# Patient Record
Sex: FEMALE | Race: WHITE | NOT HISPANIC OR LATINO | Employment: OTHER | URBAN - METROPOLITAN AREA
[De-identification: names, ages, dates, MRNs, and addresses within clinical notes are randomized per-mention and may not be internally consistent; named-entity substitution may affect disease eponyms.]

---

## 2020-06-03 ENCOUNTER — HOSPITAL ENCOUNTER (OUTPATIENT)
Dept: LAB | Facility: MEDICAL CENTER | Age: 70
End: 2020-06-03
Attending: PHYSICIAN ASSISTANT
Payer: MEDICARE

## 2020-06-03 LAB
BASOPHILS # BLD AUTO: 0.6 % (ref 0–1.8)
BASOPHILS # BLD: 0.04 K/UL (ref 0–0.12)
CRP SERPL HS-MCNC: 1.2 MG/DL (ref 0–0.75)
EOSINOPHIL # BLD AUTO: 0.12 K/UL (ref 0–0.51)
EOSINOPHIL NFR BLD: 1.9 % (ref 0–6.9)
ERYTHROCYTE [DISTWIDTH] IN BLOOD BY AUTOMATED COUNT: 51.1 FL (ref 35.9–50)
ERYTHROCYTE [SEDIMENTATION RATE] IN BLOOD BY WESTERGREN METHOD: 8 MM/HOUR (ref 0–30)
HCT VFR BLD AUTO: 41.6 % (ref 37–47)
HGB BLD-MCNC: 13.5 G/DL (ref 12–16)
IMM GRANULOCYTES # BLD AUTO: 0.01 K/UL (ref 0–0.11)
IMM GRANULOCYTES NFR BLD AUTO: 0.2 % (ref 0–0.9)
LYMPHOCYTES # BLD AUTO: 2.32 K/UL (ref 1–4.8)
LYMPHOCYTES NFR BLD: 36.4 % (ref 22–41)
MCH RBC QN AUTO: 30.3 PG (ref 27–33)
MCHC RBC AUTO-ENTMCNC: 32.5 G/DL (ref 33.6–35)
MCV RBC AUTO: 93.3 FL (ref 81.4–97.8)
MONOCYTES # BLD AUTO: 0.44 K/UL (ref 0–0.85)
MONOCYTES NFR BLD AUTO: 6.9 % (ref 0–13.4)
NEUTROPHILS # BLD AUTO: 3.45 K/UL (ref 2–7.15)
NEUTROPHILS NFR BLD: 54 % (ref 44–72)
NRBC # BLD AUTO: 0 K/UL
NRBC BLD-RTO: 0 /100 WBC
PLATELET # BLD AUTO: 251 K/UL (ref 164–446)
PMV BLD AUTO: 10.6 FL (ref 9–12.9)
RBC # BLD AUTO: 4.46 M/UL (ref 4.2–5.4)
WBC # BLD AUTO: 6.4 K/UL (ref 4.8–10.8)

## 2020-06-03 PROCEDURE — 86140 C-REACTIVE PROTEIN: CPT

## 2020-06-03 PROCEDURE — 36415 COLL VENOUS BLD VENIPUNCTURE: CPT

## 2020-06-03 PROCEDURE — 85652 RBC SED RATE AUTOMATED: CPT

## 2020-06-03 PROCEDURE — 85025 COMPLETE CBC W/AUTO DIFF WBC: CPT

## 2022-05-04 PROBLEM — I10 ESSENTIAL HYPERTENSION: Status: ACTIVE | Noted: 2022-05-04

## 2022-05-04 PROBLEM — I25.10 CORONARY ATHEROSCLEROSIS: Status: ACTIVE | Noted: 2020-10-14

## 2022-05-04 PROBLEM — G47.39 SLEEP APNEA-LIKE BEHAVIOR: Status: ACTIVE | Noted: 2022-05-04

## 2022-05-04 PROBLEM — G47.14 HYPERSOMNIA DUE TO MEDICAL CONDITION: Status: ACTIVE | Noted: 2022-05-04

## 2022-05-04 PROBLEM — I70.1 RENAL ARTERY STENOSIS (HCC): Status: ACTIVE | Noted: 2022-05-04

## 2022-05-04 PROBLEM — I47.29 PAROXYSMAL VENTRICULAR TACHYCARDIA (HCC): Status: ACTIVE | Noted: 2020-10-14

## 2022-05-04 PROBLEM — E66.9 OBESITY: Status: ACTIVE | Noted: 2020-10-14

## 2022-05-04 PROBLEM — R53.82 CHRONIC FATIGUE: Status: ACTIVE | Noted: 2022-05-04

## 2022-05-04 PROBLEM — I27.20 PULMONARY HYPERTENSION (HCC): Status: ACTIVE | Noted: 2020-10-14

## 2022-05-04 PROBLEM — Z12.31 ENCOUNTER FOR SCREENING MAMMOGRAM FOR MALIGNANT NEOPLASM OF BREAST: Status: ACTIVE | Noted: 2022-05-04

## 2022-05-04 PROBLEM — Z86.73 HISTORY OF STROKE: Status: ACTIVE | Noted: 2020-10-14

## 2022-05-04 PROBLEM — R60.0 BILATERAL LOWER EXTREMITY EDEMA: Status: ACTIVE | Noted: 2022-05-04

## 2022-05-04 PROBLEM — I48.0 PAROXYSMAL ATRIAL FIBRILLATION WITH RAPID VENTRICULAR RESPONSE (HCC): Status: ACTIVE | Noted: 2020-10-14

## 2022-10-06 PROBLEM — M1A.09X0 IDIOPATHIC CHRONIC GOUT OF MULTIPLE SITES WITHOUT TOPHUS: Status: ACTIVE | Noted: 2022-10-06

## 2022-10-30 ENCOUNTER — DOCUMENTATION (OUTPATIENT)
Dept: HOSPITALIST | Facility: MEDICAL CENTER | Age: 72
End: 2022-10-30
Payer: MEDICARE

## 2022-10-30 ENCOUNTER — HOSPITAL ENCOUNTER (INPATIENT)
Facility: MEDICAL CENTER | Age: 72
LOS: 4 days | DRG: 327 | End: 2022-11-03
Attending: STUDENT IN AN ORGANIZED HEALTH CARE EDUCATION/TRAINING PROGRAM | Admitting: INTERNAL MEDICINE
Payer: MEDICARE
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
## 2022-10-30 DIAGNOSIS — G89.18 POSTOPERATIVE PAIN: ICD-10-CM

## 2022-10-30 DIAGNOSIS — T18.128S FOOD IMPACTION OF ESOPHAGUS, SEQUELA: ICD-10-CM

## 2022-10-30 DIAGNOSIS — K31.89 STENOSIS OF STOMACH: ICD-10-CM

## 2022-10-30 PROBLEM — E86.0 DEHYDRATION: Status: ACTIVE | Noted: 2022-10-30

## 2022-10-30 PROBLEM — F32.A DEPRESSION: Status: ACTIVE | Noted: 2022-10-30

## 2022-10-30 PROBLEM — R11.10 VOMITING: Status: ACTIVE | Noted: 2022-10-30

## 2022-10-30 PROBLEM — E03.9 HYPOTHYROIDISM: Status: ACTIVE | Noted: 2022-10-30

## 2022-10-30 PROCEDURE — 99223 1ST HOSP IP/OBS HIGH 75: CPT | Mod: AI | Performed by: INTERNAL MEDICINE

## 2022-10-30 PROCEDURE — 700105 HCHG RX REV CODE 258: Performed by: INTERNAL MEDICINE

## 2022-10-30 PROCEDURE — A9270 NON-COVERED ITEM OR SERVICE: HCPCS | Performed by: INTERNAL MEDICINE

## 2022-10-30 PROCEDURE — 700102 HCHG RX REV CODE 250 W/ 637 OVERRIDE(OP): Performed by: INTERNAL MEDICINE

## 2022-10-30 PROCEDURE — 770001 HCHG ROOM/CARE - MED/SURG/GYN PRIV*

## 2022-10-30 RX ORDER — AMOXICILLIN 875 MG/1
875 TABLET, COATED ORAL 2 TIMES DAILY
Status: ON HOLD | COMMUNITY
Start: 2022-10-29 | End: 2022-11-03

## 2022-10-30 RX ORDER — AMOXICILLIN 250 MG
2 CAPSULE ORAL 2 TIMES DAILY
Status: DISCONTINUED | OUTPATIENT
Start: 2022-10-30 | End: 2022-11-03 | Stop reason: HOSPADM

## 2022-10-30 RX ORDER — ONDANSETRON 2 MG/ML
4 INJECTION INTRAMUSCULAR; INTRAVENOUS EVERY 4 HOURS PRN
Status: DISCONTINUED | OUTPATIENT
Start: 2022-10-30 | End: 2022-11-03 | Stop reason: HOSPADM

## 2022-10-30 RX ORDER — BISACODYL 10 MG
10 SUPPOSITORY, RECTAL RECTAL
Status: DISCONTINUED | OUTPATIENT
Start: 2022-10-30 | End: 2022-11-03 | Stop reason: HOSPADM

## 2022-10-30 RX ORDER — FLUOXETINE HYDROCHLORIDE 20 MG/1
80 CAPSULE ORAL DAILY
Status: DISCONTINUED | OUTPATIENT
Start: 2022-10-31 | End: 2022-11-03 | Stop reason: HOSPADM

## 2022-10-30 RX ORDER — OXYCODONE HYDROCHLORIDE 5 MG/1
5 TABLET ORAL EVERY 4 HOURS PRN
Status: DISCONTINUED | OUTPATIENT
Start: 2022-10-30 | End: 2022-11-03 | Stop reason: HOSPADM

## 2022-10-30 RX ORDER — ONDANSETRON 4 MG/1
4 TABLET, ORALLY DISINTEGRATING ORAL EVERY 4 HOURS PRN
Status: DISCONTINUED | OUTPATIENT
Start: 2022-10-30 | End: 2022-11-03 | Stop reason: HOSPADM

## 2022-10-30 RX ORDER — TRAZODONE HYDROCHLORIDE 50 MG/1
100 TABLET ORAL NIGHTLY
Status: DISCONTINUED | OUTPATIENT
Start: 2022-10-30 | End: 2022-11-03 | Stop reason: HOSPADM

## 2022-10-30 RX ORDER — ALLOPURINOL 100 MG/1
100 TABLET ORAL DAILY
Status: DISCONTINUED | OUTPATIENT
Start: 2022-10-31 | End: 2022-11-03 | Stop reason: HOSPADM

## 2022-10-30 RX ORDER — HYDRALAZINE HYDROCHLORIDE 20 MG/ML
10 INJECTION INTRAMUSCULAR; INTRAVENOUS EVERY 4 HOURS PRN
Status: DISCONTINUED | OUTPATIENT
Start: 2022-10-30 | End: 2022-11-03 | Stop reason: HOSPADM

## 2022-10-30 RX ORDER — ACETAMINOPHEN 325 MG/1
650 TABLET ORAL EVERY 6 HOURS PRN
Status: DISCONTINUED | OUTPATIENT
Start: 2022-10-30 | End: 2022-11-03 | Stop reason: HOSPADM

## 2022-10-30 RX ORDER — SODIUM CHLORIDE 9 MG/ML
INJECTION, SOLUTION INTRAVENOUS CONTINUOUS
Status: DISCONTINUED | OUTPATIENT
Start: 2022-10-30 | End: 2022-10-31

## 2022-10-30 RX ORDER — AMOXICILLIN 875 MG/1
875 TABLET, COATED ORAL 2 TIMES DAILY
Status: DISCONTINUED | OUTPATIENT
Start: 2022-10-30 | End: 2022-10-30

## 2022-10-30 RX ORDER — LANOLIN ALCOHOL/MO/W.PET/CERES
400 CREAM (GRAM) TOPICAL DAILY
Status: DISCONTINUED | OUTPATIENT
Start: 2022-10-31 | End: 2022-10-31

## 2022-10-30 RX ORDER — POLYETHYLENE GLYCOL 3350 17 G/17G
1 POWDER, FOR SOLUTION ORAL
Status: DISCONTINUED | OUTPATIENT
Start: 2022-10-30 | End: 2022-11-03 | Stop reason: HOSPADM

## 2022-10-30 RX ADMIN — SODIUM CHLORIDE: 9 INJECTION, SOLUTION INTRAVENOUS at 21:53

## 2022-10-30 RX ADMIN — OXYCODONE 5 MG: 5 TABLET ORAL at 21:33

## 2022-10-30 RX ADMIN — SENNOSIDES AND DOCUSATE SODIUM 2 TABLET: 50; 8.6 TABLET ORAL at 21:33

## 2022-10-30 RX ADMIN — TRAZODONE HYDROCHLORIDE 100 MG: 50 TABLET ORAL at 21:33

## 2022-10-30 ASSESSMENT — COGNITIVE AND FUNCTIONAL STATUS - GENERAL
SUGGESTED CMS G CODE MODIFIER DAILY ACTIVITY: CH
DAILY ACTIVITIY SCORE: 24
SUGGESTED CMS G CODE MODIFIER MOBILITY: CH
MOBILITY SCORE: 24

## 2022-10-30 ASSESSMENT — ENCOUNTER SYMPTOMS
SHORTNESS OF BREATH: 0
COUGH: 0
HEADACHES: 0
DEPRESSION: 0
LOSS OF CONSCIOUSNESS: 0
FEVER: 0
FALLS: 0
MYALGIAS: 0
CHILLS: 0
ABDOMINAL PAIN: 1
DIZZINESS: 0
CONSTIPATION: 0
SPUTUM PRODUCTION: 0
VOMITING: 1
STRIDOR: 0
TINGLING: 0
PALPITATIONS: 0
NAUSEA: 0
DIARRHEA: 0
WEAKNESS: 0

## 2022-10-30 ASSESSMENT — LIFESTYLE VARIABLES
EVER HAD A DRINK FIRST THING IN THE MORNING TO STEADY YOUR NERVES TO GET RID OF A HANGOVER: NO
TOTAL SCORE: 0
ALCOHOL_USE: NO
HOW MANY TIMES IN THE PAST YEAR HAVE YOU HAD 5 OR MORE DRINKS IN A DAY: 0
EVER FELT BAD OR GUILTY ABOUT YOUR DRINKING: NO
TOTAL SCORE: 0
HAVE PEOPLE ANNOYED YOU BY CRITICIZING YOUR DRINKING: NO
TOTAL SCORE: 0
AVERAGE NUMBER OF DAYS PER WEEK YOU HAVE A DRINK CONTAINING ALCOHOL: 0
HAVE YOU EVER FELT YOU SHOULD CUT DOWN ON YOUR DRINKING: NO
DOES PATIENT WANT TO STOP DRINKING: NO
CONSUMPTION TOTAL: NEGATIVE
ON A TYPICAL DAY WHEN YOU DRINK ALCOHOL HOW MANY DRINKS DO YOU HAVE: 0

## 2022-10-30 ASSESSMENT — PATIENT HEALTH QUESTIONNAIRE - PHQ9
SUM OF ALL RESPONSES TO PHQ9 QUESTIONS 1 AND 2: 0
2. FEELING DOWN, DEPRESSED, IRRITABLE, OR HOPELESS: NOT AT ALL
1. LITTLE INTEREST OR PLEASURE IN DOING THINGS: NOT AT ALL

## 2022-10-30 ASSESSMENT — PAIN DESCRIPTION - PAIN TYPE: TYPE: ACUTE PAIN

## 2022-10-31 ENCOUNTER — APPOINTMENT (OUTPATIENT)
Dept: RADIOLOGY | Facility: MEDICAL CENTER | Age: 72
DRG: 327 | End: 2022-10-31
Attending: SURGERY
Payer: MEDICARE

## 2022-10-31 PROBLEM — R00.1 BRADYCARDIA: Status: ACTIVE | Noted: 2022-10-31

## 2022-10-31 PROBLEM — K04.7 DENTAL INFECTION: Status: ACTIVE | Noted: 2022-10-31

## 2022-10-31 PROBLEM — N17.9 AKI (ACUTE KIDNEY INJURY) (HCC): Status: ACTIVE | Noted: 2022-10-30

## 2022-10-31 PROBLEM — Z98.84 HX OF LAPAROSCOPIC GASTRIC BANDING: Status: ACTIVE | Noted: 2022-10-31

## 2022-10-31 PROBLEM — E83.42 HYPOMAGNESEMIA: Status: ACTIVE | Noted: 2022-10-31

## 2022-10-31 PROBLEM — J96.11 CHRONIC RESPIRATORY FAILURE WITH HYPOXIA (HCC): Status: ACTIVE | Noted: 2022-10-31

## 2022-10-31 LAB
ALBUMIN SERPL BCP-MCNC: 3.8 G/DL (ref 3.2–4.9)
ALBUMIN/GLOB SERPL: 1.4 G/DL
ALP SERPL-CCNC: 62 U/L (ref 30–99)
ALT SERPL-CCNC: 15 U/L (ref 2–50)
ANION GAP SERPL CALC-SCNC: 10 MMOL/L (ref 7–16)
ANION GAP SERPL CALC-SCNC: 10 MMOL/L (ref 7–16)
AST SERPL-CCNC: 23 U/L (ref 12–45)
BILIRUB SERPL-MCNC: 0.3 MG/DL (ref 0.1–1.5)
BUN SERPL-MCNC: 22 MG/DL (ref 8–22)
BUN SERPL-MCNC: 22 MG/DL (ref 8–22)
CALCIUM SERPL-MCNC: 9.1 MG/DL (ref 8.5–10.5)
CALCIUM SERPL-MCNC: 9.1 MG/DL (ref 8.5–10.5)
CHLORIDE SERPL-SCNC: 110 MMOL/L (ref 96–112)
CHLORIDE SERPL-SCNC: 110 MMOL/L (ref 96–112)
CO2 SERPL-SCNC: 23 MMOL/L (ref 20–33)
CO2 SERPL-SCNC: 23 MMOL/L (ref 20–33)
CREAT SERPL-MCNC: 1.03 MG/DL (ref 0.5–1.4)
CREAT SERPL-MCNC: 1.03 MG/DL (ref 0.5–1.4)
EKG IMPRESSION: NORMAL
ERYTHROCYTE [DISTWIDTH] IN BLOOD BY AUTOMATED COUNT: 56.1 FL (ref 35.9–50)
GFR SERPLBLD CREATININE-BSD FMLA CKD-EPI: 57 ML/MIN/1.73 M 2
GLOBULIN SER CALC-MCNC: 2.8 G/DL (ref 1.9–3.5)
GLUCOSE SERPL-MCNC: 81 MG/DL (ref 65–99)
GLUCOSE SERPL-MCNC: 81 MG/DL (ref 65–99)
HCT VFR BLD AUTO: 38.7 % (ref 37–47)
HGB BLD-MCNC: 12.2 G/DL (ref 12–16)
MAGNESIUM SERPL-MCNC: 1.9 MG/DL (ref 1.5–2.5)
MCH RBC QN AUTO: 29.5 PG (ref 27–33)
MCHC RBC AUTO-ENTMCNC: 31.5 G/DL (ref 33.6–35)
MCV RBC AUTO: 93.7 FL (ref 81.4–97.8)
PHOSPHATE SERPL-MCNC: 4 MG/DL (ref 2.5–4.5)
PLATELET # BLD AUTO: 180 K/UL (ref 164–446)
PMV BLD AUTO: 10.1 FL (ref 9–12.9)
POTASSIUM SERPL-SCNC: 4.3 MMOL/L (ref 3.6–5.5)
POTASSIUM SERPL-SCNC: 4.3 MMOL/L (ref 3.6–5.5)
PROT SERPL-MCNC: 6.6 G/DL (ref 6–8.2)
RBC # BLD AUTO: 4.13 M/UL (ref 4.2–5.4)
SODIUM SERPL-SCNC: 143 MMOL/L (ref 135–145)
SODIUM SERPL-SCNC: 143 MMOL/L (ref 135–145)
WBC # BLD AUTO: 4.9 K/UL (ref 4.8–10.8)

## 2022-10-31 PROCEDURE — 700102 HCHG RX REV CODE 250 W/ 637 OVERRIDE(OP): Performed by: INTERNAL MEDICINE

## 2022-10-31 PROCEDURE — 700111 HCHG RX REV CODE 636 W/ 250 OVERRIDE (IP): Performed by: SURGERY

## 2022-10-31 PROCEDURE — 80053 COMPREHEN METABOLIC PANEL: CPT

## 2022-10-31 PROCEDURE — 83735 ASSAY OF MAGNESIUM: CPT

## 2022-10-31 PROCEDURE — A9270 NON-COVERED ITEM OR SERVICE: HCPCS | Performed by: INTERNAL MEDICINE

## 2022-10-31 PROCEDURE — 93005 ELECTROCARDIOGRAM TRACING: CPT | Performed by: FAMILY MEDICINE

## 2022-10-31 PROCEDURE — 93010 ELECTROCARDIOGRAM REPORT: CPT | Performed by: INTERNAL MEDICINE

## 2022-10-31 PROCEDURE — A9270 NON-COVERED ITEM OR SERVICE: HCPCS | Performed by: SURGERY

## 2022-10-31 PROCEDURE — 700111 HCHG RX REV CODE 636 W/ 250 OVERRIDE (IP): Performed by: INTERNAL MEDICINE

## 2022-10-31 PROCEDURE — 700111 HCHG RX REV CODE 636 W/ 250 OVERRIDE (IP): Performed by: FAMILY MEDICINE

## 2022-10-31 PROCEDURE — 85027 COMPLETE CBC AUTOMATED: CPT

## 2022-10-31 PROCEDURE — 700105 HCHG RX REV CODE 258: Performed by: INTERNAL MEDICINE

## 2022-10-31 PROCEDURE — 74240 X-RAY XM UPR GI TRC 1CNTRST: CPT

## 2022-10-31 PROCEDURE — 700117 HCHG RX CONTRAST REV CODE 255: Performed by: SURGERY

## 2022-10-31 PROCEDURE — 84100 ASSAY OF PHOSPHORUS: CPT

## 2022-10-31 PROCEDURE — 36415 COLL VENOUS BLD VENIPUNCTURE: CPT

## 2022-10-31 PROCEDURE — 770001 HCHG ROOM/CARE - MED/SURG/GYN PRIV*

## 2022-10-31 PROCEDURE — 700102 HCHG RX REV CODE 250 W/ 637 OVERRIDE(OP): Performed by: SURGERY

## 2022-10-31 RX ORDER — DEXTROSE AND SODIUM CHLORIDE 5; .9 G/100ML; G/100ML
INJECTION, SOLUTION INTRAVENOUS CONTINUOUS
Status: DISCONTINUED | OUTPATIENT
Start: 2022-10-31 | End: 2022-11-03 | Stop reason: HOSPADM

## 2022-10-31 RX ORDER — MAGNESIUM SULFATE HEPTAHYDRATE 40 MG/ML
2 INJECTION, SOLUTION INTRAVENOUS ONCE
Status: COMPLETED | OUTPATIENT
Start: 2022-10-31 | End: 2022-10-31

## 2022-10-31 RX ORDER — FLUOXETINE HYDROCHLORIDE 40 MG/1
80 CAPSULE ORAL DAILY
COMMUNITY

## 2022-10-31 RX ORDER — CYANOCOBALAMIN 1000 UG/ML
1000 INJECTION, SOLUTION INTRAMUSCULAR; SUBCUTANEOUS ONCE
Status: COMPLETED | OUTPATIENT
Start: 2022-10-31 | End: 2022-10-31

## 2022-10-31 RX ORDER — ASPIRIN 81 MG/1
81 TABLET ORAL DAILY
COMMUNITY

## 2022-10-31 RX ORDER — M-VIT,TX,IRON,MINS/CALC/FOLIC 27MG-0.4MG
1 TABLET ORAL DAILY
Status: DISCONTINUED | OUTPATIENT
Start: 2022-10-31 | End: 2022-11-03 | Stop reason: HOSPADM

## 2022-10-31 RX ADMIN — CYANOCOBALAMIN 1000 MCG: 1000 INJECTION INTRAMUSCULAR; SUBCUTANEOUS at 11:01

## 2022-10-31 RX ADMIN — ONDANSETRON 4 MG: 2 INJECTION INTRAMUSCULAR; INTRAVENOUS at 00:17

## 2022-10-31 RX ADMIN — OXYCODONE 5 MG: 5 TABLET ORAL at 17:49

## 2022-10-31 RX ADMIN — AMPICILLIN AND SULBACTAM 3 G: 1; 2 INJECTION, POWDER, FOR SOLUTION INTRAMUSCULAR; INTRAVENOUS at 05:08

## 2022-10-31 RX ADMIN — OXYCODONE 5 MG: 5 TABLET ORAL at 08:56

## 2022-10-31 RX ADMIN — MAGNESIUM OXIDE TAB 400 MG (241.3 MG ELEMENTAL MG) 400 MG: 400 (241.3 MG) TAB at 05:09

## 2022-10-31 RX ADMIN — AMPICILLIN AND SULBACTAM 3 G: 1; 2 INJECTION, POWDER, FOR SOLUTION INTRAMUSCULAR; INTRAVENOUS at 17:50

## 2022-10-31 RX ADMIN — LEVOTHYROXINE SODIUM 200 MCG: 0.11 TABLET ORAL at 05:08

## 2022-10-31 RX ADMIN — THIAMINE HYDROCHLORIDE 100 MG: 100 INJECTION, SOLUTION INTRAMUSCULAR; INTRAVENOUS at 11:00

## 2022-10-31 RX ADMIN — MULTIPLE VITAMINS W/ MINERALS TAB 1 TABLET: TAB at 11:01

## 2022-10-31 RX ADMIN — SENNOSIDES AND DOCUSATE SODIUM 2 TABLET: 50; 8.6 TABLET ORAL at 05:12

## 2022-10-31 RX ADMIN — AMPICILLIN AND SULBACTAM 3 G: 1; 2 INJECTION, POWDER, FOR SOLUTION INTRAMUSCULAR; INTRAVENOUS at 00:03

## 2022-10-31 RX ADMIN — FLUOXETINE 80 MG: 20 CAPSULE ORAL at 05:09

## 2022-10-31 RX ADMIN — TRAZODONE HYDROCHLORIDE 100 MG: 50 TABLET ORAL at 21:42

## 2022-10-31 RX ADMIN — ALLOPURINOL 100 MG: 100 TABLET ORAL at 05:08

## 2022-10-31 RX ADMIN — BARIUM SULFATE 1 TABLET: 700 TABLET ORAL at 14:30

## 2022-10-31 RX ADMIN — AMPICILLIN AND SULBACTAM 3 G: 1; 2 INJECTION, POWDER, FOR SOLUTION INTRAMUSCULAR; INTRAVENOUS at 11:57

## 2022-10-31 RX ADMIN — MAGNESIUM SULFATE HEPTAHYDRATE 2 G: 40 INJECTION, SOLUTION INTRAVENOUS at 09:42

## 2022-10-31 ASSESSMENT — PAIN DESCRIPTION - PAIN TYPE
TYPE: ACUTE PAIN

## 2022-10-31 NOTE — PROGRESS NOTES
Med Rec Complete per patient  Allergies Reviewed with patient  Patient's Preferred Pharmacy: [facility] in [place], [place]      Patient started a 10 day course of Augmentin 875mg tablets on 10/29/22 and stopped this therapy on 10/30/22 upon admission to hospital and has continued IV antibiotics.

## 2022-10-31 NOTE — ASSESSMENT & PLAN NOTE
- Without nausea  -In patient with history of lap band surgery by Dr. Vega  -She does feel like the food makes it all the way to her stomach however then she begins having some pain and continues to vomit until the food comes up, mild distal esophageal wall thickening on CT scan, will likely need intervention  -May need to discuss the case with Dr. Vega and/or gastroenterology  -She has been tolerating fluids however she is dehydrated I will start IV fluids  -Trial of clear liquid diet, otherwise we will make her n.p.o. at midnight  -No pain with swallowing  -Change on CT scan could be secondary to her vomiting however there could be esophageal narrowing

## 2022-10-31 NOTE — DIETARY
NUTRITION SERVICES: BMI - Pt with BMI >40 (=Body mass index is 40.83 kg/m².), Class III obesity. Weight loss counseling not appropriate in acute care setting. RECOMMEND - If appropriate at DC please refer to outpatient nutrition services for weight management.

## 2022-10-31 NOTE — PROGRESS NOTES
[facility]PITAL[facility] TRIAGE OFFICER DIRECT ADMISSION REPORT    Transferring facility: [facility]    Transferring physician: Dr Eid    Transferring facility/physician contact number: [phone]    Chief complaint: Nausea and Vomiting    Pertinent history & patient course: 73 yo Woman with Afib, HTN, CAD, CKD3 presented to [place] with 3 days of post-prandial pain and N/V. She has history of bariatric surgery with Dr. Vega. Dr. Stanton Pretson. She recently completed a course of augmentin.    Pertinent imaging & lab results: Hgb normal. CT demonstrates GE junction edema without obstruction. BMP without hyponatremia, hypokalemia. Ekg and troponins not indicative of cardiac ischemia.    Code Status: Full Code Full Intubation per transferring provider, I personally verified with the transferring provider patient's code status and the transferring provider has confirmed this with the patient.    Further work up or recommendations per triage officer prior to transfer: Trial of PO liquids after antiemetic administration.    Consultants called prior to transfer and pertinent input from consultants:   Bariatric Surgery - transfer for endoscopy    Patient accepted for transfer: Yes    Consultants to be called upon arrival: Gastroenterology    Admission status: Inpatient.     Floor requested: Medical    If ICU transfer, name of intensivist case discussed with and pertinent input from critical care: N/A    Please inform the triage officer upon arrival of the patient to [facility] for assignment of a hospitalist to perform admission.     For any question or concerns regarding the care of this patient, please reach out to the assigned hospitalist.

## 2022-10-31 NOTE — CARE PLAN
The patient is Stable - Low risk of patient condition declining or worsening    Shift Goals  Clinical Goals: Admit, safety, pain control, rest  Patient Goals: Rest    Progress made toward(s) clinical / shift goals:  Pt resting. Admission profile complete. Pt educated on safety, call light within reach, pain controlled w/ medication per MD order.     Patient is not progressing towards the following goals:

## 2022-10-31 NOTE — DISCHARGE PLANNING
"Case Management Discharge Planning    Admission Date: 10/30/2022  GMLOS: 3  ALOS: 1    6-Clicks ADL Score: 24  6-Clicks Mobility Score: 24      Anticipated Discharge Dispo: Likely Home with Close OP follow Up    DME Needed: No    Action(s) Taken: Updated Provider/Nurse on Discharge Plan    Pt was discussed in IDT rounds today and per Dr Celaya pt might be scheduled for Surgery tomorrow.    Per Surgery Notes today\"  Will proceed with additional workup via Fluoro UGI w/ Barium today and will schedule patient for a laparoscopic gastric band/port removal and simultaneous EGD at the earliest convenience (pending OR availability - likely Tues vs Wed of this week).\"    Escalations Completed: None    Medically Clear: No    Next Steps:   This RN CM to continue to assist Pt with discharge as needed    Barriers to Discharge: Medical clearance    Is the patient up for discharge tomorrow: No        "

## 2022-10-31 NOTE — PROGRESS NOTES
Pt arrived to [facility] w/ belongings via [facility].   Pt A&Ox4  Denies tolerating clear liquid diet, intermittant n/v. Hypoactive bowel sounds, passing flatus, LBM PTA. IV access through RAC that is SL.  Saturating >90% on 1L NC.  Pt ambulates SBA.  Pain is controlled through medication orders. Updated on plan of care. Safety education provided. Bed locked in low. Call light within reach. Rounding in place.

## 2022-10-31 NOTE — CONSULTS
BARIATRIC AND GASTROESOPHAGEAL SURGERY  [facility]  CONSULT NOTE    Date  10/31/2022    Primary Care Physician  Drew T. Blumberg, D.O.    CC  Dysphagia, regurgitation, nausea & vomiting, poor PO tolerance    HPI  This is a 72 y.o. female, with prior history of open Chris-en-Y gastric bypass in 2006 (performed in [place], [place]) for morbid obesity, and s/p laparoscopic adjustable gastric band & port placement in 8/2018 for weight regain. She [facility] evaluated at [facility] on 10/30/2022 for ~ 3 day history of progressive dysphagia (solids > liquids), associated with regurgitation, some nausea with vomiting, and almost complete intolerance to PO intake. O/w, no recent history of fevers/chills, hematemesis, odynophagia, CP/SOB, bloating/belching, worsening abdominal pain, dysuria/hematuria, BRBPR/melena, or constipation/diarrhea. No other symptoms or complaints at this time. No recent history of unintentional weight loss.    Past Medical History:   Diagnosis Date    Hypertension     Pulmonary HTN (HCC)     moderate     Sleep apnea     mild JOSH    not on CPAP or O2    Stroke (HCC)     Thyroid disease     Tricuspid valve regurgitation        Past Surgical History:   Procedure Laterality Date    COLONOSCOPY - ENDO  12/8/2016    Procedure: COLONOSCOPY - ENDO with Anesthesia;  Surgeon: Raffaele Ritter M.D.;  Location: SURGERY [facility];  Service:     KNEE REPLACEMENT, TOTAL Right 3/15/2016    Dr Horowitz    CATARACT EXTRACTION WITH IOL Bilateral 2016    CARDIAC CATH, RIGHT/LEFT HEART  12/2015    nonobstructive CAD EF 60%    THYROIDECTOMY  3/2012    COLONOSCOPY  2010    GASTRIC BYPASS LAPAROSCOPIC  2006    Chris-en-Y    HYSTERECTOMY, TOTAL ABDOMINAL         Current Facility-Administered Medications   Medication Dose Route Frequency Provider Last Rate Last Admin    allopurinol (ZYLOPRIM) tablet 100 mg  100 mg Oral DAILY Sidney Stone D.O.   100 mg at 10/31/22 0507    FLUoxetine (PROZAC) capsule 80 mg  80 mg Oral  DAILY Sidney Stone D.O.   80 mg at 10/31/22 0509    levothyroxine (SYNTHROID) tablet 200 mcg  200 mcg Oral AM ES Sidney Stone D.O.   200 mcg at 10/31/22 0508    magnesium oxide tablet 400 mg  400 mg Oral DAILY Sidney Stone D.O.   400 mg at 10/31/22 0509    traZODone (DESYREL) tablet 100 mg  100 mg Oral Nightly Sidney Stone D.O.   100 mg at 10/30/22 2133    senna-docusate (PERICOLACE or SENOKOT S) 8.6-50 MG per tablet 2 Tablet  2 Tablet Oral BID Sidney Stone D.O.   2 Tablet at 10/31/22 0512    And    polyethylene glycol/lytes (MIRALAX) PACKET 1 Packet  1 Packet Oral QDAY PRN Sidney Stone D.O.        And    magnesium hydroxide (MILK OF MAGNESIA) suspension 30 mL  30 mL Oral QDAY PRN Sidney Stone D.O.        And    bisacodyl (DULCOLAX) suppository 10 mg  10 mg Rectal QDAY PRN Sidney Stone D.O.        NS infusion   Intravenous Continuous Sidney Stone D.O. 83 mL/hr at 10/30/22 2153 New Bag at 10/30/22 2153    acetaminophen (Tylenol) tablet 650 mg  650 mg Oral Q6HRS PRN Sidney Stone D.O.        hydrALAZINE (APRESOLINE) injection 10 mg  10 mg Intravenous Q4HRS PRN Sidney Stone D.O.        ondansetron (ZOFRAN) syringe/vial injection 4 mg  4 mg Intravenous Q4HRS PRN Sidney Stone D.O.   4 mg at 10/31/22 0017    ondansetron (ZOFRAN ODT) dispertab 4 mg  4 mg Oral Q4HRS PRN Sidney Stone D.O.        oxyCODONE immediate-release (ROXICODONE) tablet 5 mg  5 mg Oral Q4HRS PRN Sidney Stone D.O.   5 mg at 10/30/22 2133    ampicillin/sulbactam (UNASYN) 3 g in  mL IVPB  3 g Intravenous Q6HRS Sidney Stone D.O.   Stopped at 10/31/22 0538       Social History     Socioeconomic History    Marital status:      Spouse name: Not on file    Number of children: Not on file    Years of education: Not on file    Highest education level: Not on file   Occupational History    Not on file   Tobacco Use    Smoking status: Never    Smokeless tobacco: Never   Vaping Use    Vaping  Use: Never used   Substance and Sexual Activity    Alcohol use: No    Drug use: No    Sexual activity: Not on file   Other Topics Concern    Not on file   Social History Narrative    Not on file     Social Determinants of Health     Financial Resource Strain: Not on file   Food Insecurity: Not on file   Transportation Needs: Not on file   Physical Activity: Not on file   Stress: Not on file   Social Connections: Not on file   Intimate Partner Violence: Not on file   Housing Stability: Not on file       Family History   Problem Relation Age of Onset    Heart Attack Mother        Allergies  Iodine    Review of Systems  Negative except for what's noted in the HPI/PMH.    Vital Signs  Blood Pressure : 100/52   Temperature: 35.9 °C (96.7 °F)   Pulse: (!) 51   Respiration: 16   Pulse Oximetry: 96 %     Gen - comfortable, NAD, A&O x 3  HEENT - anicteric, PERRLA, no tracheal deviation  CV - regular rate and rhythm  Pulm - normal chest excursion, CTA - B  Abd - soft, no TTP, no rebound/guarding, no distention, no palp masses or bulges; gastric port is palp in LUQ - no evidence of overlying skin changes or fluctuance  Ext - full range of motion, no clubbing, cyanosis or edema bilaterally  Neuro - no focal deficits, CN II-XII are grossly intact  Skin - no rashes/lesions, no open wounds, no jaundice    Labs:  Recent Labs     10/31/22  0643   WBC 4.9   RBC 4.13*   HEMOGLOBIN 12.2   HEMATOCRIT 38.7   MCV 93.7   MCH 29.5   MCHC 31.5*   RDW 56.1*   PLATELETCT 180   MPV 10.1     Recent Labs     10/31/22  0643   SODIUM 143  143   POTASSIUM 4.3  4.3   CHLORIDE 110  110   CO2 23  23   GLUCOSE 81  81   BUN 22  22   CREATININE 1.03  1.03   CALCIUM 9.1  9.1         Recent Labs     10/31/22  0643   ASTSGOT 23   ALTSGPT 15   TBILIRUBIN 0.3   ALKPHOSPHAT 62   GLOBULIN 2.8       Assessment/Plan:  Possible gastric band slippage causing gastric outlet obstruction, s/p open Chris-en-Y gastric bypass in 2006 for morbid obesity, followed by  laparoscopic adjustable gastric band & port placement in 8/2018 for weight regain. Other Ddx includes: EGJOO (either due to benign or malignant causes), GJ anastomosis stricture, internal hernia, SBO due to adhesions, etc.    Will proceed with additional workup via Fluoro UGI w/ Barium today and will schedule patient for a laparoscopic gastric band/port removal and simultaneous EGD at the earliest convenience (pending OR availability - likely Tues vs Wed of this week). If additional findings are noted on Fluoro UGI, which require adjustment to the plan, both patient and her primary team will be notified ASAP. Will also write for IV Vitamin supplementation given history of RYGB and poor PO intake.    Thank you for giving us this opportunity to care for your patient.    Eitan Preston MD MPH FACS [facility]  Bariatric and Gastroesophageal Surgery  [facility]  Clinical Assistant Professor of Surgery and Physiology & Cell Biology  [facility]

## 2022-10-31 NOTE — PROGRESS NOTES
Assumed care, bedside report received from Bernie CADET. Initial assessment completed, orders reviewed, call light within reach, and hourly rounding in place. POC addressed with patient, no additional questions at this time.

## 2022-10-31 NOTE — H&P
Hospital Medicine History & Physical Note    Date of Service  10/30/2022    Primary Care Physician  Drew T. Blumberg, D.O.    Consultants  None    Specialist Names: None    Code Status  Full Code    Chief Complaint  No chief complaint on file.      History of Presenting Illness  Azalea Gan is a 72 y.o. female who presented 10/30/2022 with vomiting.  Patient states her symptoms started 3 days ago.  She tried to have a small amount of food, within a couple of minutes she had vomited it back up.  She states this has happened for the last 3 days, today she felt she was becoming more dehydrated and since her symptoms persisted she presented to an outside facility.  She was transferred here for higher level of care.  She does have a history of a Lap-Band surgery with Dr. Vega, states the only issue she has had in the past is if she eats too rapidly then she vomits everything up.  She states she can keep her pills down as well as fluids but any solid food she vomits.  She denies any nausea.  She states what happens that she will eat and then she will feel it get to her stomach and then she began to have some mild upper quadrant cramping and then she will began vomiting.  She states at first she will vomit a lot of fluid and then eventually she will get the food out but will continue to vomit until she gets it all out.  After she gets the food out, she has no symptoms.  However, next time she tries to eat she will have the same occurrence.  Patient denies any nausea, fever, chills.    I discussed the plan of care with patient.    Review of Systems  Review of Systems   Constitutional:  Negative for chills, fever and malaise/fatigue.   HENT:  Negative for congestion.    Respiratory:  Negative for cough, sputum production, shortness of breath and stridor.    Cardiovascular:  Negative for chest pain, palpitations and leg swelling.   Gastrointestinal:  Positive for abdominal pain and vomiting. Negative for constipation,  diarrhea and nausea.   Genitourinary:  Negative for dysuria and urgency.   Musculoskeletal:  Negative for falls and myalgias.   Neurological:  Negative for dizziness, tingling, loss of consciousness, weakness and headaches.   Psychiatric/Behavioral:  Negative for depression and suicidal ideas.    All other systems reviewed and are negative.    Past Medical History   has a past medical history of Hypertension, Pulmonary HTN (HCC), Sleep apnea, Stroke (HCC), Thyroid disease, and Tricuspid valve regurgitation.    Surgical History   has a past surgical history that includes gastric bypass laparoscopic (2006); hysterectomy, total abdominal; thyroidectomy (3/2012); knee replacement, total (Right, 3/15/2016); cardiac cath, right/left heart (12/2015); cataract extraction with iol (Bilateral, 2016); colonoscopy (2010); and colonoscopy - endo (12/8/2016).     Family History  family history includes Heart Attack in her mother.   Family history reviewed with patient. There is no family history that is pertinent to the chief complaint.     Social History   reports that she has never smoked. She has never used smokeless tobacco. She reports that she does not drink alcohol and does not use drugs.    Allergies  Allergies   Allergen Reactions    Iodine Anaphylaxis       Medications  Prior to Admission Medications   Prescriptions Last Dose Informant Patient Reported? Taking?   allopurinol (ZYLOPRIM) 100 MG Tab   No No   Sig: Take 1 Tablet by mouth every day.   amLODIPine (NORVASC) 10 MG Tab   No No   Sig: Take 1 Tablet by mouth every day.   chlorthalidone (HYGROTON) 25 MG Tab   No No   Sig: Take 1 Tablet by mouth every day.   cloNIDine (CATAPRES) 0.1 MG Tab   Yes No   Sig: Take 0.1 mg by mouth as needed.   fluoxetine (PROZAC) 40 MG capsule   No No   Sig: Take 2 Capsules by mouth every day for 180 days.   furosemide (LASIX) 20 MG Tab   No No   Sig: Take 1 Tablet by mouth every day for 180 days.   levothyroxine (SYNTHROID) 200 MCG Tab    No No   Sig: Take 1 Tablet by mouth every morning on an empty stomach.   lisinopril (PRINIVIL) 40 MG tablet   No No   Sig: Take 1 Tablet by mouth every day for 180 days.   magnesium oxide (MAG-OX) 400 MG Tab tablet   Yes No   Sig: Take 400 mg by mouth every day.   metoprolol tartrate (LOPRESSOR) 25 MG Tab   No No   Sig: Take 1 Tablet by mouth 2 times a day for 360 days.   potassium chloride SA (K-DUR) 10 MEQ Tab CR   No No   Sig: Take 1 Tablet by mouth every day.   traZODone (DESYREL) 100 MG Tab   No No   Sig: Take 1 Tablet by mouth every evening for 360 days.      Facility-Administered Medications: None       Physical Exam  Temp:  [36.4 °C (97.5 °F)] 36.4 °C (97.5 °F)  Pulse:  [48] 48  Resp:  [16] 16  BP: (102)/(55) 102/55  SpO2:  [88 %] 88 %  Blood Pressure : 102/55   Temperature: 36.4 °C (97.5 °F)   Pulse: (!) 48   Respiration: 16   Pulse Oximetry: 88 %       Physical Exam  Vitals and nursing note reviewed.   Constitutional:       General: She is not in acute distress.     Appearance: She is well-developed. She is obese. She is not diaphoretic.   HENT:      Head: Normocephalic and atraumatic.      Right Ear: External ear normal.      Left Ear: External ear normal.      Nose: Nose normal. No congestion or rhinorrhea.      Mouth/Throat:      Mouth: Mucous membranes are dry.      Pharynx: No oropharyngeal exudate.   Eyes:      General:         Right eye: No discharge.         Left eye: No discharge.   Neck:      Trachea: No tracheal deviation.   Cardiovascular:      Rate and Rhythm: Normal rate and regular rhythm.      Heart sounds: No murmur heard.    No friction rub. No gallop.   Pulmonary:      Effort: Pulmonary effort is normal. No respiratory distress.      Breath sounds: Normal breath sounds. No stridor. No wheezing or rales.   Chest:      Chest wall: No tenderness.   Abdominal:      General: Bowel sounds are normal. There is no distension.      Palpations: Abdomen is soft.      Tenderness: There is no  abdominal tenderness.   Musculoskeletal:         General: No tenderness. Normal range of motion.      Cervical back: Neck supple.      Right lower leg: No edema.      Left lower leg: No edema.   Lymphadenopathy:      Cervical: No cervical adenopathy.   Skin:     General: Skin is warm and dry.      Findings: No erythema or rash.   Neurological:      General: No focal deficit present.      Mental Status: She is alert and oriented to person, place, and time.      Cranial Nerves: No cranial nerve deficit.   Psychiatric:         Mood and Affect: Mood normal.         Behavior: Behavior normal.         Thought Content: Thought content normal.         Judgment: Judgment normal.       Laboratory:  Labs from outside facility: Sodium 141 potassium 3.7 chloride 107 CO2 26 BUN 29 creatinine 1.17 glucose is 95 alk phos 68 ALT 19 AST 21 total bilirubin 0.4 White blood cell count 4.72 hemoglobin 12.9 hematocrit 40.7 platelets 224      No results for input(s): ALTSGPT, ASTSGOT, ALKPHOSPHAT, TBILIRUBIN, DBILIRUBIN, GAMMAGT, AMYLASE, LIPASE, ALB, PREALBUMIN, GLUCOSE in the last 72 hours.      No results for input(s): NTPROBNP in the last 72 hours.      No results for input(s): TROPONINT in the last 72 hours.    Imaging:  OUTSIDE IMAGES-DX CHEST   Final Result      OUTSIDE IMAGES-CT ABDOMEN /PELVIS   Final Result      Imaging from the outside facility: CT abdomen/pelvis-no bowel wall thickening or evidence obstruction, mild distal esophageal wall thickening  Chest x-ray-mild cardiomegaly and mild central pulmonary vascular congestion without significant interstitial edema    no X-Ray or EKG requiring interpretation    Assessment/Plan:  Justification for Admission Status  I anticipate this patient will require at least two midnights for appropriate medical management, necessitating inpatient admission because of vomiting without nausea and patient with a history of Lap-Band surgery    Patient will need a Med/Surg bed on MEDICAL service  .  The need is secondary to ongoing vomiting without nausea, history of lap band surgery.    * Vomiting- (present on admission)  Assessment & Plan  - Without nausea  -In patient with history of lap band surgery by Dr. Vega  -She does feel like the food makes it all the way to her stomach however then she begins having some pain and continues to vomit until the food comes up, mild distal esophageal wall thickening on CT scan, will likely need intervention  -May need to discuss the case with Dr. Vega and/or gastroenterology  -She has been tolerating fluids however she is dehydrated I will start IV fluids  -Trial of clear liquid diet, otherwise we will make her n.p.o. at midnight  -No pain with swallowing  -Change on CT scan could be secondary to her vomiting however there could be esophageal narrowing    Dehydration- (present on admission)  Assessment & Plan  - Due to decreased oral intake from vomiting  -Start IV fluids  -Patient is on chlorthalidone and Lasix at home, will hold these but I would anticipate when she is eating more she will need these to be resumed    Hypothyroidism- (present on admission)  Assessment & Plan  - Continue home Synthroid at 200 mcg daily  -No recent TSH    Depression- (present on admission)  Assessment & Plan  - Continue home meds    Idiopathic chronic gout of multiple sites without tophus- (present on admission)  Assessment & Plan  - No acute flare, continue home allopurinol    Paroxysmal atrial fibrillation (HCC)- (present on admission)  Assessment & Plan  - Currently appears sinus  -She is not on full dose anticoagulation and I will not start any at this time, I do anticipate intervention so will not start anticoagulation at this time  -Hold home metoprolol until blood pressure improves    Obesity- (present on admission)  Assessment & Plan  - Chronic, would benefit from diet and exercise adjustment    Essential hypertension- (present on admission)  Assessment & Plan  - Blood  pressure is borderline low currently, I do feel this is due to dehydration  -Fluid bolus as needed, but for now we will hold her home meds including amlodipine, lisinopril, metoprolol as well as her Lasix and chlorthalidone      VTE prophylaxis: SCDs/TEDs

## 2022-11-01 ENCOUNTER — ANESTHESIA EVENT (OUTPATIENT)
Dept: SURGERY | Facility: MEDICAL CENTER | Age: 72
DRG: 327 | End: 2022-11-01
Payer: MEDICARE
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
## 2022-11-01 PROBLEM — T18.128A FOOD IMPACTION OF ESOPHAGUS: Status: ACTIVE | Noted: 2022-11-01

## 2022-11-01 PROBLEM — K31.89: Status: ACTIVE | Noted: 2022-11-01

## 2022-11-01 PROCEDURE — 94760 N-INVAS EAR/PLS OXIMETRY 1: CPT

## 2022-11-01 PROCEDURE — A9270 NON-COVERED ITEM OR SERVICE: HCPCS | Performed by: INTERNAL MEDICINE

## 2022-11-01 PROCEDURE — 700105 HCHG RX REV CODE 258: Performed by: FAMILY MEDICINE

## 2022-11-01 PROCEDURE — 99233 SBSQ HOSP IP/OBS HIGH 50: CPT | Performed by: STUDENT IN AN ORGANIZED HEALTH CARE EDUCATION/TRAINING PROGRAM

## 2022-11-01 PROCEDURE — 700102 HCHG RX REV CODE 250 W/ 637 OVERRIDE(OP): Performed by: INTERNAL MEDICINE

## 2022-11-01 PROCEDURE — 700111 HCHG RX REV CODE 636 W/ 250 OVERRIDE (IP): Performed by: SURGERY

## 2022-11-01 PROCEDURE — 700111 HCHG RX REV CODE 636 W/ 250 OVERRIDE (IP): Performed by: INTERNAL MEDICINE

## 2022-11-01 PROCEDURE — A9270 NON-COVERED ITEM OR SERVICE: HCPCS | Performed by: SURGERY

## 2022-11-01 PROCEDURE — 700105 HCHG RX REV CODE 258: Performed by: SURGERY

## 2022-11-01 PROCEDURE — 700105 HCHG RX REV CODE 258: Performed by: INTERNAL MEDICINE

## 2022-11-01 PROCEDURE — 700102 HCHG RX REV CODE 250 W/ 637 OVERRIDE(OP): Performed by: SURGERY

## 2022-11-01 PROCEDURE — 770001 HCHG ROOM/CARE - MED/SURG/GYN PRIV*

## 2022-11-01 RX ORDER — HYDROMORPHONE HYDROCHLORIDE 1 MG/ML
0.5 INJECTION, SOLUTION INTRAMUSCULAR; INTRAVENOUS; SUBCUTANEOUS EVERY 4 HOURS PRN
Status: DISCONTINUED | OUTPATIENT
Start: 2022-11-01 | End: 2022-11-03

## 2022-11-01 RX ADMIN — HYDROMORPHONE HYDROCHLORIDE 0.5 MG: 1 INJECTION, SOLUTION INTRAMUSCULAR; INTRAVENOUS; SUBCUTANEOUS at 17:23

## 2022-11-01 RX ADMIN — AMPICILLIN AND SULBACTAM 3 G: 1; 2 INJECTION, POWDER, FOR SOLUTION INTRAMUSCULAR; INTRAVENOUS at 11:23

## 2022-11-01 RX ADMIN — DEXTROSE AND SODIUM CHLORIDE: 5; 900 INJECTION, SOLUTION INTRAVENOUS at 05:41

## 2022-11-01 RX ADMIN — AMPICILLIN AND SULBACTAM 3 G: 1; 2 INJECTION, POWDER, FOR SOLUTION INTRAMUSCULAR; INTRAVENOUS at 00:09

## 2022-11-01 RX ADMIN — ACETAMINOPHEN 650 MG: 325 TABLET, FILM COATED ORAL at 04:26

## 2022-11-01 RX ADMIN — MULTIPLE VITAMINS W/ MINERALS TAB 1 TABLET: TAB at 04:26

## 2022-11-01 RX ADMIN — OXYCODONE 5 MG: 5 TABLET ORAL at 20:58

## 2022-11-01 RX ADMIN — AMPICILLIN AND SULBACTAM 3 G: 1; 2 INJECTION, POWDER, FOR SOLUTION INTRAMUSCULAR; INTRAVENOUS at 17:23

## 2022-11-01 RX ADMIN — ALLOPURINOL 100 MG: 100 TABLET ORAL at 04:26

## 2022-11-01 RX ADMIN — THIAMINE HYDROCHLORIDE 100 MG: 100 INJECTION, SOLUTION INTRAMUSCULAR; INTRAVENOUS at 05:42

## 2022-11-01 RX ADMIN — HYDROMORPHONE HYDROCHLORIDE 0.5 MG: 1 INJECTION, SOLUTION INTRAMUSCULAR; INTRAVENOUS; SUBCUTANEOUS at 21:52

## 2022-11-01 RX ADMIN — AMPICILLIN AND SULBACTAM 3 G: 1; 2 INJECTION, POWDER, FOR SOLUTION INTRAMUSCULAR; INTRAVENOUS at 04:26

## 2022-11-01 RX ADMIN — TRAZODONE HYDROCHLORIDE 100 MG: 50 TABLET ORAL at 20:46

## 2022-11-01 RX ADMIN — DEXTROSE AND SODIUM CHLORIDE: 5; 900 INJECTION, SOLUTION INTRAVENOUS at 20:58

## 2022-11-01 RX ADMIN — OXYCODONE 5 MG: 5 TABLET ORAL at 01:20

## 2022-11-01 RX ADMIN — HYDROMORPHONE HYDROCHLORIDE 0.5 MG: 1 INJECTION, SOLUTION INTRAMUSCULAR; INTRAVENOUS; SUBCUTANEOUS at 11:26

## 2022-11-01 RX ADMIN — FLUOXETINE 80 MG: 20 CAPSULE ORAL at 04:26

## 2022-11-01 RX ADMIN — LEVOTHYROXINE SODIUM 200 MCG: 0.11 TABLET ORAL at 04:26

## 2022-11-01 ASSESSMENT — PAIN DESCRIPTION - PAIN TYPE
TYPE: ACUTE PAIN

## 2022-11-01 ASSESSMENT — ENCOUNTER SYMPTOMS
EYES NEGATIVE: 1
CARDIOVASCULAR NEGATIVE: 1
CONSTITUTIONAL NEGATIVE: 1
BACK PAIN: 1
NEUROLOGICAL NEGATIVE: 1
GASTROINTESTINAL NEGATIVE: 1
RESPIRATORY NEGATIVE: 1
PSYCHIATRIC NEGATIVE: 1

## 2022-11-01 NOTE — CARE PLAN
The patient is Stable - Low risk of patient condition declining or worsening    Shift Goals  Clinical Goals: IV, rest  Patient Goals: Rest    Progress made toward(s) clinical / shift goals:  Pt resting. New IV placed. Fluids running.     Patient is not progressing towards the following goals:

## 2022-11-01 NOTE — CARE PLAN
The patient is Stable - Low risk of patient condition declining or worsening    Shift Goals  Clinical Goals: pain control, ambulate  Patient Goals: rest, pain control    Progress made toward(s) clinical / shift goals:  pain controlled, ambulating to bathroom    Patient is not progressing towards the following goals:        Problem: Knowledge Deficit - Standard  Goal: Patient and family/care givers will demonstrate understanding of plan of care, disease process/condition, diagnostic tests and medications  Outcome: Progressing     Problem: Pain - Standard  Goal: Alleviation of pain or a reduction in pain to the patient’s comfort goal  Outcome: Progressing

## 2022-11-01 NOTE — PROGRESS NOTES
"[facility]  Progress Note    HD#2, admitted with progressive dysphagia (solids > liquids), associated with regurgitation, some nausea with vomiting, and almost complete intolerance to PO intake, in the setting of open Chris-en-Y gastric bypass in 2006 (performed in [place], [place]) for morbid obesity, and s/p laparoscopic adjustable gastric band & port placement in 8/2018 for weight regain.    Subjective:     Able to swallow her saliva and some liquids. However, pills/tabs and solid foods get stuck at the level of xiphoid. Notes low back pain that is not controlled with oral pain tabs (due to gastric outlet obstruction) and is requesting IV pain meds. Otherwise, no recent history of fevers/chills, hematemesis, CP/SOB, bloating/belching, worsening abdominal pain, dysuria/hematuria, BRBPR/melena, or constipation/diarrhea. No other symptoms or complaints at this time.    Underwent a Fluoro UGI study yesterday (see below for results) - noted to have significant obstruction at the level of gastric band. In addition, likely has some retained food in distal esophagus / proximal stomach (based on a filling defect on that same fluoro study).    Objective:    /60   Pulse (!) 54   Temp 36.2 °C (97.1 °F) (Temporal)   Resp 17   Ht 1.626 m (5' 4\")   Wt 108 kg (237 lb 14 oz)   SpO2 94%     I/O last 3 completed shifts:  In: 200   Out: 0     Current Facility-Administered Medications   Medication Dose    HYDROmorphone (Dilaudid) injection 0.5 mg  0.5 mg    D5 NS infusion      thiamine (B-1) IVPB 100 mg in 100 mL D5W (premix)  100 mg    therapeutic multivitamin-minerals (THERAGRAN-M) tablet 1 Tablet  1 Tablet    allopurinol (ZYLOPRIM) tablet 100 mg  100 mg    FLUoxetine (PROZAC) capsule 80 mg  80 mg    levothyroxine (SYNTHROID) tablet 200 mcg  200 mcg    traZODone (DESYREL) tablet 100 mg  100 mg    senna-docusate (PERICOLACE or SENOKOT S) 8.6-50 MG per tablet 2 Tablet  2 Tablet    And    polyethylene glycol/lytes " (MIRALAX) PACKET 1 Packet  1 Packet    And    magnesium hydroxide (MILK OF MAGNESIA) suspension 30 mL  30 mL    And    bisacodyl (DULCOLAX) suppository 10 mg  10 mg    acetaminophen (Tylenol) tablet 650 mg  650 mg    hydrALAZINE (APRESOLINE) injection 10 mg  10 mg    ondansetron (ZOFRAN) syringe/vial injection 4 mg  4 mg    ondansetron (ZOFRAN ODT) dispertab 4 mg  4 mg    oxyCODONE immediate-release (ROXICODONE) tablet 5 mg  5 mg    ampicillin/sulbactam (UNASYN) 3 g in  mL IVPB  3 g     Physical Exam:     Gen - comfortable, NAD, A&O x 3  HEENT - anicteric, PERRLA  CV - regular rate and rhythm  Abd - soft, no TTP, no rebound/guarding, no distention, no palp masses or bulges; gastric port is palp in LUQ - no evidence of overlying skin changes or fluctuance  Ext - no clubbing, cyanosis or edema bilaterally  Skin - no rashes/lesions, no open wounds, no jaundice    Labs:    Recent Labs     10/31/22  0643   WBC 4.9   RBC 4.13*   HEMOGLOBIN 12.2   HEMATOCRIT 38.7   MCV 93.7   MCH 29.5   MCHC 31.5*   RDW 56.1*   PLATELETCT 180   MPV 10.1     Recent Labs     10/31/22  0643   SODIUM 143  143   POTASSIUM 4.3  4.3   CHLORIDE 110  110   CO2 23  23   GLUCOSE 81  81   BUN 22  22     Imaging:    DX-UPPER GI-SERIES WITH KUB    Result Date: 10/31/2022  10/31/2022 1:53 PM HISTORY/REASON FOR EXAM:  Gastrointestinal Complaint; S/p adjustable gastric band placement 4 years ago and old gastric bypass. Now, with dysphagia and regurgitation.. TECHNIQUE/EXAM DESCRIPTION AND NUMBER OF VIEWS: Single contrast barium upper GI series was performed. A 13 mm pill was attempted to be solid. 33 fluoroscopic images obtained. Fluoroscopy time: 0.8 minutes COMPARISON: None available. FINDINGS: Esophagus: Motility:  Tertiary contractions Filling defect at the distal esophagus. Gastroesophageal junction:  No evidence of hiatal hernia. Gastroesophageal reflux:  None observed. Stomach:  Marked stenosis of the proximal stomach associated with the  lap band. Barium pill did not pass through the stenosis. Duodenum:  Bulb and sweep are normal.  Duodenal-jejunal junction is in the normal expected position.     1.  Focal, marked stenosis of the proximal stomach at the lap band. Barium pill could not pass the stenosis. 2.  Esophageal dysmotility. 3.  Filling defect at the distal esophagus likely representing food impaction.    Assessment/Plan:    Gastric outlet obstruction due to implanted gastric band (possibly slipped), s/p open Chris-en-Y gastric bypass in 2006 for morbid obesity, followed by laparoscopic adjustable gastric band & port placement in 8/2018 for weight regain. In addition, possibly with a retained food bolus at/near GE junction.    Will proceed with laparoscopic gastric band/port removal and simultaneous EGD with possible food bolus retrieval tomorrow (Wed) in AM (earliest available time in the OR). In the meanwhile, will write patient for a full liquid diet with protein supplements and for some IV Dilaudid for management of her chronic back pain (unable to take pills at this point).     Thank you for giving us this opportunity to care for your patient.     Eitan Preston MD MPH [facility]  Bariatric and Gastroesophageal Surgery  [facility]  Clinical Assistant Professor of Surgery and Physiology & Cell Biology  [facility]

## 2022-11-01 NOTE — PROGRESS NOTES
Pt A&Ox4  Denies tolerating clear liquid diet, intermittant n/v. Hypoactive bowel sounds, passing flatus, LBM PTA. IV access through 20g RAC that is SL.  Saturating >90% on 1L NC.  Pt ambulates SBA.  Pain is controlled through medication orders. Updated on plan of care. Safety education provided. Bed locked in low. Call light within reach. Rounding in place.

## 2022-11-01 NOTE — ASSESSMENT & PLAN NOTE
Stenosis of the proximal stomach, dysmotility and food impaction on Barium  General surgery following, aim for laparoscopic gastric band/port removal and simultaneous EGD on today

## 2022-11-01 NOTE — PROGRESS NOTES
[facility] Medicine Daily Progress Note    Date of Service  11/1/2022    Chief Complaint  Azalea Gan is a 72 y.o. female admitted 10/30/2022 with ABD pain    Hospital Course  72 y.o. female who presented 10/30/2022 with vomiting.  Patient states her symptoms started 3 days ago.  She tried to have a small amount of food, within a couple of minutes she had vomited it back up.  She states this has happened for the last 3 days, today she felt she was becoming more dehydrated and since her symptoms persisted she presented to an outside facility.  She was transferred here for higher level of care.  She does have a history of a Lap-Band surgery with Dr. Vega, states the only issue she has had in the past is if she eats too rapidly then she vomits everything up.  She states she can keep her pills down as well as fluids but any solid food she vomits.  She denies any nausea.  She states what happens that she will eat and then she will feel it get to her stomach and then she began to have some mild upper quadrant cramping and then she will began vomiting.  She states at first she will vomit a lot of fluid and then eventually she will get the food out but will continue to vomit until she gets it all out.  After she gets the food out, she has no symptoms.  However, next time she tries to eat she will have the same occurrence.  Patient denies any nausea, fever, chills.    Interval Problem Update  Patient was evaluated at bedside  Reporting some back pain  Stable vitals  On 1 L nasal cannula  Stenosis of the proximal stomach, dysmotility and food impaction on Barium  General surgery following, aim for laparoscopic gastric band/port removal and simultaneous EGD on AM    I have discussed this patient's plan of care and discharge plan at IDT rounds today with Case Management, Nursing, Nursing leadership, and other members of the IDT team.    Consultants/Specialty  Surg    Code Status  Full Code    Disposition  Patient is not  medically cleared for discharge.   Anticipate discharge to to home with close outpatient follow-up.  I have placed the appropriate orders for post-discharge needs.    Review of Systems  Review of Systems   Constitutional: Negative.    HENT: Negative.     Eyes: Negative.    Respiratory: Negative.     Cardiovascular: Negative.    Gastrointestinal: Negative.    Genitourinary: Negative.    Musculoskeletal:  Positive for back pain.   Skin: Negative.    Neurological: Negative.    Endo/Heme/Allergies: Negative.    Psychiatric/Behavioral: Negative.        Physical Exam  Temp:  [36.2 °C (97.1 °F)-37.3 °C (99.1 °F)] 36.2 °C (97.1 °F)  Pulse:  [54] 54  Resp:  [17-20] 17  BP: (110-140)/(50-64) 110/60  SpO2:  [91 %-94 %] 94 %    Physical Exam  Constitutional:       Appearance: Normal appearance.   HENT:      Head: Normocephalic and atraumatic.      Mouth/Throat:      Mouth: Mucous membranes are moist.   Eyes:      Extraocular Movements: Extraocular movements intact.      Pupils: Pupils are equal, round, and reactive to light.   Cardiovascular:      Rate and Rhythm: Normal rate and regular rhythm.      Pulses: Normal pulses.      Heart sounds: Normal heart sounds.   Pulmonary:      Effort: Pulmonary effort is normal.      Breath sounds: Normal breath sounds.   Abdominal:      General: Bowel sounds are normal. There is distension.      Palpations: Abdomen is soft.      Tenderness: There is no abdominal tenderness. There is no guarding or rebound.   Musculoskeletal:         General: No swelling. Normal range of motion.      Cervical back: Normal range of motion and neck supple.   Skin:     General: Skin is warm.      Coloration: Skin is not jaundiced.   Neurological:      General: No focal deficit present.      Mental Status: She is alert and oriented to person, place, and time. Mental status is at baseline.      Cranial Nerves: No cranial nerve deficit.   Psychiatric:         Mood and Affect: Mood normal.         Behavior:  Behavior normal.         Thought Content: Thought content normal.         Judgment: Judgment normal.       Fluids    Intake/Output Summary (Last 24 hours) at 11/1/2022 0944  Last data filed at 11/1/2022 0800  Gross per 24 hour   Intake 300 ml   Output --   Net 300 ml       Laboratory  Recent Labs     10/31/22  0643   WBC 4.9   RBC 4.13*   HEMOGLOBIN 12.2   HEMATOCRIT 38.7   MCV 93.7   MCH 29.5   MCHC 31.5*   RDW 56.1*   PLATELETCT 180   MPV 10.1     Recent Labs     10/31/22  0643   SODIUM 143  143   POTASSIUM 4.3  4.3   CHLORIDE 110  110   CO2 23  23   GLUCOSE 81  81   BUN 22  22   CREATININE 1.03  1.03   CALCIUM 9.1  9.1                   Imaging  DX-UPPER GI-SERIES WITH KUB   Final Result      1.  Focal, marked stenosis of the proximal stomach at the lap band. Barium pill could not pass the stenosis.   2.  Esophageal dysmotility.   3.  Filling defect at the distal esophagus likely representing food impaction.      OUTSIDE IMAGES-DX CHEST   Final Result      OUTSIDE IMAGES-CT ABDOMEN /PELVIS   Final Result           Assessment/Plan  * Stenosis of stomach  Assessment & Plan  Stenosis of the proximal stomach on barium   Surg following, OR on AM    Food impaction of esophagus  Assessment & Plan  Stenosis of the proximal stomach, dysmotility and food impaction on Barium  General surgery following, aim for laparoscopic gastric band/port removal and simultaneous EGD on AM    Hx of laparoscopic gastric banding- (present on admission)  Assessment & Plan  Diet per Surgery    Dental infection- (present on admission)  Assessment & Plan  IV Unasyn  Follow up with Dentist as outpatient    Bradycardia- (present on admission)  Assessment & Plan  Hold Metoprolol  Check EKG    Hypomagnesemia- (present on admission)  Assessment & Plan  IV Mg 2 g  Follow level    Chronic respiratory failure with hypoxia (HCC)- (present on admission)  Assessment & Plan  RT protocol    ALCIRA (acute kidney injury) (HCC)- (present on  admission)  Assessment & Plan  Careful IVF hydration  Follow bmp, check PTH    Hypothyroidism- (present on admission)  Assessment & Plan  Synthroid   Check TSH    Depression- (present on admission)  Assessment & Plan  Prozac, Trazodone    Vomiting- (present on admission)  Assessment & Plan  For UGIS  Diet per Surgery  OR on AM    Idiopathic chronic gout of multiple sites without tophus- (present on admission)  Assessment & Plan  Allopurinol    Pulmonary hypertension (HCC)- (present on admission)  Assessment & Plan  Hold Lasix  Monitor volume status  Needs sleep study    Paroxysmal atrial fibrillation (HCC)- (present on admission)  Assessment & Plan  Hold Metoprolol  Was not on anticoagulation    Obesity- (present on admission)  Assessment & Plan  Body mass index is 40.83 kg/m².    Bilateral lower extremity edema- (present on admission)  Assessment & Plan  Hold Lasix and HCTZ    Essential hypertension- (present on admission)  Assessment & Plan  Hold Metoprolol, Lisinopril, HCTZ, Amlodipine       VTE prophylaxis: SCDs/TEDs    I have performed a physical exam and reviewed and updated ROS and Plan today (11/1/2022). In review of yesterday's note (10/31/2022), there are no changes except as documented above.

## 2022-11-01 NOTE — PROGRESS NOTES
Assumed care of patient at 0700. Patient is alert and oriented, respirations are unlabored and regular on 1L02. Lung sounds clear throughout, diminished in bases. Abdomen soft, non tender, +bowel sounds, +flatus. Pain to mouth and lower back, patient states that pain pills do not work because they are not reaching the stomach to dissolve, she can taste them in her mouth. Heat packs applied to lower back. Patient NPO since midnight for possible surgery today. Bed in lowest position, call light within reach, patient states no needs at this time.

## 2022-11-02 ENCOUNTER — ANESTHESIA (OUTPATIENT)
Dept: SURGERY | Facility: MEDICAL CENTER | Age: 72
DRG: 327 | End: 2022-11-02
Payer: MEDICARE

## 2022-11-02 PROBLEM — N18.9 CKD (CHRONIC KIDNEY DISEASE): Status: ACTIVE | Noted: 2022-11-02

## 2022-11-02 PROBLEM — R13.10 DYSPHAGIA: Status: ACTIVE | Noted: 2022-11-02

## 2022-11-02 LAB — PATHOLOGY CONSULT NOTE: NORMAL

## 2022-11-02 PROCEDURE — 160029 HCHG SURGERY MINUTES - 1ST 30 MINS LEVEL 4: Performed by: SURGERY

## 2022-11-02 PROCEDURE — 700102 HCHG RX REV CODE 250 W/ 637 OVERRIDE(OP): Performed by: STUDENT IN AN ORGANIZED HEALTH CARE EDUCATION/TRAINING PROGRAM

## 2022-11-02 PROCEDURE — 160041 HCHG SURGERY MINUTES - EA ADDL 1 MIN LEVEL 4: Performed by: SURGERY

## 2022-11-02 PROCEDURE — 00797 ANES IPER UPR ABD GSTR PX MO: CPT | Performed by: STUDENT IN AN ORGANIZED HEALTH CARE EDUCATION/TRAINING PROGRAM

## 2022-11-02 PROCEDURE — 700111 HCHG RX REV CODE 636 W/ 250 OVERRIDE (IP): Performed by: STUDENT IN AN ORGANIZED HEALTH CARE EDUCATION/TRAINING PROGRAM

## 2022-11-02 PROCEDURE — 160036 HCHG PACU - EA ADDL 30 MINS PHASE I: Performed by: SURGERY

## 2022-11-02 PROCEDURE — 160048 HCHG OR STATISTICAL LEVEL 1-5: Performed by: SURGERY

## 2022-11-02 PROCEDURE — 700102 HCHG RX REV CODE 250 W/ 637 OVERRIDE(OP): Performed by: INTERNAL MEDICINE

## 2022-11-02 PROCEDURE — 700111 HCHG RX REV CODE 636 W/ 250 OVERRIDE (IP): Performed by: SURGERY

## 2022-11-02 PROCEDURE — 160002 HCHG RECOVERY MINUTES (STAT): Performed by: SURGERY

## 2022-11-02 PROCEDURE — 700101 HCHG RX REV CODE 250: Performed by: STUDENT IN AN ORGANIZED HEALTH CARE EDUCATION/TRAINING PROGRAM

## 2022-11-02 PROCEDURE — 700105 HCHG RX REV CODE 258: Performed by: FAMILY MEDICINE

## 2022-11-02 PROCEDURE — 88300 SURGICAL PATH GROSS: CPT

## 2022-11-02 PROCEDURE — 0DJ08ZZ INSPECTION OF UPPER INTESTINAL TRACT, VIA NATURAL OR ARTIFICIAL OPENING ENDOSCOPIC: ICD-10-PCS | Performed by: SURGERY

## 2022-11-02 PROCEDURE — 99100 ANES PT EXTEME AGE<1 YR&>70: CPT | Performed by: STUDENT IN AN ORGANIZED HEALTH CARE EDUCATION/TRAINING PROGRAM

## 2022-11-02 PROCEDURE — 700105 HCHG RX REV CODE 258: Performed by: INTERNAL MEDICINE

## 2022-11-02 PROCEDURE — 700105 HCHG RX REV CODE 258: Performed by: STUDENT IN AN ORGANIZED HEALTH CARE EDUCATION/TRAINING PROGRAM

## 2022-11-02 PROCEDURE — 0DN64ZZ RELEASE STOMACH, PERCUTANEOUS ENDOSCOPIC APPROACH: ICD-10-PCS | Performed by: SURGERY

## 2022-11-02 PROCEDURE — 0DP64CZ REMOVAL OF EXTRALUMINAL DEVICE FROM STOMACH, PERCUTANEOUS ENDOSCOPIC APPROACH: ICD-10-PCS | Performed by: SURGERY

## 2022-11-02 PROCEDURE — 700105 HCHG RX REV CODE 258: Performed by: SURGERY

## 2022-11-02 PROCEDURE — 0DNU4ZZ RELEASE OMENTUM, PERCUTANEOUS ENDOSCOPIC APPROACH: ICD-10-PCS | Performed by: SURGERY

## 2022-11-02 PROCEDURE — 700101 HCHG RX REV CODE 250: Performed by: SURGERY

## 2022-11-02 PROCEDURE — 0DN34ZZ RELEASE LOWER ESOPHAGUS, PERCUTANEOUS ENDOSCOPIC APPROACH: ICD-10-PCS | Performed by: SURGERY

## 2022-11-02 PROCEDURE — 770001 HCHG ROOM/CARE - MED/SURG/GYN PRIV*

## 2022-11-02 PROCEDURE — 160009 HCHG ANES TIME/MIN: Performed by: SURGERY

## 2022-11-02 PROCEDURE — A9270 NON-COVERED ITEM OR SERVICE: HCPCS | Performed by: INTERNAL MEDICINE

## 2022-11-02 PROCEDURE — 160035 HCHG PACU - 1ST 60 MINS PHASE I: Performed by: SURGERY

## 2022-11-02 PROCEDURE — A9270 NON-COVERED ITEM OR SERVICE: HCPCS | Performed by: STUDENT IN AN ORGANIZED HEALTH CARE EDUCATION/TRAINING PROGRAM

## 2022-11-02 PROCEDURE — 700111 HCHG RX REV CODE 636 W/ 250 OVERRIDE (IP): Performed by: INTERNAL MEDICINE

## 2022-11-02 PROCEDURE — 99233 SBSQ HOSP IP/OBS HIGH 50: CPT | Performed by: STUDENT IN AN ORGANIZED HEALTH CARE EDUCATION/TRAINING PROGRAM

## 2022-11-02 RX ORDER — ROCURONIUM BROMIDE 10 MG/ML
INJECTION, SOLUTION INTRAVENOUS PRN
Status: DISCONTINUED | OUTPATIENT
Start: 2022-11-02 | End: 2022-11-02 | Stop reason: SURG

## 2022-11-02 RX ORDER — HYDRALAZINE HYDROCHLORIDE 20 MG/ML
5 INJECTION INTRAMUSCULAR; INTRAVENOUS
Status: DISCONTINUED | OUTPATIENT
Start: 2022-11-02 | End: 2022-11-02 | Stop reason: HOSPADM

## 2022-11-02 RX ORDER — DEXAMETHASONE SODIUM PHOSPHATE 4 MG/ML
INJECTION, SOLUTION INTRA-ARTICULAR; INTRALESIONAL; INTRAMUSCULAR; INTRAVENOUS; SOFT TISSUE PRN
Status: DISCONTINUED | OUTPATIENT
Start: 2022-11-02 | End: 2022-11-02 | Stop reason: SURG

## 2022-11-02 RX ORDER — ONDANSETRON 2 MG/ML
INJECTION INTRAMUSCULAR; INTRAVENOUS PRN
Status: DISCONTINUED | OUTPATIENT
Start: 2022-11-02 | End: 2022-11-02 | Stop reason: SURG

## 2022-11-02 RX ORDER — SCOLOPAMINE TRANSDERMAL SYSTEM 1 MG/1
PATCH, EXTENDED RELEASE TRANSDERMAL
Status: COMPLETED
Start: 2022-11-02 | End: 2022-11-02

## 2022-11-02 RX ORDER — NEOSTIGMINE METHYLSULFATE 1 MG/ML
INJECTION, SOLUTION INTRAVENOUS PRN
Status: DISCONTINUED | OUTPATIENT
Start: 2022-11-02 | End: 2022-11-02 | Stop reason: SURG

## 2022-11-02 RX ORDER — HYDROMORPHONE HYDROCHLORIDE 1 MG/ML
0.1 INJECTION, SOLUTION INTRAMUSCULAR; INTRAVENOUS; SUBCUTANEOUS
Status: DISCONTINUED | OUTPATIENT
Start: 2022-11-02 | End: 2022-11-02 | Stop reason: HOSPADM

## 2022-11-02 RX ORDER — PRAMIPEXOLE DIHYDROCHLORIDE 0.5 MG/1
1.5 TABLET ORAL
Status: DISCONTINUED | OUTPATIENT
Start: 2022-11-02 | End: 2022-11-03 | Stop reason: HOSPADM

## 2022-11-02 RX ORDER — HALOPERIDOL 5 MG/ML
1 INJECTION INTRAMUSCULAR
Status: DISCONTINUED | OUTPATIENT
Start: 2022-11-02 | End: 2022-11-02 | Stop reason: HOSPADM

## 2022-11-02 RX ORDER — BUPIVACAINE HYDROCHLORIDE AND EPINEPHRINE 5; 5 MG/ML; UG/ML
INJECTION, SOLUTION PERINEURAL
Status: DISCONTINUED | OUTPATIENT
Start: 2022-11-02 | End: 2022-11-02 | Stop reason: HOSPADM

## 2022-11-02 RX ORDER — LIDOCAINE HYDROCHLORIDE 20 MG/ML
INJECTION, SOLUTION EPIDURAL; INFILTRATION; INTRACAUDAL; PERINEURAL PRN
Status: DISCONTINUED | OUTPATIENT
Start: 2022-11-02 | End: 2022-11-02 | Stop reason: SURG

## 2022-11-02 RX ORDER — SODIUM CHLORIDE, SODIUM LACTATE, POTASSIUM CHLORIDE, CALCIUM CHLORIDE 600; 310; 30; 20 MG/100ML; MG/100ML; MG/100ML; MG/100ML
INJECTION, SOLUTION INTRAVENOUS
Status: DISCONTINUED | OUTPATIENT
Start: 2022-11-02 | End: 2022-11-02 | Stop reason: SURG

## 2022-11-02 RX ORDER — SODIUM CHLORIDE, SODIUM LACTATE, POTASSIUM CHLORIDE, CALCIUM CHLORIDE 600; 310; 30; 20 MG/100ML; MG/100ML; MG/100ML; MG/100ML
INJECTION, SOLUTION INTRAVENOUS CONTINUOUS
Status: DISCONTINUED | OUTPATIENT
Start: 2022-11-02 | End: 2022-11-02 | Stop reason: HOSPADM

## 2022-11-02 RX ORDER — ONDANSETRON 2 MG/ML
4 INJECTION INTRAMUSCULAR; INTRAVENOUS
Status: COMPLETED | OUTPATIENT
Start: 2022-11-02 | End: 2022-11-02

## 2022-11-02 RX ORDER — SCOLOPAMINE TRANSDERMAL SYSTEM 1 MG/1
PATCH, EXTENDED RELEASE TRANSDERMAL PRN
Status: DISCONTINUED | OUTPATIENT
Start: 2022-11-02 | End: 2022-11-02 | Stop reason: SURG

## 2022-11-02 RX ORDER — GLYCOPYRROLATE 0.2 MG/ML
INJECTION INTRAMUSCULAR; INTRAVENOUS PRN
Status: DISCONTINUED | OUTPATIENT
Start: 2022-11-02 | End: 2022-11-02 | Stop reason: SURG

## 2022-11-02 RX ORDER — HYDROMORPHONE HYDROCHLORIDE 1 MG/ML
0.4 INJECTION, SOLUTION INTRAMUSCULAR; INTRAVENOUS; SUBCUTANEOUS
Status: DISCONTINUED | OUTPATIENT
Start: 2022-11-02 | End: 2022-11-02 | Stop reason: HOSPADM

## 2022-11-02 RX ORDER — LABETALOL HYDROCHLORIDE 5 MG/ML
5 INJECTION, SOLUTION INTRAVENOUS
Status: DISCONTINUED | OUTPATIENT
Start: 2022-11-02 | End: 2022-11-02 | Stop reason: HOSPADM

## 2022-11-02 RX ORDER — DIPHENHYDRAMINE HYDROCHLORIDE 50 MG/ML
12.5 INJECTION INTRAMUSCULAR; INTRAVENOUS
Status: DISCONTINUED | OUTPATIENT
Start: 2022-11-02 | End: 2022-11-02 | Stop reason: HOSPADM

## 2022-11-02 RX ORDER — HYDROMORPHONE HYDROCHLORIDE 1 MG/ML
0.2 INJECTION, SOLUTION INTRAMUSCULAR; INTRAVENOUS; SUBCUTANEOUS
Status: DISCONTINUED | OUTPATIENT
Start: 2022-11-02 | End: 2022-11-02 | Stop reason: HOSPADM

## 2022-11-02 RX ORDER — SUCCINYLCHOLINE CHLORIDE 20 MG/ML
INJECTION INTRAMUSCULAR; INTRAVENOUS PRN
Status: DISCONTINUED | OUTPATIENT
Start: 2022-11-02 | End: 2022-11-02 | Stop reason: SURG

## 2022-11-02 RX ORDER — HEPARIN SODIUM 5000 [USP'U]/ML
5000 INJECTION, SOLUTION INTRAVENOUS; SUBCUTANEOUS
Status: COMPLETED | OUTPATIENT
Start: 2022-11-02 | End: 2022-11-02

## 2022-11-02 RX ADMIN — ONDANSETRON HYDROCHLORIDE 4 MG: 2 SOLUTION INTRAMUSCULAR; INTRAVENOUS at 11:23

## 2022-11-02 RX ADMIN — SODIUM CHLORIDE, POTASSIUM CHLORIDE, SODIUM LACTATE AND CALCIUM CHLORIDE: 600; 310; 30; 20 INJECTION, SOLUTION INTRAVENOUS at 11:25

## 2022-11-02 RX ADMIN — HYDROMORPHONE HYDROCHLORIDE 0.4 MG: 1 INJECTION, SOLUTION INTRAMUSCULAR; INTRAVENOUS; SUBCUTANEOUS at 11:42

## 2022-11-02 RX ADMIN — SUCCINYLCHOLINE CHLORIDE 200 MG: 20 INJECTION, SOLUTION INTRAMUSCULAR; INTRAVENOUS; PARENTERAL at 09:45

## 2022-11-02 RX ADMIN — ONDANSETRON 4 MG: 2 INJECTION INTRAMUSCULAR; INTRAVENOUS at 10:54

## 2022-11-02 RX ADMIN — HEPARIN SODIUM 5000 UNITS: 5000 INJECTION, SOLUTION INTRAVENOUS; SUBCUTANEOUS at 09:14

## 2022-11-02 RX ADMIN — AMPICILLIN AND SULBACTAM 3 G: 1; 2 INJECTION, POWDER, FOR SOLUTION INTRAMUSCULAR; INTRAVENOUS at 00:05

## 2022-11-02 RX ADMIN — FLUOXETINE 80 MG: 20 CAPSULE ORAL at 04:19

## 2022-11-02 RX ADMIN — FENTANYL CITRATE 50 MCG: 50 INJECTION, SOLUTION INTRAMUSCULAR; INTRAVENOUS at 11:23

## 2022-11-02 RX ADMIN — LEVOTHYROXINE SODIUM 200 MCG: 0.11 TABLET ORAL at 04:19

## 2022-11-02 RX ADMIN — ROCURONIUM BROMIDE 10 MG: 10 INJECTION, SOLUTION INTRAVENOUS at 09:45

## 2022-11-02 RX ADMIN — NEOSTIGMINE METHYLSULFATE 3 MG: 1 INJECTION INTRAVENOUS at 10:55

## 2022-11-02 RX ADMIN — ROCURONIUM BROMIDE 10 MG: 10 INJECTION, SOLUTION INTRAVENOUS at 10:00

## 2022-11-02 RX ADMIN — OXYCODONE 5 MG: 5 TABLET ORAL at 15:26

## 2022-11-02 RX ADMIN — LIDOCAINE HYDROCHLORIDE 60 MG: 20 INJECTION, SOLUTION EPIDURAL; INFILTRATION; INTRACAUDAL at 09:45

## 2022-11-02 RX ADMIN — OXYCODONE 5 MG: 5 TABLET ORAL at 21:12

## 2022-11-02 RX ADMIN — ROCURONIUM BROMIDE 10 MG: 10 INJECTION, SOLUTION INTRAVENOUS at 10:23

## 2022-11-02 RX ADMIN — PROPOFOL 130 MG: 10 INJECTION, EMULSION INTRAVENOUS at 09:45

## 2022-11-02 RX ADMIN — GLYCOPYRROLATE 0.6 MG: 0.2 INJECTION INTRAMUSCULAR; INTRAVENOUS at 10:55

## 2022-11-02 RX ADMIN — AMPICILLIN AND SULBACTAM 3 G: 1; 2 INJECTION, POWDER, FOR SOLUTION INTRAMUSCULAR; INTRAVENOUS at 23:11

## 2022-11-02 RX ADMIN — SODIUM CHLORIDE, POTASSIUM CHLORIDE, SODIUM LACTATE AND CALCIUM CHLORIDE: 600; 310; 30; 20 INJECTION, SOLUTION INTRAVENOUS at 09:40

## 2022-11-02 RX ADMIN — AMPICILLIN AND SULBACTAM 3 G: 1; 2 INJECTION, POWDER, FOR SOLUTION INTRAMUSCULAR; INTRAVENOUS at 17:38

## 2022-11-02 RX ADMIN — DEXAMETHASONE SODIUM PHOSPHATE 4 MG: 4 INJECTION, SOLUTION INTRA-ARTICULAR; INTRALESIONAL; INTRAMUSCULAR; INTRAVENOUS; SOFT TISSUE at 09:55

## 2022-11-02 RX ADMIN — ALLOPURINOL 100 MG: 100 TABLET ORAL at 04:19

## 2022-11-02 RX ADMIN — AMPICILLIN AND SULBACTAM 3 G: 1; 2 INJECTION, POWDER, FOR SOLUTION INTRAMUSCULAR; INTRAVENOUS at 04:19

## 2022-11-02 RX ADMIN — HYDROMORPHONE HYDROCHLORIDE 0.5 MG: 1 INJECTION, SOLUTION INTRAMUSCULAR; INTRAVENOUS; SUBCUTANEOUS at 03:15

## 2022-11-02 RX ADMIN — HYDROMORPHONE HYDROCHLORIDE 0.2 MG: 1 INJECTION, SOLUTION INTRAMUSCULAR; INTRAVENOUS; SUBCUTANEOUS at 11:50

## 2022-11-02 RX ADMIN — HYDROMORPHONE HYDROCHLORIDE 0.4 MG: 1 INJECTION, SOLUTION INTRAMUSCULAR; INTRAVENOUS; SUBCUTANEOUS at 11:33

## 2022-11-02 RX ADMIN — FENTANYL CITRATE 50 MCG: 50 INJECTION, SOLUTION INTRAMUSCULAR; INTRAVENOUS at 09:45

## 2022-11-02 RX ADMIN — THIAMINE HYDROCHLORIDE 100 MG: 100 INJECTION, SOLUTION INTRAMUSCULAR; INTRAVENOUS at 05:38

## 2022-11-02 RX ADMIN — SCOPALAMINE 1 PATCH: 1 PATCH, EXTENDED RELEASE TRANSDERMAL at 09:20

## 2022-11-02 RX ADMIN — DEXTROSE AND SODIUM CHLORIDE: 5; 900 INJECTION, SOLUTION INTRAVENOUS at 21:23

## 2022-11-02 ASSESSMENT — PAIN DESCRIPTION - PAIN TYPE
TYPE: SURGICAL PAIN
TYPE: ACUTE PAIN
TYPE: SURGICAL PAIN
TYPE: ACUTE PAIN;SURGICAL PAIN
TYPE: SURGICAL PAIN
TYPE: ACUTE PAIN;SURGICAL PAIN
TYPE: SURGICAL PAIN
TYPE: ACUTE PAIN
TYPE: ACUTE PAIN;SURGICAL PAIN
TYPE: SURGICAL PAIN
TYPE: ACUTE PAIN;SURGICAL PAIN
TYPE: SURGICAL PAIN

## 2022-11-02 ASSESSMENT — ENCOUNTER SYMPTOMS
CARDIOVASCULAR NEGATIVE: 1
BACK PAIN: 1
NEUROLOGICAL NEGATIVE: 1
EYES NEGATIVE: 1
CONSTITUTIONAL NEGATIVE: 1
RESPIRATORY NEGATIVE: 1
NERVOUS/ANXIOUS: 1
HEARTBURN: 1

## 2022-11-02 NOTE — ANESTHESIA PREPROCEDURE EVALUATION
Case: [id number] Date/Time: 11/02/22 0845    Procedures:       LAPAROSCOPIC ADJUSTABLE GASTRIC BANDING, PORT REMOVAL,ESOPHAGOGASTRODUODENOSCOPY (Abdomen)      GASTROSCOPY (Esophagus)    Anesthesia type: General    Pre-op diagnosis: dysphagia    Location: [facility] OR 10 / SURGERY [facility]    Surgeons: Eitan Preston M.D.        73 yo w/ dysphagia s/t gastric band.  CKDIII, pAfib, trace aortic regurg, h/o PONV. No h/o pHTN (see ECHO 1/25/22 in care everywhere)    Relevant Problems   NEURO   (positive) History of stroke      CARDIAC   (positive) Coronary atherosclerosis   (positive) Essential hypertension   (positive) Paroxysmal atrial fibrillation (HCC)   (positive) Paroxysmal ventricular tachycardia   (positive) Renal artery stenosis (HCC)         (positive) CKD (chronic kidney disease)      ENDO   (positive) Hypothyroidism      Other   (positive) Dysphagia   (positive) Food impaction of esophagus   (positive) Hx of laparoscopic gastric banding   (positive) Obesity   (positive) Vomiting       Physical Exam    Airway   Mallampati: III  TM distance: >3 FB  Neck ROM: full       Cardiovascular - normal exam  Rhythm: irregular  Rate: normal     Dental - normal exam           Pulmonary - normal exam  Breath sounds clear to auscultation     Abdominal    Neurological - normal exam                 Anesthesia Plan    ASA 3   ASA physical status 3 criteria: morbid obesity - BMI greater than or equal to 40    Plan - general       Airway plan will be ETT          Induction: intravenous    Postoperative Plan: Postoperative administration of opioids is intended.    Pertinent diagnostic labs and testing reviewed    Informed Consent:    Anesthetic plan and risks discussed with patient.    Use of blood products discussed with: patient whom consented to blood products.

## 2022-11-02 NOTE — OR NURSING
1104 Pt arrived from OR via bed. Report given by anesthesia and RN. 99.1f. sleeping, perrla, opa, 4L mask even non labored breathing, lungs clear airway patent. SR. Skin pink warm dry. PIV patent. Abd soft, x4 lap sites, dermabond, well approximated attached edges, no drainage, no hematoma, cold pack.     1110 arousable. Gag reflex intact, opa removed spontaneous even non labored breathing.     1116 given pur wick. Awake. Clear speech, follows commands. DENIES pain and nausea.     1123, 1133, 1142  c/o pain and nausea treated per mar    1145 abd soft, x4 lap sites, cdi, no drainage, no hematoma. Cold pack.     1150 continue to treat for pain per mar    1210 resting comfortably with eyes closed, even non labored breathing. Face relaxed, skin pink warm dry.     1239 report given to Nimisha CADET, T401. O2 tank full for transfer. Personal belongings given to pt.     1243 pain resolved. Awake alert. Declines update given to daughter. Ready for transfer to room.     1345 awake alert, even non labored breathing,DENIES pain and nausea. Abd no changes

## 2022-11-02 NOTE — CARE PLAN
The patient is Stable - Low risk of patient condition declining or worsening    Shift Goals  Clinical Goals: Pain control, sleep  Patient Goals: Sleep    Progress made toward(s) clinical / shift goals:  Pt resting. Pain managed w/ medication per MD order.     Patient is not progressing towards the following goals:

## 2022-11-02 NOTE — CARE PLAN
The patient is Stable - Low risk of patient condition declining or worsening    Shift Goals  Clinical Goals: Surgery  Patient Goals: heart burn resolution    Progress made toward(s) clinical / shift goals:  Patient recovering from surgery.     Patient is not progressing towards the following goals:

## 2022-11-02 NOTE — ANESTHESIA PROCEDURE NOTES
Airway    Date/Time: 11/2/2022 9:47 AM  Performed by: Blas Garcia M.D.  Authorized by: Gus Burr M.D.     Location:  OR  Urgency:  Elective  Indications for Airway Management:  Anesthesia      Spontaneous Ventilation: absent    Sedation Level:  Deep  Preoxygenated: Yes    Patient Position:  Sniffing  Mask Difficulty Assessment:  1 - vent by mask  Final Airway Type:  Endotracheal airway  Final Endotracheal Airway:  ETT  Cuffed: Yes    Technique Used for Successful ETT Placement:  Direct laryngoscopy  Devices/Methods Used in Placement:  Cricoid pressure    Insertion Site:  Oral  Blade Type:  Tara  Laryngoscope Blade/Videolaryngoscope Blade Size:  3  ETT Size (mm):  7.0  Measured from:  Teeth  ETT to Teeth (cm):  21  Placement Verified by: auscultation and capnometry    Cormack-Lehane Classification:  Grade IIb - view of arytenoids or posterior of glottis only  Number of Attempts at Approach:  1  Number of Other Approaches Attempted:  0

## 2022-11-02 NOTE — PROGRESS NOTES
"[facility]  Progress Note    HD#3, admitted with progressive dysphagia (solids > liquids), associated with regurgitation, some nausea with vomiting, and almost complete intolerance to PO intake, in the setting of open Chris-en-Y gastric bypass in 2006 (performed in [place], [place]) for morbid obesity, and s/p laparoscopic adjustable gastric band & port placement in 8/2018 for weight regain.    Subjective:     Tolerated some full liquids, still has mild nausea and epigastric abdominal pain. Otherwise, no recent history of fevers/chills, hematemesis, CP/SOB, bloating/belching, worsening abdominal pain, dysuria/hematuria, BRBPR/melena, or constipation/diarrhea. No other symptoms or complaints at this time.    Objective:    BP (!) 192/88   Pulse 73   Temp 36.8 °C (98.2 °F) (Temporal)   Resp 20   Ht 1.626 m (5' 4\")   Wt 108 kg (237 lb 14 oz)   SpO2 92%     I/O last 3 completed shifts:  In: 200   Out: 0     Current Facility-Administered Medications   Medication Dose    SCOPOLAMINE 1 MG/3DAYS TD PT72      [MAR Hold] HYDROmorphone (Dilaudid) injection 0.5 mg  0.5 mg    D5 NS infusion      [MAR Hold] thiamine (B-1) IVPB 100 mg in 100 mL D5W (premix)  100 mg    [MAR Hold] therapeutic multivitamin-minerals (THERAGRAN-M) tablet 1 Tablet  1 Tablet    [MAR Hold] allopurinol (ZYLOPRIM) tablet 100 mg  100 mg    [MAR Hold] FLUoxetine (PROZAC) capsule 80 mg  80 mg    [MAR Hold] levothyroxine (SYNTHROID) tablet 200 mcg  200 mcg    [MAR Hold] traZODone (DESYREL) tablet 100 mg  100 mg    [MAR Hold] senna-docusate (PERICOLACE or SENOKOT S) 8.6-50 MG per tablet 2 Tablet  2 Tablet    And    [MAR Hold] polyethylene glycol/lytes (MIRALAX) PACKET 1 Packet  1 Packet    And    [MAR Hold] magnesium hydroxide (MILK OF MAGNESIA) suspension 30 mL  30 mL    And    [MAR Hold] bisacodyl (DULCOLAX) suppository 10 mg  10 mg    [MAR Hold] acetaminophen (Tylenol) tablet 650 mg  650 mg    [MAR Hold] hydrALAZINE (APRESOLINE) injection 10 mg  " 10 mg    [MAR Hold] ondansetron (ZOFRAN) syringe/vial injection 4 mg  4 mg    [MAR Hold] ondansetron (ZOFRAN ODT) dispertab 4 mg  4 mg    [MAR Hold] oxyCODONE immediate-release (ROXICODONE) tablet 5 mg  5 mg    [MAR Hold] ampicillin/sulbactam (UNASYN) 3 g in  mL IVPB  3 g     Physical Exam:     Gen - comfortable, NAD, A&O x 3  HEENT - anicteric, PERRLA  CV - regular rate and rhythm  Abd - soft, no TTP, no rebound/guarding, no distention, no palp masses or bulges; gastric port is palp in LUQ - no evidence of overlying skin changes or fluctuance  Ext - no clubbing, cyanosis or edema bilaterally  Skin - no rashes/lesions, no open wounds, no jaundice    Labs:    Recent Labs     10/31/22  0643   WBC 4.9   RBC 4.13*   HEMOGLOBIN 12.2   HEMATOCRIT 38.7   MCV 93.7   MCH 29.5   MCHC 31.5*   RDW 56.1*   PLATELETCT 180   MPV 10.1       Recent Labs     10/31/22  0643   SODIUM 143  143   POTASSIUM 4.3  4.3   CHLORIDE 110  110   CO2 23  23   GLUCOSE 81  81   BUN 22  22       Imaging:    DX-UPPER GI-SERIES WITH KUB    Result Date: 10/31/2022  10/31/2022 1:53 PM HISTORY/REASON FOR EXAM:  Gastrointestinal Complaint; S/p adjustable gastric band placement 4 years ago and old gastric bypass. Now, with dysphagia and regurgitation.. TECHNIQUE/EXAM DESCRIPTION AND NUMBER OF VIEWS: Single contrast barium upper GI series was performed. A 13 mm pill was attempted to be solid. 33 fluoroscopic images obtained. Fluoroscopy time: 0.8 minutes COMPARISON: None available. FINDINGS: Esophagus: Motility:  Tertiary contractions Filling defect at the distal esophagus. Gastroesophageal junction:  No evidence of hiatal hernia. Gastroesophageal reflux:  None observed. Stomach:  Marked stenosis of the proximal stomach associated with the lap band. Barium pill did not pass through the stenosis. Duodenum:  Bulb and sweep are normal.  Duodenal-jejunal junction is in the normal expected position.     1.  Focal, marked stenosis of the proximal  stomach at the lap band. Barium pill could not pass the stenosis. 2.  Esophageal dysmotility. 3.  Filling defect at the distal esophagus likely representing food impaction.    Assessment/Plan:    Gastric outlet obstruction due to implanted gastric band (possibly slipped), s/p open Chris-en-Y gastric bypass in 2006 for morbid obesity, followed by laparoscopic adjustable gastric band & port placement in 8/2018 for weight regain. In addition, possibly with a retained food bolus at/near GE junction.    Will proceed with laparoscopic gastric band/port removal and simultaneous EGD with possible food bolus retrieval today. Possible discharge home tomorrow AM if condition improves.     Thank you for giving us this opportunity to care for your patient.     Eitan Preston MD MPH FACS [facility]  Bariatric and Gastroesophageal Surgery  [facility]  Clinical Assistant Professor of Surgery and Physiology & Cell Biology  [facility]

## 2022-11-02 NOTE — ANESTHESIA POSTPROCEDURE EVALUATION
Patient: Azalea Gna    Procedure Summary     Date: 11/02/22 Room / Location: [place] [address] / SURGERY [facility]    Anesthesia Start: 0939 Anesthesia Stop: 1106    Procedures:       LAPAROSCOPIC ADJUSTABLE GASTRIC BANDING, PORT REMOVAL,ESOPHAGOGASTRODUODENOSCOPY (Abdomen)      GASTROSCOPY (Esophagus) Diagnosis: (dysphagia)    Surgeons: Eitan Preston M.D. Responsible Provider: Gus Burr M.D.    Anesthesia Type: general ASA Status: 3          Final Anesthesia Type: general  Last vitals  BP   Blood Pressure : (!) 192/88 (surgeon notified)    Temp   36.8 °C (98.2 °F)    Pulse   73   Resp   20    SpO2   92 %      Anesthesia Post Evaluation    Patient location during evaluation: PACU  Patient participation: complete - patient participated  Level of consciousness: awake and alert    Airway patency: patent  Anesthetic complications: no  Cardiovascular status: hemodynamically stable  Respiratory status: acceptable  Hydration status: euvolemic    PONV: none          No notable events documented.     Nurse Pain Score: 2 (NPRS)

## 2022-11-02 NOTE — OP REPORT
[facility]    OPERATIVE REPORT         DATE OF OPERATION: 11/2/2022    SURGEON:  Eitan Preston MD MPH FACS [facility]    ASSISTANT(S):  Annette Varma PA-C    ANESTHESIOLOGIST(S):  Blas Garcia MD    ANESTHESIA:  General endotracheal, with local anesthetic    PREOPERATIVE DIAGNOSES:    Dysphagia, regurgitation, and nausea with vomiting  Esophagogastric junction outlet obstruction  S/p laparoscopic adjustable gastric band placement for weight regain in 2018  S/p open Chris-en-Y gastric bypass in 2006    POSTOPERATIVE DIAGNOSES:    Dysphagia, regurgitation, and nausea with vomiting  Esophagogastric junction outlet obstruction  S/p laparoscopic adjustable gastric band placement for weight regain in 2018  S/p open undivided Chris-en-Y gastric bypass in 2006  Reflux esophagitis  Esophageal dysmotility    OPERATION(S) PERFORMED:     1.  Laparoscopic removal of adjustable gastric band and port  2.  Laparoscopic extensive lysis of adhesions  3.  Esophagogastroduodenoscopy (EGD) with removal of impacted food bolus    ESTIMATED BLOOD LOSS:  10 mL    SPECIMEN(S):  Adjustable gastric band and port, in 4 pieces    COMPLICATIONS: None    BACKGROUND:  Ms. Azalea Gan is a 72 y.o. female, who was admitted a few days ago with progressive dysphagia, regurgitation, and nausea with vomiting. On her diagnostic workup (including a CT Scan of Abdomen/Pelvis and Fluoro UGI w/ Barium), she was found to have evidence of esophagogastric junction outlet obstruction due to her implanted adjustable gastric band (that has possibly slipped). Of note, her prior surgical history includes an open Chris-en-Y gastric bypass in 2006 for morbid obesity, followed by laparoscopic adjustable gastric band & port placement in 8/2018 for weight regain. Thus, she was recommended to undergo a laparoscopic removal of the adjustable gastric band and port, and possible EGD with retrieval of retained food bolus.    We discussed the risks of  surgery including infection, bleeding, need for transfusion, blood clot formation, pulmonary embolus, pneumonia, leak or perforation, recurrence of symptoms, and death. In addition, the risks of general anesthetic were discussed, including MI, CVA, reaction to anesthetic medications, or sudden death. The patient understands all of the above risks and all questions were answered to her satisfaction.    OPERATIVE FINDINGS:  Severe intra-abdominal adhesions between the greater omentum, Chris limb and anterior abdominal wall - located mostly throughout bilateral upper quadrants. Evidence of previously placed adjustable gastric band around the cardia of stomach, with dense adhesions to the surrounding omentum, hiatal crura, and diaphragm. Gastric band was noted to be surrounded by a thick fibrous capsule that was adhering to the left liver lobe and proximal stomach. No visual evidence of band erosion, slippage/herniation, or infection on laparoscopy or endoscopy. No other abnormalities were noted on diagnostic laparoscopy.     Our upper endoscopy demonstrated a retained large food bolus in the distal esophagus. There was evidence of moderate distal esophagitis (consistent with LA Grade B esophagitis) and some retained bile in the fundus of stomach. Further diagnostic endoscopy did not demonstrate any evidence of band erosion or a large hiatal hernia. The food bolus was pushed into the lumen of stomach by using our endoscope.    OPERATIVE PROCEDURE:  Following obtaining informed consent, the patient was brought to the Operating Room and placed in the supine position. A preoperative dose of antibiotics was not necessary in this case, since patient was already maintained on the broad-spectrum antibiotics while on the floor. General anesthesia was smoothly induced. The abdomen was prepped and draped in the usual sterile fashion. Following a formal time-out and site verification, the procedure was undertaken.     The patient's  port from her laparoscopic gastric band was palpated about 5-cm superior to the umbilicus and slightly to the left of midline. Incision was made above and to the left of upper umbilicus. The abdomen was then entered using a FIOS 5-mm trocar. Insufflation was obtained successfully to 15 mmHg. The patient tolerated this insufflation well. Additional two 5-mm trocars were placed under direct vision throughout her left upper quadrant.     Diagnostic laparoscopy was performed and demonstrated no injuries to the viscera or blood vessels from entry. The patient was noted to have severe intra-abdominal adhesions between the greater omentum, Chris limb and anterior abdominal wall - those were located mostly throughout bilateral upper quadrants. In addition, there was evidence of a previously placed adjustable gastric band around the cardia of stomach, with dense adhesions to the surrounding omentum, hiatal crura, and diaphragm. Gastric band was noted to be surrounded by a thick fibrous capsule that was adhering to the left liver lobe and proximal stomach. No visual evidence of band erosion, slippage/herniation, or infection on laparoscopy or endoscopy. No other abnormalities were noted on diagnostic laparoscopy.     Next, extensive adhesiolysis for greater than 30 mins was performed with our blunt graspers and the Harmonic scalpel. This maneuver allowed us to determine the exact location of the gastric band and to start taking down numerous adhesions in its vicinity. The gastric band was then dissected free from the surrounding tissues. Additional lysis of adhesions was done to remove the band and to split the capsule along the anterior aspect of  proximal stomach. The band was unclasped and the tubing was ligated. The band was then removed from the abdomen via the midline trocar incision.    Diagnostic esophagogastroscopy was performed next. Esophageal and gastric mucosa were evaluated carefully and the following findings  were noted: there was a retained large food bolus in the distal esophagus. There was evidence of moderate distal esophagitis (consistent with LA Grade B esophagitis) and some retained bile in the fundus of stomach. The remainder of esophageal and gastric mucosa appeared normal. Further diagnostic endoscopy did not demonstrate any evidence of band erosion or a large hiatal hernia (on the retroflex view). The food bolus was pushed into the lumen of stomach by using our endoscope. Endoscopy was concluded successfully, stomach was suctioned out, and laparoscopic portion of the operation was concluded.    Attention then turned to the subcutaneous port of the gastric band. The incision overlying the port was opened with a scalpel and the device itself was freed from its surrounding tissues by using electrocautery. All anchoring sutures were cut and the port was removed in one piece. Hemostasis was achieved. The deep tissues were reapproximated with interrupted 3-0 Vicryl sutures. The abdomen was then desufflated after confirming hemostasis. The skin at all trocar sites was then reapproximated by using 4-0 Monocryl sutures. Local anesthetic was injected into all trocar sites and the abdomen was then cleaned and dried. Dermabond skin glue was then applied to the incisions.    The patient was then awakened from anesthesia and transferred to the recovery room in stable condition.  At the conclusion of the case, the sponge, needle, and instrument counts were correct x 2.    Dr. Eitan Preston was present and scrubbed for the entirety of this case.

## 2022-11-02 NOTE — PROGRESS NOTES
Report received from RN, assumed care at 0645  Pt is A0X4, and responds appropriately   Pt declines any SOB, chest pain, new onset of numbness/ tingling  Pt rates pain at 8/10, on a scale of 1-10, pt medicated per MAR postoperatively   Pt is voiding adequatly and without hesitancy  Pt has + flatus, + bowel sounds. Last BM prior to procedure 11/2.   Pt ambulates with a x1 assist   Pt is tolerating a diet, pt denies any nausea/vomiting  Plan of care discussed, all questions answered. Explained importance of calling before getting OOB and pt verbalizes understanding. Explained importance of oral care. Call light is within reach, treaded slipper socks on, bed in lowest/ locked position, hourly rounding in place, all needs met at this time

## 2022-11-02 NOTE — PROGRESS NOTES
4 Eyes Skin Assessment Completed by CHICHI Bansal and CHICHI Pleitez.    Head WDL  Ears WDL  Nose WDL  Mouth WDL  Neck WDL  Breast/Chest WDL  Shoulder Blades WDL  Spine WDL  (R) Arm/Elbow/Hand WDL  (L) Arm/Elbow/Hand WDL  Abdomen Incision x4  Groin WDL  Scrotum/Coccyx/Buttocks WDL  (R) Leg WDL  (L) Leg WDL  (R) Heel/Foot/Toe WDL  (L) Heel/Foot/Toe WDL          Devices In Places Blood Pressure Cuff, Pulse Ox, SCD's, and Oxy Mask      Interventions In Place Pillows, Heels Loaded W/Pillows, and Pressure Redistribution Mattress    Possible Skin Injury No    Pictures Uploaded Into Epic N/A  Wound Consult Placed N/A  RN Wound Prevention Protocol Ordered No

## 2022-11-02 NOTE — PROGRESS NOTES
[facility] Medicine Daily Progress Note    Date of Service  11/2/2022    Chief Complaint  Azalea Gan is a 72 y.o. female admitted 10/30/2022 with ABD pain    Hospital Course  72 y.o. female who presented 10/30/2022 with vomiting.  Patient states her symptoms started 3 days ago.  She tried to have a small amount of food, within a couple of minutes she had vomited it back up.  She states this has happened for the last 3 days, today she felt she was becoming more dehydrated and since her symptoms persisted she presented to an outside facility.  She was transferred here for higher level of care.  She does have a history of a Lap-Band surgery with Dr. Vega, states the only issue she has had in the past is if she eats too rapidly then she vomits everything up.  She states she can keep her pills down as well as fluids but any solid food she vomits.  She denies any nausea.  She states what happens that she will eat and then she will feel it get to her stomach and then she began to have some mild upper quadrant cramping and then she will began vomiting.  She states at first she will vomit a lot of fluid and then eventually she will get the food out but will continue to vomit until she gets it all out.  After she gets the food out, she has no symptoms.  However, next time she tries to eat she will have the same occurrence.  Patient denies any nausea, fever, chills.    Interval Problem Update  Patient was evaluated at bedside  Reporting epigastric pain and anxiety 2/2 surgery today  SBP in the 190, likely 2/2 anxiety  Stenosis of the proximal stomach, dysmotility and food impaction on Barium  General surgery following, aim for laparoscopic gastric band/port removal and simultaneous EGD today    I have discussed this patient's plan of care and discharge plan at IDT rounds today with Case Management, Nursing, Nursing leadership, and other members of the IDT team.    Consultants/Specialty  Surg    Code Status  Full  Code    Disposition  Patient is not medically cleared for discharge.   Anticipate discharge to to home with close outpatient follow-up.  I have placed the appropriate orders for post-discharge needs.    Review of Systems  Review of Systems   Constitutional: Negative.    HENT: Negative.     Eyes: Negative.    Respiratory: Negative.     Cardiovascular: Negative.    Gastrointestinal:  Positive for heartburn.   Genitourinary: Negative.    Musculoskeletal:  Positive for back pain.   Skin: Negative.    Neurological: Negative.    Endo/Heme/Allergies: Negative.    Psychiatric/Behavioral:  The patient is nervous/anxious.       Physical Exam  Temp:  [35.9 °C (96.6 °F)-37.7 °C (99.8 °F)] 36.8 °C (98.2 °F)  Pulse:  [53-73] 73  Resp:  [17-20] 20  BP: (117-192)/(51-88) 192/88  SpO2:  [90 %-92 %] 92 %    Physical Exam  Constitutional:       Appearance: Normal appearance.   HENT:      Head: Normocephalic and atraumatic.      Mouth/Throat:      Mouth: Mucous membranes are moist.   Eyes:      Extraocular Movements: Extraocular movements intact.      Pupils: Pupils are equal, round, and reactive to light.   Cardiovascular:      Rate and Rhythm: Normal rate and regular rhythm.      Pulses: Normal pulses.      Heart sounds: Normal heart sounds.   Pulmonary:      Effort: Pulmonary effort is normal.      Breath sounds: Normal breath sounds.   Abdominal:      General: Bowel sounds are normal. There is distension.      Palpations: Abdomen is soft.      Tenderness: There is no abdominal tenderness. There is no guarding or rebound.   Musculoskeletal:         General: No swelling. Normal range of motion.      Cervical back: Normal range of motion and neck supple.   Skin:     General: Skin is warm.      Coloration: Skin is not jaundiced.   Neurological:      General: No focal deficit present.      Mental Status: She is alert and oriented to person, place, and time. Mental status is at baseline.      Cranial Nerves: No cranial nerve deficit.    Psychiatric:         Mood and Affect: Mood normal.         Behavior: Behavior normal.         Thought Content: Thought content normal.         Judgment: Judgment normal.       Fluids    Intake/Output Summary (Last 24 hours) at 11/2/2022 0933  Last data filed at 11/2/2022 0800  Gross per 24 hour   Intake 560 ml   Output 50 ml   Net 510 ml       Laboratory  Recent Labs     10/31/22  0643   WBC 4.9   RBC 4.13*   HEMOGLOBIN 12.2   HEMATOCRIT 38.7   MCV 93.7   MCH 29.5   MCHC 31.5*   RDW 56.1*   PLATELETCT 180   MPV 10.1     Recent Labs     10/31/22  0643   SODIUM 143  143   POTASSIUM 4.3  4.3   CHLORIDE 110  110   CO2 23  23   GLUCOSE 81  81   BUN 22  22   CREATININE 1.03  1.03   CALCIUM 9.1  9.1                   Imaging  DX-UPPER GI-SERIES WITH KUB   Final Result      1.  Focal, marked stenosis of the proximal stomach at the lap band. Barium pill could not pass the stenosis.   2.  Esophageal dysmotility.   3.  Filling defect at the distal esophagus likely representing food impaction.      OUTSIDE IMAGES-DX CHEST   Final Result      OUTSIDE IMAGES-CT ABDOMEN /PELVIS   Final Result           Assessment/Plan  * Stenosis of stomach  Assessment & Plan  Stenosis of the proximal stomach on barium   Surg following, OR on today    Food impaction of esophagus  Assessment & Plan  Stenosis of the proximal stomach, dysmotility and food impaction on Barium  General surgery following, aim for laparoscopic gastric band/port removal and simultaneous EGD on today    Hx of laparoscopic gastric banding- (present on admission)  Assessment & Plan  Diet per Surgery    Dental infection- (present on admission)  Assessment & Plan  IV Unasyn  Follow up with Dentist as outpatient    Bradycardia- (present on admission)  Assessment & Plan  Hold Metoprolol  Check EKG    Hypomagnesemia- (present on admission)  Assessment & Plan  IV Mg 2 g  Follow level    Chronic respiratory failure with hypoxia (HCC)- (present on admission)  Assessment &  Plan  RT protocol    ALCIRA (acute kidney injury) (HCC)- (present on admission)  Assessment & Plan  Careful IVF hydration  Follow bmp, check PTH    Hypothyroidism- (present on admission)  Assessment & Plan  Synthroid   Check TSH    Depression- (present on admission)  Assessment & Plan  Prozac, Trazodone    Vomiting- (present on admission)  Assessment & Plan  For UGIS  Diet per Surgery    Idiopathic chronic gout of multiple sites without tophus- (present on admission)  Assessment & Plan  Allopurinol    Pulmonary hypertension (HCC)- (present on admission)  Assessment & Plan  Hold Lasix  Monitor volume status  Needs sleep study    Paroxysmal atrial fibrillation (HCC)- (present on admission)  Assessment & Plan  Hold Metoprolol  Was not on anticoagulation    Obesity- (present on admission)  Assessment & Plan  Body mass index is 40.83 kg/m².    Bilateral lower extremity edema- (present on admission)  Assessment & Plan  Hold Lasix and HCTZ    Essential hypertension- (present on admission)  Assessment & Plan  Hold Metoprolol, Lisinopril, HCTZ, Amlodipine       VTE prophylaxis: SCDs/TEDs    I have performed a physical exam and reviewed and updated ROS and Plan today (11/2/2022). In review of yesterday's note (11/1/2022), there are no changes except as documented above.

## 2022-11-03 ENCOUNTER — PHARMACY VISIT (OUTPATIENT)
Dept: PHARMACY | Facility: MEDICAL CENTER | Age: 72
End: 2022-11-03
Payer: MEDICARE

## 2022-11-03 VITALS
HEART RATE: 67 BPM | BODY MASS INDEX: 40.61 KG/M2 | HEIGHT: 64 IN | RESPIRATION RATE: 16 BRPM | OXYGEN SATURATION: 90 % | WEIGHT: 237.88 LBS | DIASTOLIC BLOOD PRESSURE: 50 MMHG | TEMPERATURE: 97.6 F | SYSTOLIC BLOOD PRESSURE: 123 MMHG

## 2022-11-03 LAB
ALBUMIN SERPL BCP-MCNC: 3.3 G/DL (ref 3.2–4.9)
ALBUMIN/GLOB SERPL: 1.2 G/DL
ALP SERPL-CCNC: 56 U/L (ref 30–99)
ALT SERPL-CCNC: 16 U/L (ref 2–50)
ANION GAP SERPL CALC-SCNC: 8 MMOL/L (ref 7–16)
AST SERPL-CCNC: 19 U/L (ref 12–45)
BASOPHILS # BLD AUTO: 0.3 % (ref 0–1.8)
BASOPHILS # BLD: 0.02 K/UL (ref 0–0.12)
BILIRUB SERPL-MCNC: 0.3 MG/DL (ref 0.1–1.5)
BUN SERPL-MCNC: 11 MG/DL (ref 8–22)
CALCIUM SERPL-MCNC: 8.7 MG/DL (ref 8.5–10.5)
CHLORIDE SERPL-SCNC: 103 MMOL/L (ref 96–112)
CO2 SERPL-SCNC: 27 MMOL/L (ref 20–33)
CREAT SERPL-MCNC: 0.74 MG/DL (ref 0.5–1.4)
EOSINOPHIL # BLD AUTO: 0.04 K/UL (ref 0–0.51)
EOSINOPHIL NFR BLD: 0.6 % (ref 0–6.9)
ERYTHROCYTE [DISTWIDTH] IN BLOOD BY AUTOMATED COUNT: 53.2 FL (ref 35.9–50)
GFR SERPLBLD CREATININE-BSD FMLA CKD-EPI: 85 ML/MIN/1.73 M 2
GLOBULIN SER CALC-MCNC: 2.7 G/DL (ref 1.9–3.5)
GLUCOSE SERPL-MCNC: 143 MG/DL (ref 65–99)
HCT VFR BLD AUTO: 35.5 % (ref 37–47)
HGB BLD-MCNC: 11.3 G/DL (ref 12–16)
IMM GRANULOCYTES # BLD AUTO: 0.02 K/UL (ref 0–0.11)
IMM GRANULOCYTES NFR BLD AUTO: 0.3 % (ref 0–0.9)
LYMPHOCYTES # BLD AUTO: 1.08 K/UL (ref 1–4.8)
LYMPHOCYTES NFR BLD: 15.3 % (ref 22–41)
MCH RBC QN AUTO: 29.4 PG (ref 27–33)
MCHC RBC AUTO-ENTMCNC: 31.8 G/DL (ref 33.6–35)
MCV RBC AUTO: 92.2 FL (ref 81.4–97.8)
MONOCYTES # BLD AUTO: 0.6 K/UL (ref 0–0.85)
MONOCYTES NFR BLD AUTO: 8.5 % (ref 0–13.4)
NEUTROPHILS # BLD AUTO: 5.31 K/UL (ref 2–7.15)
NEUTROPHILS NFR BLD: 75 % (ref 44–72)
NRBC # BLD AUTO: 0 K/UL
NRBC BLD-RTO: 0 /100 WBC
PLATELET # BLD AUTO: 183 K/UL (ref 164–446)
PMV BLD AUTO: 9.7 FL (ref 9–12.9)
POTASSIUM SERPL-SCNC: 4 MMOL/L (ref 3.6–5.5)
PROT SERPL-MCNC: 6 G/DL (ref 6–8.2)
RBC # BLD AUTO: 3.85 M/UL (ref 4.2–5.4)
SODIUM SERPL-SCNC: 138 MMOL/L (ref 135–145)
WBC # BLD AUTO: 7.1 K/UL (ref 4.8–10.8)

## 2022-11-03 PROCEDURE — A9270 NON-COVERED ITEM OR SERVICE: HCPCS | Performed by: INTERNAL MEDICINE

## 2022-11-03 PROCEDURE — 700105 HCHG RX REV CODE 258: Performed by: INTERNAL MEDICINE

## 2022-11-03 PROCEDURE — A9270 NON-COVERED ITEM OR SERVICE: HCPCS

## 2022-11-03 PROCEDURE — 700111 HCHG RX REV CODE 636 W/ 250 OVERRIDE (IP): Performed by: SURGERY

## 2022-11-03 PROCEDURE — RXMED WILLOW AMBULATORY MEDICATION CHARGE: Performed by: SURGERY

## 2022-11-03 PROCEDURE — 700102 HCHG RX REV CODE 250 W/ 637 OVERRIDE(OP): Performed by: INTERNAL MEDICINE

## 2022-11-03 PROCEDURE — 85025 COMPLETE CBC W/AUTO DIFF WBC: CPT

## 2022-11-03 PROCEDURE — 700102 HCHG RX REV CODE 250 W/ 637 OVERRIDE(OP)

## 2022-11-03 PROCEDURE — 80053 COMPREHEN METABOLIC PANEL: CPT

## 2022-11-03 PROCEDURE — 700105 HCHG RX REV CODE 258: Performed by: SURGERY

## 2022-11-03 PROCEDURE — 700111 HCHG RX REV CODE 636 W/ 250 OVERRIDE (IP): Performed by: INTERNAL MEDICINE

## 2022-11-03 PROCEDURE — RXMED WILLOW AMBULATORY MEDICATION CHARGE: Performed by: PHYSICIAN ASSISTANT

## 2022-11-03 PROCEDURE — 700102 HCHG RX REV CODE 250 W/ 637 OVERRIDE(OP): Performed by: SURGERY

## 2022-11-03 PROCEDURE — 99239 HOSP IP/OBS DSCHRG MGMT >30: CPT | Performed by: STUDENT IN AN ORGANIZED HEALTH CARE EDUCATION/TRAINING PROGRAM

## 2022-11-03 PROCEDURE — A9270 NON-COVERED ITEM OR SERVICE: HCPCS | Performed by: SURGERY

## 2022-11-03 RX ORDER — OMEPRAZOLE 20 MG/1
20 CAPSULE, DELAYED RELEASE ORAL DAILY
Qty: 90 CAPSULE | Refills: 3 | Status: SHIPPED | OUTPATIENT
Start: 2022-11-03

## 2022-11-03 RX ORDER — HYDROCODONE BITARTRATE AND ACETAMINOPHEN 5; 325 MG/1; MG/1
1 TABLET ORAL EVERY 4 HOURS PRN
Qty: 18 TABLET | Refills: 0 | Status: SHIPPED | OUTPATIENT
Start: 2022-11-03 | End: 2022-11-06

## 2022-11-03 RX ADMIN — AMPICILLIN AND SULBACTAM 3 G: 1; 2 INJECTION, POWDER, FOR SOLUTION INTRAMUSCULAR; INTRAVENOUS at 06:10

## 2022-11-03 RX ADMIN — TRAZODONE HYDROCHLORIDE 100 MG: 50 TABLET ORAL at 00:50

## 2022-11-03 RX ADMIN — PRAMIPEXOLE DIHYDROCHLORIDE 1.5 MG: 0.5 TABLET ORAL at 00:50

## 2022-11-03 RX ADMIN — AMPICILLIN AND SULBACTAM 3 G: 1; 2 INJECTION, POWDER, FOR SOLUTION INTRAMUSCULAR; INTRAVENOUS at 12:02

## 2022-11-03 RX ADMIN — OXYCODONE 5 MG: 5 TABLET ORAL at 12:01

## 2022-11-03 RX ADMIN — THIAMINE HYDROCHLORIDE 100 MG: 100 INJECTION, SOLUTION INTRAMUSCULAR; INTRAVENOUS at 06:54

## 2022-11-03 RX ADMIN — OXYCODONE 5 MG: 5 TABLET ORAL at 06:16

## 2022-11-03 RX ADMIN — OXYCODONE 5 MG: 5 TABLET ORAL at 02:06

## 2022-11-03 RX ADMIN — LEVOTHYROXINE SODIUM 200 MCG: 0.11 TABLET ORAL at 06:09

## 2022-11-03 RX ADMIN — MULTIPLE VITAMINS W/ MINERALS TAB 1 TABLET: TAB at 06:10

## 2022-11-03 RX ADMIN — FLUOXETINE 80 MG: 20 CAPSULE ORAL at 06:10

## 2022-11-03 RX ADMIN — ALLOPURINOL 100 MG: 100 TABLET ORAL at 06:10

## 2022-11-03 ASSESSMENT — PAIN DESCRIPTION - PAIN TYPE
TYPE: ACUTE PAIN;SURGICAL PAIN
TYPE: ACUTE PAIN

## 2022-11-03 ASSESSMENT — ENCOUNTER SYMPTOMS
DIARRHEA: 0
CHILLS: 0
VOMITING: 0
HEARTBURN: 0
COUGH: 0
SHORTNESS OF BREATH: 0
ABDOMINAL PAIN: 1
PALPITATIONS: 0
FEVER: 0
NAUSEA: 0

## 2022-11-03 NOTE — DISCHARGE SUMMARY
Discharge Summary    CHIEF COMPLAINT ON ADMISSION  No chief complaint on file.      Reason for Admission  Intractable vomiting     Admission Date  10/30/2022    CODE STATUS  Full Code    HPI & HOSPITAL COURSE  72-year-old female with a past medical history of Band surgery was admitted on 10/30/2022 for intractable vomiting and dysphagia to solids.  Patient underwent barium swallow which showed Stenosis of the proximal stomach, dysmotility and food impaction.  General surgery following for which patient underwent Laparoscopic removal of adjustable gastric band and port,  laparoscopic extensive lysis of adhesions and Esophagogastroduodenoscopy (EGD) with removal of impacted food bolus on 11/2/2022 which patient tolerated well.  Ultimately, patient able to tolerate regular diet and abdominal pain significantly improved.  Stable patient with in chronic condition was discharged home and instructed to follow-up with her primary care provider within 2 weeks.  All results and plan of action discussed with the patient for she was understanding and agreement with the primary care team.  Patient was instructed to return to emergency department if symptoms were to worsen.    Therefore, she is discharged in good and stable condition to home with close outpatient follow-up.    The patient met 2-midnight criteria for an inpatient stay at the time of discharge.    Discharge Date  11/3/2022    FOLLOW UP ITEMS POST DISCHARGE  Primary care provider follow-up with hospital discharge care    DISCHARGE DIAGNOSES  Principal Problem:    Stenosis of stomach POA: No  Active Problems:    Food impaction of esophagus POA: No    Essential hypertension POA: Yes    Bilateral lower extremity edema POA: Yes    Obesity POA: Yes    Paroxysmal atrial fibrillation (HCC) POA: Yes    Pulmonary hypertension (HCC) POA: Yes    Idiopathic chronic gout of multiple sites without tophus POA: Yes    Vomiting POA: Yes    Depression POA: Yes    Hypothyroidism POA:  Yes    ALCIRA (acute kidney injury) (HCC) POA: Yes    Chronic respiratory failure with hypoxia (HCC) POA: Yes    Hypomagnesemia POA: Yes    Bradycardia POA: Yes    Dental infection POA: Yes    Hx of laparoscopic gastric banding POA: Yes    Dysphagia POA: Unknown    CKD (chronic kidney disease) POA: Unknown  Resolved Problems:    * No resolved hospital problems. *      FOLLOW UP  No future appointments.  No follow-up provider specified.    MEDICATIONS ON DISCHARGE     Medication List        START taking these medications        Instructions   HYDROcodone-acetaminophen 5-325 MG Tabs per tablet  Commonly known as: Norco   Take 1 Tablet by mouth every four hours as needed (for pain) for up to 3 days.  Dose: 1 Tablet            ASK your doctor about these medications        Instructions   allopurinol 100 MG Tabs  Commonly known as: ZYLOPRIM   Take 1 Tablet by mouth every day.  Dose: 100 mg     amLODIPine 10 MG Tabs  Commonly known as: NORVASC   Take 1 Tablet by mouth every day.  Dose: 10 mg     amoxicillin 875 MG tablet  Commonly known as: AMOXIL   Take 875 mg by mouth 2 times a day. 10 day course  Indications: Dental infection  Dose: 875 mg     aspirin 81 MG EC tablet   Take 81 mg by mouth every day.  Dose: 81 mg     chlorthalidone 25 MG Tabs  Commonly known as: HYGROTON   Take 1 Tablet by mouth every day.  Dose: 25 mg     cloNIDine 0.1 MG Tabs  Commonly known as: CATAPRES   Take 0.1 mg by mouth as needed.  Dose: 0.1 mg     fluoxetine 40 MG capsule  Commonly known as: PROZAC  Ask about: Which instructions should I use?   Take 80 mg by mouth every day. 80 mg = 2 capsules  Dose: 80 mg     furosemide 20 MG Tabs  Commonly known as: LASIX   Take 1 Tablet by mouth every day for 180 days.  Dose: 20 mg     levothyroxine 200 MCG Tabs  Commonly known as: SYNTHROID   Take 1 Tablet by mouth every morning on an empty stomach.  Dose: 200 mcg     lisinopril 40 MG tablet  Commonly known as: PRINIVIL   Take 1 Tablet by mouth every day for  180 days.  Dose: 40 mg     magnesium oxide 400 MG Tabs tablet  Commonly known as: MAG-OX   Take 400 mg by mouth every day.  Dose: 400 mg     metoprolol tartrate 25 MG Tabs  Commonly known as: LOPRESSOR   Take 1 Tablet by mouth 2 times a day for 360 days.  Dose: 25 mg     potassium chloride SA 10 MEQ Tbcr  Commonly known as: K-DUR   Take 1 Tablet by mouth every day.  Dose: 10 mEq     traZODone 100 MG Tabs  Commonly known as: DESYREL   Take 1 Tablet by mouth every evening for 360 days.  Dose: 100 mg              Allergies  Allergies   Allergen Reactions    Iodine Anaphylaxis       DIET  Orders Placed This Encounter   Procedures    Diet Order Diet: Regular     Standing Status:   Standing     Number of Occurrences:   1     Order Specific Question:   Diet:     Answer:   Regular [1]       ACTIVITY  As tolerated.  Weight bearing as tolerated    CONSULTATIONS  General surgery    PROCEDURES  1.  Laparoscopic removal of adjustable gastric band and port  2.  Laparoscopic extensive lysis of adhesions  3.  Esophagogastroduodenoscopy (EGD) with removal of impacted food bolus    LABORATORY  Lab Results   Component Value Date    SODIUM 138 11/03/2022    POTASSIUM 4.0 11/03/2022    CHLORIDE 103 11/03/2022    CO2 27 11/03/2022    GLUCOSE 143 (H) 11/03/2022    BUN 11 11/03/2022    CREATININE 0.74 11/03/2022        Lab Results   Component Value Date    WBC 7.1 11/03/2022    HEMOGLOBIN 11.3 (L) 11/03/2022    HEMATOCRIT 35.5 (L) 11/03/2022    PLATELETCT 183 11/03/2022        Total time of the discharge process exceeds 33 minutes.

## 2022-11-03 NOTE — CARE PLAN
The patient is Stable - Low risk of patient condition declining or worsening    Shift Goals  Clinical Goals: Pain control, mobility, rest  Patient Goals: Pain control, rest, discharge discussion    Progress made toward(s) clinical / shift goals: Medicated for pain; see MAR. Patient up to/from bathroom and chair x 2 during shift. Patient slept intermittently.     Patient is not progressing towards the following goals: Patient has discharge concerns regarding ability to take care of self. SW consult placed.

## 2022-11-03 NOTE — PROGRESS NOTES
Report received from AM RN; assumed care. Assumed care. A&O x 4. VSS. 96% on 3L NC. Mild SOB reported d/t pain. Patient denied numbness, tingling, nausea, vomiting, dizziness. Medicated for pain; see MAR. Abdomen round, distended. X 1 transverse incision and x 3 lap stabs, all approximated with dermabond. + void, utilized fracture pan/toilet. - eructation. -flatus. LBM 11/02. Patient tolerating clear liquid diet. Patient up SBA with stead, wide based gait. Patient tolerating MIVF/IV ABX. Discussed plan of care/potential discharge with patient. All questions answered.  Low fall risk.. Bed in locked/lowest position.  Call light/personal belongings within reach.  All needs met, patient sleeping at present time.     Patient expressed concern with inclement weather and discharge difficulties. IP SW consult placed.
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
## 2022-11-03 NOTE — DISCHARGE INSTRUCTIONS
Discharge instructions:    1. DIET: Upon discharge from the hospital you may resume your normal preoperative diet. Depending on how you are feeling and whether you have nausea or not, you may wish to stay with a bland diet for the first few days. However, you can advance this as quickly as you feel ready.    2. ACTIVITIES: After discharge from the hospital, you may resume full routine activities. However, there should be no heavy lifting (greater than 15 pounds) and no strenuous activities until after your follow-up visit. Otherwise, routine activities of daily living are acceptable.    3. DRIVING: You may drive whenever you are off pain medications and are able to perform the activities needed to drive, i.e. turning, bending, twisting, etc.    4. BATHING: You may get the wound wet 2 days after surgery. You may shower, but do not submerge in a bath for at least a week.      5. BOWEL FUNCTION: Constipation is common after an operation, especially with pain medications. The combination of pain medication and decreased activity level can cause constipation in otherwise normal patients. If you feel this is occurring, take a laxative (Miralax, Milk of Magnesia, Ex-Lax, Senokot, etc.) until the problem has resolved.    6. PAIN MEDICATION: You will be given a prescription for pain medication at discharge. Please take these as directed. It is important to remember not to take medications on an empty stomach as this may cause nausea.    7.CALL IF YOU HAVE: (1) Fevers to more than 101.0 F, (2) Unusual chest or leg pain, (3) Drainage or fluid from incision that may be foul smelling, increased tenderness or soreness at the wound or the wound edges are no longer together, redness or swelling at the incision site. Please do not hesitate to call with any other questions.     8. APPOINTMENT: Contact our office at [phone] for a follow-up appointment in 1-2 weeks following your procedure.    If you have any additional questions,  please do not hesitate to call the office and speak to either myself or the physician on call.    Office address:  [facility] [address]  [address]  [place] [address]

## 2022-11-03 NOTE — PROGRESS NOTES
Assumed care of patient at 0645. Bedside report received. Assessment complete.  AA&Ox4. Denies CP/SOB.  Reporting mild pain. Declined intervention at this time. Educated patient regarding pharmacologic and non pharmacologic modalities for pain management.  Skin per flow sheets. One transverse incision dermabond and EMILIANO, 2x lap sites  Tolerating diet. Denies N/V.  + void. + BM. Last BM 11/2  Pt ambulates standby.  Fall prevention measures in place per flowsheets. SCD's in use/Educated on SCD use, patient declined at this time, ambulating frequently, Pharmacologic VTE prophylaxis in use.  Plan of care discussed, all questions answered.  Educated regarding importance of oral care. Oral care kit at bedside. Call light is within reach, treaded slipper socks on, bed in lowest/ locked position, hourly rounding in place, all needs met at this time.

## 2023-05-30 ENCOUNTER — HOSPITAL ENCOUNTER (OUTPATIENT)
Dept: RADIOLOGY | Facility: MEDICAL CENTER | Age: 73
End: 2023-05-30

## 2023-05-30 ENCOUNTER — APPOINTMENT (OUTPATIENT)
Dept: RADIOLOGY | Facility: MEDICAL CENTER | Age: 73
DRG: 292 | End: 2023-05-30
Attending: STUDENT IN AN ORGANIZED HEALTH CARE EDUCATION/TRAINING PROGRAM
Payer: MEDICARE

## 2023-05-30 ENCOUNTER — HOSPITAL ENCOUNTER (INPATIENT)
Facility: MEDICAL CENTER | Age: 73
LOS: 3 days | DRG: 292 | End: 2023-06-02
Attending: STUDENT IN AN ORGANIZED HEALTH CARE EDUCATION/TRAINING PROGRAM | Admitting: STUDENT IN AN ORGANIZED HEALTH CARE EDUCATION/TRAINING PROGRAM
Payer: MEDICARE

## 2023-05-30 DIAGNOSIS — I48.0 PAROXYSMAL ATRIAL FIBRILLATION (HCC): ICD-10-CM

## 2023-05-30 DIAGNOSIS — G25.81 RESTLESS LEG: ICD-10-CM

## 2023-05-30 DIAGNOSIS — G47.39 SLEEP APNEA-LIKE BEHAVIOR: ICD-10-CM

## 2023-05-30 DIAGNOSIS — I47.29 PAROXYSMAL VENTRICULAR TACHYCARDIA (HCC): ICD-10-CM

## 2023-05-30 DIAGNOSIS — R60.0 BILATERAL LOWER EXTREMITY EDEMA: ICD-10-CM

## 2023-05-30 DIAGNOSIS — M25.552 LEFT HIP PAIN: ICD-10-CM

## 2023-05-30 PROBLEM — Z71.89 ADVANCE CARE PLANNING: Status: ACTIVE | Noted: 2022-05-04

## 2023-05-30 PROBLEM — R06.02 SOB (SHORTNESS OF BREATH): Status: ACTIVE | Noted: 2023-05-30

## 2023-05-30 LAB
ALBUMIN SERPL BCP-MCNC: 3.9 G/DL (ref 3.2–4.9)
ALBUMIN/GLOB SERPL: 1.4 G/DL
ALP SERPL-CCNC: 60 U/L (ref 30–99)
ALT SERPL-CCNC: 16 U/L (ref 2–50)
ANION GAP SERPL CALC-SCNC: 12 MMOL/L (ref 7–16)
AST SERPL-CCNC: 20 U/L (ref 12–45)
BASOPHILS # BLD AUTO: 0.5 % (ref 0–1.8)
BASOPHILS # BLD: 0.03 K/UL (ref 0–0.12)
BILIRUB SERPL-MCNC: 0.3 MG/DL (ref 0.1–1.5)
BUN SERPL-MCNC: 20 MG/DL (ref 8–22)
CALCIUM ALBUM COR SERPL-MCNC: 9.5 MG/DL (ref 8.5–10.5)
CALCIUM SERPL-MCNC: 9.4 MG/DL (ref 8.5–10.5)
CHLORIDE SERPL-SCNC: 105 MMOL/L (ref 96–112)
CO2 SERPL-SCNC: 27 MMOL/L (ref 20–33)
CREAT SERPL-MCNC: 0.83 MG/DL (ref 0.5–1.4)
D DIMER PPP IA.FEU-MCNC: 0.73 UG/ML (FEU) (ref 0–0.5)
EKG IMPRESSION: NORMAL
EOSINOPHIL # BLD AUTO: 0.07 K/UL (ref 0–0.51)
EOSINOPHIL NFR BLD: 1.3 % (ref 0–6.9)
ERYTHROCYTE [DISTWIDTH] IN BLOOD BY AUTOMATED COUNT: 47.4 FL (ref 35.9–50)
GFR SERPLBLD CREATININE-BSD FMLA CKD-EPI: 74 ML/MIN/1.73 M 2
GLOBULIN SER CALC-MCNC: 2.7 G/DL (ref 1.9–3.5)
GLUCOSE SERPL-MCNC: 82 MG/DL (ref 65–99)
HCT VFR BLD AUTO: 36.7 % (ref 37–47)
HGB BLD-MCNC: 12.1 G/DL (ref 12–16)
IMM GRANULOCYTES # BLD AUTO: 0.01 K/UL (ref 0–0.11)
IMM GRANULOCYTES NFR BLD AUTO: 0.2 % (ref 0–0.9)
LYMPHOCYTES # BLD AUTO: 1.86 K/UL (ref 1–4.8)
LYMPHOCYTES NFR BLD: 33.5 % (ref 22–41)
MCH RBC QN AUTO: 30 PG (ref 27–33)
MCHC RBC AUTO-ENTMCNC: 33 G/DL (ref 32.2–35.5)
MCV RBC AUTO: 91.1 FL (ref 81.4–97.8)
MONOCYTES # BLD AUTO: 0.46 K/UL (ref 0–0.85)
MONOCYTES NFR BLD AUTO: 8.3 % (ref 0–13.4)
NEUTROPHILS # BLD AUTO: 3.13 K/UL (ref 1.82–7.42)
NEUTROPHILS NFR BLD: 56.2 % (ref 44–72)
NRBC # BLD AUTO: 0 K/UL
NRBC BLD-RTO: 0 /100 WBC (ref 0–0.2)
NT-PROBNP SERPL IA-MCNC: 898 PG/ML (ref 0–125)
PLATELET # BLD AUTO: 239 K/UL (ref 164–446)
PMV BLD AUTO: 9.3 FL (ref 9–12.9)
POTASSIUM SERPL-SCNC: 3.6 MMOL/L (ref 3.6–5.5)
PROCALCITONIN SERPL-MCNC: <0.05 NG/ML
PROT SERPL-MCNC: 6.6 G/DL (ref 6–8.2)
RBC # BLD AUTO: 4.03 M/UL (ref 4.2–5.4)
SODIUM SERPL-SCNC: 144 MMOL/L (ref 135–145)
TROPONIN T SERPL-MCNC: 13 NG/L (ref 6–19)
TROPONIN T SERPL-MCNC: 15 NG/L (ref 6–19)
WBC # BLD AUTO: 5.6 K/UL (ref 4.8–10.8)

## 2023-05-30 PROCEDURE — 93005 ELECTROCARDIOGRAM TRACING: CPT | Performed by: STUDENT IN AN ORGANIZED HEALTH CARE EDUCATION/TRAINING PROGRAM

## 2023-05-30 PROCEDURE — 80053 COMPREHEN METABOLIC PANEL: CPT

## 2023-05-30 PROCEDURE — 700102 HCHG RX REV CODE 250 W/ 637 OVERRIDE(OP): Performed by: STUDENT IN AN ORGANIZED HEALTH CARE EDUCATION/TRAINING PROGRAM

## 2023-05-30 PROCEDURE — 99223 1ST HOSP IP/OBS HIGH 75: CPT | Mod: 25,AI | Performed by: STUDENT IN AN ORGANIZED HEALTH CARE EDUCATION/TRAINING PROGRAM

## 2023-05-30 PROCEDURE — 770020 HCHG ROOM/CARE - TELE (206)

## 2023-05-30 PROCEDURE — 84484 ASSAY OF TROPONIN QUANT: CPT | Mod: 91

## 2023-05-30 PROCEDURE — 85379 FIBRIN DEGRADATION QUANT: CPT

## 2023-05-30 PROCEDURE — 84145 PROCALCITONIN (PCT): CPT

## 2023-05-30 PROCEDURE — 36415 COLL VENOUS BLD VENIPUNCTURE: CPT

## 2023-05-30 PROCEDURE — 83880 ASSAY OF NATRIURETIC PEPTIDE: CPT

## 2023-05-30 PROCEDURE — 99497 ADVNCD CARE PLAN 30 MIN: CPT | Performed by: STUDENT IN AN ORGANIZED HEALTH CARE EDUCATION/TRAINING PROGRAM

## 2023-05-30 PROCEDURE — A9270 NON-COVERED ITEM OR SERVICE: HCPCS | Performed by: STUDENT IN AN ORGANIZED HEALTH CARE EDUCATION/TRAINING PROGRAM

## 2023-05-30 PROCEDURE — 71045 X-RAY EXAM CHEST 1 VIEW: CPT

## 2023-05-30 PROCEDURE — 85025 COMPLETE CBC W/AUTO DIFF WBC: CPT

## 2023-05-30 PROCEDURE — 93010 ELECTROCARDIOGRAM REPORT: CPT | Performed by: INTERNAL MEDICINE

## 2023-05-30 RX ORDER — CHLORTHALIDONE 25 MG/1
25 TABLET ORAL DAILY
Status: DISCONTINUED | OUTPATIENT
Start: 2023-05-31 | End: 2023-05-31

## 2023-05-30 RX ORDER — ONDANSETRON 4 MG/1
4 TABLET, ORALLY DISINTEGRATING ORAL EVERY 4 HOURS PRN
Status: DISCONTINUED | OUTPATIENT
Start: 2023-05-30 | End: 2023-06-02 | Stop reason: HOSPADM

## 2023-05-30 RX ORDER — LEVOTHYROXINE SODIUM 0.1 MG/1
200 TABLET ORAL
Status: DISCONTINUED | OUTPATIENT
Start: 2023-05-31 | End: 2023-06-02 | Stop reason: HOSPADM

## 2023-05-30 RX ORDER — ONDANSETRON 2 MG/ML
4 INJECTION INTRAMUSCULAR; INTRAVENOUS EVERY 4 HOURS PRN
Status: DISCONTINUED | OUTPATIENT
Start: 2023-05-30 | End: 2023-06-02 | Stop reason: HOSPADM

## 2023-05-30 RX ORDER — METOPROLOL SUCCINATE 25 MG/1
25 TABLET, EXTENDED RELEASE ORAL DAILY
Status: ON HOLD | COMMUNITY
Start: 2023-03-23 | End: 2023-06-02

## 2023-05-30 RX ORDER — PRAMIPEXOLE DIHYDROCHLORIDE 0.12 MG/1
0.25 TABLET ORAL NIGHTLY
Status: ON HOLD | COMMUNITY
End: 2023-06-02 | Stop reason: SDUPTHER

## 2023-05-30 RX ORDER — TRAMADOL HYDROCHLORIDE 50 MG/1
50 TABLET ORAL EVERY 6 HOURS PRN
Status: DISCONTINUED | OUTPATIENT
Start: 2023-05-30 | End: 2023-06-01

## 2023-05-30 RX ORDER — HYDRALAZINE HYDROCHLORIDE 20 MG/ML
10 INJECTION INTRAMUSCULAR; INTRAVENOUS EVERY 4 HOURS PRN
Status: DISCONTINUED | OUTPATIENT
Start: 2023-05-30 | End: 2023-06-02 | Stop reason: HOSPADM

## 2023-05-30 RX ORDER — AMLODIPINE BESYLATE 10 MG/1
10 TABLET ORAL DAILY
Status: DISCONTINUED | OUTPATIENT
Start: 2023-05-31 | End: 2023-06-02 | Stop reason: HOSPADM

## 2023-05-30 RX ORDER — AMOXICILLIN 250 MG
2 CAPSULE ORAL 2 TIMES DAILY
Status: DISCONTINUED | OUTPATIENT
Start: 2023-05-30 | End: 2023-06-02 | Stop reason: HOSPADM

## 2023-05-30 RX ORDER — FLUOXETINE HYDROCHLORIDE 20 MG/1
80 CAPSULE ORAL DAILY
Status: DISCONTINUED | OUTPATIENT
Start: 2023-05-31 | End: 2023-06-02 | Stop reason: HOSPADM

## 2023-05-30 RX ORDER — BISACODYL 10 MG
10 SUPPOSITORY, RECTAL RECTAL
Status: DISCONTINUED | OUTPATIENT
Start: 2023-05-30 | End: 2023-06-02 | Stop reason: HOSPADM

## 2023-05-30 RX ORDER — OMEPRAZOLE 20 MG/1
20 CAPSULE, DELAYED RELEASE ORAL DAILY
Status: DISCONTINUED | OUTPATIENT
Start: 2023-05-31 | End: 2023-06-02 | Stop reason: HOSPADM

## 2023-05-30 RX ORDER — POLYETHYLENE GLYCOL 3350 17 G/17G
1 POWDER, FOR SOLUTION ORAL
Status: DISCONTINUED | OUTPATIENT
Start: 2023-05-30 | End: 2023-06-02 | Stop reason: HOSPADM

## 2023-05-30 RX ORDER — ACETAMINOPHEN 325 MG/1
650 TABLET ORAL EVERY 6 HOURS PRN
Status: DISCONTINUED | OUTPATIENT
Start: 2023-05-30 | End: 2023-06-02 | Stop reason: HOSPADM

## 2023-05-30 RX ORDER — FUROSEMIDE 10 MG/ML
40 INJECTION INTRAMUSCULAR; INTRAVENOUS
Status: DISCONTINUED | OUTPATIENT
Start: 2023-05-31 | End: 2023-06-02 | Stop reason: HOSPADM

## 2023-05-30 RX ORDER — TRAZODONE HYDROCHLORIDE 100 MG/1
100 TABLET ORAL NIGHTLY
Status: DISCONTINUED | OUTPATIENT
Start: 2023-05-30 | End: 2023-06-02 | Stop reason: HOSPADM

## 2023-05-30 RX ADMIN — TRAZODONE HYDROCHLORIDE 100 MG: 100 TABLET ORAL at 21:47

## 2023-05-30 RX ADMIN — METOPROLOL TARTRATE 25 MG: 25 TABLET, FILM COATED ORAL at 21:47

## 2023-05-30 RX ADMIN — APIXABAN 5 MG: 5 TABLET, FILM COATED ORAL at 21:47

## 2023-05-30 ASSESSMENT — PATIENT HEALTH QUESTIONNAIRE - PHQ9
SUM OF ALL RESPONSES TO PHQ QUESTIONS 1-9: 18
8. MOVING OR SPEAKING SO SLOWLY THAT OTHER PEOPLE COULD HAVE NOTICED. OR THE OPPOSITE, BEING SO FIGETY OR RESTLESS THAT YOU HAVE BEEN MOVING AROUND A LOT MORE THAN USUAL: NOT AT ALL
3. TROUBLE FALLING OR STAYING ASLEEP OR SLEEPING TOO MUCH: NEARLY EVERY DAY
9. THOUGHTS THAT YOU WOULD BE BETTER OFF DEAD, OR OF HURTING YOURSELF: SEVERAL DAYS
4. FEELING TIRED OR HAVING LITTLE ENERGY: NEARLY EVERY DAY
7. TROUBLE CONCENTRATING ON THINGS, SUCH AS READING THE NEWSPAPER OR WATCHING TELEVISION: NOT AT ALL
5. POOR APPETITE OR OVEREATING: MORE THAN HALF THE DAYS
SUM OF ALL RESPONSES TO PHQ9 QUESTIONS 1 AND 2: 6
1. LITTLE INTEREST OR PLEASURE IN DOING THINGS: NEARLY EVERY DAY
2. FEELING DOWN, DEPRESSED, IRRITABLE, OR HOPELESS: NEARLY EVERY DAY
6. FEELING BAD ABOUT YOURSELF - OR THAT YOU ARE A FAILURE OR HAVE LET YOURSELF OR YOUR FAMILY DOWN: NEARLY EVERY DAY

## 2023-05-30 ASSESSMENT — COGNITIVE AND FUNCTIONAL STATUS - GENERAL
CLIMB 3 TO 5 STEPS WITH RAILING: A LITTLE
STANDING UP FROM CHAIR USING ARMS: A LITTLE
SUGGESTED CMS G CODE MODIFIER DAILY ACTIVITY: CH
DAILY ACTIVITIY SCORE: 24
MOBILITY SCORE: 21
SUGGESTED CMS G CODE MODIFIER MOBILITY: CJ
WALKING IN HOSPITAL ROOM: A LITTLE

## 2023-05-30 ASSESSMENT — LIFESTYLE VARIABLES
ON A TYPICAL DAY WHEN YOU DRINK ALCOHOL HOW MANY DRINKS DO YOU HAVE: 0
HAVE YOU EVER FELT YOU SHOULD CUT DOWN ON YOUR DRINKING: NO
ALCOHOL_USE: NO
HOW MANY TIMES IN THE PAST YEAR HAVE YOU HAD 5 OR MORE DRINKS IN A DAY: 0
TOTAL SCORE: 0
DOES PATIENT WANT TO STOP DRINKING: NO
EVER HAD A DRINK FIRST THING IN THE MORNING TO STEADY YOUR NERVES TO GET RID OF A HANGOVER: NO
TOTAL SCORE: 0
TOTAL SCORE: 0
HAVE PEOPLE ANNOYED YOU BY CRITICIZING YOUR DRINKING: NO
CONSUMPTION TOTAL: NEGATIVE
EVER FELT BAD OR GUILTY ABOUT YOUR DRINKING: NO
AVERAGE NUMBER OF DAYS PER WEEK YOU HAVE A DRINK CONTAINING ALCOHOL: 0

## 2023-05-30 ASSESSMENT — ENCOUNTER SYMPTOMS
SPUTUM PRODUCTION: 1
WEAKNESS: 1
COUGH: 1
SENSORY CHANGE: 1
TINGLING: 1
SHORTNESS OF BREATH: 1
BACK PAIN: 1

## 2023-05-30 ASSESSMENT — FIBROSIS 4 INDEX
FIB4 SCORE: 1.89
FIB4 SCORE: 1.89

## 2023-05-30 NOTE — PROGRESS NOTES
"[facility]PITAL[facility] TRIAGE OFFICER DIRECT ADMISSION REPORT  Transferring facility: [facility]  Transferring physician: Dr Harrison  Chief complaint: sob  Pertinent history & patient course: 71 yo female with hx of CHF, Afib, HTN, who presented to [facility] for sob. Noted EKG new LBBB and ddimer 860, trop 15, . CXR notes pulmonary edema vs pneumonia. Concerning for PE or CHF exacerbation. Patient has hx of anaphylaxis reaction to iodine, unable to perform CTA.   Pertinent imaging & lab results: See above  Code Status: Full code per transferring provider, I personally verified with the transferring provider patient's code status and the transferring provider has confirmed this with the patient.  Further work up or recommendations per triage officer prior to transfer: None  Consultants called prior to transfer and pertinent input from consultants: Tele  Patient accepted for transfer: Yes  Consultants to be called upon arrival: na  Admission status: Inpatient.   Floor requested: Tele   If ICU transfer, name of intensivist case discussed with and pertinent input from critical care: na    Please inform the \"Triage Coord.-RTOC\" through Voalte upon arrival of the patient to [facility] for assignment of a hospitalist to perform admission. Reach out to the assigned hospitalist (assigned by RTOC) for any questions or concerns regarding the care of this patient.           "

## 2023-05-31 ENCOUNTER — APPOINTMENT (OUTPATIENT)
Dept: CARDIOLOGY | Facility: MEDICAL CENTER | Age: 73
DRG: 292 | End: 2023-05-31
Attending: STUDENT IN AN ORGANIZED HEALTH CARE EDUCATION/TRAINING PROGRAM
Payer: MEDICARE

## 2023-05-31 ENCOUNTER — APPOINTMENT (OUTPATIENT)
Dept: RADIOLOGY | Facility: MEDICAL CENTER | Age: 73
DRG: 292 | End: 2023-05-31
Attending: STUDENT IN AN ORGANIZED HEALTH CARE EDUCATION/TRAINING PROGRAM
Payer: MEDICARE

## 2023-05-31 LAB
ANION GAP SERPL CALC-SCNC: 12 MMOL/L (ref 7–16)
BUN SERPL-MCNC: 28 MG/DL (ref 8–22)
CALCIUM SERPL-MCNC: 9 MG/DL (ref 8.5–10.5)
CHLORIDE SERPL-SCNC: 107 MMOL/L (ref 96–112)
CO2 SERPL-SCNC: 26 MMOL/L (ref 20–33)
CREAT SERPL-MCNC: 1.15 MG/DL (ref 0.5–1.4)
GFR SERPLBLD CREATININE-BSD FMLA CKD-EPI: 50 ML/MIN/1.73 M 2
GLUCOSE SERPL-MCNC: 97 MG/DL (ref 65–99)
LV EJECT FRACT  99904: 65
LV EJECT FRACT MOD 2C 99903: 61.45
LV EJECT FRACT MOD 4C 99902: 66.13
LV EJECT FRACT MOD BP 99901: 64.25
POTASSIUM SERPL-SCNC: 3.6 MMOL/L (ref 3.6–5.5)
SODIUM SERPL-SCNC: 145 MMOL/L (ref 135–145)
TSH SERPL DL<=0.005 MIU/L-ACNC: 12.4 UIU/ML (ref 0.38–5.33)

## 2023-05-31 PROCEDURE — 700111 HCHG RX REV CODE 636 W/ 250 OVERRIDE (IP): Performed by: STUDENT IN AN ORGANIZED HEALTH CARE EDUCATION/TRAINING PROGRAM

## 2023-05-31 PROCEDURE — 93306 TTE W/DOPPLER COMPLETE: CPT

## 2023-05-31 PROCEDURE — A9270 NON-COVERED ITEM OR SERVICE: HCPCS | Performed by: STUDENT IN AN ORGANIZED HEALTH CARE EDUCATION/TRAINING PROGRAM

## 2023-05-31 PROCEDURE — 770020 HCHG ROOM/CARE - TELE (206)

## 2023-05-31 PROCEDURE — 700102 HCHG RX REV CODE 250 W/ 637 OVERRIDE(OP): Performed by: STUDENT IN AN ORGANIZED HEALTH CARE EDUCATION/TRAINING PROGRAM

## 2023-05-31 PROCEDURE — 99232 SBSQ HOSP IP/OBS MODERATE 35: CPT | Performed by: INTERNAL MEDICINE

## 2023-05-31 PROCEDURE — 93970 EXTREMITY STUDY: CPT

## 2023-05-31 PROCEDURE — 84443 ASSAY THYROID STIM HORMONE: CPT

## 2023-05-31 PROCEDURE — 80048 BASIC METABOLIC PNL TOTAL CA: CPT

## 2023-05-31 PROCEDURE — 700102 HCHG RX REV CODE 250 W/ 637 OVERRIDE(OP): Performed by: INTERNAL MEDICINE

## 2023-05-31 PROCEDURE — A9270 NON-COVERED ITEM OR SERVICE: HCPCS | Performed by: INTERNAL MEDICINE

## 2023-05-31 PROCEDURE — 93306 TTE W/DOPPLER COMPLETE: CPT | Mod: 26 | Performed by: INTERNAL MEDICINE

## 2023-05-31 PROCEDURE — 36415 COLL VENOUS BLD VENIPUNCTURE: CPT

## 2023-05-31 PROCEDURE — 93970 EXTREMITY STUDY: CPT | Mod: 26 | Performed by: INTERNAL MEDICINE

## 2023-05-31 RX ORDER — CYCLOBENZAPRINE HCL 10 MG
5 TABLET ORAL 3 TIMES DAILY PRN
Status: DISCONTINUED | OUTPATIENT
Start: 2023-05-31 | End: 2023-06-02 | Stop reason: HOSPADM

## 2023-05-31 RX ORDER — PRAMIPEXOLE DIHYDROCHLORIDE 0.25 MG/1
0.25 TABLET ORAL NIGHTLY
Status: DISCONTINUED | OUTPATIENT
Start: 2023-05-31 | End: 2023-06-02 | Stop reason: HOSPADM

## 2023-05-31 RX ADMIN — TRAZODONE HYDROCHLORIDE 100 MG: 100 TABLET ORAL at 20:08

## 2023-05-31 RX ADMIN — LEVOTHYROXINE SODIUM 200 MCG: 0.1 TABLET ORAL at 05:55

## 2023-05-31 RX ADMIN — APIXABAN 5 MG: 5 TABLET, FILM COATED ORAL at 17:30

## 2023-05-31 RX ADMIN — ACETAMINOPHEN 650 MG: 325 TABLET, FILM COATED ORAL at 17:30

## 2023-05-31 RX ADMIN — TRAMADOL HYDROCHLORIDE 50 MG: 50 TABLET, COATED ORAL at 10:52

## 2023-05-31 RX ADMIN — PRAMIPEXOLE DIHYDROCHLORIDE 0.25 MG: 0.25 TABLET ORAL at 20:08

## 2023-05-31 RX ADMIN — CHLORTHALIDONE 25 MG: 25 TABLET ORAL at 05:55

## 2023-05-31 RX ADMIN — OMEPRAZOLE 20 MG: 20 CAPSULE, DELAYED RELEASE ORAL at 05:55

## 2023-05-31 RX ADMIN — APIXABAN 5 MG: 5 TABLET, FILM COATED ORAL at 05:55

## 2023-05-31 RX ADMIN — FUROSEMIDE 40 MG: 10 INJECTION INTRAMUSCULAR; INTRAVENOUS at 05:55

## 2023-05-31 RX ADMIN — TRAMADOL HYDROCHLORIDE 50 MG: 50 TABLET, COATED ORAL at 20:07

## 2023-05-31 RX ADMIN — AMLODIPINE BESYLATE 10 MG: 10 TABLET ORAL at 05:55

## 2023-05-31 RX ADMIN — FLUOXETINE 80 MG: 20 CAPSULE ORAL at 05:55

## 2023-05-31 RX ADMIN — METOPROLOL TARTRATE 25 MG: 25 TABLET, FILM COATED ORAL at 17:30

## 2023-05-31 ASSESSMENT — FIBROSIS 4 INDEX
FIB4 SCORE: 1.53
FIB4 SCORE: 1.53

## 2023-05-31 ASSESSMENT — PAIN DESCRIPTION - PAIN TYPE
TYPE: ACUTE PAIN
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
## 2023-05-31 NOTE — ASSESSMENT & PLAN NOTE
possibles due to  pulmonary edema  CXR personally reviewed showing cardiomegaly and edema   BNP elevated, echo pending   Cont on lasix  40mg iv bid, monitoring I&O

## 2023-05-31 NOTE — PROGRESS NOTES
4 Eyes Skin Assessment Completed by CHICHI Hansen and CHICHI Vail.    Head WDL  Ears WDL  Nose WDL  Mouth WDL  Neck WDL  Breast/Chest Redness under breasts  Shoulder Blades WDL  Spine WDL  (R) Arm/Elbow/Hand WDL  (L) Arm/Elbow/Hand WDL  Abdomen WDL  Groin Redness mild redness/moisture in groin  Scrotum/Coccyx/Buttocks WDL  (R) Leg WDL  (L) Leg WDL  (R) Heel/Foot/Toe WDL  (L) Heel/Foot/Toe WDL          Devices In Places Tele Box and Nasal Cannula      Interventions In Place N/A    Possible Skin Injury No    Pictures Uploaded Into Epic Yes  Wound Consult Placed N/A  RN Wound Prevention Protocol Ordered No

## 2023-05-31 NOTE — H&P
Hospital Medicine History & Physical Note    Date of Service  5/30/2023    Primary Care Physician  Drew T. Blumberg, D.O.    Consultants  na    Code Status  DNAR/DNI    Chief Complaint  sob      History of Presenting Illness  Azalea Gan is a 72 y.o. female with history of chronic respiratory failure on 2L O2, HTN, Afib on Eliquis, hypothyroidism, who presented 5/30/2023 as a transfer from [facility] for sob. Patient reports worsening sob and leg swelling in the past few weeks. She has gained 25 lbs in the past couple of months. The sob has been gradually worsening which prompted ER visit to [facility]. She endorses chills and coughing with phlegm and BLE swelling, but denies fever, chest pain, nausea or abdominal pain.   Reviewed [facility] record, CBC unremarkable, CMP normal sodium 138, potassium 3.6, Cr 0.93, normal LFT, , Ddimer 858, Trop 14.9 (normal range), CPK 69. CXR notes pulmonary edema, query cardiogenic, asymmetric right basilar edema vs pneumonia. Patient has anaphylactic reaction to contrast, so CTA was not performed there.   Patient is transferred here for further evaluation and managements.  Of note, she said she has significant LLE pain and numbness, likely sciatic pain. She has not been walking for a while. She lives alone.     I discussed the plan of care with patient, bedside RN, and Dr. Harrison at [facility] .    Review of Systems  Review of Systems   Constitutional:  Positive for malaise/fatigue.   Respiratory:  Positive for cough, sputum production and shortness of breath.    Cardiovascular:  Positive for leg swelling.   Musculoskeletal:  Positive for back pain and joint pain.   Neurological:  Positive for tingling, sensory change and weakness.   All other systems reviewed and are negative.      Past Medical History   has a past medical history of Hypertension, Pulmonary HTN (HCC), Sleep apnea, Stroke (HCC), Thyroid disease, and Tricuspid valve  regurgitation.    Surgical History   has a past surgical history that includes gastric bypass laparoscopic (2006); hysterectomy, total abdominal; thyroidectomy (3/2012); knee replacement, total (Right, 3/15/2016); cardiac cath, right/left heart (12/2015); cataract extraction with iol (Bilateral, 2016); colonoscopy (2010); colonoscopy - endo (12/8/2016); lap, place adjust alfreda restrict de* (N/A, 11/2/2022); and pr upper gi endoscopy,diagnosis (N/A, 11/2/2022).     Family History  family history includes Heart Attack in her mother.   Family history reviewed with patient. There is no family history that is pertinent to the chief complaint.     Social History   reports that she has never smoked. She has never used smokeless tobacco. She reports that she does not drink alcohol and does not use drugs.    Allergies  Allergies   Allergen Reactions    Iodine Anaphylaxis       Medications  Prior to Admission Medications   Prescriptions Last Dose Informant Patient Reported? Taking?   allopurinol (ZYLOPRIM) 100 MG Tab  Patient No No   Sig: Take 1 Tablet by mouth every day.   amLODIPine (NORVASC) 10 MG Tab  Patient No No   Sig: Take 1 Tablet by mouth every day.   aspirin 81 MG EC tablet  Patient Yes No   Sig: Take 81 mg by mouth every day.   chlorthalidone (HYGROTON) 25 MG Tab  Patient No No   Sig: Take 1 Tablet by mouth every day.   cloNIDine (CATAPRES) 0.1 MG Tab  Patient Yes No   Sig: Take 0.1 mg by mouth as needed.   fluoxetine (PROZAC) 40 MG capsule  Patient Yes No   Sig: Take 80 mg by mouth every day. 80 mg = 2 capsules   levothyroxine (SYNTHROID) 200 MCG Tab  Patient No No   Sig: Take 1 Tablet by mouth every morning on an empty stomach.   magnesium oxide (MAG-OX) 400 MG Tab tablet  Patient Yes No   Sig: Take 400 mg by mouth every day.   metoprolol tartrate (LOPRESSOR) 25 MG Tab  Patient No No   Sig: Take 1 Tablet by mouth 2 times a day for 360 days.   omeprazole (PRILOSEC) 20 MG delayed-release capsule   No No   Sig: Take  1 Capsule by mouth every day.   potassium chloride SA (K-DUR) 10 MEQ Tab CR  Patient No No   Sig: Take 1 Tablet by mouth every day.   traZODone (DESYREL) 100 MG Tab  Patient No No   Sig: Take 1 Tablet by mouth every evening for 360 days.      Facility-Administered Medications: None       Physical Exam  BP: (147)/(81) 147/81  Blood Pressure : (!) 147/81                       Physical Exam  Vitals and nursing note reviewed.   Constitutional:       Appearance: Normal appearance. She is obese.   HENT:      Head: Normocephalic and atraumatic.      Nose: Nose normal.      Mouth/Throat:      Pharynx: Oropharynx is clear.   Eyes:      Extraocular Movements: Extraocular movements intact.      Conjunctiva/sclera: Conjunctivae normal.      Pupils: Pupils are equal, round, and reactive to light.   Cardiovascular:      Rate and Rhythm: Normal rate and regular rhythm.      Pulses: Normal pulses.      Heart sounds: Normal heart sounds.   Pulmonary:      Effort: Pulmonary effort is normal.      Breath sounds: Normal breath sounds.      Comments: On 2L O2  Abdominal:      General: Abdomen is flat. Bowel sounds are normal.      Palpations: Abdomen is soft.   Musculoskeletal:         General: No swelling or tenderness. Normal range of motion.      Cervical back: Normal range of motion and neck supple.      Right lower leg: Edema present.      Left lower leg: Edema present.   Skin:     General: Skin is warm and dry.   Neurological:      General: No focal deficit present.      Mental Status: She is alert and oriented to person, place, and time. Mental status is at baseline.   Psychiatric:         Mood and Affect: Mood normal.         Behavior: Behavior normal.         Laboratory:          No results for input(s): ALTSGPT, ASTSGOT, ALKPHOSPHAT, TBILIRUBIN, DBILIRUBIN, GAMMAGT, AMYLASE, LIPASE, ALB, PREALBUMIN, GLUCOSE in the last 72 hours.      No results for input(s): NTPROBNP in the last 72 hours.      No results for input(s):  TROPONINT in the last 72 hours.    Imaging:  DX-CHEST-PORTABLE (1 VIEW)    (Results Pending)   EC-ECHOCARDIOGRAM COMPLETE W/O CONT    (Results Pending)       X-Ray:  I have personally reviewed the images and compared with prior images.  EKG:  I have personally reviewed the images and compared with prior images.    Assessment/Plan:  Justification for Admission Status  I anticipate this patient will require at least two midnights for appropriate medical management, necessitating inpatient admission because sob and leg swelling requiring iv lasix and echo and weaning O2 as tolerated    Patient will need a Telemetry bed on MEDICAL service .  The need is secondary to sob.    * SOB (shortness of breath)- (present on admission)  Assessment & Plan  Likely secondary to pulmonary edema vs. Pneumonia vs. PE (less likely as patient reports she is compliant with Eliquis)  CXR per outside record, reviewed, per my impression noted infiltrates in Right lower lobe.   I have ordered BNP, procalcitonin, Ddimer  Echo ordered   Lasix 40mg iv bid, monitoring I&O     Chronic respiratory failure with hypoxia (HCC)- (present on admission)  Assessment & Plan  On 2L at home nightly. Currently O2 at baseline    Hypothyroidism- (present on admission)  Assessment & Plan  Check TSH  Cont levothyroxine     Paroxysmal atrial fibrillation (HCC)- (present on admission)  Assessment & Plan  Hx of Afib, on toprol and Eliquis, which were resumed  Monitoring cbc and sign of bleeding    Obesity- (present on admission)  Assessment & Plan  BMI 43  Life style modifications including healthy plant based diet and regular exercise recommended       Bilateral lower extremity edema- (present on admission)  Assessment & Plan  BLE swelling, weight gain in past few months  Doppler BLE ordered     Advance care planning  Assessment & Plan  DNAR: I discussed code status with patient  who at time of discussion had medical decision making capacity. The patient wishes to  be DNAR and under no circumstances would want life sustaining treatments in the event that he has a cardiac arrest. I also discussed intubation including temporary courses and he does not wish to be placed on a ventilator under any circumstances, including for temporary and reversible causes.   I discussed advance care planning with the patient for16 minutes, including diagnosis, prognosis, plan of care, risks and benefits of any therapies that could be offered, as well as alternatives including palliation and hospice, as appropriate.  POLST filled out.        Essential hypertension- (present on admission)  Assessment & Plan  Cont home med amlodipine, cholothalidone and Toprol with prn hydralazine prn        VTE prophylaxis: therapeutic anticoagulation with Eliquis

## 2023-05-31 NOTE — ASSESSMENT & PLAN NOTE
BLE swelling, weight gain in past few months  Doppler BLE reviewed and is negative for DVT  Echo reviewed and showing EF of 65%  Cont on IV lasix

## 2023-05-31 NOTE — CARE PLAN
The patient is Stable - Low risk of patient condition declining or worsening    Shift Goals  Clinical Goals: mobility, monitor pain and VS  Patient Goals: rest, go home  Family Goals: DANE    Progress made toward(s) clinical / shift goals:    Problem: Fall Risk  Goal: Patient will remain free from falls  Outcome: Progressing  Note: Pt did not fall during shift     Problem: Psychosocial  Goal: Patient's ability to identify and utilize available support systems will improve  Outcome: Progressing  Note: Pt is going to move in with daughter to help with loneliness.        Patient is not progressing towards the following goals:

## 2023-05-31 NOTE — ASSESSMENT & PLAN NOTE
DNAR: I discussed code status with patient  who at time of discussion had medical decision making capacity. The patient wishes to be DNAR and under no circumstances would want life sustaining treatments in the event that he has a cardiac arrest. I also discussed intubation including temporary courses and he does not wish to be placed on a ventilator under any circumstances, including for temporary and reversible causes.   I discussed advance care planning with the patient for16 minutes, including diagnosis, prognosis, plan of care, risks and benefits of any therapies that could be offered, as well as alternatives including palliation and hospice, as appropriate.  POLST filled out.

## 2023-05-31 NOTE — PROGRESS NOTES
Hospital Medicine Daily Progress Note    Date of Service  5/31/2023    Chief Complaint  Azalea Gan is a 72 y.o. female admitted 5/30/2023 with SOB     Hospital Course  This is a  72 y.o. female with history of chronic respiratory failure on 2L O2, HTN, Afib on Eliquis, hypothyroidism, who presented 5/30/2023 as a transfer from [facility] for sob. Patient reports worsening sob and leg swelling in the past few weeks. She has gained 25 lbs in the past couple of months. The sob has been gradually worsening which prompted ER visit to [facility]. Patient was then transferred here for higher level of care     Interval Problem Update  Patient seen and examined, restin in bed, still feeling SOB, cont onIV lasix , echo pending, d-dimer 0.73. BNP at 800.  CXR personally reviewed showing cardiomegaly idania pulmonary congestion/edema      I have discussed this patient's plan of care and discharge plan at IDT rounds today with Case Management, Nursing, Nursing leadership, and other members of the IDT team.    Consultants/Specialty  None     Code Status  DNAR/DNI    Disposition  The patient is not medically cleared for discharge to home or a post-acute facility.      I have placed the appropriate orders for post-discharge needs.    Review of Systems  Review of Systems   Constitutional:  Positive for malaise/fatigue.   Eyes:  Negative for double vision and photophobia.   Respiratory:  Negative for cough, sputum production and shortness of breath.    Cardiovascular:  Positive for leg swelling.   Musculoskeletal:  Positive for back pain and joint pain.   Neurological:  Negative for tingling, sensory change and weakness.   All other systems reviewed and are negative.       Physical Exam  Temp:  [36.4 °C (97.5 °F)-37 °C (98.6 °F)] 36.8 °C (98.2 °F)  Pulse:  [53-66] 53  Resp:  [16-18] 16  BP: (117-147)/(65-81) 135/77  SpO2:  [91 %-94 %] 91 %    Physical Exam  Vitals and nursing note reviewed.   Constitutional:        Appearance: Normal appearance. She is obese.   HENT:      Head: Normocephalic and atraumatic.      Nose: Nose normal.      Mouth/Throat:      Pharynx: Oropharynx is clear.   Eyes:      Extraocular Movements: Extraocular movements intact.      Conjunctiva/sclera: Conjunctivae normal.      Pupils: Pupils are equal, round, and reactive to light.   Cardiovascular:      Rate and Rhythm: Normal rate and regular rhythm.      Pulses: Normal pulses.      Heart sounds: Normal heart sounds.   Pulmonary:      Effort: Pulmonary effort is normal.      Breath sounds: Normal breath sounds.      Comments: On 2L O2  Abdominal:      General: Abdomen is flat. Bowel sounds are normal. There is no distension.      Palpations: Abdomen is soft.      Tenderness: There is no abdominal tenderness. There is no guarding or rebound.   Musculoskeletal:         General: No swelling or tenderness. Normal range of motion.      Cervical back: Normal range of motion and neck supple.      Right lower leg: Edema present.      Left lower leg: Edema present.   Skin:     General: Skin is warm and dry.   Neurological:      General: No focal deficit present.      Mental Status: She is alert and oriented to person, place, and time. Mental status is at baseline.   Psychiatric:         Mood and Affect: Mood normal.         Behavior: Behavior normal.         Fluids    Intake/Output Summary (Last 24 hours) at 5/31/2023 1258  Last data filed at 5/31/2023 0830  Gross per 24 hour   Intake 480 ml   Output --   Net 480 ml       Laboratory  Recent Labs     05/30/23  1811   WBC 5.6   RBC 4.03*   HEMOGLOBIN 12.1   HEMATOCRIT 36.7*   MCV 91.1   MCH 30.0   MCHC 33.0   RDW 47.4   PLATELETCT 239   MPV 9.3     Recent Labs     05/30/23  1811 05/31/23  0528   SODIUM 144 145   POTASSIUM 3.6 3.6   CHLORIDE 105 107   CO2 27 26   GLUCOSE 82 97   BUN 20 28*   CREATININE 0.83 1.15   CALCIUM 9.4 9.0                   Imaging  EC-ECHOCARDIOGRAM COMPLETE W/O CONT          US-EXTREMITY VENOUS LOWER BILAT   Final Result      DX-CHEST-PORTABLE (1 VIEW)   Final Result      Cardiomegaly and mild pulmonary edema.           Assessment/Plan  * SOB (shortness of breath)- (present on admission)  Assessment & Plan  possibles due to  pulmonary edema  CXR personally reviewed showing cardiomegaly and edema   BNP elevated, echo pending   Cont on lasix  40mg iv bid, monitoring I&O     Chronic respiratory failure with hypoxia (HCC)- (present on admission)  Assessment & Plan  On 2L at home nightly. Currently O2 at baseline    Hypothyroidism- (present on admission)  Assessment & Plan  TSH elevated   Cont levothyroxine     Paroxysmal atrial fibrillation (HCC)- (present on admission)  Assessment & Plan  Hx of Afib, on toprol and Eliquis, which were resumed  Monitoring cbc and sign of bleeding    Obesity- (present on admission)  Assessment & Plan  BMI 43  Life style modifications including healthy plant based diet and regular exercise recommended       Bilateral lower extremity edema- (present on admission)  Assessment & Plan  BLE swelling, weight gain in past few months  Doppler BLE ordered   Cont on IV lasix     Advance care planning  Assessment & Plan  DNAR: I discussed code status with patient  who at time of discussion had medical decision making capacity. The patient wishes to be DNAR and under no circumstances would want life sustaining treatments in the event that he has a cardiac arrest. I also discussed intubation including temporary courses and he does not wish to be placed on a ventilator under any circumstances, including for temporary and reversible causes.   I discussed advance care planning with the patient for16 minutes, including diagnosis, prognosis, plan of care, risks and benefits of any therapies that could be offered, as well as alternatives including palliation and hospice, as appropriate.  POLST filled out.        Essential hypertension- (present on admission)  Assessment &  Plan  Cont home regimen of  Amlodipine and Toprol with prn hydralazine prn  Also on lasix IV now          VTE prophylaxis: therapeutic anticoagulation with eliquis     I have performed a physical exam and reviewed and updated ROS and Plan today (5/31/2023). In review of yesterday's note (5/30/2023), there are no changes except as documented above.

## 2023-05-31 NOTE — ASSESSMENT & PLAN NOTE
BMI 43  Life style modifications including healthy plant based diet and regular exercise recommended

## 2023-06-01 ENCOUNTER — APPOINTMENT (OUTPATIENT)
Dept: RADIOLOGY | Facility: MEDICAL CENTER | Age: 73
DRG: 292 | End: 2023-06-01
Attending: INTERNAL MEDICINE
Payer: MEDICARE

## 2023-06-01 PROBLEM — M25.552 LEFT HIP PAIN: Status: ACTIVE | Noted: 2023-06-01

## 2023-06-01 PROBLEM — E87.6 HYPOKALEMIA: Status: ACTIVE | Noted: 2023-06-01

## 2023-06-01 LAB
ANION GAP SERPL CALC-SCNC: 11 MMOL/L (ref 7–16)
BUN SERPL-MCNC: 27 MG/DL (ref 8–22)
CALCIUM SERPL-MCNC: 9 MG/DL (ref 8.5–10.5)
CHLORIDE SERPL-SCNC: 102 MMOL/L (ref 96–112)
CO2 SERPL-SCNC: 27 MMOL/L (ref 20–33)
CREAT SERPL-MCNC: 1.16 MG/DL (ref 0.5–1.4)
ERYTHROCYTE [DISTWIDTH] IN BLOOD BY AUTOMATED COUNT: 49.7 FL (ref 35.9–50)
GFR SERPLBLD CREATININE-BSD FMLA CKD-EPI: 50 ML/MIN/1.73 M 2
GLUCOSE SERPL-MCNC: 84 MG/DL (ref 65–99)
HCT VFR BLD AUTO: 36.8 % (ref 37–47)
HGB BLD-MCNC: 11.7 G/DL (ref 12–16)
MCH RBC QN AUTO: 30.3 PG (ref 27–33)
MCHC RBC AUTO-ENTMCNC: 31.8 G/DL (ref 32.2–35.5)
MCV RBC AUTO: 95.3 FL (ref 81.4–97.8)
PLATELET # BLD AUTO: 215 K/UL (ref 164–446)
PMV BLD AUTO: 10 FL (ref 9–12.9)
POTASSIUM SERPL-SCNC: 3.5 MMOL/L (ref 3.6–5.5)
RBC # BLD AUTO: 3.86 M/UL (ref 4.2–5.4)
SODIUM SERPL-SCNC: 140 MMOL/L (ref 135–145)
WBC # BLD AUTO: 6.2 K/UL (ref 4.8–10.8)

## 2023-06-01 PROCEDURE — A9270 NON-COVERED ITEM OR SERVICE: HCPCS | Performed by: INTERNAL MEDICINE

## 2023-06-01 PROCEDURE — 99233 SBSQ HOSP IP/OBS HIGH 50: CPT | Performed by: INTERNAL MEDICINE

## 2023-06-01 PROCEDURE — 770020 HCHG ROOM/CARE - TELE (206)

## 2023-06-01 PROCEDURE — 700111 HCHG RX REV CODE 636 W/ 250 OVERRIDE (IP): Performed by: STUDENT IN AN ORGANIZED HEALTH CARE EDUCATION/TRAINING PROGRAM

## 2023-06-01 PROCEDURE — 73502 X-RAY EXAM HIP UNI 2-3 VIEWS: CPT | Mod: LT

## 2023-06-01 PROCEDURE — 97161 PT EVAL LOW COMPLEX 20 MIN: CPT

## 2023-06-01 PROCEDURE — 700102 HCHG RX REV CODE 250 W/ 637 OVERRIDE(OP): Performed by: STUDENT IN AN ORGANIZED HEALTH CARE EDUCATION/TRAINING PROGRAM

## 2023-06-01 PROCEDURE — 97535 SELF CARE MNGMENT TRAINING: CPT

## 2023-06-01 PROCEDURE — 36415 COLL VENOUS BLD VENIPUNCTURE: CPT

## 2023-06-01 PROCEDURE — 80048 BASIC METABOLIC PNL TOTAL CA: CPT

## 2023-06-01 PROCEDURE — 85027 COMPLETE CBC AUTOMATED: CPT

## 2023-06-01 PROCEDURE — 97165 OT EVAL LOW COMPLEX 30 MIN: CPT

## 2023-06-01 PROCEDURE — A9270 NON-COVERED ITEM OR SERVICE: HCPCS | Performed by: STUDENT IN AN ORGANIZED HEALTH CARE EDUCATION/TRAINING PROGRAM

## 2023-06-01 PROCEDURE — 700102 HCHG RX REV CODE 250 W/ 637 OVERRIDE(OP): Performed by: INTERNAL MEDICINE

## 2023-06-01 RX ORDER — NYSTATIN 100000 [USP'U]/G
POWDER TOPICAL 2 TIMES DAILY
Status: DISCONTINUED | OUTPATIENT
Start: 2023-06-01 | End: 2023-06-02 | Stop reason: HOSPADM

## 2023-06-01 RX ORDER — OXYCODONE HYDROCHLORIDE 5 MG/1
5 TABLET ORAL EVERY 4 HOURS PRN
Status: DISCONTINUED | OUTPATIENT
Start: 2023-06-01 | End: 2023-06-02 | Stop reason: HOSPADM

## 2023-06-01 RX ADMIN — APIXABAN 5 MG: 5 TABLET, FILM COATED ORAL at 05:00

## 2023-06-01 RX ADMIN — METOPROLOL TARTRATE 25 MG: 25 TABLET, FILM COATED ORAL at 05:01

## 2023-06-01 RX ADMIN — DOCUSATE SODIUM 50 MG AND SENNOSIDES 8.6 MG 2 TABLET: 8.6; 5 TABLET, FILM COATED ORAL at 17:26

## 2023-06-01 RX ADMIN — METOPROLOL TARTRATE 25 MG: 25 TABLET, FILM COATED ORAL at 17:25

## 2023-06-01 RX ADMIN — APIXABAN 5 MG: 5 TABLET, FILM COATED ORAL at 17:26

## 2023-06-01 RX ADMIN — NYSTATIN: 100000 POWDER TOPICAL at 21:19

## 2023-06-01 RX ADMIN — LEVOTHYROXINE SODIUM 200 MCG: 0.1 TABLET ORAL at 05:01

## 2023-06-01 RX ADMIN — FUROSEMIDE 40 MG: 10 INJECTION INTRAMUSCULAR; INTRAVENOUS at 05:00

## 2023-06-01 RX ADMIN — OXYCODONE 5 MG: 5 TABLET ORAL at 19:42

## 2023-06-01 RX ADMIN — FLUOXETINE 80 MG: 20 CAPSULE ORAL at 05:00

## 2023-06-01 RX ADMIN — OMEPRAZOLE 20 MG: 20 CAPSULE, DELAYED RELEASE ORAL at 05:00

## 2023-06-01 RX ADMIN — OXYCODONE 5 MG: 5 TABLET ORAL at 12:12

## 2023-06-01 RX ADMIN — PRAMIPEXOLE DIHYDROCHLORIDE 0.25 MG: 0.25 TABLET ORAL at 20:44

## 2023-06-01 RX ADMIN — TRAMADOL HYDROCHLORIDE 50 MG: 50 TABLET, COATED ORAL at 02:12

## 2023-06-01 RX ADMIN — TRAZODONE HYDROCHLORIDE 100 MG: 100 TABLET ORAL at 20:44

## 2023-06-01 RX ADMIN — AMLODIPINE BESYLATE 10 MG: 10 TABLET ORAL at 05:01

## 2023-06-01 ASSESSMENT — ENCOUNTER SYMPTOMS
SENSORY CHANGE: 0
PHOTOPHOBIA: 0
TINGLING: 0
DOUBLE VISION: 0
SPUTUM PRODUCTION: 0
SHORTNESS OF BREATH: 0
BACK PAIN: 1
COUGH: 0
WEAKNESS: 0

## 2023-06-01 ASSESSMENT — PAIN DESCRIPTION - PAIN TYPE
TYPE: ACUTE PAIN

## 2023-06-01 ASSESSMENT — COGNITIVE AND FUNCTIONAL STATUS - GENERAL
CLIMB 3 TO 5 STEPS WITH RAILING: A LITTLE
STANDING UP FROM CHAIR USING ARMS: A LITTLE
MOBILITY SCORE: 21
SUGGESTED CMS G CODE MODIFIER DAILY ACTIVITY: CH
WALKING IN HOSPITAL ROOM: A LITTLE
DAILY ACTIVITIY SCORE: 24
SUGGESTED CMS G CODE MODIFIER MOBILITY: CJ

## 2023-06-01 ASSESSMENT — GAIT ASSESSMENTS
DISTANCE (FEET): 60
DEVIATION: ANTALGIC
GAIT LEVEL OF ASSIST: SUPERVISED
ASSISTIVE DEVICE: FRONT WHEEL WALKER

## 2023-06-01 ASSESSMENT — ACTIVITIES OF DAILY LIVING (ADL): TOILETING: INDEPENDENT

## 2023-06-01 NOTE — ASSESSMENT & PLAN NOTE
Pain management with narcotics close monitoring for side effects   XRay hip reviewed and shows osteoarthritis and no fracture  PT/OT eval

## 2023-06-01 NOTE — PROGRESS NOTES
Hospital Medicine Daily Progress Note    Date of Service  6/1/2023    Chief Complaint  Azalea Gan is a 72 y.o. female admitted 5/30/2023 with SOB     Hospital Course  This is a  72 y.o. female with history of chronic respiratory failure on 2L O2, HTN, Afib on Eliquis, hypothyroidism, who presented 5/30/2023 as a transfer from [facility] for sob. Patient reports worsening sob and leg swelling in the past few weeks. She has gained 25 lbs in the past couple of months. The sob has been gradually worsening which prompted ER visit to [facility]. Patient was then transferred here for higher level of care     Interval Problem Update  Patient s restin in bed, still feeling SOB, cont onIV lasix , echo reviewed showing EF of 65%.  Complaining of hip pain. Xray done personally reviewed showing osteoarthritis no fracture   Cont on pain management  with narcotics close monitoring for side effect   Hypokalemia: potassium of 3.5 repleting     I have discussed this patient's plan of care and discharge plan at IDT rounds today with Case Management, Nursing, Nursing leadership, and other members of the IDT team.    Consultants/Specialty  None     Code Status  DNAR/DNI    Disposition  The patient is not medically cleared for discharge to home or a post-acute facility.      I have placed the appropriate orders for post-discharge needs.    Review of Systems  Review of Systems   Constitutional:  Positive for malaise/fatigue.   Respiratory:  Negative for cough, sputum production and shortness of breath.    Cardiovascular:  Positive for leg swelling.   Musculoskeletal:  Positive for back pain and joint pain.   Neurological:  Negative for tingling, sensory change and weakness.   All other systems reviewed and are negative.       Physical Exam  Temp:  [36.6 °C (97.9 °F)-37.3 °C (99.1 °F)] 36.8 °C (98.2 °F)  Pulse:  [51-63] 58  Resp:  [15-20] 17  BP: (109-146)/(60-79) 145/79  SpO2:  [91 %-95 %] 95 %    Physical  Exam  Vitals and nursing note reviewed.   Constitutional:       Appearance: Normal appearance. She is obese.   HENT:      Head: Normocephalic and atraumatic.      Nose: Nose normal.      Mouth/Throat:      Pharynx: Oropharynx is clear.   Eyes:      Extraocular Movements: Extraocular movements intact.      Conjunctiva/sclera: Conjunctivae normal.      Pupils: Pupils are equal, round, and reactive to light.   Cardiovascular:      Rate and Rhythm: Normal rate and regular rhythm.      Pulses: Normal pulses.      Heart sounds: Normal heart sounds.   Pulmonary:      Effort: Pulmonary effort is normal.      Breath sounds: Normal breath sounds.      Comments: On 2L O2  Abdominal:      General: Abdomen is flat. Bowel sounds are normal. There is no distension.      Palpations: Abdomen is soft.      Tenderness: There is no abdominal tenderness. There is no guarding or rebound.   Musculoskeletal:         General: No swelling or tenderness. Normal range of motion.      Cervical back: Normal range of motion and neck supple.      Right lower leg: Edema present.      Left lower leg: Edema present.   Skin:     General: Skin is warm and dry.   Neurological:      General: No focal deficit present.      Mental Status: She is alert and oriented to person, place, and time. Mental status is at baseline.   Psychiatric:         Mood and Affect: Mood normal.         Behavior: Behavior normal.         Fluids  No intake or output data in the 24 hours ending 06/01/23 1348      Laboratory  Recent Labs     05/30/23  1811 06/01/23  0230   WBC 5.6 6.2   RBC 4.03* 3.86*   HEMOGLOBIN 12.1 11.7*   HEMATOCRIT 36.7* 36.8*   MCV 91.1 95.3   MCH 30.0 30.3   MCHC 33.0 31.8*   RDW 47.4 49.7   PLATELETCT 239 215   MPV 9.3 10.0     Recent Labs     05/30/23  1811 05/31/23  0528 06/01/23  0230   SODIUM 144 145 140   POTASSIUM 3.6 3.6 3.5*   CHLORIDE 105 107 102   CO2 27 26 27   GLUCOSE 82 97 84   BUN 20 28* 27*   CREATININE 0.83 1.15 1.16   CALCIUM 9.4 9.0 9.0                    Imaging  DX-HIP-COMPLETE - UNILATERAL 2+ LEFT   Final Result      1.  No radiographic evidence of acute traumatic injury.      2.  Moderate osteoarthritis of the hips particular attention to the left hip.      3.  Osteitis pubis.      EC-ECHOCARDIOGRAM COMPLETE W/O CONT   Final Result      US-EXTREMITY VENOUS LOWER BILAT   Final Result      DX-CHEST-PORTABLE (1 VIEW)   Final Result      Cardiomegaly and mild pulmonary edema.           Assessment/Plan  * SOB (shortness of breath)- (present on admission)  Assessment & Plan  possibles due to  pulmonary edema  CXR personally reviewed showing cardiomegaly and edema   BNP elevated, echo pending   Cont on lasix  40mg iv bid, monitoring I&O     Left hip pain  Assessment & Plan  Pain management with narcotics close monitoring for side effects   XRay hip reviewed and shows osteoarthritis and no fracture  PT/OT eval     Hypokalemia  Assessment & Plan  Potassium of 3.5 replete     Chronic respiratory failure with hypoxia (HCC)- (present on admission)  Assessment & Plan  On 2L at home nightly. Currently O2 at baseline    Hypothyroidism- (present on admission)  Assessment & Plan  TSH elevated   Cont levothyroxine     Paroxysmal atrial fibrillation (HCC)- (present on admission)  Assessment & Plan  Hx of Afib, on toprol and Eliquis, which were resumed  Monitoring cbc and sign of bleeding    Obesity- (present on admission)  Assessment & Plan  BMI 43  Life style modifications including healthy plant based diet and regular exercise recommended       Bilateral lower extremity edema- (present on admission)  Assessment & Plan  BLE swelling, weight gain in past few months  Doppler BLE reviewed and is negative for DVT  Echo reviewed and showing EF of 65%  Cont on IV lasix     Advance care planning  Assessment & Plan  DNAR: I discussed code status with patient  who at time of discussion had medical decision making capacity. The patient wishes to be DNAR and under no  circumstances would want life sustaining treatments in the event that he has a cardiac arrest. I also discussed intubation including temporary courses and he does not wish to be placed on a ventilator under any circumstances, including for temporary and reversible causes.   I discussed advance care planning with the patient for16 minutes, including diagnosis, prognosis, plan of care, risks and benefits of any therapies that could be offered, as well as alternatives including palliation and hospice, as appropriate.  POLST filled out.        Essential hypertension- (present on admission)  Assessment & Plan  Cont home regimen of  Amlodipine and Toprol with prn hydralazine prn  Also on lasix IV now          VTE prophylaxis: therapeutic anticoagulation with eliquis     I have performed a physical exam and reviewed and updated ROS and Plan today (6/1/2023). In review of yesterday's note (5/31/2023), there are no changes except as documented above.

## 2023-06-01 NOTE — DOCUMENTATION QUERY
[facility]                                                                       Query Response Note      PATIENT:               JOSE LANE  ACCT #:                  [id number]  MRN:                     [id number]  :                      1950  ADMIT DATE:       2023 5:41 PM  DISCH DATE:          RESPONDING  PROVIDER #:        [id number]           QUERY TEXT:    Congestive Heart Failure is documented in the Medical Record. Please document the type and acuity (includes probable or suspected).    The patient's Clinical Indicators include:  Findings:  --Patient admitted for SOB and leg swelling requiring IV Lasix and Echo  --Echocardiogram from :  The left ventricular ejection fraction is visually estimated to be 65%. Normal      regional wall motion. Normal left ventricular systolic function. Grade 1 diastolic dysfunction  --CXR from  admission:  Cardiomegaly and mild pulmonary edema  --BNP from  admission:  898    Treatment:  --Echocardiogram  --CXR  --Labs including BNP  --Lasix 40mg IV daily    Risk Factors:  --Cardiomegaly  --Mild pulmonary edema  --Chronic hypoxic respiratory failure  --Paroxysmal AFib    Thank you,  Sally MASTERSON, RN  Clinical   Connect via [facility]  Options provided:   -- Acute Systolic heart failure   -- Chronic Systolic heart failure   -- Acute on Chronic Systolic heart failure   -- Acute Diastolic heart failure   -- Chronic Diastolic heart failure   -- Acute on Chronic Diastolic heart failure   -- Acute Systolic and Diastolic heart failure   -- Chronic Systolic and Diastolic heart failure   -- Acute on Chronic Systolic and diastolic heart failure   -- Other explanation, please specify   -- Unable to determine      Query created by: Sally Leon on 2023 9:26 AM    RESPONSE TEXT:    Unable to determine          Electronically signed by:  ANA ELLSWORTH MD  6/1/2023 9:41 AM

## 2023-06-01 NOTE — THERAPY
Physical Therapy   Initial Evaluation     Patient Name: Azalea Gan  Age:  72 y.o., Sex:  female  Medical Record #: [id number]  Today's Date: 6/1/2023    Assessment  Patient is 72 y.o. female admitted with SOB.  PMH includes chronic hypoxic respiratory failure on 2L at night, hypothyroidism, A fib, and essential HTN.  Patient endorsed back and hip pain, has appt to follow up with neurosx, hip X ray indicates moderate OA.  Patient was seen for PT evaluation, presented at/near functional baseline.  She demonstrated functional mobility including ambulation in room and hallway with FWW and SPV.  Educated patient on role of acute PT and outpatient PT for strengthening & pain management.  No further acute PT needs.      Plan    Physical Therapy Initial Treatment Plan   Duration: Evaluation only    DC Equipment Recommendations: None  Discharge Recommendations: Anticipate that the patient will have no further physical therapy needs after discharge from the hospital     Objective     06/01/23 1004   Pain 0 - 10 Group   Therapist Pain Assessment Post Activity Pain Same as Prior to Activity;Nurse Notified;0   Prior Living Situation   Prior Services Home-Independent   Housing / Facility 2 Story House   Steps Into Home (ramp)   Steps In Home (stays on 1st floor)   Equipment Owned Front-Wheel Walker   Lives with - Patient's Self Care Capacity Alone and Able to Care For Self   Comments Pt is planning to move to [place] to live near family   Prior Level of Functional Mobility   Bed Mobility Independent   Transfer Status Independent   Ambulation Independent   Ambulation Distance community ambulator   Assistive Devices Used Front-Wheel Walker   Cognition    Cognition / Consciousness WDL   Comments Very pleasant & cooperative   Active ROM Lower Body    Active ROM Lower Body  WDL   Strength Lower Body   Lower Body Strength  WDL   Sensation Lower Body   Lower Extremity Sensation   WDL   Balance Assessment   Sitting Balance (Static) Good    Sitting Balance (Dynamic) Good   Standing Balance (Static) Fair +   Standing Balance (Dynamic) Fair +   Weight Shift Sitting Good   Weight Shift Standing Good   Bed Mobility    Supine to Sit Supervised   Sit to Supine Supervised   Scooting Supervised   Gait Analysis   Gait Level Of Assist Supervised   Assistive Device Front Wheel Walker   Distance (Feet) 60   # of Times Distance was Traveled 2   Deviation Antalgic   Weight Bearing Status No restrictions   Functional Mobility   Sit to Stand Supervised   Bed, Chair, Wheelchair Transfer Supervised   Toilet Transfers Supervised   Transfer Method Stand Step   Mobility bed mobility, ambulation   Physical Therapy Initial Treatment Plan    Duration Evaluation only

## 2023-06-01 NOTE — THERAPY
Occupational Therapy   Initial Evaluation     Patient Name: Azalea Gan  Age:  72 y.o., Sex:  female  Medical Record #: [id number]  Today's Date: 6/1/2023          Assessment  Patient is 72 y.o. female with a diagnosis of SOB.   Pt is at or near his/her functional baseline. Pt with no further skilled OT needs in the acute care setting at this time.       Plan    Occupational Therapy Initial Treatment Plan   Duration: (P) Discharge Needs Only       Discharge Recommendations: (P) Anticipate that the patient will have no further occupational therapy needs after discharge from the hospital        06/01/23 0949   Prior Living Situation   Prior Services Home-Independent   Housing / Facility 2 Story House   Steps Into Home   (ramp)   Equipment Owned Front-Wheel Walker   Lives with - Patient's Self Care Capacity Alone and Able to Care For Self   Comments planning to move to [place] with DTR   Prior Level of ADL Function   Self Feeding Independent   Grooming / Hygiene Independent   Bathing Independent   Dressing Independent   Toileting Independent   ADL Assessment   Grooming Supervision   Upper Body Dressing Supervision   Lower Body Dressing Supervision   Toileting Supervision   Functional Mobility   Sit to Stand Supervised   Bed, Chair, Wheelchair Transfer Supervised   Toilet Transfers Supervised   Tub / Shower Transfers Supervised   Occupational Therapy Initial Treatment Plan    Duration Discharge Needs Only   Anticipated Discharge Equipment and Recommendations   Discharge Recommendations Anticipate that the patient will have no further occupational therapy needs after discharge from the hospital

## 2023-06-01 NOTE — CARE PLAN
Problem: Pain - Standard  Goal: Alleviation of pain or a reduction in pain to the patient’s comfort goal  Outcome: Progressing  Patient receiving PRN medication for pain      The patient is Stable - Low risk of patient condition declining or worsening    Shift Goals  Clinical Goals: hemodynamic stability, diuresis  Patient Goals: Sleep, pain control  Family Goals: DANE    Progress made toward(s) clinical / shift goals:  Progressing    Patient is not progressing towards the following goals: n/a

## 2023-06-02 ENCOUNTER — PHARMACY VISIT (OUTPATIENT)
Dept: PHARMACY | Facility: MEDICAL CENTER | Age: 73
End: 2023-06-02
Payer: MEDICARE

## 2023-06-02 ENCOUNTER — PATIENT OUTREACH (OUTPATIENT)
Dept: SCHEDULING | Facility: IMAGING CENTER | Age: 73
End: 2023-06-02
Payer: MEDICARE

## 2023-06-02 VITALS
RESPIRATION RATE: 14 BRPM | TEMPERATURE: 98 F | SYSTOLIC BLOOD PRESSURE: 116 MMHG | DIASTOLIC BLOOD PRESSURE: 63 MMHG | HEART RATE: 59 BPM | OXYGEN SATURATION: 92 % | BODY MASS INDEX: 43.76 KG/M2 | WEIGHT: 256.3 LBS | HEIGHT: 64 IN

## 2023-06-02 LAB
ANION GAP SERPL CALC-SCNC: 12 MMOL/L (ref 7–16)
BUN SERPL-MCNC: 28 MG/DL (ref 8–22)
CALCIUM SERPL-MCNC: 8.9 MG/DL (ref 8.5–10.5)
CHLORIDE SERPL-SCNC: 99 MMOL/L (ref 96–112)
CO2 SERPL-SCNC: 29 MMOL/L (ref 20–33)
CREAT SERPL-MCNC: 0.89 MG/DL (ref 0.5–1.4)
ERYTHROCYTE [DISTWIDTH] IN BLOOD BY AUTOMATED COUNT: 49 FL (ref 35.9–50)
GFR SERPLBLD CREATININE-BSD FMLA CKD-EPI: 68 ML/MIN/1.73 M 2
GLUCOSE SERPL-MCNC: 103 MG/DL (ref 65–99)
HCT VFR BLD AUTO: 37.3 % (ref 37–47)
HGB BLD-MCNC: 11.8 G/DL (ref 12–16)
MCH RBC QN AUTO: 30 PG (ref 27–33)
MCHC RBC AUTO-ENTMCNC: 31.6 G/DL (ref 32.2–35.5)
MCV RBC AUTO: 94.9 FL (ref 81.4–97.8)
PLATELET # BLD AUTO: 222 K/UL (ref 164–446)
PMV BLD AUTO: 9.8 FL (ref 9–12.9)
POTASSIUM SERPL-SCNC: 3.4 MMOL/L (ref 3.6–5.5)
RBC # BLD AUTO: 3.93 M/UL (ref 4.2–5.4)
SODIUM SERPL-SCNC: 140 MMOL/L (ref 135–145)
WBC # BLD AUTO: 7.3 K/UL (ref 4.8–10.8)

## 2023-06-02 PROCEDURE — A9270 NON-COVERED ITEM OR SERVICE: HCPCS | Performed by: STUDENT IN AN ORGANIZED HEALTH CARE EDUCATION/TRAINING PROGRAM

## 2023-06-02 PROCEDURE — 700102 HCHG RX REV CODE 250 W/ 637 OVERRIDE(OP): Performed by: STUDENT IN AN ORGANIZED HEALTH CARE EDUCATION/TRAINING PROGRAM

## 2023-06-02 PROCEDURE — A9270 NON-COVERED ITEM OR SERVICE: HCPCS | Performed by: INTERNAL MEDICINE

## 2023-06-02 PROCEDURE — RXMED WILLOW AMBULATORY MEDICATION CHARGE: Performed by: INTERNAL MEDICINE

## 2023-06-02 PROCEDURE — 80048 BASIC METABOLIC PNL TOTAL CA: CPT

## 2023-06-02 PROCEDURE — 85027 COMPLETE CBC AUTOMATED: CPT

## 2023-06-02 PROCEDURE — 700102 HCHG RX REV CODE 250 W/ 637 OVERRIDE(OP): Performed by: INTERNAL MEDICINE

## 2023-06-02 PROCEDURE — 99239 HOSP IP/OBS DSCHRG MGMT >30: CPT | Performed by: INTERNAL MEDICINE

## 2023-06-02 PROCEDURE — 36415 COLL VENOUS BLD VENIPUNCTURE: CPT

## 2023-06-02 PROCEDURE — 700111 HCHG RX REV CODE 636 W/ 250 OVERRIDE (IP): Performed by: STUDENT IN AN ORGANIZED HEALTH CARE EDUCATION/TRAINING PROGRAM

## 2023-06-02 RX ORDER — FUROSEMIDE 40 MG/1
40 TABLET ORAL DAILY
Qty: 30 TABLET | Refills: 0 | Status: ON HOLD | OUTPATIENT
Start: 2023-06-02 | End: 2023-06-06 | Stop reason: SDUPTHER

## 2023-06-02 RX ORDER — OXYCODONE HYDROCHLORIDE 5 MG/1
5 TABLET ORAL EVERY 4 HOURS PRN
Qty: 12 TABLET | Refills: 0 | Status: ON HOLD | OUTPATIENT
Start: 2023-06-02 | End: 2023-06-06

## 2023-06-02 RX ORDER — PRAMIPEXOLE DIHYDROCHLORIDE 0.25 MG/1
0.25 TABLET ORAL NIGHTLY
Qty: 180 TABLET | Refills: 0 | Status: SHIPPED | OUTPATIENT
Start: 2023-06-02

## 2023-06-02 RX ADMIN — OMEPRAZOLE 20 MG: 20 CAPSULE, DELAYED RELEASE ORAL at 06:30

## 2023-06-02 RX ADMIN — AMLODIPINE BESYLATE 10 MG: 10 TABLET ORAL at 06:29

## 2023-06-02 RX ADMIN — OXYCODONE 5 MG: 5 TABLET ORAL at 07:53

## 2023-06-02 RX ADMIN — NYSTATIN: 100000 POWDER TOPICAL at 06:30

## 2023-06-02 RX ADMIN — APIXABAN 5 MG: 5 TABLET, FILM COATED ORAL at 06:29

## 2023-06-02 RX ADMIN — METOPROLOL TARTRATE 25 MG: 25 TABLET, FILM COATED ORAL at 06:30

## 2023-06-02 RX ADMIN — LEVOTHYROXINE SODIUM 200 MCG: 0.1 TABLET ORAL at 06:31

## 2023-06-02 RX ADMIN — FUROSEMIDE 40 MG: 10 INJECTION INTRAMUSCULAR; INTRAVENOUS at 06:29

## 2023-06-02 RX ADMIN — DOCUSATE SODIUM 50 MG AND SENNOSIDES 8.6 MG 2 TABLET: 8.6; 5 TABLET, FILM COATED ORAL at 06:30

## 2023-06-02 RX ADMIN — FLUOXETINE 80 MG: 20 CAPSULE ORAL at 06:29

## 2023-06-02 ASSESSMENT — FIBROSIS 4 INDEX: FIB4 SCORE: 1.7

## 2023-06-02 ASSESSMENT — PAIN DESCRIPTION - PAIN TYPE: TYPE: ACUTE PAIN

## 2023-06-02 NOTE — CARE PLAN
Pt had no acute events today. Very pleasant/cooperative. A&Ox4. VSS on 1LNC. C/o R hip pain radiating down leg, PRN oxy added to help manage. XR completed. Nystatin powder ordered for mild skin breakdown in folds. Tolerating cardiac diet. OOB independently with FWW. Will continue to monitor improvements in resp status and diuresis.      Problem: Knowledge Deficit - Standard  Goal: Patient and family/care givers will demonstrate understanding of plan of care, disease process/condition, diagnostic tests and medications  Outcome: Progressing     Problem: Fall Risk  Goal: Patient will remain free from falls  Outcome: Progressing     Problem: Depression  Goal: Patient and family/caregiver will verbalize accurate information about at least two of the possible causes of depression, three-four of the signs and symptoms of depression  Outcome: Progressing     Problem: Provide Safe Environment  Goal: Suicide environmental safety, protocols, policies, and practices will be implemented  Outcome: Progressing     Problem: Psychosocial  Goal: Patient's ability to identify and develop effective coping behaviors will improve  Outcome: Progressing  Goal: Patient's ability to identify and utilize available support systems will improve  Outcome: Progressing     Problem: Pain - Standard  Goal: Alleviation of pain or a reduction in pain to the patient’s comfort goal  Outcome: Progressing

## 2023-06-02 NOTE — FACE TO FACE
"Face to Face Note  -  Durable Medical Equipment    Vern Vidal M.D. - NPI: [id number]  I certify that this patient is under my care and that they had a durable medical equipment(DME)face to face encounter by myself that meets the physician DME face-to-face encounter requirements with this patient on:    Date of encounter:   Patient:                    MRN:                       YOB: 2024  Azalea Gan  [id number]  1950     The encounter with the patient was in whole, or in part, for the following medical condition, which is the primary reason for durable medical equipment:  Cardiac Disease    I certify that, based on my findings, the following durable medical equipment is medically necessary:    Oxygen   HOME O2 Saturation Measurements:(Values must be present for Home Oxygen orders)  Room air sat at rest: 93  Room air sat with amb: 87  With liters of O2: 0, O2 sat at rest with O2: 93  With Liters of O2: 1, O2 sat with amb with O2 : 90  Is the patient mobile?: Yes  If patient feels more short of breath, they can go up to 6 liters per minute and contact healthcare provider.    Supporting Symptoms: The patient requires supplemental oxygen, as the following interventions have been tried with limited or no improvement: \"Ambulation with oximetry and \"Incentive spirometry.    My Clinical findings support the need for the above equipment due to:  Hypoxia  "

## 2023-06-02 NOTE — DISCHARGE SUMMARY
Discharge Summary    CHIEF COMPLAINT ON ADMISSION  No chief complaint on file.      Reason for Admission  CHF exacerbation     Admission Date  5/30/2023    CODE STATUS  DNAR/DNI    HPI & HOSPITAL COURSE  This is a 72 y.o. female with history of chronic respiratory failure on 2L O2, HTN, Afib on Eliquis, hypothyroidism, who presented 5/30/2023 as a transfer from [facility] for sob. Patient reports worsening sob and leg swelling in the past few weeks. She has gained 25 lbs in the past couple of months. The sob has been gradually worsening which prompted ER visit to [facility]. Patient was then transferred here for higher level of care . She was started on IV lasix and her oxygen requirement improved and is now requiring 1L with activity. An echo was done showing normal LV function with EF of 65%.  She was complaining of left hip pain so xray was done showing osteoarthritis   Seen by PT/OT and cleared for discharge.  Will switch her chlorthalidone to lasix, will arrange meds to bed and also home O2 and will discharge patient home today      The patient met 2-midnight criteria for an inpatient stay at the time of discharge.    Discharge Date  6/2/23    FOLLOW UP ITEMS POST DISCHARGE  PCP     DISCHARGE DIAGNOSES  Principal Problem:    SOB (shortness of breath) (POA: Yes)  Active Problems:    Essential hypertension (POA: Yes)    Advance care planning (POA: Unknown)    Bilateral lower extremity edema (POA: Yes)    Obesity (POA: Yes)    Paroxysmal atrial fibrillation (HCC) (POA: Yes)    Hypothyroidism (POA: Yes)    Chronic respiratory failure with hypoxia (HCC) (POA: Yes)    Hypokalemia (POA: Unknown)    Left hip pain (POA: Unknown)  Resolved Problems:    * No resolved hospital problems. *      FOLLOW UP  No future appointments.  WHITNEY JoyN.P.  [address] [address]  [phone]    Call  The office of JOSEFA Joy will be reaching out to you to  schedule.    Drew T. Blumberg, D.O.  [address] [facility] [address]  [facility] [id number] [phone]    Follow up        MEDICATIONS ON DISCHARGE     Medication List        START taking these medications        Instructions   furosemide 40 MG Tabs  Commonly known as: LASIX   Take 1 Tablet by mouth every day.  Dose: 40 mg     oxyCODONE immediate-release 5 MG Tabs  Commonly known as: ROXICODONE   Take 1 Tablet by mouth every four hours as needed for Severe Pain for up to 3 days.  Dose: 5 mg            CONTINUE taking these medications        Instructions   allopurinol 100 MG Tabs  Commonly known as: ZYLOPRIM   Take 1 Tablet by mouth every day.  Dose: 100 mg     amLODIPine 10 MG Tabs  Commonly known as: NORVASC   Take 1 Tablet by mouth every day.  Dose: 10 mg     apixaban 5mg Tabs  Commonly known as: ELIQUIS   Take 1 Tablet by mouth 2 times a day.  Dose: 1 Tablet     aspirin 81 MG EC tablet   Take 81 mg by mouth every day.  Dose: 81 mg     cloNIDine 0.1 MG Tabs  Commonly known as: CATAPRES   Take 0.1 mg by mouth as needed.  Dose: 0.1 mg     fluoxetine 40 MG capsule  Commonly known as: PROZAC   Take 80 mg by mouth every day. 80 mg = 2 capsules  Dose: 80 mg     levothyroxine 200 MCG Tabs  Commonly known as: SYNTHROID   Take 1 Tablet by mouth every morning on an empty stomach.  Dose: 200 mcg     magnesium oxide 400 MG Tabs tablet  Commonly known as: MAG-OX   Take 400 mg by mouth every day.  Dose: 400 mg     metoprolol tartrate 25 MG Tabs  Commonly known as: LOPRESSOR   Take 1 Tablet by mouth 2 times a day for 360 days.  Dose: 25 mg     omeprazole 20 MG delayed-release capsule  Commonly known as: PRILOSEC   Take 1 Capsule by mouth every day.  Dose: 20 mg     potassium chloride SA 10 MEQ Tbcr  Commonly known as: K-DUR   Take 1 Tablet by mouth every day.  Dose: 10 mEq     pramipexole 0.125 MG Tabs  Commonly known as: MIRAPEX   Take 0.25 mg by mouth every evening. Indications: Restless Leg Syndrome  Dose: 0.25 mg     traZODone 100  MG Tabs  Commonly known as: DESYREL   Take 1 Tablet by mouth every evening for 360 days.  Dose: 100 mg            STOP taking these medications      chlorthalidone 25 MG Tabs  Commonly known as: HYGROTON     metoprolol SR 25 MG Tb24  Commonly known as: TOPROL XL              Allergies  Allergies   Allergen Reactions    Iodine Anaphylaxis       DIET  Orders Placed This Encounter   Procedures    Diet Order Diet: Cardiac     Standing Status:   Standing     Number of Occurrences:   1     Order Specific Question:   Diet:     Answer:   Cardiac [6]       ACTIVITY  As tolerated.  Weight bearing as tolerated    CONSULTATIONS  None     PROCEDURES  None     LABORATORY  Lab Results   Component Value Date    SODIUM 140 06/02/2023    POTASSIUM 3.4 (L) 06/02/2023    CHLORIDE 99 06/02/2023    CO2 29 06/02/2023    GLUCOSE 103 (H) 06/02/2023    BUN 28 (H) 06/02/2023    CREATININE 0.89 06/02/2023        Lab Results   Component Value Date    WBC 7.3 06/02/2023    HEMOGLOBIN 11.8 (L) 06/02/2023    HEMATOCRIT 37.3 06/02/2023    PLATELETCT 222 06/02/2023        Total time of the discharge process exceeds 42 minutes.

## 2023-06-02 NOTE — DISCHARGE PLANNING
Patient needed assistance with a ride back home to:  [address] [facility] [address]    An Uber ride was arranged for the patient, trip ID [id number].

## 2023-06-02 NOTE — DISCHARGE PLANNING
0946  Agency/Facility Name: [facility] ([place])  Spoke To: Hayley   Outcome: Hayley transferred DPA to [place] office as Pt is service with [facility].     0948  Agency/Facility Name: [facility] ([place])  Spoke To: Roman   Outcome: DPA transferred and Roman requesting referral sent to [place] office to review and give [facility] the greenlight to deliver at bedside.     1130  Agency/Facility Name: [facility]   Spoke To: Roman  Outcome: DPA called to check on order, per Roman order received, and being reviewed. Roman will now get ready to send the order to [facility] in [place].      1223  Agency/Facility Name: [facility]   Spoke To: Precious   Outcome: Per Precious order received, and  will deliver within 1-2 hours.

## 2023-06-05 ENCOUNTER — HOSPITAL ENCOUNTER (OUTPATIENT)
Facility: MEDICAL CENTER | Age: 73
End: 2023-06-06
Attending: INTERNAL MEDICINE | Admitting: INTERNAL MEDICINE
Payer: MEDICARE

## 2023-06-05 DIAGNOSIS — M25.552 LEFT HIP PAIN: ICD-10-CM

## 2023-06-05 DIAGNOSIS — R60.0 BILATERAL LOWER EXTREMITY EDEMA: ICD-10-CM

## 2023-06-05 DIAGNOSIS — I47.29 PAROXYSMAL VENTRICULAR TACHYCARDIA (HCC): ICD-10-CM

## 2023-06-05 PROBLEM — G47.34 NOCTURNAL HYPOXEMIA: Status: ACTIVE | Noted: 2022-10-31

## 2023-06-05 PROCEDURE — 99223 1ST HOSP IP/OBS HIGH 75: CPT | Performed by: GENERAL PRACTICE

## 2023-06-05 PROCEDURE — G0378 HOSPITAL OBSERVATION PER HR: HCPCS

## 2023-06-05 PROCEDURE — 700102 HCHG RX REV CODE 250 W/ 637 OVERRIDE(OP): Performed by: GENERAL PRACTICE

## 2023-06-05 PROCEDURE — A9270 NON-COVERED ITEM OR SERVICE: HCPCS | Performed by: GENERAL PRACTICE

## 2023-06-05 RX ORDER — OMEPRAZOLE 20 MG/1
20 CAPSULE, DELAYED RELEASE ORAL DAILY
Status: DISCONTINUED | OUTPATIENT
Start: 2023-06-06 | End: 2023-06-06 | Stop reason: HOSPADM

## 2023-06-05 RX ORDER — OXYCODONE HYDROCHLORIDE 5 MG/1
5 TABLET ORAL EVERY 4 HOURS PRN
Status: DISCONTINUED | OUTPATIENT
Start: 2023-06-05 | End: 2023-06-05

## 2023-06-05 RX ORDER — TRAZODONE HYDROCHLORIDE 50 MG/1
100 TABLET ORAL NIGHTLY
Status: DISCONTINUED | OUTPATIENT
Start: 2023-06-05 | End: 2023-06-06 | Stop reason: HOSPADM

## 2023-06-05 RX ORDER — AMOXICILLIN 250 MG
2 CAPSULE ORAL 2 TIMES DAILY
Status: DISCONTINUED | OUTPATIENT
Start: 2023-06-05 | End: 2023-06-06 | Stop reason: HOSPADM

## 2023-06-05 RX ORDER — ONDANSETRON 2 MG/ML
4 INJECTION INTRAMUSCULAR; INTRAVENOUS EVERY 4 HOURS PRN
Status: DISCONTINUED | OUTPATIENT
Start: 2023-06-05 | End: 2023-06-05

## 2023-06-05 RX ORDER — PRAMIPEXOLE DIHYDROCHLORIDE 0.25 MG/1
0.25 TABLET ORAL NIGHTLY
Status: DISCONTINUED | OUTPATIENT
Start: 2023-06-05 | End: 2023-06-06 | Stop reason: HOSPADM

## 2023-06-05 RX ORDER — HYDROCODONE BITARTRATE AND ACETAMINOPHEN 10; 325 MG/1; MG/1
1 TABLET ORAL EVERY 6 HOURS PRN
Status: DISCONTINUED | OUTPATIENT
Start: 2023-06-05 | End: 2023-06-06 | Stop reason: HOSPADM

## 2023-06-05 RX ORDER — AMLODIPINE BESYLATE 10 MG/1
10 TABLET ORAL DAILY
Status: DISCONTINUED | OUTPATIENT
Start: 2023-06-06 | End: 2023-06-06 | Stop reason: HOSPADM

## 2023-06-05 RX ORDER — POLYETHYLENE GLYCOL 3350 17 G/17G
1 POWDER, FOR SOLUTION ORAL
Status: DISCONTINUED | OUTPATIENT
Start: 2023-06-05 | End: 2023-06-06 | Stop reason: HOSPADM

## 2023-06-05 RX ORDER — ONDANSETRON 4 MG/1
4 TABLET, ORALLY DISINTEGRATING ORAL EVERY 4 HOURS PRN
Status: DISCONTINUED | OUTPATIENT
Start: 2023-06-05 | End: 2023-06-06 | Stop reason: HOSPADM

## 2023-06-05 RX ORDER — ONDANSETRON 2 MG/ML
4 INJECTION INTRAMUSCULAR; INTRAVENOUS EVERY 4 HOURS PRN
Status: DISCONTINUED | OUTPATIENT
Start: 2023-06-05 | End: 2023-06-06 | Stop reason: HOSPADM

## 2023-06-05 RX ORDER — LEVOTHYROXINE SODIUM 0.2 MG/1
200 TABLET ORAL
Status: DISCONTINUED | OUTPATIENT
Start: 2023-06-06 | End: 2023-06-06 | Stop reason: HOSPADM

## 2023-06-05 RX ORDER — FLUOXETINE HYDROCHLORIDE 20 MG/1
80 CAPSULE ORAL DAILY
Status: DISCONTINUED | OUTPATIENT
Start: 2023-06-06 | End: 2023-06-06 | Stop reason: HOSPADM

## 2023-06-05 RX ORDER — HYDROCODONE BITARTRATE AND ACETAMINOPHEN 5; 325 MG/1; MG/1
1 TABLET ORAL EVERY 6 HOURS PRN
Status: DISCONTINUED | OUTPATIENT
Start: 2023-06-05 | End: 2023-06-06 | Stop reason: HOSPADM

## 2023-06-05 RX ORDER — LABETALOL HYDROCHLORIDE 5 MG/ML
10 INJECTION, SOLUTION INTRAVENOUS EVERY 4 HOURS PRN
Status: ACTIVE | OUTPATIENT
Start: 2023-06-05 | End: 2023-06-05

## 2023-06-05 RX ORDER — BISACODYL 10 MG
10 SUPPOSITORY, RECTAL RECTAL
Status: DISCONTINUED | OUTPATIENT
Start: 2023-06-05 | End: 2023-06-06 | Stop reason: HOSPADM

## 2023-06-05 RX ORDER — ACETAMINOPHEN 325 MG/1
650 TABLET ORAL EVERY 6 HOURS PRN
Status: DISCONTINUED | OUTPATIENT
Start: 2023-06-05 | End: 2023-06-05

## 2023-06-05 RX ORDER — ASPIRIN 81 MG/1
81 TABLET ORAL DAILY
Status: DISCONTINUED | OUTPATIENT
Start: 2023-06-06 | End: 2023-06-06 | Stop reason: HOSPADM

## 2023-06-05 RX ORDER — PREDNISONE 20 MG/1
40 TABLET ORAL DAILY
Status: DISCONTINUED | OUTPATIENT
Start: 2023-06-06 | End: 2023-06-06 | Stop reason: HOSPADM

## 2023-06-05 RX ORDER — LANOLIN ALCOHOL/MO/W.PET/CERES
400 CREAM (GRAM) TOPICAL DAILY
Status: DISCONTINUED | OUTPATIENT
Start: 2023-06-06 | End: 2023-06-06 | Stop reason: HOSPADM

## 2023-06-05 RX ORDER — ACETAMINOPHEN 325 MG/1
650 TABLET ORAL EVERY 6 HOURS PRN
Status: DISCONTINUED | OUTPATIENT
Start: 2023-06-05 | End: 2023-06-06 | Stop reason: HOSPADM

## 2023-06-05 RX ADMIN — HYDROCODONE BITARTRATE AND ACETAMINOPHEN 1 TABLET: 5; 325 TABLET ORAL at 23:11

## 2023-06-05 RX ADMIN — PRAMIPEXOLE DIHYDROCHLORIDE 0.25 MG: 0.25 TABLET ORAL at 22:38

## 2023-06-05 RX ADMIN — TRAZODONE HYDROCHLORIDE 100 MG: 50 TABLET ORAL at 22:37

## 2023-06-05 ASSESSMENT — PATIENT HEALTH QUESTIONNAIRE - PHQ9
5. POOR APPETITE OR OVEREATING: SEVERAL DAYS
2. FEELING DOWN, DEPRESSED, IRRITABLE, OR HOPELESS: NOT AT ALL
SUM OF ALL RESPONSES TO PHQ QUESTIONS 1-9: 9
7. TROUBLE CONCENTRATING ON THINGS, SUCH AS READING THE NEWSPAPER OR WATCHING TELEVISION: NOT AT ALL
4. FEELING TIRED OR HAVING LITTLE ENERGY: NEARLY EVERY DAY
1. LITTLE INTEREST OR PLEASURE IN DOING THINGS: SEVERAL DAYS
SUM OF ALL RESPONSES TO PHQ9 QUESTIONS 1 AND 2: 1
6. FEELING BAD ABOUT YOURSELF - OR THAT YOU ARE A FAILURE OR HAVE LET YOURSELF OR YOUR FAMILY DOWN: SEVERAL DAYS
8. MOVING OR SPEAKING SO SLOWLY THAT OTHER PEOPLE COULD HAVE NOTICED. OR THE OPPOSITE, BEING SO FIGETY OR RESTLESS THAT YOU HAVE BEEN MOVING AROUND A LOT MORE THAN USUAL: NOT AT ALL
3. TROUBLE FALLING OR STAYING ASLEEP OR SLEEPING TOO MUCH: NEARLY EVERY DAY
9. THOUGHTS THAT YOU WOULD BE BETTER OFF DEAD, OR OF HURTING YOURSELF: NOT AT ALL

## 2023-06-05 ASSESSMENT — COGNITIVE AND FUNCTIONAL STATUS - GENERAL
SUGGESTED CMS G CODE MODIFIER MOBILITY: CJ
CLIMB 3 TO 5 STEPS WITH RAILING: A LOT
STANDING UP FROM CHAIR USING ARMS: A LITTLE
TOILETING: A LITTLE
DAILY ACTIVITIY SCORE: 21
DRESSING REGULAR LOWER BODY CLOTHING: A LITTLE
WALKING IN HOSPITAL ROOM: A LITTLE
HELP NEEDED FOR BATHING: A LITTLE
MOBILITY SCORE: 20
SUGGESTED CMS G CODE MODIFIER DAILY ACTIVITY: CJ

## 2023-06-05 ASSESSMENT — LIFESTYLE VARIABLES
HAVE YOU EVER FELT YOU SHOULD CUT DOWN ON YOUR DRINKING: NO
ON A TYPICAL DAY WHEN YOU DRINK ALCOHOL HOW MANY DRINKS DO YOU HAVE: 0
CONSUMPTION TOTAL: NEGATIVE
HAVE PEOPLE ANNOYED YOU BY CRITICIZING YOUR DRINKING: NO
AVERAGE NUMBER OF DAYS PER WEEK YOU HAVE A DRINK CONTAINING ALCOHOL: 0
TOTAL SCORE: 0
TOTAL SCORE: 0
HOW MANY TIMES IN THE PAST YEAR HAVE YOU HAD 5 OR MORE DRINKS IN A DAY: 0
EVER HAD A DRINK FIRST THING IN THE MORNING TO STEADY YOUR NERVES TO GET RID OF A HANGOVER: NO
TOTAL SCORE: 0
ALCOHOL_USE: NO
EVER FELT BAD OR GUILTY ABOUT YOUR DRINKING: NO

## 2023-06-05 ASSESSMENT — CHA2DS2 SCORE
CHA2DS2 VASC SCORE: 6
DIABETES: NO
HYPERTENSION: YES
VASCULAR DISEASE: YES
SEX: FEMALE
PRIOR STROKE OR TIA OR THROMBOEMBOLISM: YES
AGE 65 TO 74: YES
CHF OR LEFT VENTRICULAR DYSFUNCTION: NO
AGE 75 OR GREATER: NO

## 2023-06-05 ASSESSMENT — FIBROSIS 4 INDEX: FIB4 SCORE: 1.64

## 2023-06-06 ENCOUNTER — HOSPITAL ENCOUNTER (OUTPATIENT)
Dept: RADIOLOGY | Facility: MEDICAL CENTER | Age: 73
End: 2023-06-06
Payer: MEDICARE

## 2023-06-06 ENCOUNTER — PHARMACY VISIT (OUTPATIENT)
Dept: PHARMACY | Facility: MEDICAL CENTER | Age: 73
End: 2023-06-06
Payer: COMMERCIAL

## 2023-06-06 VITALS
DIASTOLIC BLOOD PRESSURE: 67 MMHG | TEMPERATURE: 98.1 F | SYSTOLIC BLOOD PRESSURE: 117 MMHG | RESPIRATION RATE: 18 BRPM | WEIGHT: 240.74 LBS | BODY MASS INDEX: 41.32 KG/M2 | OXYGEN SATURATION: 94 % | HEART RATE: 72 BPM

## 2023-06-06 LAB
ANION GAP SERPL CALC-SCNC: 12 MMOL/L (ref 7–16)
BUN SERPL-MCNC: 29 MG/DL (ref 8–22)
CALCIUM SERPL-MCNC: 9.5 MG/DL (ref 8.5–10.5)
CHLORIDE SERPL-SCNC: 97 MMOL/L (ref 96–112)
CO2 SERPL-SCNC: 30 MMOL/L (ref 20–33)
CREAT SERPL-MCNC: 0.99 MG/DL (ref 0.5–1.4)
ERYTHROCYTE [DISTWIDTH] IN BLOOD BY AUTOMATED COUNT: 45.6 FL (ref 35.9–50)
GFR SERPLBLD CREATININE-BSD FMLA CKD-EPI: 60 ML/MIN/1.73 M 2
GLUCOSE SERPL-MCNC: 189 MG/DL (ref 65–99)
HCT VFR BLD AUTO: 39 % (ref 37–47)
HGB BLD-MCNC: 13 G/DL (ref 12–16)
MAGNESIUM SERPL-MCNC: 1.8 MG/DL (ref 1.5–2.5)
MCH RBC QN AUTO: 30.3 PG (ref 27–33)
MCHC RBC AUTO-ENTMCNC: 33.3 G/DL (ref 32.2–35.5)
MCV RBC AUTO: 90.9 FL (ref 81.4–97.8)
PHOSPHATE SERPL-MCNC: 3.3 MG/DL (ref 2.5–4.5)
PLATELET # BLD AUTO: 249 K/UL (ref 164–446)
PMV BLD AUTO: 9.8 FL (ref 9–12.9)
POTASSIUM SERPL-SCNC: 3.2 MMOL/L (ref 3.6–5.5)
RBC # BLD AUTO: 4.29 M/UL (ref 4.2–5.4)
SODIUM SERPL-SCNC: 139 MMOL/L (ref 135–145)
WBC # BLD AUTO: 7.9 K/UL (ref 4.8–10.8)

## 2023-06-06 PROCEDURE — A9270 NON-COVERED ITEM OR SERVICE: HCPCS | Performed by: HOSPITALIST

## 2023-06-06 PROCEDURE — RXMED WILLOW AMBULATORY MEDICATION CHARGE: Performed by: HOSPITALIST

## 2023-06-06 PROCEDURE — 700111 HCHG RX REV CODE 636 W/ 250 OVERRIDE (IP): Performed by: GENERAL PRACTICE

## 2023-06-06 PROCEDURE — 85027 COMPLETE CBC AUTOMATED: CPT

## 2023-06-06 PROCEDURE — G0378 HOSPITAL OBSERVATION PER HR: HCPCS

## 2023-06-06 PROCEDURE — 700102 HCHG RX REV CODE 250 W/ 637 OVERRIDE(OP): Performed by: HOSPITALIST

## 2023-06-06 PROCEDURE — 84100 ASSAY OF PHOSPHORUS: CPT

## 2023-06-06 PROCEDURE — A9270 NON-COVERED ITEM OR SERVICE: HCPCS | Performed by: GENERAL PRACTICE

## 2023-06-06 PROCEDURE — 99239 HOSP IP/OBS DSCHRG MGMT >30: CPT | Performed by: HOSPITALIST

## 2023-06-06 PROCEDURE — 700111 HCHG RX REV CODE 636 W/ 250 OVERRIDE (IP): Performed by: HOSPITALIST

## 2023-06-06 PROCEDURE — 80048 BASIC METABOLIC PNL TOTAL CA: CPT

## 2023-06-06 PROCEDURE — 700102 HCHG RX REV CODE 250 W/ 637 OVERRIDE(OP): Performed by: GENERAL PRACTICE

## 2023-06-06 PROCEDURE — 83735 ASSAY OF MAGNESIUM: CPT

## 2023-06-06 PROCEDURE — 36415 COLL VENOUS BLD VENIPUNCTURE: CPT

## 2023-06-06 RX ORDER — HYDROCODONE BITARTRATE AND ACETAMINOPHEN 5; 325 MG/1; MG/1
1 TABLET ORAL
Qty: 10 TABLET | Refills: 0 | Status: SHIPPED | OUTPATIENT
Start: 2023-06-06 | End: 2023-06-11

## 2023-06-06 RX ORDER — MAGNESIUM SULFATE HEPTAHYDRATE 40 MG/ML
2 INJECTION, SOLUTION INTRAVENOUS ONCE
Status: COMPLETED | OUTPATIENT
Start: 2023-06-06 | End: 2023-06-06

## 2023-06-06 RX ORDER — FUROSEMIDE 40 MG/1
20 TABLET ORAL DAILY
Qty: 30 TABLET | Refills: 0 | Status: SHIPPED | OUTPATIENT
Start: 2023-06-06

## 2023-06-06 RX ORDER — AMOXICILLIN 250 MG
2 CAPSULE ORAL 2 TIMES DAILY
Qty: 30 TABLET | Refills: 0 | Status: SHIPPED | OUTPATIENT
Start: 2023-06-06

## 2023-06-06 RX ORDER — POTASSIUM CHLORIDE 20 MEQ/1
40 TABLET, EXTENDED RELEASE ORAL ONCE
Status: COMPLETED | OUTPATIENT
Start: 2023-06-06 | End: 2023-06-06

## 2023-06-06 RX ORDER — POLYETHYLENE GLYCOL 3350 17 G/17G
17 POWDER, FOR SOLUTION ORAL
Qty: 510 G | Refills: 0 | Status: SHIPPED | OUTPATIENT
Start: 2023-06-06

## 2023-06-06 RX ORDER — PREDNISONE 20 MG/1
40 TABLET ORAL DAILY
Qty: 8 TABLET | Refills: 0 | Status: SHIPPED | OUTPATIENT
Start: 2023-06-06 | End: 2023-06-10

## 2023-06-06 RX ADMIN — Medication 400 MG: at 05:16

## 2023-06-06 RX ADMIN — APIXABAN 5 MG: 5 TABLET, FILM COATED ORAL at 05:16

## 2023-06-06 RX ADMIN — DOCUSATE SODIUM 50 MG AND SENNOSIDES 8.6 MG 2 TABLET: 8.6; 5 TABLET, FILM COATED ORAL at 17:00

## 2023-06-06 RX ADMIN — FLUOXETINE 80 MG: 20 CAPSULE ORAL at 05:16

## 2023-06-06 RX ADMIN — HYDROCODONE BITARTRATE AND ACETAMINOPHEN 1 TABLET: 10; 325 TABLET ORAL at 08:16

## 2023-06-06 RX ADMIN — OMEPRAZOLE 20 MG: 20 CAPSULE, DELAYED RELEASE ORAL at 05:16

## 2023-06-06 RX ADMIN — PREDNISONE 40 MG: 20 TABLET ORAL at 05:17

## 2023-06-06 RX ADMIN — HYDROCODONE BITARTRATE AND ACETAMINOPHEN 1 TABLET: 5; 325 TABLET ORAL at 17:01

## 2023-06-06 RX ADMIN — ASPIRIN 81 MG: 81 TABLET, COATED ORAL at 05:16

## 2023-06-06 RX ADMIN — APIXABAN 5 MG: 5 TABLET, FILM COATED ORAL at 17:01

## 2023-06-06 RX ADMIN — POTASSIUM CHLORIDE 40 MEQ: 1500 TABLET, EXTENDED RELEASE ORAL at 12:38

## 2023-06-06 RX ADMIN — LEVOTHYROXINE SODIUM 200 MCG: 0.2 TABLET ORAL at 05:16

## 2023-06-06 RX ADMIN — MAGNESIUM SULFATE HEPTAHYDRATE 2 G: 2 INJECTION, SOLUTION INTRAVENOUS at 12:40

## 2023-06-06 RX ADMIN — METOPROLOL TARTRATE 25 MG: 25 TABLET, FILM COATED ORAL at 17:00

## 2023-06-06 RX ADMIN — AMLODIPINE BESYLATE 10 MG: 10 TABLET ORAL at 05:16

## 2023-06-06 RX ADMIN — METOPROLOL TARTRATE 25 MG: 25 TABLET, FILM COATED ORAL at 05:16

## 2023-06-06 ASSESSMENT — PATIENT HEALTH QUESTIONNAIRE - PHQ9
SUM OF ALL RESPONSES TO PHQ QUESTIONS 1-9: 9
7. TROUBLE CONCENTRATING ON THINGS, SUCH AS READING THE NEWSPAPER OR WATCHING TELEVISION: NOT AT ALL
4. FEELING TIRED OR HAVING LITTLE ENERGY: NEARLY EVERY DAY
5. POOR APPETITE OR OVEREATING: SEVERAL DAYS
SUM OF ALL RESPONSES TO PHQ9 QUESTIONS 1 AND 2: 1
3. TROUBLE FALLING OR STAYING ASLEEP OR SLEEPING TOO MUCH: NEARLY EVERY DAY
1. LITTLE INTEREST OR PLEASURE IN DOING THINGS: SEVERAL DAYS
9. THOUGHTS THAT YOU WOULD BE BETTER OFF DEAD, OR OF HURTING YOURSELF: NOT AT ALL
2. FEELING DOWN, DEPRESSED, IRRITABLE, OR HOPELESS: NOT AT ALL
8. MOVING OR SPEAKING SO SLOWLY THAT OTHER PEOPLE COULD HAVE NOTICED. OR THE OPPOSITE, BEING SO FIGETY OR RESTLESS THAT YOU HAVE BEEN MOVING AROUND A LOT MORE THAN USUAL: NOT AT ALL
6. FEELING BAD ABOUT YOURSELF - OR THAT YOU ARE A FAILURE OR HAVE LET YOURSELF OR YOUR FAMILY DOWN: SEVERAL DAYS

## 2023-06-06 ASSESSMENT — PAIN DESCRIPTION - PAIN TYPE
TYPE: ACUTE PAIN

## 2023-06-06 NOTE — DISCHARGE INSTRUCTIONS
Discharge Instructions per Thania Parada M.D.  DIET: Resume previous diet  Healthy diet. Plenty of vegetables.  ACTIVITY: Avoid strenuous activity or heavy lifting.  DIAGNOSIS: Principal Problem:    ALCIRA (acute kidney injury) (HCC) (POA: Yes)  Active Problems:    Essential hypertension (POA: Yes)    Advance care planning (POA: Yes)    Paroxysmal atrial fibrillation (HCC) (POA: Yes)    Idiopathic chronic gout of multiple sites without tophus (POA: Yes)    Hypothyroidism (POA: Yes)    Nocturnal hypoxemia (POA: Yes)    Hypokalemia (POA: Yes)  Resolved Problems:    * No resolved hospital problems. *    Follow up instructions.  Please call [phone] to schedule and/or confirm appointments([phone] for medical group, [phone] for imaging, [phone] for specialty); we will do our part to try calling as well.  Follow up Drew T. Blumberg, D.O.    Return to ER in the event of new or worsening symptoms. Please note importance of compliance and the patient has agreed to proceed with all medical recommendations and follow up plan indicated above. All medications come with benefits and risks. Risks may include permanent injury or death and these risks can be minimized with close reassessment and monitoring. Please make it to your scheduled follow ups with Drew T. Blumberg, D.O., and/or specialists clinic.    Please do not take lasix and Potassium  until 6/8/2023 a

## 2023-06-06 NOTE — CARE PLAN
The patient is Stable - Low risk of patient condition declining or worsening    Shift Goals  Clinical Goals: maintain skin integrity with interventions in place    Progress made toward(s) clinical / shift goals:  Pt is A&Ox4, medicated foe BLE pain report pain level 7/10. Pt will report decrease in pain within 2 hours. Pillow uses for support and comfort. Hourly rounding in place. Reinforced the use of call light when assistance needed. Safety measures in place.           Problem: Pain - Standard  Goal: Alleviation of pain or a reduction in pain to the patient’s comfort goal  Description: Target End Date:  Prior to discharge or change in level of care    Document on Vitals flowsheet    1.  Document pain using the appropriate pain scale per order or unit policy  2.  Educate and implement non-pharmacologic comfort measures (i.e. relaxation, distraction, massage, cold/heat therapy, etc.)  3.  Pain management medications as ordered  4.  Reassess pain after pain med administration per policy  5.  If opiods administered assess patient's response to pain medication is appropriate per POSS sedation scale  6.  Follow pain management plan developed in collaboration with patient and interdisciplinary team (including palliative care or pain specialists if applicable)  Outcome: Not Progressing

## 2023-06-06 NOTE — ASSESSMENT & PLAN NOTE
Prior creatinine of 0.89, creatinine at outside facility 1.36  ERP was concerned with worsening ALCIRA requiring nephrology consult  She was discharged with Lasix 40 mg daily, her cardiologist increased it to Lasix 40 mg twice daily  At this time hold patient's diuretics  Serial BMP

## 2023-06-06 NOTE — HOSPITAL COURSE
"This is a 72-year-old female with past medical history of hypertension, paroxysmal atrial fibrillation on Eliquis, chronic hypoxemic respiratory failure on 2 L, hypothyroidism who was transferred from [facility] for ?  Gout flare.    \"Uric acid 9.4, CRP greater than 18.  Outside facility reports patient's BNP is now 160 is improved from the 800s on last admission.  Creatinine noted to be 1.36.  Patient was possibly over diuresed leading to gout flare.  Would recommend reducing dose of patient Lasix and possibly giving back a little fluid.  Outside facility stated they are incapable of caring for patient and therefore patient is transferred for further care.\"    Of note patient was recently admitted 5/30-6/2 for shortness of breath. TTE was performed and noted LVEF of 65%, no valvular abnormalities, no evidence of pulmonary hypertension, noted grade 1 diastolic dysfunction.  During that admission, D-dimer elevated at 0.73 however when age corrected this is normal.  Venous Doppler negative for DVT bilaterally TSH elevated at 12.4.  Patient discharged with Lasix, chlorthalidone discontinued.    This is a 72-year-old female with a past medical history significant for hypertension, paroxysmal atrial fibrillation, secondary hypercoagulable state on Eliquis, chronic hypoxic respiratory failure on 2 to 3 L of oxygen, hypothyroidism was [facility].  [facility] uric acid noted to be 9.4, it is noted from [facility] facility that patient was not been able to be taken care of thus was transferred to [facility].    Patient was recently discharged from the hospital on 6/2 for shortness of breath, TTE showing EF of 65%, grade 1 diastolic dysfunction: Patient said chlorthalidone was discontinued and was discharged on Lasix.    During the stay in the hospital, patient was started on prednisone 40 mg for total of 5 days.  Her pain/erythema has gotten completely better, patient is able to walk " in hallway without any problem.  At this time patient is medically stable to be discharged home

## 2023-06-06 NOTE — H&P
"Hospital Medicine History & Physical Note    Date of Service  6/5/2023    Primary Care Physician  Drew T. Blumberg, D.O.    Consultants  None    Specialist Names: None    Code Status  DNAR/DNI    Chief Complaint  Bilateral foot pain    History of Presenting Illness  This is a 72-year-old female with past medical history of hypertension, paroxysmal atrial fibrillation on Eliquis, nocturnal hypoxemia on 2 L, hypothyroidism and gout who was transferred from \[facility]\"" for gout flare and ALCIRA.    \"Uric acid 9.4, CRP greater than 18.  Outside facility reports patient's BNP is now 160 is improved from the 800s on last admission.  Creatinine noted to be 1.36.  Patient was possibly over diuresed leading to gout flare.\"      Outside facility requested transfer to 3 different facilities all declined due to no need for higher level of care.  Cardiology was called and they recommended to decrease patient's Lasix and discharged home.    Of note patient was recently admitted 5/30-6/2 for shortness of breath. TTE was performed and noted LVEF of 65%, no valvular abnormalities, no evidence of pulmonary hypertension, noted grade 1 diastolic dysfunction.  During that admission, D-dimer elevated at 0.73 however when age corrected this is normal.  Venous Doppler negative for DVT bilaterally TSH elevated at 12.4.  Patient discharged with Lasix, chlorthalidone discontinued.    I discussed the plan of care with patient and bedside RN.    Review of Systems  Review of Systems   Musculoskeletal:         Bilateral foot pain   All other systems reviewed and are negative.      Past Medical History   has a past medical history of Hypertension, Pulmonary HTN (HCC), Sleep apnea, Stroke (HCC), Thyroid disease, and Tricuspid valve regurgitation.    Surgical History   has a past surgical history that includes gastric bypass laparoscopic (2006); hysterectomy, total abdominal; thyroidectomy (3/2012); knee replacement, total (Right, 3/15/2016); cardiac " cath, right/left heart (12/2015); cataract extraction with iol (Bilateral, 2016); colonoscopy (2010); colonoscopy - endo (12/8/2016); lap, place adjust alfreda restrict de* (N/A, 11/2/2022); and pr upper gi endoscopy,diagnosis (N/A, 11/2/2022).     Family History  family history includes Heart Attack in her mother.   Family history reviewed with patient. There is no family history that is pertinent to the chief complaint.     Social History   reports that she has never smoked. She has never used smokeless tobacco. She reports that she does not drink alcohol and does not use drugs.    Allergies  Allergies   Allergen Reactions    Iodine Anaphylaxis       Medications  Prior to Admission Medications   Prescriptions Last Dose Informant Patient Reported? Taking?   allopurinol (ZYLOPRIM) 100 MG Tab  Patient No No   Sig: Take 1 Tablet by mouth every day.   amLODIPine (NORVASC) 10 MG Tab  Patient No No   Sig: Take 1 Tablet by mouth every day.   apixaban (ELIQUIS) 5mg Tab   Yes No   Sig: Take 1 Tablet by mouth 2 times a day.   aspirin 81 MG EC tablet  Patient Yes No   Sig: Take 81 mg by mouth every day.   cloNIDine (CATAPRES) 0.1 MG Tab  Patient Yes No   Sig: Take 0.1 mg by mouth as needed.   fluoxetine (PROZAC) 40 MG capsule  Patient Yes No   Sig: Take 80 mg by mouth every day. 80 mg = 2 capsules   furosemide (LASIX) 40 MG Tab   No No   Sig: Take 1 Tablet by mouth every day.   levothyroxine (SYNTHROID) 200 MCG Tab  Patient No No   Sig: Take 1 Tablet by mouth every morning on an empty stomach.   magnesium oxide (MAG-OX) 400 MG Tab tablet  Patient Yes No   Sig: Take 400 mg by mouth every day.   metoprolol tartrate (LOPRESSOR) 25 MG Tab  Patient No No   Sig: Take 1 Tablet by mouth 2 times a day for 360 days.   omeprazole (PRILOSEC) 20 MG delayed-release capsule   No No   Sig: Take 1 Capsule by mouth every day.   oxyCODONE immediate-release (ROXICODONE) 5 MG Tab   No No   Sig: Take 1 Tablet by mouth every four hours as needed for  Severe Pain for up to 3 days.   potassium chloride SA (K-DUR) 10 MEQ Tab CR  Patient No No   Sig: Take 1 Tablet by mouth every day.   pramipexole (MIRAPEX) 0.25 MG Tab   No No   Sig: Take 1 Tablet by mouth every evening. Indications: Restless Leg Syndrome   traZODone (DESYREL) 100 MG Tab  Patient No No   Sig: Take 1 Tablet by mouth every evening for 360 days.      Facility-Administered Medications: None       Physical Exam  Temp:  [36.7 °C (98 °F)] 36.7 °C (98 °F)  Pulse:  [69] 69  Resp:  [17] 17  BP: (130)/(81) 130/81  SpO2:  [87 %-93 %] 93 %  Blood Pressure : 130/81   Temperature: 36.7 °C (98 °F)   Pulse: 69   Respiration: 17   Pulse Oximetry: 93 %       Physical Exam  Vitals and nursing note reviewed.   Constitutional:       General: She is not in acute distress.     Appearance: She is obese.   HENT:      Head: Normocephalic and atraumatic.      Mouth/Throat:      Mouth: Mucous membranes are moist.      Pharynx: No oropharyngeal exudate.   Eyes:      Extraocular Movements: Extraocular movements intact.      Pupils: Pupils are equal, round, and reactive to light.   Cardiovascular:      Rate and Rhythm: Normal rate and regular rhythm.      Pulses: Normal pulses.      Heart sounds: No murmur heard.     No friction rub. No gallop.   Pulmonary:      Effort: Pulmonary effort is normal. No respiratory distress.      Breath sounds: No wheezing, rhonchi or rales.   Abdominal:      General: Bowel sounds are normal. There is no distension.      Palpations: Abdomen is soft. There is no mass.      Tenderness: There is no abdominal tenderness.   Musculoskeletal:         General: No swelling or tenderness. Normal range of motion.      Cervical back: Normal range of motion. No rigidity. No muscular tenderness.      Right lower leg: No edema.      Left lower leg: No edema.   Skin:     General: Skin is warm and dry.      Capillary Refill: Capillary refill takes less than 2 seconds.      Findings: No erythema or rash.    Neurological:      General: No focal deficit present.      Mental Status: She is alert and oriented to person, place, and time.      Motor: No weakness.      Gait: Gait normal.         Laboratory:          No results for input(s): ALTSGPT, ASTSGOT, ALKPHOSPHAT, TBILIRUBIN, DBILIRUBIN, GAMMAGT, AMYLASE, LIPASE, ALB, PREALBUMIN, GLUCOSE in the last 72 hours.      No results for input(s): NTPROBNP in the last 72 hours.      No results for input(s): TROPONINT in the last 72 hours.    Imaging:  No orders to display       no X-Ray or EKG requiring interpretation    Assessment/Plan:  Justification for Admission Status  I anticipate this patient is appropriate for observation status at this time because will require serial labs to monitor renal function and pain control for gout flare    Patient will need a Med/Surg bed on MEDICAL service .  The need is secondary to will require serial labs to monitor renal function and pain control for gout flare.    * ALCIRA (acute kidney injury) (HCC)- (present on admission)  Assessment & Plan  Prior creatinine of 0.89, creatinine at outside facility 1.36  ERP was concerned with worsening ALCIRA requiring nephrology consult  She was discharged with Lasix 40 mg daily, her cardiologist increased it to Lasix 40 mg twice daily  At this time hold patient's diuretics  Serial BMP    Idiopathic chronic gout of multiple sites without tophus- (present on admission)  Assessment & Plan  Uric acid 9.4   Elevated CRP  Bilateral foot pain -gout flare   Started patient on steroid therapy  In light of ALCIRA held patient's allopurinol, will refrain from colchicine      Nocturnal hypoxemia- (present on admission)  Assessment & Plan  Requiring 2 L    Hypothyroidism- (present on admission)  Assessment & Plan  Last TSH was 12  Resume patient's Synthroid    Paroxysmal atrial fibrillation (HCC)- (present on admission)  Assessment & Plan  Resume patient's metoprolol and Eliquis    Advance care planning- (present on  admission)  Assessment & Plan  Confirm with patient she is DNR/DNI    Essential hypertension- (present on admission)  Assessment & Plan  Resume patient's amlodipine and metoprolol with parameters        VTE prophylaxis: SCDs/TEDs and therapeutic anticoagulation with Eliquis

## 2023-06-06 NOTE — DISCHARGE PLANNING
DC Transport Scheduled    Received request at: 6/6/2023 at 1306    Transport Company Scheduled:  [employer]    Scheduled Date: 6/6/2023  Scheduled Time: 1630    Destination: Home at [address] [place] [address]     Notified care team of scheduled transport via [employer].     If there are any changes needed to the DC transportation scheduled, please contact [facility] at ext. [phone] between the hours of 7914-2400 Mon-Fri. If outside those hours, contact the ED Case Manager at ext. [phone].

## 2023-06-06 NOTE — ASSESSMENT & PLAN NOTE
Uric acid 9.4   Elevated CRP  Bilateral foot pain -gout flare   Started patient on steroid therapy  In light of ALCIRA held patient's allopurinol, will refrain from colchicine

## 2023-06-06 NOTE — DISCHARGE SUMMARY
Discharge Summary    CHIEF COMPLAINT ON ADMISSION  No chief complaint on file.      Reason for Admission  CHF, Gout     Admission Date  6/5/2023    CODE STATUS  DNAR/DNI    HPI & HOSPITAL COURSE  This is a 72-year-old female with a past medical history significant for hypertension, paroxysmal atrial fibrillation, secondary hypercoagulable state on Eliquis, chronic hypoxic respiratory failure on 2 to 3 L of oxygen, hypothyroidism was transferred from [facility] for gout flare.  At [facility], uric acid noted to be 9.4, it is noted from [facility] that patient was not been able to be taken care of thus was transferred to [facility].    Patient was recently discharged from the hospital on 6/2 for shortness of breath, TTE showing EF of 65%, grade 1 diastolic dysfunction: Patient said chlorthalidone was discontinued and was discharged on Lasix.    During the stay in the hospital, patient was started on prednisone 40 mg for total of 5 days.  Her pain/erythema has gotten completely better, patient is able to walk in hallway without any problem.  At this time patient is medically stable to be discharged home. She will continue home allopurinol.      Therefore, she is discharged in good and stable condition to home with close outpatient follow-up.    The patient met 2-midnight criteria for an inpatient stay at the time of discharge.    Discharge Date  6/6/2023    FOLLOW UP ITEMS POST DISCHARGE  Drew T. Blumberg, D.O.      DISCHARGE DIAGNOSES  Principal Problem:    ALCIRA (acute kidney injury) (HCC) (POA: Yes)  Active Problems:    Essential hypertension (POA: Yes)    Advance care planning (POA: Yes)    Paroxysmal atrial fibrillation (HCC) (POA: Yes)    Idiopathic chronic gout of multiple sites without tophus (POA: Yes)    Hypothyroidism (POA: Yes)    Nocturnal hypoxemia (POA: Yes)    Hypokalemia (POA: Yes)  Resolved Problems:    * No resolved hospital problems. *      FOLLOW UP  No future appointments.  No  follow-up provider specified.    MEDICATIONS ON DISCHARGE     Medication List        START taking these medications        Instructions   HYDROcodone-acetaminophen 5-325 MG Tabs per tablet  Commonly known as: NORCO   Take 1 Tablet by mouth 2 times daily with meals as needed (severe pain) for up to 5 days.  Dose: 1 Tablet     polyethylene glycol 3350 17 GM/SCOOP Powd  Commonly known as: MIRALAX   Take 17 g by mouth 1 time a day as needed (if sennosides and docusate ineffective after 24 hours).  Dose: 17 g     predniSONE 20 MG Tabs  Commonly known as: DELTASONE   Take 2 Tablets by mouth every day for 4 days.  Dose: 40 mg     senna-docusate 8.6-50 MG Tabs  Commonly known as: PERICOLACE or SENOKOT S   Take 2 Tablets by mouth 2 times a day.  Dose: 2 Tablet            CHANGE how you take these medications        Instructions   furosemide 40 MG Tabs  What changed: how much to take  Commonly known as: LASIX   Take 0.5 Tablets by mouth every day.  Dose: 20 mg            CONTINUE taking these medications        Instructions   allopurinol 100 MG Tabs  Commonly known as: ZYLOPRIM   Take 1 Tablet by mouth every day.  Dose: 100 mg     amLODIPine 10 MG Tabs  Commonly known as: NORVASC   Take 1 Tablet by mouth every day.  Dose: 10 mg     apixaban 5mg Tabs  Commonly known as: ELIQUIS   Take 1 Tablet by mouth 2 times a day.  Dose: 1 Tablet     aspirin 81 MG EC tablet   Take 81 mg by mouth every day.  Dose: 81 mg     cloNIDine 0.1 MG Tabs  Commonly known as: CATAPRES   Take 0.1 mg by mouth as needed.  Dose: 0.1 mg     fluoxetine 40 MG capsule  Commonly known as: PROZAC   Take 80 mg by mouth every day. 80 mg = 2 capsules  Dose: 80 mg     levothyroxine 200 MCG Tabs  Commonly known as: SYNTHROID   Take 1 Tablet by mouth every morning on an empty stomach.  Dose: 200 mcg     metoprolol tartrate 25 MG Tabs  Commonly known as: LOPRESSOR   Take 1 Tablet by mouth 2 times a day for 360 days.  Dose: 25 mg     omeprazole 20 MG delayed-release  capsule  Commonly known as: PRILOSEC   Take 1 Capsule by mouth every day.  Dose: 20 mg     potassium chloride SA 10 MEQ Tbcr  Commonly known as: K-DUR   Take 1 Tablet by mouth every day.  Dose: 10 mEq     pramipexole 0.25 MG Tabs  Commonly known as: MIRAPEX   Take 1 Tablet by mouth every evening. Indications: Restless Leg Syndrome  Dose: 0.25 mg     traZODone 100 MG Tabs  Commonly known as: DESYREL   Take 1 Tablet by mouth every evening for 360 days.  Dose: 100 mg            STOP taking these medications      magnesium oxide 400 MG Tabs tablet  Commonly known as: MAG-OX     oxyCODONE immediate-release 5 MG Tabs  Commonly known as: ROXICODONE              Allergies  Allergies   Allergen Reactions    Iodine Anaphylaxis       DIET  Orders Placed This Encounter   Procedures    Diet Order Diet: Cardiac     Standing Status:   Standing     Number of Occurrences:   1     Order Specific Question:   Diet:     Answer:   Cardiac [6]       ACTIVITY  As tolerated.  Weight bearing as tolerated    CONSULTATIONS  None     PROCEDURES  None     LABORATORY  Lab Results   Component Value Date    SODIUM 139 06/06/2023    POTASSIUM 3.2 (L) 06/06/2023    CHLORIDE 97 06/06/2023    CO2 30 06/06/2023    GLUCOSE 189 (H) 06/06/2023    BUN 29 (H) 06/06/2023    CREATININE 0.99 06/06/2023        Lab Results   Component Value Date    WBC 7.9 06/06/2023    HEMOGLOBIN 13.0 06/06/2023    HEMATOCRIT 39.0 06/06/2023    PLATELETCT 249 06/06/2023        Total time of the discharge process exceeds 37 minutes.

## 2023-06-06 NOTE — PROGRESS NOTES
4 Eyes Skin Assessment Completed by lian RN and uzair RN.    Head WDL  Ears Redness  Nose Redness  Mouth WDL  Neck WDL  Breast/Chest WDL  Shoulder Blades WDL  Spine WDL  (R) Arm/Elbow/Hand WDL  (L) Arm/Elbow/Hand Bruising  Abdomen Scar, redness to left panus area  Groin Redness  Scrotum/Coccyx/Buttocks Redness  (R) Leg WDL  (L) Leg WDL  (R) Heel/Foot/Toe Redness  (L) Heel/Foot/Toe Redness          Devices In Places     Interventions In Place Pillows    Possible Skin Injury No    Pictures Uploaded Into Epic N/A  Wound Consult Placed N/A  RN Wound Prevention Protocol Ordered No

## 2023-06-06 NOTE — PROGRESS NOTES
Direct admit via remsa.   Alert x4 and able to let her needs known, bed alarm placed due to painful walking due to her gout flare up  Await orders

## 2023-06-06 NOTE — PROGRESS NOTES
"[facility] TRIAGE OFFICER DIRECT ADMISSION REPORT  Transferring facility: [facility]  Transferring physician: Dr Song  Chief complaint: Diarrhea  Pertinent history & patient course:   72-year-old female recently discharged from [facility] after hospitalization from 5/30 - 6/2.  Patient was diuresed after experiencing increased shortness of breath and lower extremity swelling.  Patient improved clinically and was discharged home on 6/2.  Patient presented today to outside facility with nonspecific symptoms.  I was told patient had a gout flare but provider did not specify which joint.  Uric acid 9.4, CRP greater than 18.  Outside facility reports patient's BNP is now 160 is improved from the 800s on last admission.  Creatinine noted to be 1.36.  Patient was possibly over diuresed leading to gout flare.  Would recommend reducing dose of patient Lasix and possibly giving back a little fluid.  Outside facility stated they are incapable of caring for patient and therefore patient is transferred for further care.    Pertinent imaging & lab results:   Code Status:  per transferring provider, I personally verified with the transferring provider patient's code status and the transferring provider has confirmed this with the patient.  Further work up or recommendations per triage officer prior to transfer:   Consultants called prior to transfer and pertinent input from consultants:   Patient accepted for transfer: Yes  Consultants to be called upon arrival:   Admission status: Observation.   Floor requested: medical  If ICU transfer, name of intensivist case discussed with and pertinent input from critical care:     Please inform the \"Triage Coord.-RTOC\" through Voalte upon arrival of the patient to [facility] for assignment of a hospitalist to perform admission. Reach out to the assigned hospitalist (assigned by RTOC) for any questions or concerns regarding the care of this patient.           "

## 2024-05-08 ENCOUNTER — HOSPITAL ENCOUNTER (INPATIENT)
Facility: MEDICAL CENTER | Age: 74
LOS: 14 days | DRG: 273 | End: 2024-05-22
Attending: INTERNAL MEDICINE | Admitting: STUDENT IN AN ORGANIZED HEALTH CARE EDUCATION/TRAINING PROGRAM
Payer: MEDICARE

## 2024-05-08 DIAGNOSIS — R00.1 BRADYCARDIA: ICD-10-CM

## 2024-05-08 DIAGNOSIS — I27.20 PULMONARY HYPERTENSION (HCC): ICD-10-CM

## 2024-05-08 DIAGNOSIS — I48.91 ATRIAL FIBRILLATION WITH RAPID VENTRICULAR RESPONSE (HCC): ICD-10-CM

## 2024-05-08 DIAGNOSIS — I50.31 ACUTE HEART FAILURE WITH PRESERVED EJECTION FRACTION (HCC): Primary | ICD-10-CM

## 2024-05-08 DIAGNOSIS — J69.0 ASPIRATION PNEUMONIA OF RIGHT LUNG DUE TO GASTRIC SECRETIONS, UNSPECIFIED PART OF LUNG (HCC): ICD-10-CM

## 2024-05-08 DIAGNOSIS — I48.92 ATRIAL FIB/FLUTTER, TRANSIENT (HCC): ICD-10-CM

## 2024-05-08 DIAGNOSIS — I48.91 ATRIAL FIB/FLUTTER, TRANSIENT (HCC): ICD-10-CM

## 2024-05-08 PROBLEM — I95.9 HYPOTENSION: Status: ACTIVE | Noted: 2024-05-08

## 2024-05-08 PROCEDURE — 99222 1ST HOSP IP/OBS MODERATE 55: CPT | Mod: AI | Performed by: STUDENT IN AN ORGANIZED HEALTH CARE EDUCATION/TRAINING PROGRAM

## 2024-05-08 RX ORDER — ALLOPURINOL 100 MG/1
100 TABLET ORAL DAILY
Status: DISCONTINUED | OUTPATIENT
Start: 2024-05-09 | End: 2024-05-12

## 2024-05-08 RX ORDER — LEVOTHYROXINE SODIUM 175 UG/1
175 TABLET ORAL
Status: DISCONTINUED | OUTPATIENT
Start: 2024-05-09 | End: 2024-05-12

## 2024-05-08 RX ORDER — SODIUM CHLORIDE 9 MG/ML
INJECTION, SOLUTION INTRAVENOUS CONTINUOUS
Status: DISCONTINUED | OUTPATIENT
Start: 2024-05-09 | End: 2024-05-09

## 2024-05-08 RX ORDER — FLUOXETINE HYDROCHLORIDE 20 MG/1
120 CAPSULE ORAL DAILY
Status: DISCONTINUED | OUTPATIENT
Start: 2024-05-09 | End: 2024-05-11

## 2024-05-08 RX ORDER — PRAMIPEXOLE DIHYDROCHLORIDE 0.25 MG/1
0.25 TABLET ORAL NIGHTLY
Status: DISCONTINUED | OUTPATIENT
Start: 2024-05-08 | End: 2024-05-15

## 2024-05-08 RX ORDER — TRAMADOL HYDROCHLORIDE 100 MG/1
100 TABLET, COATED ORAL NIGHTLY
Status: ON HOLD | COMMUNITY
End: 2024-05-31

## 2024-05-08 RX ORDER — OMEPRAZOLE 20 MG/1
20 CAPSULE, DELAYED RELEASE ORAL DAILY
Status: DISCONTINUED | OUTPATIENT
Start: 2024-05-09 | End: 2024-05-12

## 2024-05-08 RX ADMIN — PRAMIPEXOLE DIHYDROCHLORIDE 0.25 MG: 0.25 TABLET ORAL at 23:31

## 2024-05-08 RX ADMIN — APIXABAN 5 MG: 5 TABLET, FILM COATED ORAL at 23:32

## 2024-05-08 ASSESSMENT — CHA2DS2 SCORE
VASCULAR DISEASE: YES
AGE 65 TO 74: YES
CHA2DS2 VASC SCORE: 5
PRIOR STROKE OR TIA OR THROMBOEMBOLISM: NO
AGE 75 OR GREATER: NO
SEX: FEMALE
CHF OR LEFT VENTRICULAR DYSFUNCTION: YES
DIABETES: NO
HYPERTENSION: YES

## 2024-05-08 ASSESSMENT — PATIENT HEALTH QUESTIONNAIRE - PHQ9
SUM OF ALL RESPONSES TO PHQ9 QUESTIONS 1 AND 2: 1
6. FEELING BAD ABOUT YOURSELF - OR THAT YOU ARE A FAILURE OR HAVE LET YOURSELF OR YOUR FAMILY DOWN: NOT AL ALL
3. TROUBLE FALLING OR STAYING ASLEEP OR SLEEPING TOO MUCH: NOT AT ALL
2. FEELING DOWN, DEPRESSED, IRRITABLE, OR HOPELESS: NOT AT ALL
8. MOVING OR SPEAKING SO SLOWLY THAT OTHER PEOPLE COULD HAVE NOTICED. OR THE OPPOSITE, BEING SO FIGETY OR RESTLESS THAT YOU HAVE BEEN MOVING AROUND A LOT MORE THAN USUAL: NOT AT ALL
9. THOUGHTS THAT YOU WOULD BE BETTER OFF DEAD, OR OF HURTING YOURSELF: NOT AT ALL
7. TROUBLE CONCENTRATING ON THINGS, SUCH AS READING THE NEWSPAPER OR WATCHING TELEVISION: NOT AT ALL
1. LITTLE INTEREST OR PLEASURE IN DOING THINGS: SEVERAL DAYS
5. POOR APPETITE OR OVEREATING: SEVERAL DAYS
SUM OF ALL RESPONSES TO PHQ QUESTIONS 1-9: 3
4. FEELING TIRED OR HAVING LITTLE ENERGY: SEVERAL DAYS

## 2024-05-08 ASSESSMENT — COGNITIVE AND FUNCTIONAL STATUS - GENERAL
STANDING UP FROM CHAIR USING ARMS: A LITTLE
TURNING FROM BACK TO SIDE WHILE IN FLAT BAD: A LITTLE
MOVING TO AND FROM BED TO CHAIR: A LITTLE
MOVING FROM LYING ON BACK TO SITTING ON SIDE OF FLAT BED: A LITTLE
DAILY ACTIVITIY SCORE: 20
TOILETING: A LITTLE
DRESSING REGULAR LOWER BODY CLOTHING: A LITTLE
SUGGESTED CMS G CODE MODIFIER DAILY ACTIVITY: CJ
HELP NEEDED FOR BATHING: A LITTLE
MOBILITY SCORE: 18
CLIMB 3 TO 5 STEPS WITH RAILING: A LITTLE
WALKING IN HOSPITAL ROOM: A LITTLE
SUGGESTED CMS G CODE MODIFIER MOBILITY: CK
DRESSING REGULAR UPPER BODY CLOTHING: A LITTLE

## 2024-05-08 ASSESSMENT — LIFESTYLE VARIABLES
DOES PATIENT WANT TO STOP DRINKING: NO
HOW MANY TIMES IN THE PAST YEAR HAVE YOU HAD 5 OR MORE DRINKS IN A DAY: 0
EVER FELT BAD OR GUILTY ABOUT YOUR DRINKING: NO
AVERAGE NUMBER OF DAYS PER WEEK YOU HAVE A DRINK CONTAINING ALCOHOL: 0
TOTAL SCORE: 0
HAVE PEOPLE ANNOYED YOU BY CRITICIZING YOUR DRINKING: NO
CONSUMPTION TOTAL: NEGATIVE
TOTAL SCORE: 0
HAVE YOU EVER FELT YOU SHOULD CUT DOWN ON YOUR DRINKING: NO
ALCOHOL_USE: NO
TOTAL SCORE: 0
ON A TYPICAL DAY WHEN YOU DRINK ALCOHOL HOW MANY DRINKS DO YOU HAVE: 0
EVER HAD A DRINK FIRST THING IN THE MORNING TO STEADY YOUR NERVES TO GET RID OF A HANGOVER: NO

## 2024-05-08 ASSESSMENT — ENCOUNTER SYMPTOMS
DEPRESSION: 0
NECK PAIN: 0
DIZZINESS: 0
CHILLS: 0
HEMOPTYSIS: 0
BLURRED VISION: 0
DOUBLE VISION: 0
HEARTBURN: 0
FEVER: 0
NAUSEA: 0
MYALGIAS: 0
PALPITATIONS: 0
COUGH: 0
BRUISES/BLEEDS EASILY: 0
HEADACHES: 0

## 2024-05-08 ASSESSMENT — PAIN DESCRIPTION - PAIN TYPE: TYPE: ACUTE PAIN

## 2024-05-08 ASSESSMENT — FIBROSIS 4 INDEX: FIB4 SCORE: 1.91

## 2024-05-09 ENCOUNTER — APPOINTMENT (OUTPATIENT)
Dept: RADIOLOGY | Facility: MEDICAL CENTER | Age: 74
DRG: 273 | End: 2024-05-09
Payer: MEDICARE

## 2024-05-09 LAB
ALBUMIN SERPL BCP-MCNC: 3.6 G/DL (ref 3.2–4.9)
ALBUMIN/GLOB SERPL: 1.8 G/DL
ALP SERPL-CCNC: 65 U/L (ref 30–99)
ALT SERPL-CCNC: 10 U/L (ref 2–50)
ANION GAP SERPL CALC-SCNC: 9 MMOL/L (ref 7–16)
AST SERPL-CCNC: 15 U/L (ref 12–45)
BASOPHILS # BLD AUTO: 0.2 % (ref 0–1.8)
BASOPHILS # BLD: 0.01 K/UL (ref 0–0.12)
BILIRUB SERPL-MCNC: <0.2 MG/DL (ref 0.1–1.5)
BUN SERPL-MCNC: 37 MG/DL (ref 8–22)
CALCIUM ALBUM COR SERPL-MCNC: 9.1 MG/DL (ref 8.5–10.5)
CALCIUM SERPL-MCNC: 8.8 MG/DL (ref 8.5–10.5)
CHLORIDE SERPL-SCNC: 116 MMOL/L (ref 96–112)
CO2 SERPL-SCNC: 21 MMOL/L (ref 20–33)
CREAT SERPL-MCNC: 1.16 MG/DL (ref 0.5–1.4)
EKG IMPRESSION: NORMAL
EOSINOPHIL # BLD AUTO: 0.27 K/UL (ref 0–0.51)
EOSINOPHIL NFR BLD: 5.9 % (ref 0–6.9)
ERYTHROCYTE [DISTWIDTH] IN BLOOD BY AUTOMATED COUNT: 60.9 FL (ref 35.9–50)
FERRITIN SERPL-MCNC: 313 NG/ML (ref 10–291)
GFR SERPLBLD CREATININE-BSD FMLA CKD-EPI: 49 ML/MIN/1.73 M 2
GLOBULIN SER CALC-MCNC: 2 G/DL (ref 1.9–3.5)
GLUCOSE SERPL-MCNC: 113 MG/DL (ref 65–99)
HCT VFR BLD AUTO: 31.8 % (ref 37–47)
HGB BLD-MCNC: 9.8 G/DL (ref 12–16)
HGB RETIC QN AUTO: 30.8 PG/CELL (ref 29–35)
IMM GRANULOCYTES # BLD AUTO: 0 K/UL (ref 0–0.11)
IMM GRANULOCYTES NFR BLD AUTO: 0 % (ref 0–0.9)
IMM RETICS NFR: 13.3 % (ref 2.6–16.1)
IRON SATN MFR SERPL: 21 % (ref 15–55)
IRON SERPL-MCNC: 52 UG/DL (ref 40–170)
LYMPHOCYTES # BLD AUTO: 1.92 K/UL (ref 1–4.8)
LYMPHOCYTES NFR BLD: 42.2 % (ref 22–41)
MCH RBC QN AUTO: 29.8 PG (ref 27–33)
MCHC RBC AUTO-ENTMCNC: 30.8 G/DL (ref 32.2–35.5)
MCV RBC AUTO: 96.7 FL (ref 81.4–97.8)
MONOCYTES # BLD AUTO: 0.47 K/UL (ref 0–0.85)
MONOCYTES NFR BLD AUTO: 10.3 % (ref 0–13.4)
NEUTROPHILS # BLD AUTO: 1.88 K/UL (ref 1.82–7.42)
NEUTROPHILS NFR BLD: 41.4 % (ref 44–72)
NRBC # BLD AUTO: 0 K/UL
NRBC BLD-RTO: 0 /100 WBC (ref 0–0.2)
PLATELET # BLD AUTO: 221 K/UL (ref 164–446)
PMV BLD AUTO: 10.6 FL (ref 9–12.9)
POTASSIUM SERPL-SCNC: 4.3 MMOL/L (ref 3.6–5.5)
PROT SERPL-MCNC: 5.6 G/DL (ref 6–8.2)
RBC # BLD AUTO: 3.29 M/UL (ref 4.2–5.4)
RETICS # AUTO: 0.07 M/UL (ref 0.04–0.12)
RETICS/RBC NFR: 1.8 % (ref 0.8–2.6)
SODIUM SERPL-SCNC: 146 MMOL/L (ref 135–145)
TIBC SERPL-MCNC: 253 UG/DL (ref 250–450)
TROPONIN T SERPL-MCNC: 14 NG/L (ref 6–19)
TROPONIN T SERPL-MCNC: 17 NG/L (ref 6–19)
UIBC SERPL-MCNC: 201 UG/DL (ref 110–370)
VIT B12 SERPL-MCNC: 605 PG/ML (ref 211–911)
WBC # BLD AUTO: 4.6 K/UL (ref 4.8–10.8)

## 2024-05-09 PROCEDURE — 93010 ELECTROCARDIOGRAM REPORT: CPT | Performed by: INTERNAL MEDICINE

## 2024-05-09 PROCEDURE — 99232 SBSQ HOSP IP/OBS MODERATE 35: CPT | Mod: GC | Performed by: INTERNAL MEDICINE

## 2024-05-09 RX ORDER — NYSTATIN 100000 [USP'U]/G
1 POWDER TOPICAL 3 TIMES DAILY PRN
Status: ON HOLD | COMMUNITY
End: 2024-05-31

## 2024-05-09 RX ORDER — DIGOXIN 125 MCG
125 TABLET ORAL DAILY
Status: DISCONTINUED | OUTPATIENT
Start: 2024-05-09 | End: 2024-05-12

## 2024-05-09 RX ORDER — FLUTICASONE PROPIONATE 50 MCG
1 SPRAY, SUSPENSION (ML) NASAL
Status: ON HOLD | COMMUNITY
End: 2024-05-31

## 2024-05-09 RX ORDER — REGADENOSON 0.08 MG/ML
0.4 INJECTION, SOLUTION INTRAVENOUS ONCE
Status: ACTIVE | OUTPATIENT
Start: 2024-05-09 | End: 2024-05-10

## 2024-05-09 RX ORDER — AMINOPHYLLINE 25 MG/ML
100 INJECTION, SOLUTION INTRAVENOUS
Status: DISCONTINUED | OUTPATIENT
Start: 2024-05-09 | End: 2024-05-12

## 2024-05-09 RX ORDER — FUROSEMIDE 40 MG/1
40 TABLET ORAL DAILY
Status: ON HOLD | COMMUNITY
End: 2024-05-22

## 2024-05-09 RX ORDER — ACETAMINOPHEN 325 MG/1
650 TABLET ORAL EVERY 4 HOURS PRN
Status: DISCONTINUED | OUTPATIENT
Start: 2024-05-09 | End: 2024-05-12

## 2024-05-09 RX ORDER — LEVOTHYROXINE SODIUM 175 UG/1
175 TABLET ORAL
Status: ON HOLD | COMMUNITY
End: 2024-05-31

## 2024-05-09 RX ORDER — ALLOPURINOL 300 MG/1
300 TABLET ORAL DAILY
Status: ON HOLD | COMMUNITY
End: 2024-05-31

## 2024-05-09 RX ADMIN — ACETAMINOPHEN 650 MG: 325 TABLET, FILM COATED ORAL at 19:35

## 2024-05-09 RX ADMIN — LEVOTHYROXINE SODIUM 175 MCG: 0.17 TABLET ORAL at 05:23

## 2024-05-09 RX ADMIN — FLUOXETINE 120 MG: 20 CAPSULE ORAL at 05:22

## 2024-05-09 RX ADMIN — APIXABAN 5 MG: 5 TABLET, FILM COATED ORAL at 05:23

## 2024-05-09 RX ADMIN — APIXABAN 5 MG: 5 TABLET, FILM COATED ORAL at 17:02

## 2024-05-09 RX ADMIN — OMEPRAZOLE 20 MG: 20 CAPSULE, DELAYED RELEASE ORAL at 05:23

## 2024-05-09 RX ADMIN — ALLOPURINOL 100 MG: 100 TABLET ORAL at 05:23

## 2024-05-09 RX ADMIN — DIGOXIN 125 MCG: 0.12 TABLET ORAL at 17:02

## 2024-05-09 RX ADMIN — PRAMIPEXOLE DIHYDROCHLORIDE 0.25 MG: 0.25 TABLET ORAL at 20:41

## 2024-05-09 ASSESSMENT — ENCOUNTER SYMPTOMS
NAUSEA: 0
DOUBLE VISION: 0
CHILLS: 0
BACK PAIN: 1
PND: 0
DIAPHORESIS: 0
LOSS OF CONSCIOUSNESS: 0
PALPITATIONS: 0
SPUTUM PRODUCTION: 0
ABDOMINAL PAIN: 0
WEAKNESS: 1
VOMITING: 0
CLAUDICATION: 0
ORTHOPNEA: 0
BLURRED VISION: 0
DIZZINESS: 0
FEVER: 0
COUGH: 1

## 2024-05-09 ASSESSMENT — PAIN DESCRIPTION - PAIN TYPE
TYPE: ACUTE PAIN
TYPE: ACUTE PAIN

## 2024-05-09 ASSESSMENT — FIBROSIS 4 INDEX: FIB4 SCORE: 1.59

## 2024-05-09 NOTE — PROGRESS NOTES
Medication history reviewed with PT at [facility] rec is complete per PT reporting    Allergies reviewed.     Patient denies any outpatient antibiotics in the last 30 days.     Patient is taking Eliquis 5mg. Last dose is 05/08/2024 at 0800.    Preferred pharmacy for this visit - [facility] in [place] ([phone])

## 2024-05-09 NOTE — CARE PLAN
The patient is Stable - Low risk of patient condition declining or worsening    Shift Goals  Clinical Goals: cardio consultation, Monitor v/S  Patient Goals: to get better, to avoid recurrent hospitaliztion  Family Goals: DANE    Progress made toward(s) clinical / shift goals:        Problem: Knowledge Deficit - Standard  Goal: Patient and family/care givers will demonstrate understanding of plan of care, disease process/condition, diagnostic tests and medications  5/9/2024 0433 by Sylvia Sweet RARMANDO  Outcome: Progressing  Note: Patient updated regarding plan of care and current treatment. All questions answered. Pt voiced understanding.     5/9/2024 0432 by Sylvia Sweet RBarbieN.  Outcome: Progressing  Note: Patient updated regarding plan of care and current treatment. All questions answered. Pt voiced understanding.        Problem: Fall Risk  Goal: Patient will remain free from falls  Outcome: Progressing  Note: Patient remained free from falls. All fall precautions in place. Patient educated the need to call when needed assistance, patient verbalized understanding. Call light and personal belongings within reach.         Patient is not progressing towards the following goals:

## 2024-05-09 NOTE — DIETARY
NUTRITION SERVICES: BMI - Pt with BMI >40 (=Body mass index is 42.19 kg/m².), Class III obesity. Weight loss counseling not appropriate in acute care setting.     RECOMMEND - If appropriate at DC please refer to outpatient nutrition services for weight management.

## 2024-05-09 NOTE — ASSESSMENT & PLAN NOTE
Blood pressure borderline, holding home BP meds   Monitoring.   Blood pressure is trending up, will restart amlodipine

## 2024-05-09 NOTE — H&P
Hospital Medicine History & Physical Note    Date of Service  5/8/2024    Primary Care Physician  No primary care provider on file.    Consultants  None     Code Status  Full Code    Chief Complaint  Atrial fibrillation with RVR     History of Presenting Illness  Azalea Gan is a 73 y.o. female with past medical history of atrial fibrillation status post recent ablation in March 2024, hypertension, who presented 5/8/2024 with atrial fibrillation with rapid ventricular response.  Patient reports she checks her blood pressure every morning and this morning her heart rate was elevated.    Patient was given IV metoprolol 5 mg x 1, metoprolol 25 mg p.o. x 1, digoxin IV.  Labs from outside facility showed hemoglobin of 11, creatinine 1.7, BUN 47, BMP normal.   [facility] did not have any beds so patient was transferred here.   Patient would like second opinion from cardiology here.     I discussed the plan of care with patient and bedside RN .    Review of Systems  Review of Systems   Constitutional:  Negative for chills and fever.   HENT:  Negative for hearing loss and tinnitus.    Eyes:  Negative for blurred vision and double vision.   Respiratory:  Negative for cough and hemoptysis.    Cardiovascular:  Negative for chest pain and palpitations.   Gastrointestinal:  Negative for heartburn and nausea.   Genitourinary:  Negative for dysuria and urgency.   Musculoskeletal:  Negative for myalgias and neck pain.   Neurological:  Negative for dizziness and headaches.   Endo/Heme/Allergies:  Does not bruise/bleed easily.   Psychiatric/Behavioral:  Negative for depression and suicidal ideas.        Past Medical History   has a past medical history of Hypertension, Pulmonary HTN (HCC), Sleep apnea, Stroke (HCC), Thyroid disease, and Tricuspid valve regurgitation.    Surgical History   has a past surgical history that includes gastric bypass laparoscopic (2006); hysterectomy, total abdominal; thyroidectomy (3/2012); knee  replacement, total (Right, 3/15/2016); cardiac cath, right/left heart (12/2015); cataract extraction with iol (Bilateral, 2016); colonoscopy (2010); colonoscopy - endo (12/8/2016); lap, place adjust alfreda restrict de* (N/A, 11/2/2022); and pr upper gi endoscopy,diagnosis (N/A, 11/2/2022).     Family History  family history includes Heart Attack in her mother.   Family history reviewed with patient. There is no family history that is pertinent to the chief complaint.     Social History   reports that she has never smoked. She has never used smokeless tobacco. She reports that she does not drink alcohol and does not use drugs.    Allergies  Allergies   Allergen Reactions    Iodine Anaphylaxis    Iodine Contrast [[employer]] Anaphylaxis    Povidone Iodine Anaphylaxis       Medications  Prior to Admission Medications   Prescriptions Last Dose Informant Patient Reported? Taking?   allopurinol (ZYLOPRIM) 100 MG Tab 5/8/2024 at 0800 Patient No Yes   Sig: Take 1 Tablet by mouth every day.   Patient taking differently: Take 300 mg by mouth every day.   amLODIPine (NORVASC) 10 MG Tab 5/8/2024 at 0800  No Yes   Sig: TAKE 1 TABLET DAILY   apixaban (ELIQUIS) 5mg Tab 5/8/2024 at 0800  Yes Yes   Sig: Take 1 Tablet by mouth 2 times a day.   aspirin 81 MG EC tablet  Patient Yes No   Sig: Take 81 mg by mouth every day.   cloNIDine (CATAPRES) 0.1 MG Tab  Patient Yes No   Sig: Take 0.1 mg by mouth as needed.   fluoxetine (PROZAC) 40 MG capsule 5/8/2024 at 0800 Patient Yes Yes   Sig: Take 120 mg by mouth every day. 120 mg = 3capsules   furosemide (LASIX) 40 MG Tab 5/8/2024 at 0800  No Yes   Sig: Take 0.5 Tablets by mouth every day.   Patient taking differently: Take 40 mg by mouth every day.   levothyroxine (SYNTHROID) 200 MCG Tab 5/8/2024 at 0600 Patient No Yes   Sig: Take 1 Tablet by mouth every morning on an empty stomach.   Patient taking differently: Take 175 mcg by mouth every morning on an empty stomach.    metoprolol tartrate (LOPRESSOR) 25 MG Tab   No No   Sig: Take 1 Tablet by mouth 2 times a day.   omeprazole (PRILOSEC) 20 MG delayed-release capsule   No No   Sig: Take 1 Capsule by mouth every day.   polyethylene glycol 3350 (MIRALAX) 17 GM/SCOOP Powder   No No   Sig: Take 17 g by mouth 1 time a day as needed (if sennosides and docusate ineffective after 24 hours).   potassium chloride SA (K-DUR) 10 MEQ Tab CR  Patient No No   Sig: Take 1 Tablet by mouth every day.   pramipexole (MIRAPEX) 0.25 MG Tab 5/7/2024 at 2100  No Yes   Sig: Take 1 Tablet by mouth every evening. Indications: Restless Leg Syndrome   senna-docusate (PERICOLACE OR SENOKOT S) 8.6-50 MG Tab   No No   Sig: Take 2 Tablets by mouth 2 times a day.   traMADol (ULTRAM) 50 MG Tab 5/7/2024 at 2100  Yes Yes   Sig: Take 100 mg by mouth every evening.      Facility-Administered Medications: None       Physical Exam  Temp:  [36.8 °C (98.2 °F)] 36.8 °C (98.2 °F)  Pulse:  [68] 68  Resp:  [17] 17  BP: (91)/(52) 91/52  SpO2:  [92 %] 92 %  Blood Pressure : 91/52   Temperature: 36.8 °C (98.2 °F)   Pulse: 68   Respiration: 17   Pulse Oximetry: 92 %       Physical Exam  Vitals and nursing note reviewed.   Constitutional:       Appearance: Normal appearance.   HENT:      Head: Normocephalic and atraumatic.      Right Ear: Tympanic membrane normal.      Left Ear: Tympanic membrane normal.      Nose: Nose normal.      Mouth/Throat:      Mouth: Mucous membranes are moist.      Pharynx: Oropharynx is clear.   Eyes:      Extraocular Movements: Extraocular movements intact.      Pupils: Pupils are equal, round, and reactive to light.   Cardiovascular:      Rate and Rhythm: Normal rate. Rhythm irregular.      Pulses: Normal pulses.      Heart sounds: Normal heart sounds.   Pulmonary:      Effort: Pulmonary effort is normal.      Breath sounds: Normal breath sounds.   Abdominal:      General: Bowel sounds are normal. There is no distension.      Palpations: Abdomen is  soft. There is no mass.   Musculoskeletal:         General: Normal range of motion.      Cervical back: Neck supple.   Skin:     General: Skin is warm.      Capillary Refill: Capillary refill takes less than 2 seconds.   Neurological:      General: No focal deficit present.      Mental Status: She is alert and oriented to person, place, and time. Mental status is at baseline.   Psychiatric:         Mood and Affect: Mood normal.         Behavior: Behavior normal.         Laboratory:      WBC 4.65  Hemoglobin 11.8  Hematocrit 39.1  Platelets 255  Sodium 146  Potassium 4.4  Chloride 112  BUN 46  Creatinine 1.47  AST 17  ALT 16  Anion gap 14.4  CPK 68  Troponin 15.3  TSH 1.06  Phosphorus 3.8  Magnesium 2.2      Imaging:  No orders to display       EKG:  I have personally reviewed the images and compared with prior images.    Assessment/Plan:  Justification for Admission Status  I anticipate this patient will require at least two midnights for appropriate medical management, necessitating inpatient admission because atrial fibrillation with RVR    Patient will need a Telemetry bed on MEDICAL service .  The need is secondary to see above.    * Atrial fibrillation with rapid ventricular response (HCC)- (present on admission)  Assessment & Plan  Currently rate controlled.    Patient given IV metoprolol 5 mg x 1, metoprolol 25 mg x 1, digoxin IV at outside facility.    Currently blood pressure is borderline we will hold off on further rate control.  Continue with eliquis 5 mg BID   Magnesium > 2   CBC, CMP       Hypothyroidism- (present on admission)  Assessment & Plan  Continue with levothyroxine     Depression- (present on admission)  Assessment & Plan  Continue with prozac     Essential hypertension- (present on admission)  Assessment & Plan  Blood pressure borderline hold blood pressure medications         VTE prophylaxis: SCDs/TEDs and therapeutic anticoagulation with eliquis

## 2024-05-09 NOTE — CARE PLAN
The patient is Stable - Low risk of patient condition declining or worsening    Shift Goals  Clinical Goals: monitor labs/vs  Patient Goals: rest  Family Goals: DANE    Progress made toward(s) clinical / shift goals:          Problem: Hemodynamics  Goal: Patient's hemodynamics, fluid balance and neurologic status will be stable or improve  Description: Target End Date:  Prior to discharge or change in level of care    Document on Assessment and I/O flowsheet templates    1.  Monitor vital signs, pulse oximetry and cardiac monitor per provider order and/or policy  2.  Maintain blood pressure per provider order  3.  Hemodynamic monitoring per provider order  4.  Manage IV fluids and IV infusions  5.  Monitor intake and output  6.  Daily weights per unit policy or provider order  7.  Assess peripheral pulses and capillary refill  8.  Assess color and body temperature  9.  Position patient for maximum circulation/cardiac output  10. Monitor for signs/symptoms of excessive bleeding  11. Assess mental status, restlessness and changes in level of consciousness  12. Monitor temperature and report fever or hypothermia to provider immediately. Consideration of targeted temperature management.  Outcome: Progressing     Problem: Mobility  Goal: Patient's capacity to carry out activities will improve  Description: Target End Date:  Prior to discharge or change in level of care    1.  Assess for barriers to mobility/activity  2.  Implement activity per interdisciplinary team recommendations  3.  Target activity level identified and patient/family/caregiver aware of goal  4.  Provide assistive devices  5.  Instruct patient/caregiver on proper use of assistive/adaptive devices  6.  Schedule activities and rest periods to decrease effects of fatigue  7.  Encourage mobilization to extent of ability  8.  Maintain proper body alignment  9.  Provide adequate pain management to allow progressive mobilization  10. Implement pace maker  precautions as needed  Outcome: Progressing     Problem: Self Care  Goal: Patient will have the ability to perform ADLs independently or with assistance (bathe, groom, dress, toilet and feed)  Description: Target End Date:  Prior to discharge or change in level of care    Document on ADL flowsheet    1.  Assess the capability and level of deficiency to perform ADLs  2.  Encourage family/care giver involvement  3.  Provide assistive devices  4.  Consider PT/OT evaluations  5.  Maintain support, give positive feedback, encourage self-care allowing extra time and verbal cuing as needed  6.  Avoid doing something for patients they can do themselves, but provide assistance as needed  7.  Assist in anticipating/planning individual needs  8.  Collaborate with Case Management and  to meet discharge needs  Outcome: Progressing       Patient is not progressing towards the following goals:

## 2024-05-09 NOTE — PROGRESS NOTES
Telemetry Report:      Rhythm:     SR- SB   Heart Rate: 59-68  Ectopy:      Frequent PAC       IN:  0.19          QRS:       0.09  QT:   0.42        Per Telestrip from Monitor Room

## 2024-05-09 NOTE — ASSESSMENT & PLAN NOTE
She is s/p DINESH+DCCV at [facility] in March.   Her last echo in 3/2024 with moderate MR, moderate dilatation of LA, EF of 45-50% but in the setting of arrhythmia.   Anticoagulation with apixaban: Currently on hold with heparin drip pending left heart cath  Rate control with digoxin  Soft pressures have limited the ability to use beta-blockade or calcium channel blockers  Cardiology following  Continue telemetry    EP eval today, recommending outpatient EP studies/ablation    EP recommending ablation before dc, probably next week Tuesday

## 2024-05-09 NOTE — PROGRESS NOTES
[facility] DIRECT ADMISSION REPORT  Transferring facility: [facility]  Transferring physician: Sadei Harrison    Chief complaint: Palpitation, dizziness  Pertinent history & patient course: 73-year-old female with history of paroxysmal A-fib ablation recently, anticoagulation with Eliquis, heart failure with preserved EF, hypothyroidism, hypertension, dyslipidemia, presented with acute onset palpitation and dizziness.  She found to have A-fib with RVR heart rate 120.  She was treated with IV fluids, IV and p.o. metoprolol and IV digoxin without improvement of heart rate, still at 120.  Blood pressure is soft  systolic.  Troponin is not elevated.  Hemoglobin 11, creatinine 1.7, BUN 47, BNP normal.  Chest x-ray: Vascular congestion, cardiomegaly..  Patient initially requested to be transferred to [facility] where she had ablation in March 2024, however they do not have beds.  She agreed to go to [facility].  Pertinent imaging & lab results:   Consultants called prior to transfer and pertinent input from consultants:   Code Status: DNR/DNI per POLST form  Reason for Transfer: Cardiology consult for paroxysmal A-fib  Further work up or recommendations requested prior to transfer:     Patient accepted for transfer: Yes  Accepting [facility] Facility: [facility] to notify the Triage Coordinator in the RTOC via Voalte or Phone ext. [phone] when patient arrives to the unit. The Triage Coordinator will assign the admitting provider.    Consultants to be called upon arrival: Consider cardiology consult for cardioversion  Admission status: Inpatient.   Floor requested: Telemetry  If ICU transfer, name of intensivist case discussed with and pertinent input from critical care: N/A    The admitting provider is the point of contact for questions or concerns regarding patient's care.

## 2024-05-09 NOTE — PROGRESS NOTES
"[facility] Internal Medicine Daily Progress Note    Date of Service  5/9/2024    R Team: R IM Blue Team   Attending: Walker Handley M.d.  Senior Resident: Dr. Davis  Intern:  Dr. Diana  Contact Number: [phone]    Chief Complaint  Atrial fibrillation with RVR    Hospital Course  Azalea Gan is a 73 y.o. female with past medical history hypertension, CVA (unclear hx), hyperlipidemia, L sided sciatica, renal artery stenosis, and paroxysmal atrial fibrillation status post DINESH+Cardioversion in March 2024 at [facility], who presented 5/8/2024 with atrial fibrillation with rapid ventricular response, transferred from [facility], HR was in the 130s  and they did not have beds. Patient was given IV metoprolol 5 mg x 1, metoprolol 25 mg p.o. x 1, digoxin IV.  Labs from outside facility showed hemoglobin of 11, creatinine 1.7, BUN 47, BMP normal, Troponin 15.3, TSH 1.06, and Magnesium 2.2.     Interval Problem Update  VS wnl, HR in the 60s, EKG shows back to sinus at 1 am but telemetry shows aFib at 5 AM - paroxysmal.   Creatinine trending down, no electrolyte derangements. Troponin remains negative.    Patient reports history of chronic fatigue over the last 1-2 years, feels energy for the first few hours in the morning but the rest of the day she feels severely tired and desiring to take naps frequently. Unclear if she snores as she lives alone with her cats.   She is frustrated because she attributes these symptoms to the afib that has not been \"taken care of\". Reports no history of CAD or CHF, denies hx of ischemic work up that she knows of.   Reports there are few days when she feels short of breath with exertion on top of her baseline tiredness, sometimes associated with chest tightness. She has stopped doing her her usual shores because of this tiredness with exertional SOB - has to stop to catch her breath frequently and then she takes naps because feels \"drained\".    I have discussed this patient's plan of care " and discharge plan at IDT rounds today with Case Management, Nursing, Nursing leadership, and other members of the IDT team.    Consultants/Specialty  None     Code Status  Full Code    Disposition  The patient is not medically cleared for discharge to home or a post-acute facility.      I have placed the appropriate orders for post-discharge needs.    Review of Systems  Review of Systems   Constitutional:  Negative for chills, diaphoresis and fever.   HENT:  Negative for congestion.    Eyes:  Negative for blurred vision and double vision.   Respiratory:  Positive for cough (chronic). Negative for sputum production.    Cardiovascular:  Negative for chest pain, palpitations, orthopnea, claudication, leg swelling and PND.   Gastrointestinal:  Negative for abdominal pain, melena, nausea and vomiting.   Genitourinary:  Negative for dysuria.   Musculoskeletal:  Positive for back pain.   Neurological:  Positive for weakness. Negative for dizziness and loss of consciousness.        Physical Exam  Temp:  [36.6 °C (97.8 °F)-36.8 °C (98.2 °F)] 36.6 °C (97.9 °F)  Pulse:  [63-71] 69  Resp:  [17-18] 17  BP: ()/(51-66) 100/62  SpO2:  [90 %-96 %] 92 %    Physical Exam  Vitals and nursing note reviewed.   HENT:      Head: Normocephalic and atraumatic.      Nose: Nose normal.      Mouth/Throat:      Mouth: Mucous membranes are moist.   Eyes:      Extraocular Movements: Extraocular movements intact.      Conjunctiva/sclera: Conjunctivae normal.      Pupils: Pupils are equal, round, and reactive to light.   Cardiovascular:      Rate and Rhythm: Normal rate. Rhythm irregular.      Pulses: Normal pulses.      Heart sounds: Normal heart sounds. No murmur heard.  Pulmonary:      Effort: Pulmonary effort is normal.      Breath sounds: Normal breath sounds.   Abdominal:      General: Abdomen is flat. Bowel sounds are normal.      Palpations: Abdomen is soft.      Tenderness: There is no abdominal tenderness.   Musculoskeletal:          General: No swelling. Normal range of motion.      Cervical back: Normal range of motion.   Skin:     General: Skin is warm.      Capillary Refill: Capillary refill takes less than 2 seconds.   Neurological:      General: No focal deficit present.      Mental Status: She is alert. Mental status is at baseline.         Fluids    Intake/Output Summary (Last 24 hours) at 5/9/2024 1728  Last data filed at 5/9/2024 1200  Gross per 24 hour   Intake 550 ml   Output 0 ml   Net 550 ml       Laboratory  Recent Labs     05/09/24  0134   WBC 4.6*   RBC 3.29*   HEMOGLOBIN 9.8*   HEMATOCRIT 31.8*   MCV 96.7   MCH 29.8   MCHC 30.8*   RDW 60.9*   PLATELETCT 221   MPV 10.6     Recent Labs     05/09/24  0134   SODIUM 146*   POTASSIUM 4.3   CHLORIDE 116*   CO2 21   GLUCOSE 113*   BUN 37*   CREATININE 1.16   CALCIUM 8.8                   Imaging  NM-CARDIAC STRESS TEST    (Results Pending)        Assessment/Plan:    * Atrial fibrillation with rapid ventricular response (HCC)- (present on admission)  Assessment & Plan  Transferred due to RVR (around 130s). Responded to IV metoprolol 5 mg x 1, metoprolol 25 mg x 1, digoxin IV.   She is s/p DINESH+DCCV at [facility] in March.   Her last echo in 3/2024 with moderate MR, moderate dilatation of LA, EF of 45-50% but in the setting of arrhythmia.   Currently rate controlled. Blood pressure remains soft, so not a good candidate for beta or CC blockers. Avoiding amidoarone in the setting of thyroid disease.   Feels poorly at baseline, tired all day with exertional symptoms on top.   - It is reasonable to obtain a stress test given her risk factors for CAD   - Start digoxin 0.125 mcg daily   - Continue with eliquis 5 mg BID   - Continue optimizing electrolytes  - Suspect underlying JOSH - pending sleep medicine appt outpatient   - No consideration for cardioversion at this point, focusing on rate control     Hypothyroidism- (present on admission)  Assessment & Plan  TSH wnl this admission, likely  not contributing to afib.   - Continue with home levothyroxine     Depression- (present on admission)  Assessment & Plan  Continue with prozac     Essential hypertension- (present on admission)  Assessment & Plan  Blood pressure borderline, holding home BP meds   Monitor         VTE prophylaxis: therapeutic anticoagulation with eliquis    I have performed a physical exam and reviewed and updated ROS and Plan today (5/9/2024). In review of yesterday's note (5/8/2024), there are no changes except as documented above.

## 2024-05-09 NOTE — PROGRESS NOTES
Received patient via gur[facility] from [facility] . Patient alert and oriented x 4, on 2 LPM via nasal cannula. Admission profile , assessment and 4 eyes completed with CHICHI Johnson. VS stable, tele monitor on. Seen and checked by MD.All fall precautions in place. Bed locked and on lowest position. Call light and personal belongings within reach. Patient updated regarding plan of care. All needs attended.

## 2024-05-09 NOTE — PROGRESS NOTES
4 Eyes Skin Assessment Completed by CHICHI Ward and CHICHI Johnson.    Head WDL  Ears WDL  Nose WDL  Mouth WDL  Neck WDL  Breast/Chest WDL  Shoulder Blades WDL  Spine WDL  (R) Arm/Elbow/Hand Redness and Blanching  (L) Arm/Elbow/Hand Redness and Blanching  Abdomen Redness and Non-Blanching,   Groin WDL  Scrotum/Coccyx/Buttocks Redness and Blanching  (R) Leg WDL  (L) Leg WDL  (R) Heel/Foot/Toe Boggy  (L) Heel/Foot/Toe Boggy          Devices In Places Tele Box and Pulse Ox      Interventions In Place Gray Ear Foams    Possible Skin Injury No    Pictures Uploaded Into Epic Yes  Wound Consult Placed N/A  RN Wound Prevention Protocol Ordered Yes

## 2024-05-10 ENCOUNTER — APPOINTMENT (OUTPATIENT)
Dept: RADIOLOGY | Facility: MEDICAL CENTER | Age: 74
DRG: 273 | End: 2024-05-10
Payer: MEDICARE

## 2024-05-10 ENCOUNTER — APPOINTMENT (OUTPATIENT)
Dept: CARDIOLOGY | Facility: MEDICAL CENTER | Age: 74
DRG: 273 | End: 2024-05-10
Attending: INTERNAL MEDICINE
Payer: MEDICARE

## 2024-05-10 PROBLEM — D64.9 NORMOCYTIC ANEMIA: Status: ACTIVE | Noted: 2024-05-10

## 2024-05-10 PROBLEM — R94.39 POSITIVE CARDIAC STRESS TEST: Status: ACTIVE | Noted: 2024-05-10

## 2024-05-10 LAB
ANION GAP SERPL CALC-SCNC: 7 MMOL/L (ref 7–16)
BASOPHILS # BLD AUTO: 0.6 % (ref 0–1.8)
BASOPHILS # BLD: 0.03 K/UL (ref 0–0.12)
BUN SERPL-MCNC: 31 MG/DL (ref 8–22)
CALCIUM SERPL-MCNC: 8.8 MG/DL (ref 8.5–10.5)
CHLORIDE SERPL-SCNC: 110 MMOL/L (ref 96–112)
CO2 SERPL-SCNC: 23 MMOL/L (ref 20–33)
CREAT SERPL-MCNC: 1.3 MG/DL (ref 0.5–1.4)
EKG IMPRESSION: NORMAL
EOSINOPHIL # BLD AUTO: 0.25 K/UL (ref 0–0.51)
EOSINOPHIL NFR BLD: 5.3 % (ref 0–6.9)
ERYTHROCYTE [DISTWIDTH] IN BLOOD BY AUTOMATED COUNT: 61.6 FL (ref 35.9–50)
GFR SERPLBLD CREATININE-BSD FMLA CKD-EPI: 43 ML/MIN/1.73 M 2
GLUCOSE SERPL-MCNC: 120 MG/DL (ref 65–99)
HCT VFR BLD AUTO: 31.5 % (ref 37–47)
HGB BLD-MCNC: 9.6 G/DL (ref 12–16)
IMM GRANULOCYTES # BLD AUTO: 0 K/UL (ref 0–0.11)
IMM GRANULOCYTES NFR BLD AUTO: 0 % (ref 0–0.9)
LV EJECT FRACT  99904: 55
LV EJECT FRACT MOD 2C 99903: 45.76
LV EJECT FRACT MOD 4C 99902: 51.58
LV EJECT FRACT MOD BP 99901: 50.09
LYMPHOCYTES # BLD AUTO: 1.47 K/UL (ref 1–4.8)
LYMPHOCYTES NFR BLD: 31.2 % (ref 22–41)
MAGNESIUM SERPL-MCNC: 2 MG/DL (ref 1.5–2.5)
MAGNESIUM SERPL-MCNC: 2 MG/DL (ref 1.5–2.5)
MCH RBC QN AUTO: 29.9 PG (ref 27–33)
MCHC RBC AUTO-ENTMCNC: 30.5 G/DL (ref 32.2–35.5)
MCV RBC AUTO: 98.1 FL (ref 81.4–97.8)
MONOCYTES # BLD AUTO: 0.5 K/UL (ref 0–0.85)
MONOCYTES NFR BLD AUTO: 10.6 % (ref 0–13.4)
NEUTROPHILS # BLD AUTO: 2.46 K/UL (ref 1.82–7.42)
NEUTROPHILS NFR BLD: 52.3 % (ref 44–72)
NRBC # BLD AUTO: 0 K/UL
NRBC BLD-RTO: 0 /100 WBC (ref 0–0.2)
NT-PROBNP SERPL IA-MCNC: 1989 PG/ML (ref 0–125)
PLATELET # BLD AUTO: 216 K/UL (ref 164–446)
PMV BLD AUTO: 10.5 FL (ref 9–12.9)
POTASSIUM SERPL-SCNC: 4.9 MMOL/L (ref 3.6–5.5)
RBC # BLD AUTO: 3.21 M/UL (ref 4.2–5.4)
SODIUM SERPL-SCNC: 140 MMOL/L (ref 135–145)
WBC # BLD AUTO: 4.7 K/UL (ref 4.8–10.8)

## 2024-05-10 PROCEDURE — 99232 SBSQ HOSP IP/OBS MODERATE 35: CPT | Mod: GC | Performed by: INTERNAL MEDICINE

## 2024-05-10 PROCEDURE — 93010 ELECTROCARDIOGRAM REPORT: CPT | Performed by: INTERNAL MEDICINE

## 2024-05-10 PROCEDURE — 99223 1ST HOSP IP/OBS HIGH 75: CPT | Mod: GC | Performed by: INTERNAL MEDICINE

## 2024-05-10 RX ORDER — IPRATROPIUM BROMIDE AND ALBUTEROL SULFATE 2.5; .5 MG/3ML; MG/3ML
3 SOLUTION RESPIRATORY (INHALATION)
Status: DISCONTINUED | OUTPATIENT
Start: 2024-05-10 | End: 2024-05-10

## 2024-05-10 RX ORDER — SODIUM CHLORIDE, SODIUM LACTATE, POTASSIUM CHLORIDE, AND CALCIUM CHLORIDE .6; .31; .03; .02 G/100ML; G/100ML; G/100ML; G/100ML
1000 INJECTION, SOLUTION INTRAVENOUS ONCE
Status: COMPLETED | OUTPATIENT
Start: 2024-05-10 | End: 2024-05-10

## 2024-05-10 RX ORDER — ALBUTEROL SULFATE 90 UG/1
2 AEROSOL, METERED RESPIRATORY (INHALATION)
Status: DISCONTINUED | OUTPATIENT
Start: 2024-05-10 | End: 2024-05-15

## 2024-05-10 RX ORDER — REGADENOSON 0.08 MG/ML
INJECTION, SOLUTION INTRAVENOUS
Status: COMPLETED
Start: 2024-05-10 | End: 2024-05-10

## 2024-05-10 RX ORDER — IPRATROPIUM BROMIDE AND ALBUTEROL SULFATE 2.5; .5 MG/3ML; MG/3ML
3 SOLUTION RESPIRATORY (INHALATION)
Status: DISCONTINUED | OUTPATIENT
Start: 2024-05-10 | End: 2024-05-15

## 2024-05-10 RX ORDER — ATORVASTATIN CALCIUM 40 MG/1
40 TABLET, FILM COATED ORAL EVERY EVENING
Status: DISCONTINUED | OUTPATIENT
Start: 2024-05-10 | End: 2024-05-12

## 2024-05-10 RX ORDER — SOTALOL HYDROCHLORIDE 80 MG/1
80 TABLET ORAL TWICE DAILY
Status: DISCONTINUED | OUTPATIENT
Start: 2024-05-10 | End: 2024-05-11

## 2024-05-10 RX ADMIN — ALBUTEROL SULFATE 2 PUFF: 90 AEROSOL, METERED RESPIRATORY (INHALATION) at 22:02

## 2024-05-10 RX ADMIN — ATORVASTATIN CALCIUM 40 MG: 40 TABLET, FILM COATED ORAL at 16:51

## 2024-05-10 RX ADMIN — PRAMIPEXOLE DIHYDROCHLORIDE 0.25 MG: 0.25 TABLET ORAL at 21:52

## 2024-05-10 RX ADMIN — SODIUM CHLORIDE, POTASSIUM CHLORIDE, SODIUM LACTATE AND CALCIUM CHLORIDE 1000 ML: 600; 310; 30; 20 INJECTION, SOLUTION INTRAVENOUS at 00:50

## 2024-05-10 RX ADMIN — REGADENOSON 0.4 MG: 0.08 INJECTION, SOLUTION INTRAVENOUS at 10:27

## 2024-05-10 RX ADMIN — SOTALOL HYDROCHLORIDE 80 MG: 80 TABLET ORAL at 21:51

## 2024-05-10 RX ADMIN — OMEPRAZOLE 20 MG: 20 CAPSULE, DELAYED RELEASE ORAL at 04:30

## 2024-05-10 RX ADMIN — FLUOXETINE 120 MG: 20 CAPSULE ORAL at 04:31

## 2024-05-10 RX ADMIN — LEVOTHYROXINE SODIUM 175 MCG: 0.17 TABLET ORAL at 04:31

## 2024-05-10 RX ADMIN — APIXABAN 5 MG: 5 TABLET, FILM COATED ORAL at 04:31

## 2024-05-10 RX ADMIN — APIXABAN 5 MG: 5 TABLET, FILM COATED ORAL at 16:51

## 2024-05-10 RX ADMIN — DIGOXIN 125 MCG: 0.12 TABLET ORAL at 16:51

## 2024-05-10 RX ADMIN — ALLOPURINOL 100 MG: 100 TABLET ORAL at 04:31

## 2024-05-10 RX ADMIN — IPRATROPIUM BROMIDE AND ALBUTEROL SULFATE 3 ML: 2.5; .5 SOLUTION RESPIRATORY (INHALATION) at 14:45

## 2024-05-10 ASSESSMENT — ENCOUNTER SYMPTOMS
FEVER: 0
ABDOMINAL PAIN: 0
ORTHOPNEA: 0
SPUTUM PRODUCTION: 0
WEAKNESS: 0
NAUSEA: 0
BLURRED VISION: 0
COUGH: 1
DIZZINESS: 0
DIARRHEA: 1
PND: 0
PALPITATIONS: 0
CLAUDICATION: 0
CHILLS: 0
VOMITING: 0
DOUBLE VISION: 0

## 2024-05-10 ASSESSMENT — FIBROSIS 4 INDEX: FIB4 SCORE: 1.63

## 2024-05-10 ASSESSMENT — PAIN DESCRIPTION - PAIN TYPE: TYPE: ACUTE PAIN

## 2024-05-10 NOTE — PROGRESS NOTES
This RN received a call from telemetry monitor tech concerning patient having 7 beats of PSVT elevated up to 161.   MD notified.   Patient asymptomatic. Currently SR.

## 2024-05-10 NOTE — ASSESSMENT & PLAN NOTE
Patient reported exertional dyspnea and some chest pain on exertion over the past year. Stress test revealed small reversible perfusion defect.  Likely an artifact per cardiology.

## 2024-05-10 NOTE — ASSESSMENT & PLAN NOTE
RVR has resolved  Transferred due to RVR (around 130s). Responded to IV metoprolol 5 mg x 1, metoprolol 25 mg x 1, digoxin IV.   She is s/p DINESH+DCCV at [facility] in March.   Her last echo in 3/2024 with moderate MR, moderate dilatation of LA, EF of 45-50% but in the setting of arrhythmia.   Currently rate controlled. Blood pressure remains soft, so not a good candidate for beta or CC blockers. Avoiding amidoarone in the setting of thyroid disease.   Feels poorly at baseline, tired all day with exertional symptoms on top.   - Due to concern for symptomatic atrial fibrillation, cardiology started patient on sotalol.  She has been in sinus rhythm since.  QTc has been stable.  She will need continued inpatient monitoring for QTc and arrhythmias.  - Continue digoxin 0.125 mcg daily   - Continue with eliquis 5 mg BID   - Continue optimizing electrolytes  - Suspect underlying JOSH - pending sleep medicine appt outpatient   - No consideration for cardioversion at this point, focusing on rate control

## 2024-05-10 NOTE — DOCUMENTATION QUERY
"                                                                         [facility]                                                                       Query Response Note      PATIENT:               JOSE LANE  ACCT #:                  [id number]  MRN:                     [id number]  :                      1950  ADMIT DATE:       2024 8:59 PM  DISCH DATE:          RESPONDING  PROVIDER #:        [id number]           QUERY TEXT:    Documentation in the medical record indicates that this patient as having a diagnosis of BMI 42.19 without a corresponding diagnosis for this finding.     Can you provide a corresponding diagnosis for the above clinical findings?    The patient's Clinical Indicators include:  72yo with dx of paroxysmal atrial fibrillation, chronic diastolic heart failure, HTN     RD note \"BMI >40 (=Body mass index is 42.19 kg/m2) Class III obesity.)    Risk factors: advanced age, HTN, chronic diastolic heart failure, paroxysmal atrial fibrillation, depression  Treatments: RD consultation, referral to outpatient nutrition services weight management    Contact me with questions.    Thank you,  MECHE Mckeon, CDI  [email or web address]  Options provided:   -- Morbid Obesity with BMI 42.19 kg/m2   -- Other explanation:other explanation-, please specify   -- Unable to determine      Query created by: Paige Ag on 5/10/2024 8:34 AM    RESPONSE TEXT:    Other explanation:other explanation- Class 3 obesity. We don't use the term morbid any longer          Electronically signed by:  DEANNA MAJOR MD 5/10/2024 8:40 AM              "

## 2024-05-10 NOTE — CARE PLAN
The patient is Stable - Low risk of patient condition declining or worsening    Shift Goals  Clinical Goals: Stress test today  Patient Goals: rest  Family Goals: DANE    Progress made toward(s) clinical / shift goals:        Problem: Knowledge Deficit - Standard  Goal: Patient and family/care givers will demonstrate understanding of plan of care, disease process/condition, diagnostic tests and medications  Description: Target End Date:  1-3 days or as soon as patient condition allows    Document in Patient Education    1.  Patient and family/caregiver oriented to unit, equipment, visitation policy and means for communicating concern  2.  Complete/review Learning Assessment  3.  Assess knowledge level of disease process/condition, treatment plan, diagnostic tests and medications  4.  Explain disease process/condition, treatment plan, diagnostic tests and medications  Outcome: Progressing     Problem: Fall Risk  Goal: Patient will remain free from falls  Description: Target End Date:  Prior to discharge or change in level of care    Document interventions on the [facility] Fall Risk Assessment    1.  Assess for fall risk factors  2.  Implement fall precautions  Outcome: Progressing     Problem: Communication  Goal: The ability to communicate needs accurately and effectively will improve  Description: Target End Date:  End of day 1    1.  Assess ability to communicate and understand  2.  Provide augmentative or alternative methods of communication devices  3.  Use /language line as appropriate  4.  Collaborate with Speech Therapy as needed  Outcome: Progressing     Problem: Respiratory  Goal: Patient will achieve/maintain optimum respiratory ventilation and gas exchange  Description: Target End Date:  Prior to discharge or change in level of care    Document on Assessment flowsheet    1.  Assess and monitor rate, rhythm, depth and effort of respiration  2.  Breath sounds assessed qshift and/or as  needed  3.  Assess O2 saturation, administer/titrate oxygen as ordered  4.  Position patient for maximum ventilatory efficiency  5.  Turn, cough, and deep breath with splinting to improve effectiveness  6.  Collaborate with RT to administer medication/treatments per order  7.  Encourage use of incentive spirometer and encourage patient to cough after use and utilize splinting techniques if applicable  8.  Airway suctioning  9.  Monitor sputum production for changes in color, consistency and frequency  10. Perform frequent oral hygiene  11. Alternate physical activity with rest periods  Outcome: Progressing     Problem: Mobility  Goal: Patient's capacity to carry out activities will improve  Description: Target End Date:  Prior to discharge or change in level of care    1.  Assess for barriers to mobility/activity  2.  Implement activity per interdisciplinary team recommendations  3.  Target activity level identified and patient/family/caregiver aware of goal  4.  Provide assistive devices  5.  Instruct patient/caregiver on proper use of assistive/adaptive devices  6.  Schedule activities and rest periods to decrease effects of fatigue  7.  Encourage mobilization to extent of ability  8.  Maintain proper body alignment  9.  Provide adequate pain management to allow progressive mobilization  10. Implement pace maker precautions as needed  Outcome: Progressing     Problem: Self Care  Goal: Patient will have the ability to perform ADLs independently or with assistance (bathe, groom, dress, toilet and feed)  Description: Target End Date:  Prior to discharge or change in level of care    Document on ADL flowsheet    1.  Assess the capability and level of deficiency to perform ADLs  2.  Encourage family/care giver involvement  3.  Provide assistive devices  4.  Consider PT/OT evaluations  5.  Maintain support, give positive feedback, encourage self-care allowing extra time and verbal cuing as needed  6.  Avoid doing  something for patients they can do themselves, but provide assistance as needed  7.  Assist in anticipating/planning individual needs  8.  Collaborate with Case Management and  to meet discharge needs  Outcome: Progressing       Patient is not progressing towards the following goals:

## 2024-05-10 NOTE — PROGRESS NOTES
Telemetry Monitor Reports    SR  PVC, POC  HR Range: 66 - 70    .18/10/.41    *per monitor tech

## 2024-05-10 NOTE — CARE PLAN
The patient is Stable - Low risk of patient condition declining or worsening    Shift Goals  Clinical Goals: monitor telemetry, NPO for planned stress test, rest, pain management  Patient Goals: pain management, stress test tomorrow and go home  Family Goals: DANE    Progress made toward(s) clinical / shift goals:      Patient has remained NPO since midnight.   Patient was hypotensive this shift, resolving post 1 L bolus    Problem: Knowledge Deficit - Standard  Goal: Patient and family/care givers will demonstrate understanding of plan of care, disease process/condition, diagnostic tests and medications  Description: Target End Date:  1-3 days or as soon as patient condition allows    Document in Patient Education    1.  Patient and family/caregiver oriented to unit, equipment, visitation policy and means for communicating concern  2.  Complete/review Learning Assessment  3.  Assess knowledge level of disease process/condition, treatment plan, diagnostic tests and medications  4.  Explain disease process/condition, treatment plan, diagnostic tests and medications  Outcome: Progressing  Note: Plan of care discussed with patient during bedside assessment, including medication regimen, diet restrictions, NPO status except sips with meds upon midnight for planned stress test, falls prevention, and continued telemetry monitoring. Patient stated understanding and compliance with goals of care.      Problem: Fall Risk  Goal: Patient will remain free from falls  Description: Target End Date:  Prior to discharge or change in level of care    Document interventions on the [place] Fall Risk Assessment    1.  Assess for fall risk factors  2.  Implement fall precautions  Outcome: Progressing  Note: Patient using call light appropriately throughout the shift for all bathroom use and assistance.   Bed in lowest position. Call light within reach, along with all personal belongings.      Problem: Pain - Standard  Goal:  Alleviation of pain or a reduction in pain to the patient’s comfort goal  Description: Target End Date:  Prior to discharge or change in level of care    Document on Vitals flowsheet    1.  Document pain using the appropriate pain scale per order or unit policy  2.  Educate and implement non-pharmacologic comfort measures (i.e. relaxation, distraction, massage, cold/heat therapy, etc.)  3.  Pain management medications as ordered  4.  Reassess pain after pain med administration per policy  5.  If opiods administered assess patient's response to pain medication is appropriate per POSS sedation scale  6.  Follow pain management plan developed in collaboration with patient and interdisciplinary team (including palliative care or pain specialists if applicable)  Outcome: Progressing  Note: Patient having complaints of bilateral shoulder pain upon bedside report. MD notified and prn tylenol ordered and given with optimal results. No more complaints of pain this shift.        Patient is not progressing towards the following goals:      Problem: Respiratory  Goal: Patient will achieve/maintain optimum respiratory ventilation and gas exchange  Description: Target End Date:  Prior to discharge or change in level of care    Document on Assessment flowsheet    1.  Assess and monitor rate, rhythm, depth and effort of respiration  2.  Breath sounds assessed qshift and/or as needed  3.  Assess O2 saturation, administer/titrate oxygen as ordered  4.  Position patient for maximum ventilatory efficiency  5.  Turn, cough, and deep breath with splinting to improve effectiveness  6.  Collaborate with RT to administer medication/treatments per order  7.  Encourage use of incentive spirometer and encourage patient to cough after use and utilize splinting techniques if applicable  8.  Airway suctioning  9.  Monitor sputum production for changes in color, consistency and frequency  10. Perform frequent oral hygiene  11. Alternate physical  activity with rest periods  Outcome: Not Progressing  Note: Patient having increased dyspnea with ambulation to and from bathroom, resolving with rest after several minutes.   Patient on room air with oxygen saturation of 93% at rest.   2 L NC applied during sleeping hours due to increased work of breathing, and noted desaturation of 88% - 90% at rest during sleep.   Currently patient oxygen saturation 94% on 2 L NC at rest.   Plan to discontinue supplemental oxygen and return to room air during waking hours

## 2024-05-10 NOTE — PROGRESS NOTES
Patient hypotensive upon 0000 VS assessment.   84/53, 86/50, and 89/51  No complaints of dizziness. No complaints of pain. Asymptomatic.   MD notified.  1 L LR bolus currently infusing per orders.   Will continue to monitor BP

## 2024-05-10 NOTE — CONSULTS
Cardiology Consult Note:    Jil Bentley M.D.  Date & Time note created:    5/10/2024   12:24 PM     Referring MD:  Dr. Diana and Dr. Handley     Patient ID:   Name:             Azalea Gan   YOB: 1950  Age:                 73 y.o.  female   MRN:               [id number]                                                             Reason for Consult:      Abnormal MPI    History of Present Illness:     is very pleasant 73-year-old female with past medical history significant for A-fib status post cardioversion x 2, on AC, hypothyroidism,Nonsustained V. tach, heart failure with preserved ejection fraction, history of mild mitral regurgitation, hypertension, pulmonary hypertension, JOSH on nocturnal O2, history of stroke and history of renal artery stenosis who presented 5/8 to an outside hospital for dizziness and lightheadedness, found to be in A-fib RVR status post metoprolol pushes which she responded to, though went in and out of A-fib during her hospital stay, given IV digoxin.  Patient states that she is felt overall very fatigued low energy with some PINTO for the last year predating this atrial fibrillation.  Though cannot give history on 04 exactly she could walk before becoming short of breath.  Patient denies any orthopnea or PND, denies any chest pain, no palpitations 14 point review of systems except for as above below.    Review of Systems:      Constitutional: Denies fevers, Denies weight changes  Eyes: Denies changes in vision, no eye pain  Ears/Nose/Throat/Mouth: Denies nasal congestion or sore throat   Cardiovascular: Denies chest pain, endorses palpitations   Respiratory: Endorses shortness of breath , Denies cough  Gastrointestinal/Hepatic: Denies abdominal pain, nausea, vomiting, diarrhea, constipation or GI bleeding   Genitourinary: Denies dysuria or frequency  Musculoskeletal/Rheum: Denies  joint pain and swelling, no edema  Skin: Denies rash  Neurological: Denies  headache, confusion, memory loss or focal weakness/parasthesias  Psychiatric: denies mood disorder   Endocrine: Cyn thyroid problems  Heme/Oncology/Lymph Nodes: Denies enlarged lymph nodes, denies brusing or known bleeding disorder  All other systems were reviewed and are negative (AMA/CMS criteria)                Past Medical History:   Past Medical History:   Diagnosis Date    Hypertension     Pulmonary HTN (HCC)     moderate     Sleep apnea     mild JOSH    not on CPAP or O2    Stroke (HCC)     Thyroid disease     Tricuspid valve regurgitation      Active Hospital Problems    Diagnosis     Atrial fibrillation with rapid ventricular response (HCC) [I48.91]     Hypotension [I95.9]     Depression [F32.A]     Hypothyroidism [E03.9]     Essential hypertension [I10]        Past Surgical History:  Past Surgical History:   Procedure Laterality Date    LAP, PLACE ADJUST NISREEN RESTRICT DE* N/A 11/2/2022    Procedure: LAPAROSCOPIC ADJUSTABLE GASTRIC BANDING, PORT REMOVAL,ESOPHAGOGASTRODUODENOSCOPY;  Surgeon: Eitan Preston M.D.;  Location: [facility];  Service: Gastroenterology    NH UPPER GI ENDOSCOPY,DIAGNOSIS N/A 11/2/2022    Procedure: GASTROSCOPY;  Surgeon: Eitan Preston M.D.;  Location: [facility];  Service: Gastroenterology    COLONOSCOPY - ENDO  12/8/2016    Procedure: COLONOSCOPY - ENDO with Anesthesia;  Surgeon: Raffaele Ritter M.D.;  Location: [facility];  Service:     KNEE REPLACEMENT, TOTAL Right 3/15/2016    Dr Horowitz    CATARACT EXTRACTION WITH IOL Bilateral 2016    CARDIAC CATH, RIGHT/LEFT HEART  12/2015    nonobstructive CAD EF 60%    THYROIDECTOMY  3/2012    COLONOSCOPY  2010    GASTRIC BYPASS LAPAROSCOPIC  2006    [facility]-en-[email or web address]    HYSTERECTOMY, TOTAL ABDOMINAL         Hospital Medications:    Current Facility-Administered Medications:     digoxin (Lanoxin) tablet 125 mcg, 125 mcg, Oral, DAILY AT 1800, Makayla Davis M.D., 125 mcg at 05/09/24 1702    regadenoson  (Lexiscan) injection SOLN 0.4 mg, 0.4 mg, Intravenous, Once, Makayla Davis M.D.    aminophylline injection 100 mg, 100 mg, Intravenous, Once PRN, Makayla Davis M.D.    acetaminophen (Tylenol) tablet 650 mg, 650 mg, Oral, Q4HRS PRN, Iveth Griffith D.O., 650 mg at 05/09/24 1935    allopurinol (Zyloprim) tablet 100 mg, 100 mg, Oral, DAILY, Wali Flores M.D., 100 mg at 05/10/24 0431    apixaban (Eliquis) tablet 5 mg, 5 mg, Oral, BID, Wali Flores M.D., 5 mg at 05/10/24 0431    FLUoxetine (PROzac) capsule 120 mg, 120 mg, Oral, DAILY, Wali Flores M.D., 120 mg at 05/10/24 0431    levothyroxine (Synthroid) tablet 175 mcg, 175 mcg, Oral, AM ES, Wali Flores M.D., 175 mcg at 05/10/24 0431    omeprazole (PriLOSEC) capsule 20 mg, 20 mg, Oral, DAILY, Wali Flores M.D., 20 mg at 05/10/24 0430    pramipexole (Mirapex) tablet 0.25 mg, 0.25 mg, Oral, Nightly, Wali Flores M.D., 0.25 mg at 05/09/24 2041    Current Outpatient Medications:  Medications Prior to Admission   Medication Sig Dispense Refill Last Dose    allopurinol (ZYLOPRIM) 300 MG Tab Take 300 mg by mouth every day.   5/8/2024 at 0800    furosemide (LASIX) 40 MG Tab Take 40 mg by mouth every day.   5/8/2024 at 0800    levothyroxine (SYNTHROID) 175 MCG Tab Take 175 mcg by mouth every morning on an empty stomach.   5/8/2024 at 0600    nystatin (MYCOSTATIN) powder Apply 1 Application topically 3 times a day as needed (itchy).   1 WEEK AGO at PRN    fluticasone (FLONASE) 50 MCG/ACT nasal spray Administer 1 Spray into affected nostril(S) 1 time a day as needed (allergies).   LAST WEEK at PRN    traMADol (ULTRAM) 50 MG Tab Take 100 mg by mouth every evening as needed for Severe Pain. 2 tablets = 100mg   5/7/2024 at 2100    amLODIPine (NORVASC) 10 MG Tab TAKE 1 TABLET DAILY 90 Tablet 1 5/8/2024 at 0800    pramipexole (MIRAPEX) 0.25 MG Tab Take 1 Tablet by mouth every evening. Indications: Restless Leg Syndrome 180 Tablet 0 5/7/2024 at 2100    apixaban  "(ELIQUIS) 5mg Tab Take 1 Tablet by mouth 2 times a day.   5/8/2024 at 0800    fluoxetine (PROZAC) 40 MG capsule Take 120 mg by mouth every day. 120 mg = 3capsules   5/8/2024 at 0800    potassium chloride SA (K-DUR) 10 MEQ Tab CR Take 1 Tablet by mouth every day. 90 Each 3 5/8/2024 at AM       Medication Allergy:  Allergies   Allergen Reactions    Iodine Anaphylaxis    Iodine Contrast [Diagnostic X-Ray Materials] Anaphylaxis    Povidone Iodine Anaphylaxis       Family History:  Family History   Problem Relation Age of Onset    Heart Attack Mother        Social History:  Social History     Socioeconomic History    Marital status:      Spouse name: Not on file    Number of children: Not on file    Years of education: Not on file    Highest education level: Not on file   Occupational History    Not on file   Tobacco Use    Smoking status: Never    Smokeless tobacco: Never   Vaping Use    Vaping Use: Never used   Substance and Sexual Activity    Alcohol use: No    Drug use: No    Sexual activity: Not on file   Other Topics Concern    Not on file   Social History Narrative    Not on file     Social Determinants of Health     Financial Resource Strain: Not on file   Food Insecurity: Not on file   Transportation Needs: Not on file   Physical Activity: Not on file   Stress: Not on file   Social Connections: Not on file   Intimate Partner Violence: Not on file   Housing Stability: Not on file         Physical Exam:  Vitals/ General Appearance:   Weight/BMI: Body mass index is 42.16 kg/m².  /69   Pulse 80   Temp 36.8 °C (98.2 °F) (Temporal)   Resp 19   Ht 1.626 m (5' 4\")   Wt 111 kg (245 lb 9.5 oz)   SpO2 97%   Vitals:    05/10/24 0155 05/10/24 0326 05/10/24 0721 05/10/24 1158   BP: 100/57 102/58 122/69    Pulse:  68 71 80   Resp:  18 17 19   Temp:  36.4 °C (97.5 °F) 36.5 °C (97.7 °F) 36.8 °C (98.2 °F)   TempSrc:  Temporal Temporal Temporal   SpO2:  93% 96% 97%   Weight:  111 kg (245 lb 9.5 oz)     Height:   "       Oxygen Therapy:  Pulse Oximetry: 97 %, O2 (LPM): 2, O2 Delivery Device: Nasal Cannula    Constitutional:   Well developed, Well nourished, No acute distress  HENMT:  Normocephalic, Atraumatic, Oropharynx moist mucous membranes, No oral exudates, Nose normal.  No thyromegaly.  Eyes:  EOMI, Conjunctiva normal, No discharge.  Neck:  Normal range of motion, No cervical tenderness,  no JVD.  Cardiovascular: Regular rate, irregular rhythm, no murmurs, No rubs, No gallops.   Extremitites with intact distal pulses, no cyanosis, or edema.  Lungs:  Normal breath sounds, breath sounds clear to auscultation bilaterally,  no crackles, no wheezing.   Abdomen: Bowel sounds normal, Soft, No tenderness, No guarding, No rebound, No masses, No hepatosplenomegaly.  Skin: Warm, Dry, No erythema, No rash, no induration.  Neurologic: Alert & oriented x 3, No focal deficits noted, cranial nerves II through X are grossly intact.  Psychiatric: Affect normal, Judgment normal, Mood normal.      MDM (Data Review):     Records reviewed and summarized in current documentation    Lab Data Review:  Recent Results (from the past 24 hour(s))   TROPONIN    Collection Time: 05/09/24  3:15 PM   Result Value Ref Range    Troponin T 14 6 - 19 ng/L   IRON/TOTAL IRON BIND    Collection Time: 05/09/24  3:15 PM   Result Value Ref Range    Iron 52 40 - 170 ug/dL    Total Iron Binding 253 250 - 450 ug/dL    Unsat Iron Binding 201 110 - 370 ug/dL    % Saturation 21 15 - 55 %   FERRITIN    Collection Time: 05/09/24  3:15 PM   Result Value Ref Range    Ferritin 313.0 (H) 10.0 - 291.0 ng/mL   RETICULOCYTES COUNT    Collection Time: 05/09/24  3:15 PM   Result Value Ref Range    Reticulocyte Count 1.8 0.8 - 2.6 %    Retic, Absolute 0.07 0.04 - 0.12 M/uL    Imm. Reticulocyte Fraction 13.3 2.6 - 16.1 %    Retic Hgb Equivalent 30.8 29.0 - 35.0 pg/cell   VITAMIN B12    Collection Time: 05/09/24  3:15 PM   Result Value Ref Range    Vitamin B12 -True Cobalamin 605  211 - 911 pg/mL   CBC WITH DIFFERENTIAL    Collection Time: 05/10/24 12:27 AM   Result Value Ref Range    WBC 4.7 (L) 4.8 - 10.8 K/uL    RBC 3.21 (L) 4.20 - 5.40 M/uL    Hemoglobin 9.6 (L) 12.0 - 16.0 g/dL    Hematocrit 31.5 (L) 37.0 - 47.0 %    MCV 98.1 (H) 81.4 - 97.8 fL    MCH 29.9 27.0 - 33.0 pg    MCHC 30.5 (L) 32.2 - 35.5 g/dL    RDW 61.6 (H) 35.9 - 50.0 fL    Platelet Count 216 164 - 446 K/uL    MPV 10.5 9.0 - 12.9 fL    Neutrophils-Polys 52.30 44.00 - 72.00 %    Lymphocytes 31.20 22.00 - 41.00 %    Monocytes 10.60 0.00 - 13.40 %    Eosinophils 5.30 0.00 - 6.90 %    Basophils 0.60 0.00 - 1.80 %    Immature Granulocytes 0.00 0.00 - 0.90 %    Nucleated RBC 0.00 0.00 - 0.20 /100 WBC    Neutrophils (Absolute) 2.46 1.82 - 7.42 K/uL    Lymphs (Absolute) 1.47 1.00 - 4.80 K/uL    Monos (Absolute) 0.50 0.00 - 0.85 K/uL    Eos (Absolute) 0.25 0.00 - 0.51 K/uL    Baso (Absolute) 0.03 0.00 - 0.12 K/uL    Immature Granulocytes (abs) 0.00 0.00 - 0.11 K/uL    NRBC (Absolute) 0.00 K/uL   Basic Metabolic Panel    Collection Time: 05/10/24 12:27 AM   Result Value Ref Range    Sodium 140 135 - 145 mmol/L    Potassium 4.9 3.6 - 5.5 mmol/L    Chloride 110 96 - 112 mmol/L    Co2 23 20 - 33 mmol/L    Glucose 120 (H) 65 - 99 mg/dL    Bun 31 (H) 8 - 22 mg/dL    Creatinine 1.30 0.50 - 1.40 mg/dL    Calcium 8.8 8.5 - 10.5 mg/dL    Anion Gap 7.0 7.0 - 16.0   MAGNESIUM    Collection Time: 05/10/24 12:27 AM   Result Value Ref Range    Magnesium 2.0 1.5 - 2.5 mg/dL   ESTIMATED GFR    Collection Time: 05/10/24 12:27 AM   Result Value Ref Range    GFR (CKD-EPI) 43 (A) >60 mL/min/1.73 m 2       Imaging/Procedures Review:    Chest Xray:  Reviewed    EKG:   As in HPI.     NM-Stress: (5/10)  1.  Small partially reversible myocardial perfusion defect in the mid    inferior and basal inferior segments.   2.  Myocardial perfusion is otherwise normal.   3.  LVEF is 50%.   4.  Left ventricular dilation with stress maneuvering.   5.  Decreased wall  motion in the inferior and septal walls, but wall    thickening is preserved.    MDM (Assessment and Plan):     Active Hospital Problems    Diagnosis     Atrial fibrillation with rapid ventricular response (HCC) [I48.91]     Hypotension [I95.9]     Depression [F32.A]     Hypothyroidism [E03.9]     Essential hypertension [I10]    1.  A-fib RVR   2.  Abnormal MPI  3.  Heart failure with preserved ejection fraction  Patient came in A-fib RVR, metoprolol pushes relieved however started on digoxin given concern for low blood pressures, in and out of A-fib, on chronic anticoagulation, some history of PINTO however has been ongoing for greater than a year, no acute chest pain, not grossly volume overloaded, troponins within normal limits, nuc med stress test demonstrating small partially reversible myocardial perfusion defect in the mid inferior and basal inferior segments.  Low concern for ischemic cause given age and multiple comorbidities.  - Consider outpatient cardiology consult for ablation.        Thank you for interesting consult, we will continue to follow along.

## 2024-05-10 NOTE — PROGRESS NOTES
[facility] Internal Medicine Daily Progress Note    Date of Service  5/10/2024    [facility] Team: [facility] IM Blue Team   Attending: Walker Handley M.d.  Senior Resident: Dr. Davis  Intern:  Dr. Diana  Contact Number: [phone]    Chief Complaint  Atrial fibrillation with RVR    Hospital Course  Azalea Gan is a 73 y.o. female with past medical history hypertension, CVA (unclear hx), hyperlipidemia, L sided sciatica, renal artery stenosis, and paroxysmal atrial fibrillation status post DINESH+Cardioversion in March 2024 at [facility], who presented 5/8/2024 with atrial fibrillation with rapid ventricular response, transferred from [facility], HR was in the 130s  and they did not have beds. Patient was given IV metoprolol 5 mg x 1, metoprolol 25 mg p.o. x 1, digoxin IV.  Labs from outside facility showed hemoglobin of 11, creatinine 1.7, BUN 47, BMP normal, Troponin 15.3, TSH 1.06, and Magnesium 2.2.  Patient was started on p.o. digoxin upon admission turndown given risk of hypotension with other rate controlling medications.    Interval Problem Update  Patient hypotensive to the systolics in 89 overnight and was given a 1 L fluid bolus.  She denies any dizziness/lightheadedness, chest pain or shortness of breath.  In the past month, she has started getting pain around her inferior neck that she relates to exertion. Reports feeling fatigued for past six months.     -Stress test revealing small reversible perfusion defect. Cardiology has been consulted.     I have discussed this patient's plan of care and discharge plan at IDT rounds today with Case Management, Nursing, Nursing leadership, and other members of the IDT team.    Consultants/Specialty  None     Code Status  Full Code    Disposition  The patient is not medically cleared for discharge to home or a post-acute facility.      I have placed the appropriate orders for post-discharge needs.    Review of Systems  Review of Systems   Constitutional:  Negative for chills and  fever.   Eyes:  Negative for blurred vision and double vision.   Respiratory:  Positive for cough (chronic). Negative for sputum production.    Cardiovascular:  Negative for chest pain, palpitations, orthopnea, claudication, leg swelling and PND.   Gastrointestinal:  Positive for diarrhea. Negative for abdominal pain, melena, nausea and vomiting.   Neurological:  Negative for dizziness and weakness.        Physical Exam  Temp:  [36.4 °C (97.5 °F)-36.7 °C (98.1 °F)] 36.5 °C (97.7 °F)  Pulse:  [63-78] 71  Resp:  [17-18] 17  BP: ()/(50-69) 122/69  SpO2:  [90 %-96 %] 96 %    Physical Exam  Vitals and nursing note reviewed.   HENT:      Head: Normocephalic and atraumatic.      Nose: Nose normal.      Mouth/Throat:      Mouth: Mucous membranes are moist.   Eyes:      General: No scleral icterus.        Right eye: No discharge.         Left eye: No discharge.   Cardiovascular:      Rate and Rhythm: Normal rate. Rhythm irregular.      Pulses: Normal pulses.      Heart sounds: Normal heart sounds. No murmur heard.  Pulmonary:      Effort: Pulmonary effort is normal.      Breath sounds: Normal breath sounds.      Comments: Normal expiratory phase with end-expiratory wheezing   Abdominal:      General: Abdomen is flat. Bowel sounds are normal.      Palpations: Abdomen is soft.      Tenderness: There is no abdominal tenderness.   Musculoskeletal:         General: No swelling. Normal range of motion.      Cervical back: Normal range of motion.   Skin:     General: Skin is warm.      Capillary Refill: Capillary refill takes less than 2 seconds.   Neurological:      General: No focal deficit present.      Mental Status: She is alert. Mental status is at baseline.         Fluids    Intake/Output Summary (Last 24 hours) at 5/10/2024 0724  Last data filed at 5/10/2024 0400  Gross per 24 hour   Intake 1790.66 ml   Output 0 ml   Net 1790.66 ml       Laboratory  Recent Labs     05/09/24  0134 05/10/24  0027   WBC 4.6* 4.7*   RBC  3.29* 3.21*   HEMOGLOBIN 9.8* 9.6*   HEMATOCRIT 31.8* 31.5*   MCV 96.7 98.1*   MCH 29.8 29.9   MCHC 30.8* 30.5*   RDW 60.9* 61.6*   PLATELETCT 221 216   MPV 10.6 10.5     Recent Labs     05/09/24  0134 05/10/24  0027   SODIUM 146* 140   POTASSIUM 4.3 4.9   CHLORIDE 116* 110   CO2 21 23   GLUCOSE 113* 120*   BUN 37* 31*   CREATININE 1.16 1.30   CALCIUM 8.8 8.8                   Imaging  NM-CARDIAC STRESS TEST   Final Result      EC-ECHOCARDIOGRAM COMPLETE W/ CONT    (Results Pending)        Assessment/Plan:    * Atrial fibrillation with rapid ventricular response (HCC)- (present on admission)  Assessment & Plan  RVR has resolved  Transferred due to RVR (around 130s). Responded to IV metoprolol 5 mg x 1, metoprolol 25 mg x 1, digoxin IV.   She is s/p DINESH+DCCV at [facility] in March.   Her last echo in 3/2024 with moderate MR, moderate dilatation of LA, EF of 45-50% but in the setting of arrhythmia.   Currently rate controlled. Blood pressure remains soft, so not a good candidate for beta or CC blockers. Avoiding amidoarone in the setting of thyroid disease.   Feels poorly at baseline, tired all day with exertional symptoms on top.   - It is reasonable to obtain a stress test given her risk factors for CAD.  Stress test revealed small reversible perfusion defect, cardiology has been consulted.   - Continue digoxin 0.125 mcg daily   - Continue with eliquis 5 mg BID   - Continue optimizing electrolytes  - Suspect underlying JOSH - pending sleep medicine appt outpatient   - No consideration for cardioversion at this point, focusing on rate control     Positive cardiac stress test  Assessment & Plan  Patient reported exertional dyspnea and some chest pain on exertion over the past year. Stress test revealed small reversible perfusion defect.  -Cardiology consulted, appreciate recs     Normocytic anemia  Assessment & Plan  Patient reported fatigue for the past 3-6 months and occasional exertional dyspnea. Hemoglobin has  slowly down trended from approximately 12 to 9.6 over past year. Has been stable in the last two months. Anemia workup this far including iron panel and b12 has been unremarkable. Reticulocyte count is 1.5% suggestive of a bone marrow process. Makes hemolytic anemia unlikely.   -Will refer patient to hematologist outpatient for further workup    Hypothyroidism- (present on admission)  Assessment & Plan  TSH wnl this admission, likely not contributing to afib.   - Continue with home levothyroxine     Depression- (present on admission)  Assessment & Plan  Continue with prozac     Essential hypertension- (present on admission)  Assessment & Plan  -Holding home blood pressure medications in the setting of softer blood pressures       VTE prophylaxis: therapeutic anticoagulation with eliquis    I have performed a physical exam and reviewed and updated ROS and Plan today (5/10/2024). In review of yesterday's note (5/9/2024), there are no changes except as documented above.

## 2024-05-10 NOTE — ASSESSMENT & PLAN NOTE
Patient reported fatigue for the past 3-6 months and occasional exertional dyspnea. Hemoglobin has slowly down trended from approximately 12 to 9.6 over past year. Has been stable in the last two months. Anemia workup this far including iron panel and b12 has been unremarkable. Reticulocyte count is 1.5% suggestive of a bone marrow process. Makes hemolytic anemia unlikely.   -Will refer patient to hematologist outpatient for further workup

## 2024-05-10 NOTE — PROGRESS NOTES
Cardiology Follow Up Progress Note    Date of Service  5/11/24    Attending Physician  Walker Handley M.D.    Chief Complaint   Cardiology is consulted for evaluation of recurrent A-fib RVR    HPI  Azalea Gan is a 73 y.o. female admitted 5/8/2024 with dizziness and lightheadedness, found to be in A-fib RVR.    PMH: PAF status post DINESH guided cardioversion 3/22/2024 with restoration of SR, on OAC with Eliquis, moderate MR, HFpEF, NSVT, hypertension, nocturnal hypoxia, daytime somnolence, BMI 40s    Interim Events  No overnight cardiac events  Telemetry-SR  -130  Initiated on sotalol 5/10/2024  Labs reviewed  K, Scr, Mg in good   QTC ~452    Review of Systems  Review of Systems   Respiratory:  Negative for apnea, cough, choking, chest tightness, shortness of breath, wheezing and stridor.        Vital signs in last 24 hours  Temp:  [36.4 °C (97.5 °F)-36.8 °C (98.2 °F)] 36.8 °C (98.2 °F)  Pulse:  [66-80] 80  Resp:  [17-19] 18  BP: ()/(50-69) 122/69  SpO2:  [92 %-97 %] 96 %    Physical Exam  Physical Exam  Cardiovascular:      Rate and Rhythm: Normal rate and regular rhythm.   Pulmonary:      Effort: Pulmonary effort is normal.   Skin:     General: Skin is warm.   Neurological:      Mental Status: She is alert. Mental status is at baseline.   Psychiatric:         Mood and Affect: Mood normal.         Lab Review  Lab Results   Component Value Date/Time    WBC 4.7 (L) 05/10/2024 12:27 AM    RBC 3.21 (L) 05/10/2024 12:27 AM    HEMOGLOBIN 9.6 (L) 05/10/2024 12:27 AM    HEMATOCRIT 31.5 (L) 05/10/2024 12:27 AM    MCV 98.1 (H) 05/10/2024 12:27 AM    MCH 29.9 05/10/2024 12:27 AM    MCHC 30.5 (L) 05/10/2024 12:27 AM    MPV 10.5 05/10/2024 12:27 AM      Lab Results   Component Value Date/Time    SODIUM 140 05/10/2024 12:27 AM    POTASSIUM 4.9 05/10/2024 12:27 AM    CHLORIDE 110 05/10/2024 12:27 AM    CO2 23 05/10/2024 12:27 AM    GLUCOSE 120 (H) 05/10/2024 12:27 AM    BUN 31 (H) 05/10/2024 12:27 AM    CREATININE  "1.30 05/10/2024 12:27 AM      Lab Results   Component Value Date/Time    ASTSGOT 15 05/09/2024 01:34 AM    ALTSGPT 10 05/09/2024 01:34 AM     Lab Results   Component Value Date/Time    TROPONINT 14 05/09/2024 03:15 PM       No results for input(s): \"NTPROBNP\" in the last 72 hours.    Cardiac Imaging and Procedures Review  EKG: Sinus bradycardia    Echocardiogram:      Normal left ventricular size, wall thickness, and systolic function.   The right ventricle is mildly dilated with preserved systolic function.  Moderately dilated left atrium.  Mild mitral regurgitation.  Mild tricuspid regurgitation.  Estimated right ventricular systolic pressure is 63 mmHg; severely   elevated.  Right atrial pressure is estimated to be 15 mmHg; elevated.  No pericardial effusion.    Imaging      Stress Test:    5/10/2024   Myocardial Perfusion   Report   NUCLEAR IMAGING INTERPRETATION   1.  Small partially reversible myocardial perfusion defect in the mid    inferior and basal inferior segments.   2.  Myocardial perfusion is otherwise normal.   3.  LVEF is 50%.   4.  Left ventricular dilation with stress maneuvering.   5.  Decreased wall motion in the inferior and septal walls, but wall    thickening is preserved.     Assessment/Plan      # Paroxysmal A-fib RVR  # Initiation of sotalol (5/10/2024).  # MPI stress likely false positive artifact, continue medical management.  # Secondary Pulmonary hypertension RVSP 63 mmHg.  # JOSH  # Suspect HFpEF  # LVEF 55%      # Clinically & hemodynamically stable.  # Maintaining SR on sotalol 80 twice daily.  # continue apixaban 5 twice daily.  # Digoxin 125 mcg daily.  # Continue atorvastatin 40 daily.  # For HFpEF added  Farxiga 10  # Spironolactone 25 daily.  # Anticipate discharge after fifth dose of sotalol likely Monday morning    Cardiology will follow along    I personally spent a total of 15 minutes which includes face-to-face time and non-face-to-face time spent on preparing to see the " patient, reviewing hospital notes and tests, obtaining history from the patient, performing a medically appropriate exam, counseling and educating the patient, ordering medications/tests/procedures/referrals as clinically indicated, and documenting information in the electronic medical record.     Thank you for allowing me to participate in the care of this patient.  I will continue to follow this patient    Please contact me with any questions.    BRI Fong.   Cardiologist, [facility]  [phone]

## 2024-05-11 ENCOUNTER — APPOINTMENT (OUTPATIENT)
Dept: RADIOLOGY | Facility: MEDICAL CENTER | Age: 74
DRG: 273 | End: 2024-05-11
Attending: INTERNAL MEDICINE
Payer: MEDICARE

## 2024-05-11 ENCOUNTER — APPOINTMENT (OUTPATIENT)
Dept: RADIOLOGY | Facility: MEDICAL CENTER | Age: 74
DRG: 273 | End: 2024-05-11
Payer: MEDICARE

## 2024-05-11 PROBLEM — I50.30 HEART FAILURE WITH PRESERVED EJECTION FRACTION (HCC): Status: ACTIVE | Noted: 2024-05-11

## 2024-05-11 PROBLEM — R53.83 FATIGUE: Status: ACTIVE | Noted: 2024-05-11

## 2024-05-11 LAB
ABO + RH BLD: NORMAL
ABO GROUP BLD: NORMAL
ANION GAP SERPL CALC-SCNC: 10 MMOL/L (ref 7–16)
ANION GAP SERPL CALC-SCNC: 13 MMOL/L (ref 7–16)
ARTERIAL PATENCY WRIST A: ABNORMAL
BASE EXCESS BLDA CALC-SCNC: 0 MMOL/L (ref -4–3)
BASOPHILS # BLD AUTO: 0.2 % (ref 0–1.8)
BASOPHILS # BLD AUTO: 0.2 % (ref 0–1.8)
BASOPHILS # BLD: 0.01 K/UL (ref 0–0.12)
BASOPHILS # BLD: 0.02 K/UL (ref 0–0.12)
BLD GP AB SCN SERPL QL: NORMAL
BODY TEMPERATURE: ABNORMAL DEGREES
BREATHS SETTING VENT: 26
BUN SERPL-MCNC: 18 MG/DL (ref 8–22)
BUN SERPL-MCNC: 19 MG/DL (ref 8–22)
CALCIUM SERPL-MCNC: 8.8 MG/DL (ref 8.5–10.5)
CALCIUM SERPL-MCNC: 8.9 MG/DL (ref 8.5–10.5)
CFT BLD TEG: 5.1 MIN (ref 4.6–9.1)
CFT P HPASE BLD TEG: 5.7 MIN (ref 4.3–8.3)
CHLORIDE SERPL-SCNC: 102 MMOL/L (ref 96–112)
CHLORIDE SERPL-SCNC: 109 MMOL/L (ref 96–112)
CLOT ANGLE BLD TEG: 74.5 DEGREES (ref 63–78)
CLOT LYSIS 30M P MA LENFR BLD TEG: 0 % (ref 0–2.6)
CO2 BLDA-SCNC: 26 MMOL/L (ref 20–33)
CO2 SERPL-SCNC: 21 MMOL/L (ref 20–33)
CO2 SERPL-SCNC: 24 MMOL/L (ref 20–33)
CREAT SERPL-MCNC: 0.87 MG/DL (ref 0.5–1.4)
CREAT SERPL-MCNC: 0.94 MG/DL (ref 0.5–1.4)
CT.EXTRINSIC BLD ROTEM: 1.1 MIN (ref 0.8–2.1)
DELSYS IDSYS: ABNORMAL
EKG IMPRESSION: NORMAL
EKG IMPRESSION: NORMAL
EOSINOPHIL # BLD AUTO: 0.04 K/UL (ref 0–0.51)
EOSINOPHIL # BLD AUTO: 0.04 K/UL (ref 0–0.51)
EOSINOPHIL NFR BLD: 0.4 % (ref 0–6.9)
EOSINOPHIL NFR BLD: 0.8 % (ref 0–6.9)
ERYTHROCYTE [DISTWIDTH] IN BLOOD BY AUTOMATED COUNT: 57.5 FL (ref 35.9–50)
ERYTHROCYTE [DISTWIDTH] IN BLOOD BY AUTOMATED COUNT: 57.8 FL (ref 35.9–50)
EST. AVERAGE GLUCOSE BLD GHB EST-MCNC: 108 MG/DL
EXTRA TUBE BLU BLU: NORMAL
GFR SERPLBLD CREATININE-BSD FMLA CKD-EPI: 63 ML/MIN/1.73 M 2
GFR SERPLBLD CREATININE-BSD FMLA CKD-EPI: 70 ML/MIN/1.73 M 2
GLUCOSE BLD STRIP.AUTO-MCNC: 140 MG/DL (ref 65–99)
GLUCOSE SERPL-MCNC: 107 MG/DL (ref 65–99)
GLUCOSE SERPL-MCNC: 136 MG/DL (ref 65–99)
HBA1C MFR BLD: 5.4 % (ref 4–5.6)
HCO3 BLDA-SCNC: 24.7 MMOL/L (ref 17–25)
HCT VFR BLD AUTO: 32.2 % (ref 37–47)
HCT VFR BLD AUTO: 38.4 % (ref 37–47)
HGB BLD-MCNC: 10.2 G/DL (ref 12–16)
HGB BLD-MCNC: 12.2 G/DL (ref 12–16)
HOROWITZ INDEX BLDA+IHG-RTO: 66 MM[HG]
IMM GRANULOCYTES # BLD AUTO: 0.01 K/UL (ref 0–0.11)
IMM GRANULOCYTES # BLD AUTO: 0.04 K/UL (ref 0–0.11)
IMM GRANULOCYTES NFR BLD AUTO: 0.2 % (ref 0–0.9)
IMM GRANULOCYTES NFR BLD AUTO: 0.4 % (ref 0–0.9)
LACTATE BLD-SCNC: 1.1 MMOL/L (ref 0.5–2)
LACTATE SERPL-SCNC: 1.3 MMOL/L (ref 0.5–2)
LACTATE SERPL-SCNC: 1.7 MMOL/L (ref 0.5–2)
LYMPHOCYTES # BLD AUTO: 0.61 K/UL (ref 1–4.8)
LYMPHOCYTES # BLD AUTO: 0.94 K/UL (ref 1–4.8)
LYMPHOCYTES NFR BLD: 18.1 % (ref 22–41)
LYMPHOCYTES NFR BLD: 5.5 % (ref 22–41)
MAGNESIUM SERPL-MCNC: 1.9 MG/DL (ref 1.5–2.5)
MAGNESIUM SERPL-MCNC: 2.3 MG/DL (ref 1.5–2.5)
MCF BLD TEG: 65.9 MM (ref 52–69)
MCF.PLATELET INHIB BLD ROTEM: 36.1 MM (ref 15–32)
MCH RBC QN AUTO: 29.7 PG (ref 27–33)
MCH RBC QN AUTO: 30 PG (ref 27–33)
MCHC RBC AUTO-ENTMCNC: 31.7 G/DL (ref 32.2–35.5)
MCHC RBC AUTO-ENTMCNC: 31.8 G/DL (ref 32.2–35.5)
MCV RBC AUTO: 93.6 FL (ref 81.4–97.8)
MCV RBC AUTO: 94.6 FL (ref 81.4–97.8)
MODE IMODE: ABNORMAL
MONOCYTES # BLD AUTO: 0.56 K/UL (ref 0–0.85)
MONOCYTES # BLD AUTO: 0.58 K/UL (ref 0–0.85)
MONOCYTES NFR BLD AUTO: 10.8 % (ref 0–13.4)
MONOCYTES NFR BLD AUTO: 5.3 % (ref 0–13.4)
NEUTROPHILS # BLD AUTO: 3.64 K/UL (ref 1.82–7.42)
NEUTROPHILS # BLD AUTO: 9.71 K/UL (ref 1.82–7.42)
NEUTROPHILS NFR BLD: 69.9 % (ref 44–72)
NEUTROPHILS NFR BLD: 88.2 % (ref 44–72)
NRBC # BLD AUTO: 0 K/UL
NRBC # BLD AUTO: 0 K/UL
NRBC BLD-RTO: 0 /100 WBC (ref 0–0.2)
NRBC BLD-RTO: 0 /100 WBC (ref 0–0.2)
NT-PROBNP SERPL IA-MCNC: 5798 PG/ML (ref 0–125)
O2/TOTAL GAS SETTING VFR VENT: 100 %
PA AA BLD-ACNC: 3.3 % (ref 0–11)
PA ADP BLD-ACNC: 24.8 % (ref 0–17)
PCO2 BLDA: 39.5 MMHG (ref 26–37)
PCO2 TEMP ADJ BLDA: 42.4 MMHG (ref 26–37)
PEEP END EXPIRATORY PRESSURE IPEEP: 14 CMH20
PH BLDA: 7.4 [PH] (ref 7.4–7.5)
PH TEMP ADJ BLDA: 7.38 [PH] (ref 7.4–7.5)
PLATELET # BLD AUTO: 211 K/UL (ref 164–446)
PLATELET # BLD AUTO: 257 K/UL (ref 164–446)
PMV BLD AUTO: 10.9 FL (ref 9–12.9)
PMV BLD AUTO: 11.3 FL (ref 9–12.9)
PO2 BLDA: 66 MMHG (ref 64–87)
PO2 TEMP ADJ BLDA: 73 MMHG (ref 64–87)
POTASSIUM SERPL-SCNC: 4.3 MMOL/L (ref 3.6–5.5)
POTASSIUM SERPL-SCNC: 4.3 MMOL/L (ref 3.6–5.5)
PROCALCITONIN SERPL-MCNC: 4.41 NG/ML
RBC # BLD AUTO: 3.44 M/UL (ref 4.2–5.4)
RBC # BLD AUTO: 4.06 M/UL (ref 4.2–5.4)
RH BLD: NORMAL
SAO2 % BLDA: 93 % (ref 93–99)
SODIUM SERPL-SCNC: 139 MMOL/L (ref 135–145)
SODIUM SERPL-SCNC: 140 MMOL/L (ref 135–145)
SPECIMEN DRAWN FROM PATIENT: ABNORMAL
TEG ALGORITHM TGALG: ABNORMAL
TIDAL VOLUME IVT: 330 ML
TRIGL SERPL-MCNC: 69 MG/DL (ref 0–149)
TROPONIN T SERPL-MCNC: 244 NG/L (ref 6–19)
TROPONIN T SERPL-MCNC: 52 NG/L (ref 6–19)
TSH SERPL DL<=0.005 MIU/L-ACNC: 0.91 UIU/ML (ref 0.38–5.33)
WBC # BLD AUTO: 11 K/UL (ref 4.8–10.8)
WBC # BLD AUTO: 5.2 K/UL (ref 4.8–10.8)

## 2024-05-11 PROCEDURE — 93010 ELECTROCARDIOGRAM REPORT: CPT | Mod: 76 | Performed by: INTERNAL MEDICINE

## 2024-05-11 PROCEDURE — 93010 ELECTROCARDIOGRAM REPORT: CPT | Performed by: INTERNAL MEDICINE

## 2024-05-11 PROCEDURE — 5A1945Z RESPIRATORY VENTILATION, 24-96 CONSECUTIVE HOURS: ICD-10-PCS | Performed by: INTERNAL MEDICINE

## 2024-05-11 PROCEDURE — 36620 INSERTION CATHETER ARTERY: CPT | Performed by: INTERNAL MEDICINE

## 2024-05-11 PROCEDURE — 99233 SBSQ HOSP IP/OBS HIGH 50: CPT | Mod: FS | Performed by: INTERNAL MEDICINE

## 2024-05-11 PROCEDURE — 99291 CRITICAL CARE FIRST HOUR: CPT | Mod: 25 | Performed by: INTERNAL MEDICINE

## 2024-05-11 PROCEDURE — 36556 INSERT NON-TUNNEL CV CATH: CPT | Performed by: INTERNAL MEDICINE

## 2024-05-11 PROCEDURE — 99232 SBSQ HOSP IP/OBS MODERATE 35: CPT | Mod: GC | Performed by: INTERNAL MEDICINE

## 2024-05-11 PROCEDURE — 02HV33Z INSERTION OF INFUSION DEVICE INTO SUPERIOR VENA CAVA, PERCUTANEOUS APPROACH: ICD-10-PCS | Performed by: INTERNAL MEDICINE

## 2024-05-11 PROCEDURE — 0BH17EZ INSERTION OF ENDOTRACHEAL AIRWAY INTO TRACHEA, VIA NATURAL OR ARTIFICIAL OPENING: ICD-10-PCS | Performed by: INTERNAL MEDICINE

## 2024-05-11 PROCEDURE — 31500 INSERT EMERGENCY AIRWAY: CPT | Performed by: INTERNAL MEDICINE

## 2024-05-11 PROCEDURE — 03HY32Z INSERTION OF MONITORING DEVICE INTO UPPER ARTERY, PERCUTANEOUS APPROACH: ICD-10-PCS | Performed by: INTERNAL MEDICINE

## 2024-05-11 RX ORDER — MAGNESIUM SULFATE HEPTAHYDRATE 40 MG/ML
2 INJECTION, SOLUTION INTRAVENOUS ONCE
Status: COMPLETED | OUTPATIENT
Start: 2024-05-11 | End: 2024-05-11

## 2024-05-11 RX ORDER — ETOMIDATE 2 MG/ML
20 INJECTION INTRAVENOUS ONCE
Status: COMPLETED | OUTPATIENT
Start: 2024-05-11 | End: 2024-05-11

## 2024-05-11 RX ORDER — BISACODYL 10 MG
10 SUPPOSITORY, RECTAL RECTAL
Status: DISCONTINUED | OUTPATIENT
Start: 2024-05-11 | End: 2024-05-15

## 2024-05-11 RX ORDER — FUROSEMIDE 40 MG/1
40 TABLET ORAL ONCE
Status: COMPLETED | OUTPATIENT
Start: 2024-05-11 | End: 2024-05-11

## 2024-05-11 RX ORDER — FUROSEMIDE 10 MG/ML
INJECTION INTRAMUSCULAR; INTRAVENOUS
Status: COMPLETED
Start: 2024-05-11 | End: 2024-05-11

## 2024-05-11 RX ORDER — LINEZOLID 2 MG/ML
600 INJECTION, SOLUTION INTRAVENOUS EVERY 12 HOURS
Status: DISCONTINUED | OUTPATIENT
Start: 2024-05-11 | End: 2024-05-12

## 2024-05-11 RX ORDER — ROCURONIUM BROMIDE 10 MG/ML
50 INJECTION, SOLUTION INTRAVENOUS ONCE
Status: COMPLETED | OUTPATIENT
Start: 2024-05-11 | End: 2024-05-11

## 2024-05-11 RX ORDER — SPIRONOLACTONE 25 MG/1
25 TABLET ORAL
Status: DISCONTINUED | OUTPATIENT
Start: 2024-05-11 | End: 2024-05-12

## 2024-05-11 RX ORDER — POLYETHYLENE GLYCOL 3350 17 G/17G
1 POWDER, FOR SOLUTION ORAL
Status: DISCONTINUED | OUTPATIENT
Start: 2024-05-11 | End: 2024-05-15

## 2024-05-11 RX ORDER — FAMOTIDINE 20 MG/1
20 TABLET, FILM COATED ORAL EVERY 12 HOURS
Status: DISCONTINUED | OUTPATIENT
Start: 2024-05-11 | End: 2024-05-12

## 2024-05-11 RX ORDER — SODIUM CHLORIDE 9 MG/ML
INJECTION, SOLUTION INTRAVENOUS
Status: DISCONTINUED
Start: 2024-05-11 | End: 2024-05-11

## 2024-05-11 RX ORDER — NOREPINEPHRINE BITARTRATE 0.03 MG/ML
0-1 INJECTION, SOLUTION INTRAVENOUS CONTINUOUS
Status: DISCONTINUED | OUTPATIENT
Start: 2024-05-11 | End: 2024-05-11

## 2024-05-11 RX ORDER — NOREPINEPHRINE BITARTRATE 0.03 MG/ML
0-1 INJECTION, SOLUTION INTRAVENOUS CONTINUOUS
Status: DISCONTINUED | OUTPATIENT
Start: 2024-05-11 | End: 2024-05-15

## 2024-05-11 RX ORDER — POTASSIUM CHLORIDE 20 MEQ/1
20 TABLET, EXTENDED RELEASE ORAL ONCE
Status: COMPLETED | OUTPATIENT
Start: 2024-05-11 | End: 2024-05-11

## 2024-05-11 RX ORDER — FUROSEMIDE 10 MG/ML
40 INJECTION INTRAMUSCULAR; INTRAVENOUS ONCE
Status: COMPLETED | OUTPATIENT
Start: 2024-05-11 | End: 2024-05-11

## 2024-05-11 RX ORDER — NOREPINEPHRINE BITARTRATE 0.03 MG/ML
INJECTION, SOLUTION INTRAVENOUS
Status: COMPLETED
Start: 2024-05-11 | End: 2024-05-11

## 2024-05-11 RX ORDER — AMOXICILLIN 250 MG
2 CAPSULE ORAL 2 TIMES DAILY
Status: DISCONTINUED | OUTPATIENT
Start: 2024-05-12 | End: 2024-05-15

## 2024-05-11 RX ORDER — DAPAGLIFLOZIN 10 MG/1
10 TABLET, FILM COATED ORAL DAILY
Status: DISCONTINUED | OUTPATIENT
Start: 2024-05-11 | End: 2024-05-12

## 2024-05-11 RX ADMIN — ACETAMINOPHEN 650 MG: 325 TABLET, FILM COATED ORAL at 22:18

## 2024-05-11 RX ADMIN — PROPOFOL 20 MCG/KG/MIN: 10 INJECTION, EMULSION INTRAVENOUS at 18:19

## 2024-05-11 RX ADMIN — PRAMIPEXOLE DIHYDROCHLORIDE 0.25 MG: 0.25 TABLET ORAL at 22:17

## 2024-05-11 RX ADMIN — ETOMIDATE 20 MG: 2 INJECTION, SOLUTION INTRAVENOUS at 17:53

## 2024-05-11 RX ADMIN — IPRATROPIUM BROMIDE AND ALBUTEROL SULFATE 3 ML: 2.5; .5 SOLUTION RESPIRATORY (INHALATION) at 00:11

## 2024-05-11 RX ADMIN — FUROSEMIDE 40 MG: 40 TABLET ORAL at 12:32

## 2024-05-11 RX ADMIN — ALBUTEROL SULFATE 2 PUFF: 90 AEROSOL, METERED RESPIRATORY (INHALATION) at 13:50

## 2024-05-11 RX ADMIN — DAPAGLIFLOZIN 10 MG: 10 TABLET, FILM COATED ORAL at 08:37

## 2024-05-11 RX ADMIN — OMEPRAZOLE 20 MG: 20 CAPSULE, DELAYED RELEASE ORAL at 05:47

## 2024-05-11 RX ADMIN — ATORVASTATIN CALCIUM 40 MG: 40 TABLET, FILM COATED ORAL at 16:33

## 2024-05-11 RX ADMIN — ETOMIDATE 20 MG: 2 INJECTION, SOLUTION INTRAVENOUS at 18:38

## 2024-05-11 RX ADMIN — APIXABAN 5 MG: 5 TABLET, FILM COATED ORAL at 16:33

## 2024-05-11 RX ADMIN — CEFEPIME 2 G: 2 INJECTION, POWDER, FOR SOLUTION INTRAVENOUS at 22:16

## 2024-05-11 RX ADMIN — FUROSEMIDE 40 MG: 10 INJECTION INTRAMUSCULAR; INTRAVENOUS at 18:22

## 2024-05-11 RX ADMIN — ROCURONIUM BROMIDE 50 MG: 50 INJECTION, SOLUTION INTRAVENOUS at 17:54

## 2024-05-11 RX ADMIN — ALLOPURINOL 100 MG: 100 TABLET ORAL at 05:47

## 2024-05-11 RX ADMIN — FENTANYL CITRATE 50 MCG: 50 INJECTION, SOLUTION INTRAMUSCULAR; INTRAVENOUS at 18:59

## 2024-05-11 RX ADMIN — NOREPINEPHRINE BITARTRATE 0.4 MCG/KG/MIN: 1 INJECTION, SOLUTION, CONCENTRATE INTRAVENOUS at 20:22

## 2024-05-11 RX ADMIN — ALBUTEROL SULFATE 2 PUFF: 90 AEROSOL, METERED RESPIRATORY (INHALATION) at 17:18

## 2024-05-11 RX ADMIN — APIXABAN 5 MG: 5 TABLET, FILM COATED ORAL at 05:47

## 2024-05-11 RX ADMIN — SODIUM BICARBONATE 100 MEQ: 84 INJECTION INTRAVENOUS at 18:20

## 2024-05-11 RX ADMIN — FENTANYL CITRATE 50 MCG: 50 INJECTION, SOLUTION INTRAMUSCULAR; INTRAVENOUS at 18:40

## 2024-05-11 RX ADMIN — SPIRONOLACTONE 25 MG: 25 TABLET ORAL at 08:37

## 2024-05-11 RX ADMIN — PROPOFOL 80 MCG/KG/MIN: 10 INJECTION, EMULSION INTRAVENOUS at 22:12

## 2024-05-11 RX ADMIN — LEVOTHYROXINE SODIUM 175 MCG: 0.17 TABLET ORAL at 05:47

## 2024-05-11 RX ADMIN — FLUOXETINE 120 MG: 20 CAPSULE ORAL at 05:47

## 2024-05-11 RX ADMIN — NOREPINEPHRINE BITARTRATE 0.05 MCG/KG/MIN: 1 INJECTION, SOLUTION, CONCENTRATE INTRAVENOUS at 18:39

## 2024-05-11 RX ADMIN — FAMOTIDINE 20 MG: 10 INJECTION, SOLUTION INTRAVENOUS at 20:34

## 2024-05-11 RX ADMIN — NOREPINEPHRINE BITARTRATE 0.05 MCG/KG/MIN: 0.03 INJECTION, SOLUTION INTRAVENOUS at 18:39

## 2024-05-11 RX ADMIN — POTASSIUM CHLORIDE 20 MEQ: 1500 TABLET, EXTENDED RELEASE ORAL at 12:32

## 2024-05-11 RX ADMIN — Medication 25 MCG/HR: at 19:25

## 2024-05-11 RX ADMIN — ACETAMINOPHEN 650 MG: 325 TABLET, FILM COATED ORAL at 06:01

## 2024-05-11 RX ADMIN — ACETAMINOPHEN 650 MG: 325 TABLET, FILM COATED ORAL at 08:37

## 2024-05-11 RX ADMIN — LINEZOLID 600 MG: 600 INJECTION, SOLUTION INTRAVENOUS at 20:47

## 2024-05-11 RX ADMIN — DIGOXIN 125 MCG: 0.12 TABLET ORAL at 16:33

## 2024-05-11 RX ADMIN — MAGNESIUM SULFATE HEPTAHYDRATE 2 G: 2 INJECTION, SOLUTION INTRAVENOUS at 18:30

## 2024-05-11 RX ADMIN — ALBUTEROL SULFATE 2 PUFF: 90 AEROSOL, METERED RESPIRATORY (INHALATION) at 06:06

## 2024-05-11 RX ADMIN — SOTALOL HYDROCHLORIDE 80 MG: 80 TABLET ORAL at 08:01

## 2024-05-11 ASSESSMENT — ENCOUNTER SYMPTOMS
APNEA: 0
COUGH: 0
WHEEZING: 0
HEADACHES: 0
STRIDOR: 0
SPUTUM PRODUCTION: 0
FEVER: 0
ORTHOPNEA: 0
CHEST TIGHTNESS: 0
CHOKING: 0
SHORTNESS OF BREATH: 0
DIZZINESS: 0
CHILLS: 0
ABDOMINAL PAIN: 0

## 2024-05-11 ASSESSMENT — PAIN DESCRIPTION - PAIN TYPE
TYPE: ACUTE PAIN

## 2024-05-11 ASSESSMENT — FIBROSIS 4 INDEX: FIB4 SCORE: 1.66

## 2024-05-11 NOTE — ASSESSMENT & PLAN NOTE
Patient reported she has been feeling fatigued for the past 3 to 6 months.  She additionally admits to having daytime sleepiness.  This is likely secondary to undiagnosed obstructive sleep apnea.  Her anemia may also be playing a part.  -She will need outpatient pulmonology and sleep study testing  -She will need outpatient referral to hematology for further workup of anemia likely due to decreased bone marrow response

## 2024-05-11 NOTE — ASSESSMENT & PLAN NOTE
Not in decompensation.  Echo demonstrated normal EF but there is suspicion for heart failure with preserved ejection fraction due to progressive exertional dyspnea and occasionally orthopnea.  Echo additionally demonstrated a moderately elevated right ventricular systolic pressure concerning for pulmonary hypertension.  Likely multifactorial in etiology, type II versus III.  -Cardiology following, appreciate recommendations  -Started on dapagliflozin and spironolactone  -Chest x-ray does not demonstrate any pulmonary edema  -Given 1 dose of oral Lasix 40 Mg today, 5/11 with potassium supplementation  -Patient will need referral to pulmonology and outpatient sleep study due to high suspicion for type III pulmonary hypertension  -Monitor kidney function

## 2024-05-11 NOTE — CARE PLAN
The patient is Stable - Low risk of patient condition declining or worsening    Shift Goals  Clinical Goals: monitor shortness of breath, medication management, telemetry monitoring  Patient Goals: rest, get better  Family Goals: hao    Progress made toward(s) clinical / shift goals:    Problem: Knowledge Deficit - Standard  Goal: Patient and family/care givers will demonstrate understanding of plan of care, disease process/condition, diagnostic tests and medications  Description: Target End Date:  1-3 days or as soon as patient condition allows    Document in Patient Education    1.  Patient and family/caregiver oriented to unit, equipment, visitation policy and means for communicating concern  2.  Complete/review Learning Assessment  3.  Assess knowledge level of disease process/condition, treatment plan, diagnostic tests and medications  4.  Explain disease process/condition, treatment plan, diagnostic tests and medications  Outcome: Progressing     Problem: Fall Risk  Goal: Patient will remain free from falls  Description: Target End Date:  Prior to discharge or change in level of care    Document interventions on the [facility] Fall Risk Assessment    1.  Assess for fall risk factors  2.  Implement fall precautions  Outcome: Progressing  Note: Patient calls appropriately and uses front wheel walker to ambulate.        Patient is not progressing towards the following goals:      Problem: Psychosocial  Goal: Patient's level of anxiety will decrease  Description: Target End Date:  1-3 days or as soon as patient condition allows    1.  Collaborate with patient and family/caregiver to identify triggers and develop strategies to cope with anxiety  2.  Implement stimuli reduction, calming techniques  3.  Pharmacologic management per provider order  4.  Encourage patient/family/care giver participation  5.  Collaborate with interdisciplinary team including Psychologist or Behavioral Health Team as needed  Outcome:  Not Progressing  Note: Patient continues to be anxious about what is going on, RN reassured patient throughout shift. Provider came and spoke to patient as well.

## 2024-05-11 NOTE — PROGRESS NOTES
[facility] Internal Medicine Daily Progress Note    Date of Service  5/11/2024    [facility] Team: [facility] IM Blue Team   Attending: Walker Handley M.d.  Senior Resident: Dr. Davis  Intern:  Dr. Diana  Contact Number: [phone]    Chief Complaint  Atrial fibrillation with RVR    Hospital Course  Azalea Gan is a 73 y.o. female with past medical history hypertension, CVA (unclear hx), hyperlipidemia, L sided sciatica, renal artery stenosis, and paroxysmal atrial fibrillation status post DINESH+Cardioversion in March 2024 at [facility], who presented 5/8/2024 with atrial fibrillation with rapid ventricular response, transferred from [facility], HR was in the 130s  and they did not have beds. Patient was given IV metoprolol 5 mg x 1, metoprolol 25 mg p.o. x 1, digoxin IV.  Labs from outside facility showed hemoglobin of 11, creatinine 1.7, BUN 47, BMP normal, Troponin 15.3, TSH 1.06, and Magnesium 2.2.  Patient was started on p.o. digoxin upon admission turndown given risk of hypotension with other rate controlling medications.  A nuclear stress test was done which demonstrated a possible reversible perfusion defect.  Cardiology was consulted and believe this was likely an artifact.  Patient was started on sotalol due to concern for symptomatic A-fib.     Interval Problem Update  No acute events overnight.  Patient reports that she feels a little bit better this morning but yesterday was a lot for her because of all the testing.  Her breathing does feel better this morning.    -Continuing EKG monitoring after sotalol doses.  QTc has been within limits.  -Ordered chest x-ray  -Giving 1 dose of p.o. Lasix with potassium supplementation    I have discussed this patient's plan of care and discharge plan at IDT rounds today with Case Management, Nursing, Nursing leadership, and other members of the IDT team.    Consultants/Specialty  Cardiology    Code Status  Full Code    Disposition  The patient is not medically cleared for discharge to  home or a post-acute facility.      I have placed the appropriate orders for post-discharge needs.    Review of Systems  Review of Systems   Constitutional:  Negative for chills and fever.   Respiratory:  Negative for cough and sputum production.    Cardiovascular:  Negative for chest pain and orthopnea.   Gastrointestinal:  Negative for abdominal pain.   Neurological:  Negative for dizziness and headaches.        Physical Exam  Temp:  [36.6 °C (97.9 °F)-37 °C (98.6 °F)] 36.6 °C (97.9 °F)  Pulse:  [59-88] 60  Resp:  [17-20] 18  BP: (110-135)/(49-74) 121/59  SpO2:  [88 %-96 %] 93 %    Physical Exam  Vitals and nursing note reviewed.   HENT:      Head: Normocephalic and atraumatic.      Nose: Nose normal.      Mouth/Throat:      Mouth: Mucous membranes are moist.   Eyes:      General: No scleral icterus.        Right eye: No discharge.         Left eye: No discharge.   Cardiovascular:      Rate and Rhythm: Normal rate and regular rhythm.      Pulses: Normal pulses.      Heart sounds: Normal heart sounds. No murmur heard.  Pulmonary:      Effort: Pulmonary effort is normal.      Breath sounds: Normal breath sounds.      Comments: Minimal expiratory wheezing  Abdominal:      General: Abdomen is flat.      Palpations: Abdomen is soft.      Tenderness: There is no abdominal tenderness.   Musculoskeletal:      Cervical back: Normal range of motion.      Comments: Trace bilateral pitting edema   Skin:     General: Skin is warm.   Neurological:      General: No focal deficit present.      Mental Status: She is alert. Mental status is at baseline.         Fluids    Intake/Output Summary (Last 24 hours) at 5/11/2024 1316  Last data filed at 5/11/2024 0800  Gross per 24 hour   Intake 440 ml   Output 0 ml   Net 440 ml       Laboratory  Recent Labs     05/09/24  0134 05/10/24  0027 05/11/24  0148   WBC 4.6* 4.7* 5.2   RBC 3.29* 3.21* 3.44*   HEMOGLOBIN 9.8* 9.6* 10.2*   HEMATOCRIT 31.8* 31.5* 32.2*   MCV 96.7 98.1* 93.6   MCH  29.8 29.9 29.7   MCHC 30.8* 30.5* 31.7*   RDW 60.9* 61.6* 57.8*   PLATELETCT 221 216 211   MPV 10.6 10.5 10.9     Recent Labs     05/09/24  0134 05/10/24  0027 05/11/24  0148   SODIUM 146* 140 140   POTASSIUM 4.3 4.9 4.3   CHLORIDE 116* 110 109   CO2 21 23 21   GLUCOSE 113* 120* 107*   BUN 37* 31* 18   CREATININE 1.16 1.30 0.87   CALCIUM 8.8 8.8 8.9                   Imaging  DX-CHEST-PORTABLE (1 VIEW)   Final Result      Cardiomegaly and pulmonary vascular dilation or again noted. No acute process.      EC-ECHOCARDIOGRAM COMPLETE W/O CONT   Final Result      NM-CARDIAC STRESS TEST   Final Result      EC-ECHOCARDIOGRAM COMPLETE W/O CONT    (Results Pending)        Assessment/Plan:    * Atrial fibrillation with rapid ventricular response (HCC)- (present on admission)  Assessment & Plan  RVR has resolved  Transferred due to RVR (around 130s). Responded to IV metoprolol 5 mg x 1, metoprolol 25 mg x 1, digoxin IV.   She is s/p DINESH+DCCV at [facility] in March.   Her last echo in 3/2024 with moderate MR, moderate dilatation of LA, EF of 45-50% but in the setting of arrhythmia.   Currently rate controlled. Blood pressure remains soft, so not a good candidate for beta or CC blockers. Avoiding amidoarone in the setting of thyroid disease.   Feels poorly at baseline, tired all day with exertional symptoms on top.   - Due to concern for symptomatic atrial fibrillation, cardiology started patient on sotalol.  She has been in sinus rhythm since.  QTc has been stable.  She will need continued inpatient monitoring for QTc and arrhythmias.  - Continue digoxin 0.125 mcg daily   - Continue with eliquis 5 mg BID   - Continue optimizing electrolytes  - Suspect underlying JOSH - pending sleep medicine appt outpatient   - No consideration for cardioversion at this point, focusing on rate control     Fatigue  Assessment & Plan  Patient reported she has been feeling fatigued for the past 3 to 6 months.  She additionally admits to having  daytime sleepiness.  This is likely secondary to undiagnosed obstructive sleep apnea.  Her anemia may also be playing a part.  -She will need outpatient pulmonology and sleep study testing  -She will need outpatient referral to hematology for further workup of anemia likely due to decreased bone marrow response    Heart failure with preserved ejection fraction (HCC)  Assessment & Plan  Not in decompensation.  Echo demonstrated normal EF but there is suspicion for heart failure with preserved ejection fraction due to progressive exertional dyspnea and occasionally orthopnea.  Echo additionally demonstrated a moderately elevated right ventricular systolic pressure concerning for pulmonary hypertension.  Likely multifactorial in etiology, type II versus III.  -Cardiology following, appreciate recommendations  -Started on dapagliflozin and spironolactone  -Chest x-ray does not demonstrate any pulmonary edema  -Given 1 dose of oral Lasix 40 Mg today, 5/11 with potassium supplementation  -Patient will need referral to pulmonology and outpatient sleep study due to high suspicion for type III pulmonary hypertension  -Monitor kidney function    Positive cardiac stress test  Assessment & Plan  Patient reported exertional dyspnea and some chest pain on exertion over the past year. Stress test revealed small reversible perfusion defect.  Likely an artifact per cardiology.    Normocytic anemia  Assessment & Plan  Patient reported fatigue for the past 3-6 months and occasional exertional dyspnea. Hemoglobin has slowly down trended from approximately 12 to 9.6 over past year. Has been stable in the last two months. Anemia workup this far including iron panel and b12 has been unremarkable. Reticulocyte count is 1.5% suggestive of a bone marrow process. Makes hemolytic anemia unlikely.   -Will refer patient to hematologist outpatient for further workup    Hypothyroidism- (present on admission)  Assessment & Plan  - Rechecking TSH  -  Continue with home levothyroxine     Depression- (present on admission)  Assessment & Plan  Continue with prozac     Essential hypertension- (present on admission)  Assessment & Plan  -Holding home blood pressure medications in the setting of softer blood pressures       VTE prophylaxis: therapeutic anticoagulation with eliquis    I have performed a physical exam and reviewed and updated ROS and Plan today (5/11/2024). In review of yesterday's note (5/10/2024), there are no changes except as documented above.

## 2024-05-11 NOTE — PROGRESS NOTES
Monitor Summary:    Rhythm:SR (68-80)  Interval: 0.15/0.12/0.38  Ectopy: pacs, pvcs                 normal

## 2024-05-11 NOTE — PROGRESS NOTES
Monitor summary  Rhythm:  SR  Ectopy: PVC, COUP  HR: 71-97  .14/.10/.36  Per strip from monitor room

## 2024-05-11 NOTE — PROGRESS NOTES
This note is intended for the purposes of medical student education and feedback only.   Please refer to the documentation by this patient's assigned medical practitioner for details of care and plans.    Reason for admission:   Azalea Gan is a 72 y/o female with a history of paroxysmal atrial fibrillation s/p post DINESH + cardioversion in March 2024 at [facility] who was admitted with atrial fibrillation with rapid ventricular response.     SUBJECTIVE  Patient states that she is feeling better today and reports that she was extremely tired yesterday after her stress test. She also states that she developed shortness of breath and further dyspnea on exertion and is now requiring 3L O2 NC. Additionally, she states that her legs have been aching since last night and believe that they are a little more swollen this morning. Otherwise, patient understands that her medications may affect her heart rate thus she is being monitored for a few days in the hospital.     OBJECTIVE   Vital Signs:  O2 Sat: 92% on 3 liters of O2    Vitals:    05/10/24 2350 05/11/24 0012 05/11/24 0427 05/11/24 0707   BP: 124/66  127/74    Pulse: 61 61 64    Resp: 20 20 18 17   Temp: 37 °C (98.6 °F)  36.6 °C (97.9 °F)    TempSrc: Temporal  Temporal Temporal   SpO2: 90% 92% 91% 92%   Weight:   110 kg (242 lb 15.2 oz)    Height:         Physical Exam:  Physical Exam  Constitutional:       General: She is not in acute distress.     Appearance: Normal appearance. She is not ill-appearing.   Eyes:      Extraocular Movements: Extraocular movements intact.      Conjunctiva/sclera: Conjunctivae normal.   Cardiovascular:      Rate and Rhythm: Normal rate. Rhythm irregular.   Pulmonary:      Breath sounds: Wheezing present.      Comments: 92% sat on 2L O2 NC. No crackles.  Abdominal:      Palpations: Abdomen is soft.      Tenderness: There is no abdominal tenderness. There is no guarding or rebound.   Musculoskeletal:         General: No tenderness.       Comments: Trace pitting edema bilaterally    Neurological:      General: No focal deficit present.      Mental Status: She is alert and oriented to person, place, and time.     ASSESSMENT/PLAN  Azalea Gan is a 74 y/o female with a history of paroxysmal atrial fibrillation s/p post DINESH + cardioversion in March 2024 at [facility] who was admitted with atrial fibrillation with rapid ventricular response. Symptoms have since resolved with IV and PO Metoprolol and IV Digoxin. ProBNP elevated 1989 with an echocardiogram showing heart failure with preserved ejection fraction of 55%, but with a severely elevated RSVP of 63 mmHg. Stress test was negative. Patient developed trace pedal edema and increasing dyspnea on exertion over night. Mild wheezing present on exam, but no crackles. Will treat with oral Lasix and check for pulmonary edema on CXR.     Atrial fibrillation with rapid ventricular response   Patient with a history of paroxysmal atrial fibrillation on Eliquis who presented with a-fib with RVR that resolved with IV Metoprolol, PO Metoprolol, and IV Digoxin. She recently had a DINESH + cardioversion at [facility] in March 2024. She is currently rate controlled. Blood pressure 110-135 systolic, 58-71 diastolic, thus she has not been a good candidate for beta blockers or calcium channel blockers. She also has a history of thyroid disease and is not a good candidate for Amiodarone. Patient also feels fatigue daily and has exertional symptoms. Stress test was negative. Cardioversion not considered at this time. Patient started on Sotalol due to symptomatic atrial fibrillation per cardiology recommendation. EKG showed sinus rhythm and stable QTc. Cardiology following. Patient needs continuous monitoring for arrhythmias and prolong QTc.   - Continue Digoxin 0.125 mcg QD  - Continue Eliquis 5mg BID  - Optimize electrolytes  - Sleep medicine referral outpatient for JOSH workup    Heart failure with preserved  ejection fraction   Patient was asymptomatic on admission. She began having increasing dyspnea on exertion and trace pedal edema. Last echo in 3/2024 with moderate MR, moderate dilatation of LA, and EF 45-50%, but in the setting of arrhythmia. Recent echo showed LVEF 55% with severely elevated RVSP concerning for pulmonary hypertension. CXR without pulmonary edema. Cardiology following.   - Started on Dapagliflozin and Spironolactone per cardiology recommendation  - Give 1 dose of oral Lasix 40mg with potassium supplementation   - Pulmonology and sleep medicine referral for JOSH and pulmonary hypertension     Fatigue   Patient with increasing fatigue over the last 3-6 months that started since her   3 years ago. She also has daytime sleepiness, but does not know if she snores because she lives alone. Echo showed severely elevated RVSP. CBC also shows anemia with a Hgb 10.2 today, but has slowly trended downwards in the last year. Iron studies, B12, and folate were normal. Reticulocyte count 1.5% possibly suggestive of bone marrow process. Symptoms are concerning for undiagnosed obstructive sleep apnea, although her anemia may be contributing as well. Will check TSH as TSH has not been checked since last year.   - Sleep medicine referral outpatient for JOSH workup  - Outpatient referral for hematology/oncology for further anemia workup  - Will order TSH with reflex T4    Normocytic anemia  Patient with fatigue over the last 3-6 months with exertional dyspnea. Iron studies, B12, and folate were normal. Hgb trending downward over the last year. Hgb 10.2 today. Reticulocyte count 1.5% possibly suggestive of bone marrow process.   - Refer to outpatient hematology/oncology for further workup    Hypothyroidism  Patient's last TSH was checked in May 2023 and was elevated up to 12 at the time. Has not had a recent TSH checked. Patient currenlty on 175mcg Levothyroxine, but unsure if she is adherent to medication at  home. Hypothyroidism could cause fatigue and anemia. Will order TSH with reflex T4 to evaluate.   - Will order TSH with reflex T4    Hypertension   Patient with hypertension. Blood pressures have been relatively soft in hospital. Holding medications to prevent further hypotension.   - Hold blood pressure medication    Depression   Patient with history of depression. No SI/HI today.  - Continue Prozac    Overseeing Licensed Provider: Dr. Walker Handley MD     Student:  DIANA Felix  [employer]

## 2024-05-11 NOTE — CARE PLAN
The patient is Stable - Low risk of patient condition declining or worsening    Shift Goals  Clinical Goals: hemodynamically stable, vss, EKG after sotalol administration  Patient Goals: rest, feel better  Family Goals: N/A    Progress made toward(s) clinical / shift goals: Plan of care and medications discussed and reviewed over with pt. All fall precautions in place. Bed alarm on. Educate pt to call for assistance using call light to ambulate or going to the bathroom. Pt verbalized understanding. Performed EKG for pt's first dose of sotalol and educate pt about medication. Stable vitals. Pt had some expiratory wheezing. Called RT to give neb and also gave prn albuterol. Pt on 2L of O2 when awake and sleep.    Patient is not progressing towards the following goals:      Problem: Knowledge Deficit - Standard  Goal: Patient and family/care givers will demonstrate understanding of plan of care, disease process/condition, diagnostic tests and medications  Outcome: Progressing     Problem: Fall Risk  Goal: Patient will remain free from falls  Outcome: Progressing     Problem: Hemodynamics  Goal: Patient's hemodynamics, fluid balance and neurologic status will be stable or improve  Outcome: Progressing     Problem: Respiratory  Goal: Patient will achieve/maintain optimum respiratory ventilation and gas exchange  Outcome: Progressing     Problem: Gastrointestinal Irritability  Goal: Diarrhea will be absent or improved  Outcome: Progressing     Problem: Mobility  Goal: Patient's capacity to carry out activities will improve  Outcome: Progressing

## 2024-05-12 ENCOUNTER — APPOINTMENT (OUTPATIENT)
Dept: RADIOLOGY | Facility: MEDICAL CENTER | Age: 74
DRG: 273 | End: 2024-05-12
Attending: INTERNAL MEDICINE
Payer: MEDICARE

## 2024-05-12 PROBLEM — A41.9 SEPSIS (HCC): Status: ACTIVE | Noted: 2024-05-12

## 2024-05-12 PROBLEM — J69.0 ASPIRATION PNEUMONIA (HCC): Status: ACTIVE | Noted: 2024-05-12

## 2024-05-12 PROBLEM — R79.89 ELEVATED TROPONIN: Status: ACTIVE | Noted: 2024-05-12

## 2024-05-12 PROBLEM — I47.20 VENTRICULAR TACHYARRHYTHMIA (HCC): Status: ACTIVE | Noted: 2020-10-14

## 2024-05-12 PROBLEM — R65.21 SEPTIC SHOCK (HCC): Status: ACTIVE | Noted: 2024-05-12

## 2024-05-12 PROBLEM — J81.1 PULMONARY EDEMA: Status: RESOLVED | Noted: 2024-05-12 | Resolved: 2024-05-12

## 2024-05-12 PROBLEM — J81.1 PULMONARY EDEMA: Status: ACTIVE | Noted: 2024-05-12

## 2024-05-12 PROBLEM — J96.01 ACUTE HYPOXIC RESPIRATORY FAILURE (HCC): Status: ACTIVE | Noted: 2024-05-12

## 2024-05-12 LAB
AMORPH CRY #/AREA URNS HPF: PRESENT /HPF
ANION GAP SERPL CALC-SCNC: 13 MMOL/L (ref 7–16)
APPEARANCE UR: CLEAR
BACTERIA #/AREA URNS HPF: ABNORMAL /HPF
BASE EXCESS BLDA CALC-SCNC: 1 MMOL/L (ref -4–3)
BASOPHILS # BLD AUTO: 0.3 % (ref 0–1.8)
BASOPHILS # BLD: 0.03 K/UL (ref 0–0.12)
BILIRUB UR QL STRIP.AUTO: NEGATIVE
BODY TEMPERATURE: ABNORMAL DEGREES
BUN SERPL-MCNC: 23 MG/DL (ref 8–22)
CALCIUM SERPL-MCNC: 8.7 MG/DL (ref 8.5–10.5)
CHLORIDE SERPL-SCNC: 102 MMOL/L (ref 96–112)
CO2 BLDA-SCNC: 23 MMOL/L (ref 20–33)
CO2 SERPL-SCNC: 23 MMOL/L (ref 20–33)
COLOR UR: YELLOW
CREAT SERPL-MCNC: 1.23 MG/DL (ref 0.5–1.4)
DIGOXIN SERPL-MCNC: 0.7 NG/ML (ref 0.8–2)
EKG IMPRESSION: NORMAL
EOSINOPHIL # BLD AUTO: 0.09 K/UL (ref 0–0.51)
EOSINOPHIL NFR BLD: 0.8 % (ref 0–6.9)
EPI CELLS #/AREA URNS HPF: ABNORMAL /HPF
ERYTHROCYTE [DISTWIDTH] IN BLOOD BY AUTOMATED COUNT: 55.2 FL (ref 35.9–50)
GFR SERPLBLD CREATININE-BSD FMLA CKD-EPI: 46 ML/MIN/1.73 M 2
GLUCOSE SERPL-MCNC: 123 MG/DL (ref 65–99)
GLUCOSE UR STRIP.AUTO-MCNC: 250 MG/DL
GRAM STN SPEC: NORMAL
HCO3 BLDA-SCNC: 22.5 MMOL/L (ref 17–25)
HCT VFR BLD AUTO: 35.9 % (ref 37–47)
HGB BLD-MCNC: 11.5 G/DL (ref 12–16)
HYALINE CASTS #/AREA URNS LPF: ABNORMAL /LPF
IMM GRANULOCYTES # BLD AUTO: 0.04 K/UL (ref 0–0.11)
IMM GRANULOCYTES NFR BLD AUTO: 0.4 % (ref 0–0.9)
KETONES UR STRIP.AUTO-MCNC: NEGATIVE MG/DL
LACTATE BLD-SCNC: 1.2 MMOL/L (ref 0.5–2)
LACTATE SERPL-SCNC: 1.7 MMOL/L (ref 0.5–2)
LEUKOCYTE ESTERASE UR QL STRIP.AUTO: NEGATIVE
LYMPHOCYTES # BLD AUTO: 2.16 K/UL (ref 1–4.8)
LYMPHOCYTES NFR BLD: 19.8 % (ref 22–41)
MAGNESIUM SERPL-MCNC: 1.9 MG/DL (ref 1.5–2.5)
MCH RBC QN AUTO: 29.6 PG (ref 27–33)
MCHC RBC AUTO-ENTMCNC: 32 G/DL (ref 32.2–35.5)
MCV RBC AUTO: 92.5 FL (ref 81.4–97.8)
MICRO URNS: ABNORMAL
MONOCYTES # BLD AUTO: 0.83 K/UL (ref 0–0.85)
MONOCYTES NFR BLD AUTO: 7.6 % (ref 0–13.4)
MUCOUS THREADS #/AREA URNS HPF: ABNORMAL /HPF
NEUTROPHILS # BLD AUTO: 7.74 K/UL (ref 1.82–7.42)
NEUTROPHILS NFR BLD: 71.1 % (ref 44–72)
NITRITE UR QL STRIP.AUTO: NEGATIVE
NRBC # BLD AUTO: 0 K/UL
NRBC BLD-RTO: 0 /100 WBC (ref 0–0.2)
PCO2 BLDA: 25.8 MMHG (ref 26–37)
PCO2 TEMP ADJ BLDA: 26 MMHG (ref 26–37)
PH BLDA: 7.55 [PH] (ref 7.4–7.5)
PH TEMP ADJ BLDA: 7.55 [PH] (ref 7.4–7.5)
PH UR STRIP.AUTO: 5 [PH] (ref 5–8)
PHOSPHATE SERPL-MCNC: 2.2 MG/DL (ref 2.5–4.5)
PLATELET # BLD AUTO: 233 K/UL (ref 164–446)
PMV BLD AUTO: 10.4 FL (ref 9–12.9)
PO2 BLDA: 110 MMHG (ref 64–87)
PO2 TEMP ADJ BLDA: 111 MMHG (ref 64–87)
POTASSIUM SERPL-SCNC: 3.6 MMOL/L (ref 3.6–5.5)
PROT UR QL STRIP: 30 MG/DL
RBC # BLD AUTO: 3.88 M/UL (ref 4.2–5.4)
RBC # URNS HPF: ABNORMAL /HPF
RBC UR QL AUTO: NEGATIVE
RENAL EPI CELLS #/AREA URNS HPF: ABNORMAL /HPF
SAO2 % BLDA: 99 % (ref 93–99)
SCCMEC + MECA PNL NOSE NAA+PROBE: NEGATIVE
SIGNIFICANT IND 70042: NORMAL
SITE SITE: NORMAL
SODIUM SERPL-SCNC: 138 MMOL/L (ref 135–145)
SOURCE SOURCE: NORMAL
SP GR UR STRIP.AUTO: 1.01
SPECIMEN DRAWN FROM PATIENT: ABNORMAL
TRANS CELLS #/AREA URNS HPF: ABNORMAL /HPF
TROPONIN T SERPL-MCNC: 248 NG/L (ref 6–19)
UROBILINOGEN UR STRIP.AUTO-MCNC: 0.2 MG/DL
WBC # BLD AUTO: 10.9 K/UL (ref 4.8–10.8)
WBC #/AREA URNS HPF: ABNORMAL /HPF

## 2024-05-12 PROCEDURE — 99292 CRITICAL CARE ADDL 30 MIN: CPT | Performed by: INTERNAL MEDICINE

## 2024-05-12 PROCEDURE — 99233 SBSQ HOSP IP/OBS HIGH 50: CPT | Mod: FS | Performed by: INTERNAL MEDICINE

## 2024-05-12 PROCEDURE — 93010 ELECTROCARDIOGRAM REPORT: CPT | Mod: 76 | Performed by: INTERNAL MEDICINE

## 2024-05-12 PROCEDURE — 93010 ELECTROCARDIOGRAM REPORT: CPT | Performed by: INTERNAL MEDICINE

## 2024-05-12 PROCEDURE — 99291 CRITICAL CARE FIRST HOUR: CPT | Performed by: INTERNAL MEDICINE

## 2024-05-12 RX ORDER — LANSOPRAZOLE 30 MG/1
30 TABLET, ORALLY DISINTEGRATING, DELAYED RELEASE ORAL DAILY
Status: DISCONTINUED | OUTPATIENT
Start: 2024-05-13 | End: 2024-05-13

## 2024-05-12 RX ORDER — DIGOXIN 125 MCG
125 TABLET ORAL DAILY
Status: DISCONTINUED | OUTPATIENT
Start: 2024-05-12 | End: 2024-05-15

## 2024-05-12 RX ORDER — FUROSEMIDE 10 MG/ML
40 INJECTION INTRAMUSCULAR; INTRAVENOUS
Status: DISCONTINUED | OUTPATIENT
Start: 2024-05-12 | End: 2024-05-13

## 2024-05-12 RX ORDER — ACETAMINOPHEN 325 MG/1
650 TABLET ORAL EVERY 4 HOURS PRN
Status: DISCONTINUED | OUTPATIENT
Start: 2024-05-12 | End: 2024-05-15

## 2024-05-12 RX ORDER — MAGNESIUM SULFATE HEPTAHYDRATE 40 MG/ML
2 INJECTION, SOLUTION INTRAVENOUS ONCE
Status: COMPLETED | OUTPATIENT
Start: 2024-05-12 | End: 2024-05-12

## 2024-05-12 RX ORDER — ROCURONIUM BROMIDE 10 MG/ML
50 INJECTION, SOLUTION INTRAVENOUS ONCE
Status: COMPLETED | OUTPATIENT
Start: 2024-05-12 | End: 2024-05-12

## 2024-05-12 RX ORDER — ATORVASTATIN CALCIUM 40 MG/1
40 TABLET, FILM COATED ORAL EVERY EVENING
Status: DISCONTINUED | OUTPATIENT
Start: 2024-05-12 | End: 2024-05-15

## 2024-05-12 RX ORDER — ALLOPURINOL 100 MG/1
100 TABLET ORAL DAILY
Status: DISCONTINUED | OUTPATIENT
Start: 2024-05-13 | End: 2024-05-15

## 2024-05-12 RX ADMIN — PROPOFOL 50 MCG/KG/MIN: 10 INJECTION, EMULSION INTRAVENOUS at 16:58

## 2024-05-12 RX ADMIN — LINEZOLID 600 MG: 600 INJECTION, SOLUTION INTRAVENOUS at 06:14

## 2024-05-12 RX ADMIN — SPIRONOLACTONE 25 MG: 25 TABLET ORAL at 05:40

## 2024-05-12 RX ADMIN — MAGNESIUM SULFATE HEPTAHYDRATE 2 G: 2 INJECTION, SOLUTION INTRAVENOUS at 09:51

## 2024-05-12 RX ADMIN — NOREPINEPHRINE BITARTRATE 0.2 MCG/KG/MIN: 1 INJECTION, SOLUTION, CONCENTRATE INTRAVENOUS at 01:41

## 2024-05-12 RX ADMIN — FENTANYL CITRATE 100 MCG: 50 INJECTION, SOLUTION INTRAMUSCULAR; INTRAVENOUS at 10:54

## 2024-05-12 RX ADMIN — CEFEPIME 2 G: 2 INJECTION, POWDER, FOR SOLUTION INTRAVENOUS at 14:07

## 2024-05-12 RX ADMIN — PROPOFOL 80 MCG/KG/MIN: 10 INJECTION, EMULSION INTRAVENOUS at 04:59

## 2024-05-12 RX ADMIN — DOXYCYCLINE 100 MG: 100 INJECTION, POWDER, LYOPHILIZED, FOR SOLUTION INTRAVENOUS at 18:08

## 2024-05-12 RX ADMIN — PROPOFOL 80 MCG/KG/MIN: 10 INJECTION, EMULSION INTRAVENOUS at 08:56

## 2024-05-12 RX ADMIN — DIGOXIN 125 MCG: 0.12 TABLET ORAL at 18:09

## 2024-05-12 RX ADMIN — PROPOFOL 60 MCG/KG/MIN: 10 INJECTION, EMULSION INTRAVENOUS at 22:38

## 2024-05-12 RX ADMIN — DOXYCYCLINE 100 MG: 100 INJECTION, POWDER, LYOPHILIZED, FOR SOLUTION INTRAVENOUS at 09:30

## 2024-05-12 RX ADMIN — APIXABAN 5 MG: 5 TABLET, FILM COATED ORAL at 18:09

## 2024-05-12 RX ADMIN — PROPOFOL 80 MCG/KG/MIN: 10 INJECTION, EMULSION INTRAVENOUS at 01:40

## 2024-05-12 RX ADMIN — OMEPRAZOLE 20 MG: 20 CAPSULE, DELAYED RELEASE ORAL at 05:40

## 2024-05-12 RX ADMIN — APIXABAN 5 MG: 5 TABLET, FILM COATED ORAL at 05:40

## 2024-05-12 RX ADMIN — LEVOTHYROXINE SODIUM 175 MCG: 0.17 TABLET ORAL at 05:44

## 2024-05-12 RX ADMIN — NOREPINEPHRINE BITARTRATE 0.07 MCG/KG/MIN: 1 INJECTION, SOLUTION, CONCENTRATE INTRAVENOUS at 18:16

## 2024-05-12 RX ADMIN — DAPAGLIFLOZIN 10 MG: 10 TABLET, FILM COATED ORAL at 05:44

## 2024-05-12 RX ADMIN — SENNOSIDES AND DOCUSATE SODIUM 2 TABLET: 50; 8.6 TABLET ORAL at 18:09

## 2024-05-12 RX ADMIN — FAMOTIDINE 20 MG: 10 INJECTION, SOLUTION INTRAVENOUS at 05:40

## 2024-05-12 RX ADMIN — SENNOSIDES AND DOCUSATE SODIUM 2 TABLET: 50; 8.6 TABLET ORAL at 05:41

## 2024-05-12 RX ADMIN — ROCURONIUM BROMIDE 50 MG: 50 INJECTION, SOLUTION INTRAVENOUS at 10:57

## 2024-05-12 RX ADMIN — FUROSEMIDE 40 MG: 10 INJECTION INTRAMUSCULAR; INTRAVENOUS at 12:34

## 2024-05-12 RX ADMIN — CEFEPIME 2 G: 2 INJECTION, POWDER, FOR SOLUTION INTRAVENOUS at 05:42

## 2024-05-12 RX ADMIN — ATORVASTATIN CALCIUM 40 MG: 40 TABLET, FILM COATED ORAL at 18:09

## 2024-05-12 RX ADMIN — IPRATROPIUM BROMIDE AND ALBUTEROL SULFATE 3 ML: 2.5; .5 SOLUTION RESPIRATORY (INHALATION) at 22:21

## 2024-05-12 RX ADMIN — POTASSIUM PHOSPHATE, MONOBASIC AND POTASSIUM PHOSPHATE, DIBASIC 15 MMOL: 224; 236 INJECTION, SOLUTION, CONCENTRATE INTRAVENOUS at 10:59

## 2024-05-12 RX ADMIN — PROPOFOL 80 MCG/KG/MIN: 10 INJECTION, EMULSION INTRAVENOUS at 12:01

## 2024-05-12 RX ADMIN — PRAMIPEXOLE DIHYDROCHLORIDE 0.25 MG: 0.25 TABLET ORAL at 21:51

## 2024-05-12 RX ADMIN — CEFEPIME 2 G: 2 INJECTION, POWDER, FOR SOLUTION INTRAVENOUS at 21:53

## 2024-05-12 RX ADMIN — ALLOPURINOL 100 MG: 100 TABLET ORAL at 05:40

## 2024-05-12 ASSESSMENT — PAIN DESCRIPTION - PAIN TYPE
TYPE: ACUTE PAIN

## 2024-05-12 ASSESSMENT — FIBROSIS 4 INDEX: FIB4 SCORE: 1.51

## 2024-05-12 NOTE — PROGRESS NOTES
Informed by nurse at 5:30 that rapid response being called. Patient got up from bed and started to become unresponsive. Unable to maintain oxy sats, dipping to 70s.     Cardiac monitor showing wide complex ventricular tachycardia. Blood pressures 130s/50s     Magnesium 2g pushed, Bicarbonate 2amps pushed. 40mg iv lasix given due to conccerns for fluid overload    Impending code blue, patient moved to RICU

## 2024-05-12 NOTE — CARE PLAN
Problem: Ventilation  Goal: Ability to achieve and maintain unassisted ventilation or tolerate decreased levels of ventilator support  Description: Target End Date:  4 days     Document on Vent flowsheet    1.  Support and monitor invasive and noninvasive mechanical ventilation  2.  Monitor ventilator weaning response  3.  Perform ventilator associated pneumonia prevention interventions  4.  Manage ventilation therapy by monitoring diagnostic test results  Outcome: Not Met    Vent Day: 2    Airway: 7.5 ETT @ 22    Ventilator Settings: APVCMV 26 / 330 / +14 / 100%

## 2024-05-12 NOTE — ASSESSMENT & PLAN NOTE
Possible afib with abberancy in the setting of LBBB  Sotalol and Prozac stopped  5/13 no arrhythmias episodes    [facility] today   Optimize electrolytes  Digoxin 125 mcg qd

## 2024-05-12 NOTE — PROCEDURES
Central Line Insertion    Date/Time: 5/11/2024 7:17 PM    Performed by: Moe Caceres M.D.  Authorized by: Moe Caceres M.D.    Consent:     Consent obtained:  Emergent situation    Consent given by:  Patient    Risks discussed:  Arterial puncture, incorrect placement, nerve damage, bleeding, infection and pneumothorax    Alternatives discussed:  No treatment, delayed treatment, alternative treatment and observation  Pre-procedure details:     Hand hygiene: Hand hygiene performed prior to insertion      Sterile barrier technique: All elements of maximal sterile technique followed      Skin preparation:  ChloraPrep  Sedation:     Sedation type:  Deep  Procedure details:     Location:  R internal jugular    Patient position:  Flat    Catheter size:  7 Fr    Landmarks identified: yes      Ultrasound guidance: yes      Sterile ultrasound techniques: Sterile gel and sterile probe covers were used      Number of attempts:  1    Successful placement: yes    Post-procedure details:     Post-procedure:  Dressing applied and line sutured    Guidewire: guidewire removal confirmed      Assessment:  Blood return through all ports and free fluid flow    Patient tolerance of procedure:  Tolerated well, no immediate complications

## 2024-05-12 NOTE — ASSESSMENT & PLAN NOTE
Cardiogenic  CXR w/increase infiltrates in comparison to CXR in AM.   Received Lasix 40 mg IV.   - Continue diuresis.   - CXR in AM.   - I/O's.  - Daily weights.

## 2024-05-12 NOTE — ASSESSMENT & PLAN NOTE
POLST indicates DNR; full code from 5/8/24 leading up to this intubation  05/12/24 spoke with Azalea Cochran's daughter, who said her mother was very clear on DNR/DNI in line with the prior POLST. It is unclear how she became a full code on this admission as its not documented. Her daughter will attempt to come to [place] in the next 1-2 days to further assess the situation but in the interim she believes the best course is DNR/I ok as her mother has made it very clear she would not want advanced life support or a reduced quality of life.    5/13 RN spoke with patient after extubation. Pt did confirm to us that pt would like to be intubated and resuscitated in the event of cardiac arrest

## 2024-05-12 NOTE — ASSESSMENT & PLAN NOTE
Continue digoxin   DOAC  Had polymorphic vtach in setting of Sotalol on 5/11  Discontinue Sotalol and Prozac as per cardiology

## 2024-05-12 NOTE — ASSESSMENT & PLAN NOTE
Acute secondary to pulmonary edema +/- aspiration pneumonia/pneumonitis.   SpO2 70s in setting of polymorphic Vtach and encephalopathy.  5/11 emergently intubated and transferred to the ICU  5/12 Bronchoscopy with BAL RML, aspirate noted; diffusely friable mucosa     Lung protective ventilation strategies  Optimize oxygenation, ventilation, and acid base balance  ABCDEF Bundle   Diuresis  Empiric antibiotics

## 2024-05-12 NOTE — PROGRESS NOTES
Cardiology Progress Note    I was called about pt Malik having decompensated while trying to go to the bathroom. Reportedly VT seen on monitor. I asked to look at the available strips and looking at what is available apparently salvos of NSVT.  Looks to be polymorphic. Repeat 12 lead showing widening underlying QRS into atypical LBBB pattern as well. ST elevation may be secondary to the widened atypical LBBB aberrancy. Recent stress was reassuring with small mid/basal inferior ischemia thought to be artifact by cardiology, wall motion normal, biomarkers unremarkable. She has been getting sotalol here at the 80 BID dose but renal function has been variable along with very high dose of concomittant qt prolonging agent in prozac. The QT is not markedly prolonged but in setting of salvos of PMVT recommend cessation of the class III antiarrhythmic agent (I will stop) along with holding the SSRI for now. Recommend eve agents for rate control if she goes back into AF. IV dilt or esmolol reasonable. Once sotalol washes out in a few days, can consider amiodarone which although another class III agent, less propensity for TDP. If recurrent PMVT, IV lidocaine can be considered in the interim.    Trending trop is reasonable to exclude acute ischemic event.         Artie Ahumada MD

## 2024-05-12 NOTE — PROGRESS NOTES
Cardiology Follow Up Progress Note    Date of Service  5/12/24    Attending Physician  Walker Handley M.D.    Chief Complaint   Cardiology is consulted for evaluation of recurrent A-fib RVR    HPI  Azalea Gan is a 73 y.o. female admitted 5/8/2024 with dizziness and lightheadedness, found to be in A-fib RVR.    PMH: PAF status post DINESH guided cardioversion 3/22/2024 with restoration of SR, on OAC with Eliquis, moderate MR, HFpEF, NSVT, hypertension, nocturnal hypoxia, daytime somnolence, BMI 40s    Interim Events    PMVT overnight, sotalol discontinued.  Patient was transferred to [facility] ICU for significant hypoxia s/p emergent intubation.    She was evaluated at bedside this afternoon.  Telemetry-SB in and out of junctional rhythm.  Hold digoxin  SBP ~116  Remains intubated  Propofol + fentayl  PEEP 12, FiO2 70%  Labs reviewed  Elevated troponin ~240s  Scr stable      Review of Systems  Intubated & sedated    Vital signs in last 24 hours  Pulse:  [] 56  Resp:  [0-60] 20  BP: ()/(46-83) 114/57  SpO2:  [83 %-100 %] 99 %    Physical Exam  Physical Exam  Constitutional:       Comments: Intubated and sedated   Cardiovascular:      Rate and Rhythm: Bradycardia present.   Pulmonary:      Comments: PEEP of 12, FiO2 70%  Skin:     General: Skin is warm.   Neurological:      Mental Status: She is alert.         Lab Review  Lab Results   Component Value Date/Time    WBC 10.9 (H) 05/12/2024 04:15 AM    RBC 3.88 (L) 05/12/2024 04:15 AM    HEMOGLOBIN 11.5 (L) 05/12/2024 04:15 AM    HEMATOCRIT 35.9 (L) 05/12/2024 04:15 AM    MCV 92.5 05/12/2024 04:15 AM    MCH 29.6 05/12/2024 04:15 AM    MCHC 32.0 (L) 05/12/2024 04:15 AM    MPV 10.4 05/12/2024 04:15 AM      Lab Results   Component Value Date/Time    SODIUM 138 05/12/2024 04:15 AM    POTASSIUM 3.6 05/12/2024 04:15 AM    CHLORIDE 102 05/12/2024 04:15 AM    CO2 23 05/12/2024 04:15 AM    GLUCOSE 123 (H) 05/12/2024 04:15 AM    BUN 23 (H) 05/12/2024 04:15 AM     CREATININE 1.23 05/12/2024 04:15 AM      Lab Results   Component Value Date/Time    ASTSGOT 15 05/09/2024 01:34 AM    ALTSGPT 10 05/09/2024 01:34 AM     Lab Results   Component Value Date/Time    TRIGLYCERIDE 69 05/11/2024 06:30 PM    TROPONINT 248 (H) 05/12/2024 04:15 AM       Recent Labs     05/10/24  1444 05/11/24  1830   NTPROBNP 1989* 5798*       Cardiac Imaging and Procedures Review  EKG: Sinus bradycardia    Echocardiogram:      Normal left ventricular size, wall thickness, and systolic function.   The right ventricle is mildly dilated with preserved systolic function.  Moderately dilated left atrium.  Mild mitral regurgitation.  Mild tricuspid regurgitation.  Estimated right ventricular systolic pressure is 63 mmHg; severely   elevated.  Right atrial pressure is estimated to be 15 mmHg; elevated.  No pericardial effusion.    Imaging      Stress Test:    5/10/2024   Myocardial Perfusion   Report   NUCLEAR IMAGING INTERPRETATION   1.  Small partially reversible myocardial perfusion defect in the mid    inferior and basal inferior segments.   2.  Myocardial perfusion is otherwise normal.   3.  LVEF is 50%.   4.  Left ventricular dilation with stress maneuvering.   5.  Decreased wall motion in the inferior and septal walls, but wall    thickening is preserved.     Assessment/Plan      # Acute hypoxic failure s/p emergent intubation overnight.  # Polymorphic VT.  # Pulmonary hypertension RVSP 63 mmHg   # JOSH  # Obesity  # Suspect HFpEF  # LVEF 55%  # Elevated troponin ~240      - Pulmonary edema versus aspiration pneumonia.  -Empiric antibiotics  -Furosemide 40 IV daily.  - PMVT overnight, sotalol discontinued 5/11/2024.  - Appreciate pulmonary consult.  - Apixaban 5 twice daily.  -Recommend ischemic workup with [facility] and coronary angiography when clinically stable.      Cardiology will follow along    I personally spent a total of 20 minutes which includes face-to-face time and non-face-to-face time spent on  preparing to see the patient, reviewing hospital notes and tests, obtaining history from the patient, performing a medically appropriate exam, counseling and educating the patient, ordering medications/tests/procedures/referrals as clinically indicated, and documenting information in the electronic medical record.     Thank you for allowing me to participate in the care of this patient.  I will continue to follow this patient    Please contact me with any questions.    BRI Fong.   Cardiologist, [facility]  [phone]

## 2024-05-12 NOTE — PROGRESS NOTES
Patient purewick was leaking so helped patient to the chair and then back to bed. After this patient started complaining of shortness of breath. Switched patient to an oxymask and was increasing O2. Patient O2 still dropping, put patient on a non-rebreather. Patient O2 continued to drop to 70s while on maximum O2 from non-rebreather. Rapid response called and rapid response team came to bedside.

## 2024-05-12 NOTE — PROCEDURES
"Arterial Line Insertion    Date/Time: 5/11/2024 7:18 PM    Performed by: Moe Caceres M.D.  Authorized by: Moe Caceres M.D.  Consent: The procedure was performed in an emergent situation.  Consent given by: patient  Patient understanding: patient states understanding of the procedure being performed  Patient consent: the patient's understanding of the procedure matches consent given  Procedure consent: procedure consent matches procedure scheduled  Relevant documents: relevant documents present and verified  Site marked: the operative site was marked  Patient identity confirmed: hospital-assigned identification number  Time out: Immediately prior to procedure a \"time out\" was called to verify the correct patient, procedure, equipment, support staff and site/side marked as required.  Preparation: Patient was prepped and draped in the usual sterile fashion.  Indications: multiple ABGs, respiratory failure and hemodynamic monitoring  Location: right radial    Anesthesia:  Local Anesthetic: lidocaine 1% without epinephrine    Sedation:  Patient sedated: yes  Sedatives: etomidate  Analgesia: fentanyl    Ashok's test normal: yes  Needle gauge: 22  Seldinger technique: Seldinger technique used  Number of attempts: 1  Post-procedure: dressing applied  Post-procedure CMS: normal  Comments: Post procedure there was a good arterial waveform noted on the monitor. However soon after the waveform could not be obtained. The line was removed. Likely radial arterial line is not going to be feasible because of excessive subcutaneous tissue that causes the line to collapse.               "

## 2024-05-12 NOTE — ASSESSMENT & PLAN NOTE
This is Sepsis Not present on admission  SIRS criteria identified on my evaluation include: Fever, with temperature greater than 100.9 deg F and Tachycardia, with heart rate greater than 90 BPM  Clinical indicators of end organ dysfunction include Acute Respiratory Failure - (mechanical ventilation or BiPap or PaO2/FiO2 ratio < 300)  Source is Pulmonary  Sepsis protocol initiated  Crystalloid Fluid Administration: Held with concern for pulmonary edema  IV antibiotics as appropriate for source of sepsis: Linezolid, Cefepime    Cefepime only. Treat for total 5 days  MRSA Nare negative: discontinue linezolid  BAL 5/12 - follow cultures  Vasopressors for MAP > 65

## 2024-05-12 NOTE — ASSESSMENT & PLAN NOTE
Normal LVEF; dilated RV  RVSP 63  Keep negative fluid balance  Increase lasix 40 Q8H  Re-evaluate for GDMT after resolution of critical illness

## 2024-05-12 NOTE — ASSESSMENT & PLAN NOTE
5/11 after AMS in the setting of vtach    Empiric cefepime + doxy. Treat for 5 days (end date 5/18)  MRSA nare negative   Follow BAL 5/12

## 2024-05-12 NOTE — CONSULTS
CONSULTATION SERVICE:   Pulmonary    REASON FOR CONSULTATION:  Respiratory failure    PATIENT IDENTIFICATION:  Azalea Gan is a 73 y.o. female w/PMH of Afib s/p DINESH cardioversion  (03/2024), HTN, Hypothyroidism, prior CVA, HLD, JOSH, Renal artery stenosis. Admitted 05/08/24 d/t Afib RVR as a transfer from [facility] d/t no bed availability. At outside facility she received Metoprolol 5 mg iv, Metoprolol 25 mg PO, Digoxin IV. On admission, decision was to hold rate controlled medications d/t labile BP. Started on Digoxin for rhythm controlled, Amiodarone not considered d/t h/o Hypothyroidism.     05/09. Rate controlled, only on rhythm control w/Digoxin d/t still having soft BP's.   05/10.  Nuclear medicine stress is done: Small partially reversible myocardial perfusion defect in the mid  inferior and basal inferior segments; decreased wall motion in inferior and septal walls; LVEF 50%. Cardiology was consulted, considered to be false positive w/artifact. Started on Sotalol 80 mg PO at 2151 hrs d/t recurrent PAF.     INTERVAL EVENT:   05/11. Cardiology following and added Dapaglifozin for HFpEF, also recommended to continue w/Sotalol. EKG in AM w/Qtc 412. CXR w/o acute abnormalities. AT 1730 hrs rapid response was called d/t acute decompensation of clinical and respiratory status in the setting of Polymorphic Vtach following patient attempting to get up from bed. Marked hypoxia, low 70's w/rapid decline in mental status. No hemodynamic instability. Patient was transferred to the RICU for emergent intubation. Received Magnesium push, Bicarbonate and IV Lasix. Central and arterial lines were placed. Bedside bronchoscopy did not reveal specific abnormalities. Cardiology (Dr. Ahumada) was contacted, recommended discontinuation of Sotalol and Prozac d/t Qtc in upper limit of normal and polymorphic Vtach (transitional). Lab work during rapid remarkable for mild leukocytosis, no electrolytes abnormalities, no  "lactic acidosis. Troponin elevated at 52.     0040 hrs. Reviewed repeat troponin, 224 increased from 52. Ordered STAT EKG.   0240 hrs. EKG w/o ischemic changes, no changes from prior. Updated Cardiology. Considering most likely demand ischemia vs NSTEMI. Ordered repeated Troponin.   0300 hrs. Added repeated Troponin, still pending up to 0540 hrs. If persistent elevation will start Heparin drip.     ROS    /58   Pulse (!) 59   Temp 36.6 °C (97.9 °F) (Temporal)   Resp 20   Ht 1.626 m (5' 4\")   Wt 110 kg (242 lb 15.2 oz)   LMP  (LMP Unknown)   SpO2 99%   BMI 41.70 kg/m²     Physical Exam  Constitutional:       Appearance: She is ill-appearing and toxic-appearing.   Cardiovascular:      Rate and Rhythm: Normal rate and regular rhythm.      Pulses: Normal pulses.      Heart sounds: Normal heart sounds.   Pulmonary:      Comments: Intubated.   Diffuse crackles, more right.   Musculoskeletal:      Right lower leg: No edema.      Left lower leg: No edema.   Skin:     Coloration: Skin is pale.   Neurological:      Comments: Under sedation         Labs:  No results found for: \"PROTHROMBTM\", \"INR\"   Lab Results   Component Value Date/Time    WBC 10.9 (H) 05/12/2024 04:15 AM    RBC 3.88 (L) 05/12/2024 04:15 AM    HEMOGLOBIN 11.5 (L) 05/12/2024 04:15 AM    HEMATOCRIT 35.9 (L) 05/12/2024 04:15 AM    MCV 92.5 05/12/2024 04:15 AM    MCH 29.6 05/12/2024 04:15 AM    MCHC 32.0 (L) 05/12/2024 04:15 AM    MPV 10.4 05/12/2024 04:15 AM    NEUTSPOLYS 71.10 05/12/2024 04:15 AM    LYMPHOCYTES 19.80 (L) 05/12/2024 04:15 AM    MONOCYTES 7.60 05/12/2024 04:15 AM    EOSINOPHILS 0.80 05/12/2024 04:15 AM    BASOPHILS 0.30 05/12/2024 04:15 AM      Lab Results   Component Value Date/Time    SODIUM 138 05/12/2024 04:15 AM    POTASSIUM 3.6 05/12/2024 04:15 AM    CHLORIDE 102 05/12/2024 04:15 AM    CO2 23 05/12/2024 04:15 AM    GLUCOSE 123 (H) 05/12/2024 04:15 AM    BUN 23 (H) 05/12/2024 04:15 AM    CREATININE 1.23 05/12/2024 04:15 AM    "     Imaging:   DX-CHEST-PORTABLE (1 VIEW)   Final Result      1.  Worsening hypoinflation   2.  Improvement of multifocal RIGHT lung consolidation.   3.  Supportive tubing is unchanged.      DX-CHEST-PORTABLE (1 VIEW)   Final Result      1.  Lines and tubes appear appropriately located      2.  Development of right lung airspace process which could be pneumonia or edema      DX-CHEST-PORTABLE (1 VIEW)   Final Result      Cardiomegaly and pulmonary vascular dilation or again noted. No acute process.      EC-ECHOCARDIOGRAM COMPLETE W/O CONT   Final Result      NM-CARDIAC STRESS TEST   Final Result      EC-ECHOCARDIOGRAM COMPLETE W/O CONT    (Results Pending)       Assessment and plan:   * Acute hypoxic respiratory failure (HCC)  Assessment & Plan  Acute onset considered secondary to pulmonary edema +/- aspiration pneumonia/pneumonitis. Patient had marked decrease in SpO2 to low 70's in setting of acute onset polymorphic Vtach and encephalopathy.   Emergent intubation and transfer to ICU.   Bronchoscopy at bedside w/o specific abnormalities noted.   CXR shows worsening interstitial infiltrates and new right consolidation.   Received Lasix 40 mg IV.   - Continue mechanical ventilation.   - Continue IV diuresis.   - Sedation: propofol, fentanyl.  - Gastric prophylaxis: Famotidine  - Antibiotic therapy: Linezolid + Cefepime.   - RT following.   - SBT per RT.  - ABG PRN per RT.   - Daily CXR.   - Oral care.   - Suction as needed.   - Head of bed > 30 degrees.     Sepsis (HCC)  Assessment & Plan  This is Sepsis Not present on admission  SIRS criteria identified on my evaluation include: Fever, with temperature greater than 100.9 deg F and Tachycardia, with heart rate greater than 90 BPM  Clinical indicators of end organ dysfunction include Acute Respiratory Failure - (mechanical ventilation or BiPap or PaO2/FiO2 ratio < 300)  Source is Pulmonary  Sepsis protocol initiated  Crystalloid Fluid Administration: Held d/t  pulmonary edema.  IV antibiotics as appropriate for source of sepsis: Linezolid, Cefepime.   - Continue antibiotic therapy.   - Pressors support for MAP > 65 mmHg.   - Follow blood cultures.    Pulmonary edema  Assessment & Plan  Cardiogenic.   CXR w/increase infiltrates in comparison to CXR in AM.   Received Lasix 40 mg IV.   - Continue diuresis.   - CXR in AM.   - I/O's.  - Daily weights.     Elevated troponin  Assessment & Plan  Troponin 52 during rapid, EKG w/ST changes considered d/t underlying LBBB.   Troponin 2330 hrs: 244. EKG unchanged, no longer ST changes. Updated Cardiology. Demand ischemia vs NSTEMI.   - PENDING REPEAT TROPONIN, IF FURTHER INCREASE START HEPARIN GGT.     Atrial fibrillation with rapid ventricular response (HCC)- (present on admission)  Assessment & Plan  Rate: Controlled.   CHADS-VASC score:   Anticoagulation:  Echocardiogram:  TSH:   Had Polymorphic Vtach in setting of Sotalol.   - Discontinue Sotalol and Prozac as per cardiology.     Hypothyroidism- (present on admission)  Assessment & Plan  TSH (05/11/24): 0.91  - Continue Levothyroxine 175 mcg daily.           Chivo Curiel M.D.  PGY-2 Internal Medicine Resident

## 2024-05-12 NOTE — PROCEDURES
Intubation    Date/Time: 5/11/2024 7:14 PM    Performed by: Moe Caceres M.D.  Authorized by: Moe Caceres M.D.    Consent:     Consent obtained:  Verbal and emergent situation    Consent given by:  Patient    Risks discussed:  Aspiration, brain injury, dental trauma, laryngeal injury, bleeding, death, hypoxia and pneumothorax    Alternatives discussed:  No treatment, delayed treatment, alternative treatment and observation  Pre-procedure details:     Patient status:  Unresponsive    Mallampati score:  III    Pretreatment meds: etomidate 20 mg.    Paralytics:  Rocuronium (50 mg)  Procedure details:     Preoxygenation:  Bag valve mask    CPR in progress: no      Intubation method:  Oral    Oral intubation technique:  Video-assisted    Laryngoscope type:  GlideScope    Laryngoscope blade:  Mac 3    Cormack-Lehane Classification:  Grade 2a    Tube size (mm):  7.5    Tube type:  Cuffed    Number of attempts:  1    Ventilation between attempts: no      Cricoid pressure: no      Tube visualized through cords: yes    Placement assessment:     Tube secured with:  ETT langston    Breath sounds:  Absent over the epigastrium and equal    Placement verification: condensation, CXR verification, direct visualization, ETCO2 detector and tube exhalation      CXR findings:  ETT in proper place  Post-procedure details:     Patient tolerance of procedure:  Tolerated well, no immediate complications

## 2024-05-12 NOTE — PROGRESS NOTES
"Critical Care Progress Note    Date of admission  5/8/2024    \"73 y.o. female w/PMH of Afib s/p DINESH cardioversion  (03/2024), HTN, Hypothyroidism, prior CVA, HLD, JOSH, Renal artery stenosis. Admitted 05/08/24 d/t Afib RVR as a transfer from [facility] d/t no bed availability. At outside facility she received Metoprolol 5 mg iv, Metoprolol 25 mg PO, Digoxin IV. On admission, decision was to hold rate controlled medications d/t labile BP. Started on Digoxin for rhythm controlled, Amiodarone not considered d/t h/o Hypothyroidism.      05/09. Rate controlled, only on rhythm control w/Digoxin d/t still having soft BP's.   05/10.  Nuclear medicine stress is done: Small partially reversible myocardial perfusion defect in the mid  inferior and basal inferior segments; decreased wall motion in inferior and septal walls; LVEF 50%. Cardiology was consulted, considered to be false positive w/artifact. Started on Sotalol 80 mg PO at 2151 hrs d/t recurrent PAF.   05/11. Cardiology following and added Dapaglifozin for HFpEF, also recommended to continue w/Sotalol. EKG in AM w/Qtc 412. CXR w/o acute abnormalities. AT 1730 hrs rapid response was called d/t acute decompensation of clinical and respiratory status in the setting of Polymorphic Vtach following patient attempting to get up from bed. Marked hypoxia, low 70's w/rapid decline in mental status. No hemodynamic instability. Patient was transferred to the RICU for emergent intubation. Received Magnesium push, Bicarbonate and IV Lasix. Central and arterial lines were placed. Bedside bronchoscopy did not reveal specific abnormalities. Cardiology (Dr. Ahumada) was contacted, recommended discontinuation of Sotalol and Prozac d/t Qtc in upper limit of normal and polymorphic Vtach (transitional). Lab work during rapid remarkable for mild leukocytosis, no electrolytes abnormalities, no lactic acidosis. Troponin elevated at 52.\"    Hospital Course - ICU  5/11 admitted to " the ICU, intubated   5/12 VD 2 PEEP 12/70%; bronch with BAL    Interval Problem Update  Reviewed last 24 hour events:  Deeply sedated   PEEP 12/70%  Prop + fent  Levo for MAP > 65  Trop 248 (244) cardiology following  PCT 4.4 on cefepime and doxy (MRSA nare negative)  Lasix 40 mg IV qd  I/O + 4.4 L    Daughter updated via phone, see problem list for details    Review of Systems  Review of Systems   Unable to perform ROS: Critical illness        Vital Signs for last 24 hours   Pulse:  [] 56  Resp:  [0-60] 20  BP: ()/(46-83) 116/68  SpO2:  [85 %-100 %] 99 %    Hemodynamic parameters for last 24 hours       Respiratory Information for the last 24 hours  Vent Mode: APVCMV  Rate (breaths/min): 20  Vt Target (mL): 330  PEEP/CPAP: 12  MAP: 17  Control VTE (exp VT): 330    Physical Exam   Physical Exam  Vitals and nursing note reviewed.   Constitutional:       Appearance: She is ill-appearing.   HENT:      Head: Normocephalic and atraumatic.      Right Ear: External ear normal.      Left Ear: External ear normal.      Nose: Nose normal.      Mouth/Throat:      Mouth: Mucous membranes are moist.   Eyes:      Pupils: Pupils are equal, round, and reactive to light.   Cardiovascular:      Rate and Rhythm: Normal rate.   Pulmonary:      Comments: Equal and symmetric breath sounds with mechanical ventilation  Abdominal:      General: Abdomen is flat. There is no distension.      Palpations: Abdomen is soft.      Tenderness: There is no abdominal tenderness. There is no guarding or rebound.   Musculoskeletal:      Right lower leg: Edema present.      Left lower leg: Edema present.   Skin:     General: Skin is warm and dry.      Capillary Refill: Capillary refill takes less than 2 seconds.   Neurological:      Comments: Deeply sedated on the ventilator          Medications  Current Facility-Administered Medications   Medication Dose Route Frequency Provider Last Rate Last Admin    doxycycline (Vibramycin) 100 mg in NS  100 mL IVPB  100 mg Intravenous Q12HRS Blayne Carrasquillo M.D.   Stopped at 05/12/24 1030    furosemide (Lasix) injection 40 mg  40 mg Intravenous Q DAY Blayne Carrasquillo M.D.   40 mg at 05/12/24 1234    propofol (DIPRIVAN) injection  0-80 mcg/kg/min (Ideal) Intravenous Continuous Asael Calvert M.D. 19.7 mL/hr at 05/12/24 1352 60 mcg/kg/min at 05/12/24 1352    fentaNYL (SUBLIMAZE) 50 mcg/mL in 50mL (Continuous Infusion)   Intravenous Continuous Moe Caceres M.D. 0.5 mL/hr at 05/11/24 1925 25 mcg/hr at 05/11/24 1925    Respiratory Therapy Consult   Nebulization Continuous RT Moe Caceres M.D.        senna-docusate (Pericolace Or Senokot S) 8.6-50 MG per tablet 2 Tablet  2 Tablet Enteral Tube BID Moe Caceres M.D.   2 Tablet at 05/12/24 0541    And    polyethylene glycol/lytes (Miralax) Packet 1 Packet  1 Packet Enteral Tube QDAY PRN Moe Caceres M.D.        And    magnesium hydroxide (Milk Of Magnesia) suspension 30 mL  30 mL Enteral Tube QDAY PRN Moe Caceres M.D.        And    bisacodyl (Dulcolax) suppository 10 mg  10 mg Rectal QDAY PRN Moe Caceres M.D. MD Alert...ICU Electrolyte Replacement per Pharmacy   Other PHARMACY TO DOSE Moe Caceres M.D.        lidocaine (Xylocaine) 1 % injection 2 mL  2 mL Tracheal Tube Q30 MIN PRN Moe Caceres M.D.        norepinephrine (Levophed) 8 mg in 250 mL NS infusion (premix)  0-1 mcg/kg/min (Ideal) Intravenous Continuous Moe Caceres M.D. 13.3 mL/hr at 05/12/24 1305 0.13 mcg/kg/min at 05/12/24 1305    And    vasopressin (Vasostrict) 20 Units in  mL Infusion  0.03 Units/min Intravenous Continuous Moe Caceres M.D.   Dose not Required at 05/11/24 1945    cefepime (Maxipime) 2 g in  mL IVPB  2 g Intravenous Q8HRS Moe Caceres M.D. 200 mL/hr at 05/12/24 1407 2 g at 05/12/24 1407    atorvastatin (Lipitor) tablet 40 mg  40 mg Oral Q EVENING Kimani Diana M.D.   40 mg at 05/11/24 1633    albuterol  inhaler 2 Puff  2 Puff Inhalation Q2HRS PRN Kimani Diana M.D.   2 Puff at 05/11/24 1718    ipratropium-albuterol (DUONEB) nebulizer solution  3 mL Nebulization Q4H PRN (RT) Kimani Diana M.D.   3 mL at 05/11/24 0011    digoxin (Lanoxin) tablet 125 mcg  125 mcg Oral DAILY AT 1800 Makayla Davis M.D.   125 mcg at 05/11/24 1633    acetaminophen (Tylenol) tablet 650 mg  650 mg Oral Q4HRS PRN Iveth Griffith D.O.   650 mg at 05/11/24 2218    allopurinol (Zyloprim) tablet 100 mg  100 mg Oral DAILY Wali Flores M.D.   100 mg at 05/12/24 0540    apixaban (Eliquis) tablet 5 mg  5 mg Oral BID Wali Flores M.D.   5 mg at 05/12/24 0540    levothyroxine (Synthroid) tablet 175 mcg  175 mcg Oral AM ES Wali Flores M.D.   175 mcg at 05/12/24 0544    omeprazole (PriLOSEC) capsule 20 mg  20 mg Oral DAILY Wali Flores M.D.   20 mg at 05/12/24 0540    pramipexole (Mirapex) tablet 0.25 mg  0.25 mg Oral Nightly Wali Flores M.D.   0.25 mg at 05/11/24 2217       Fluids    Intake/Output Summary (Last 24 hours) at 5/12/2024 1425  Last data filed at 5/12/2024 1200  Gross per 24 hour   Intake 2395.99 ml   Output 1045 ml   Net 1350.99 ml       Laboratory  Recent Labs     05/11/24  1922 05/12/24  0352   ISTATAPH 7.404 7.548*   ISTATAPCO2 39.5* 25.8*   ISTATAPO2 66 110*   ISTATATCO2 26 23   AFELYCY5AGP 93 99   ISTATARTHCO3 24.7 22.5   ISTATARTBE 0 1   ISTATTEMP 101.5 F 98.8 F   ISTATFIO2 100  --    ISTATSPEC Arterial Arterial   ISTATAPHTC 7.380* 7.547*   [id number] 73 111*         Recent Labs     05/11/24  0148 05/11/24  1830 05/12/24  0415   SODIUM 140 139 138   POTASSIUM 4.3 4.3 3.6   CHLORIDE 109 102 102   CO2 21 24 23   BUN 18 19 23*   CREATININE 0.87 0.94 1.23   MAGNESIUM 1.9 2.3 1.9   PHOSPHORUS  --   --  2.2*   CALCIUM 8.9 8.8 8.7     Recent Labs     05/11/24  0148 05/11/24  1830 05/12/24  0415   GLUCOSE 107* 136* 123*     Recent Labs     05/11/24  0148 05/11/24  1830 05/12/24  0415   WBC 5.2 11.0* 10.9*   NEUTSPOLYS 69.90  88.20* 71.10   LYMPHOCYTES 18.10* 5.50* 19.80*   MONOCYTES 10.80 5.30 7.60   EOSINOPHILS 0.80 0.40 0.80   BASOPHILS 0.20 0.20 0.30     Recent Labs     05/09/24  1515 05/10/24  0027 05/11/24  0148 05/11/24  1830 05/12/24  0415   RBC  --    < > 3.44* 4.06* 3.88*   HEMOGLOBIN  --    < > 10.2* 12.2 11.5*   HEMATOCRIT  --    < > 32.2* 38.4 35.9*   PLATELETCT  --    < > 211 257 233   IRON 52  --   --   --   --    FERRITIN 313.0*  --   --   --   --    TOTIRONBC 253  --   --   --   --     < > = values in this interval not displayed.       Imaging  X-Ray:  I have personally reviewed the images and compared with prior images.    Assessment/Plan  * Acute hypoxic respiratory failure (HCC)  Assessment & Plan  Acute secondary to pulmonary edema +/- aspiration pneumonia/pneumonitis.   SpO2 70s in setting of polymorphic Vtach and encephalopathy.  5/11 emergently intubated and transferred to the ICU  5/12 Bronchoscopy with BAL RML, aspirate noted; diffusely friable mucosa     Lung protective ventilation strategies  Optimize oxygenation, ventilation, and acid base balance  ABCDEF Bundle   Diuresis  Empiric antibiotics     Ventricular tachyarrhythmia (HCC)- (present on admission)  Assessment & Plan  5/11 with AMS and respiratory failure  Sotalol and Prozac stopped    Optimize electrolytes  Digoxin 125 mcg qd    Aspiration pneumonia (HCC)  Assessment & Plan  5/11 after AMS in the setting of vtach    Empiric cefepime + doxy  MRSA nare negative   Follow BAL 5/12    Elevated troponin  Assessment & Plan  Troponin 52 during rapid, EKG w/ST changes considered d/t underlying LBBB.   Troponin 2330 hrs: 244. EKG unchanged, no longer ST changes. Updated Cardiology. Demand ischemia vs NSTEMI    Cardiology following  Suspect type II demand ischemia from PNA    Septic shock (HCC)  Assessment & Plan  This is Sepsis Not present on admission  SIRS criteria identified on my evaluation include: Fever, with temperature greater than 100.9 deg F and  Tachycardia, with heart rate greater than 90 BPM  Clinical indicators of end organ dysfunction include Acute Respiratory Failure - (mechanical ventilation or BiPap or PaO2/FiO2 ratio < 300)  Source is Pulmonary  Sepsis protocol initiated  Crystalloid Fluid Administration: Held with concern for pulmonary edema  IV antibiotics as appropriate for source of sepsis: Linezolid, Cefepime    Empiric cefepime + doxy  MRSA Nare negative: discontinue linezolid  BAL 5/12 - follow cultures  Vasopressors for MAP > 65    Heart failure with preserved ejection fraction (HCC)  Assessment & Plan  Normal LVEF; dilated RV  RVSP 63    Maintain euvolemia  Re-evaluate for GDMT after resolution of critical illness    Atrial fibrillation with rapid ventricular response (HCC)- (present on admission)  Assessment & Plan  Continue digoxin   DOAC  Had polymorphic vtach in setting of Sotalol on 5/11  Discontinue Sotalol and Prozac as per cardiology     Hypothyroidism- (present on admission)  Assessment & Plan  TSH (05/11/24): 0.91  Continue Levothyroxine 175 mcg daily    Advance care planning- (present on admission)  Assessment & Plan  POLST indicates DNR; full code from 5/8/24 leading up to this intubation  05/12/24 spoke with Azalea Cochran's daughter, who said her mother was very clear on DNR/DNI in line with the prior POLST. It is unclear how she became a full code on this admission as its not documented. Her daughter will attempt to come to [place] in the next 1-2 days to further assess the situation but in the interim she believes the best course is DNR/I ok as her mother has made it very clear she would not want advanced life support or a reduced quality of life.    Essential hypertension- (present on admission)  Assessment & Plan  Reintroduce oral antihypertensives when appropriate           VTE:  NOAC  Ulcer: PPI  Lines: Central Line  Ongoing indication addressed and Baird Catheter  Ongoing indication addressed    I have performed a physical  exam and reviewed and updated ROS and Plan today (5/12/2024). In review of yesterday's note (5/11/2024), there are no changes except as documented above.     Discussed patient condition and risk of morbidity and/or mortality with Family, RN, RT, Pharmacy, and Charge nurse / hot rounds  The patient remains critically ill.  Critical care time = 107 minutes in directly providing and coordinating critical care and extensive data review.  No time overlap and excludes procedures.

## 2024-05-12 NOTE — CARE PLAN
The patient is Watcher - Medium risk of patient condition declining or worsening    Shift Goals  Clinical Goals: MAP > 65  Patient Goals: DANE  Family Goals: Updates    Progress made toward(s) clinical / shift goals:    Problem: Infection - Standard  Goal: Patient will remain free from infection  Outcome: Progressing       Patient is not progressing towards the following goals:      Problem: Knowledge Deficit - Standard  Goal: Patient and family/care givers will demonstrate understanding of plan of care, disease process/condition, diagnostic tests and medications  Outcome: Not Progressing     Problem: Hemodynamics  Goal: Patient's hemodynamics, fluid balance and neurologic status will be stable or improve  Outcome: Not Progressing     Problem: Respiratory  Goal: Patient will achieve/maintain optimum respiratory ventilation and gas exchange  Outcome: Not Progressing     Problem: Fluid Volume  Goal: Fluid volume balance will be maintained  Outcome: Not Progressing     Problem: Mechanical Ventilation  Goal: Successful weaning off mechanical ventilator, spontaneously maintains adequate gas exchange  Outcome: Not Progressing     Problem: Risk for Aspiration  Goal: Patient's risk for aspiration will be absent or decrease  Outcome: Not Progressing     Problem: Safety - Medical Restraint  Goal: Remains free of injury from restraints (Restraint for Interference with Medical Device)  Outcome: Not Progressing  Goal: Free from restraint(s) (Restraint for Interference with Medical Device)  Outcome: Not Progressing    SAT/SBT held at this time. Trending troponin. BP managed per MAR. Pain managed per MAR. Will continue to monitor

## 2024-05-12 NOTE — CONSULTS
Pulmonary/Critical Care Consultation    This is a 73-year-old lady with a past medical history significant for atrial fibrillation s/p cardioversion in March 2024, hypertension, hypothyroidism, history of CVA, dyslipidemia, obstructive sleep apnea and renal artery stenosis who was admitted on May 8, 2024 with atrial fibrillation and rapid ventricular rate.  She was being treated with metoprolol and digoxin but then sotalol was added by cardiology for rhythm control.  She was currently being monitored on the medical floor.    This evening I responded to rapid response.  Apparently patient started complaining of shortness of breath around 5:30 PM and I was called to the bedside.    Airway: Not communicative.  She is opening her eyes and responsive minimally to verbal stimuli.  Airway is threatened.  Chin lift/jaw thrust.  Endotracheal intubation planned as patient is currently being transferred to the intensive care unit.    Breathing: Patient has bilateral rales.  He has a lot of upper airway crackly noises.  Sats 70 to 80% on 15 L via nonrebreather facemask.  Respiratory rate in mid 20s to high 20s.  Bag-valve-mask ventilation commenced.    Circulation: Heart rate between 100-110.  Blood pressure 152/72 mmHg.  IV access functional.    Disability: Patient is febrile.  Glucose 136.    Patient was emergently transferred to [facility] ICU.  She was intubated using RSI and placed on mechanical ventilation.  I informed daughter of the change in clinical situation and plan moving forward.  I also informed her of mechanical ventilation and invasive lines placement.  She also had a central line placed.  I reviewed her chart once she was stabilized.  We reviewed her available strips and things like that she went into polymorphic VT.  There is significant widening of QRS complexes.  QTc remains stable around 450.    Patient underwent a chest x-ray that showed a very asymmetric process with significant airspace opacities in the  right lung.  I can see air bronchograms in the right upper and midlung zones.  This is significantly concerning for aspiration.  I discussed this with her daughter and planned a bronchoscopy.    The bedside bronchoscopy however showed clean airways.  There was no evidence of any aspiration.    Her labs were reviewed that showed WBC count of 11, hemoglobin of 12.2, platelet count of 257, sodium 139, potassium 4.3, chloride 102, bicarb 24, glucose 136, creatinine 0.9, lactic acid 1.7, magnesium 2.3, NT proBNP of 5798 which is significantly increased from her BNP in thousand.  Her arterial blood gas analysis showed pH of 7.404, PCO2 of 39 and a pO2 of 66.    Impression:  Acute hypoxic respiratory failure likely secondary to pulmonary edema  Ventilator dependent respiratory failure  Sepsis  Atrial fibrillation  History of hypertension  History of hypothyroidism      Assessment:  This patient has developed rapid onset of acute hypoxic respiratory failure.  In the clinical setting there are 2 major explanations for this process.  Either this could be an aspiration event or it could be flash pulmonary edema.  The aspiration event is possible given asymmetric pulmonary opacities more prominent on the right side but on the flexible bronchoscopy there was no evidence of any aspiration.  The other possibility could be a flash pulmonary edema the cause of which is not entirely clear.  I think in the meantime we are going to treat both these different etiologies and continue to wean her off the ventilator.      Plan:  Consider Lasix and follow-up on the response with urine output and oxygenation status.  Consider propofol for sedation.  Add fentanyl.  Continue low tidal volume ventilation strategy.  Continue vent VAP bundle protocol.  Oral hygiene, head of the bed elevation.  Keep MAP greater than 65.  Monitor I's and O's.  Monitor renal functions closely.  Consider broad-spectrum antibiotics that cover aspiration and  hospital-acquired pneumonia.  Starting on cefepime and linezolid.  Cautious IV fluid administration based on volume responsiveness.        Chart and note reviewed. Data reviewed. Seen with resident physician earlier today. I have independently evaluated and examined the patient. I have personally reviewed all pertinent data including labs, imaging and recommendations of treatment team providers. I agree with the exam, assessment and plans outlined by the resident physician. Please refer to the resident note for details and diagnostic/treatment plans per the resident physician note.      Total of 74 min critical care time spent at bedside during the course of care providing   evaluation, management and care decisions and ordering appropriate treatment related to critical care problems exclusive of procedures.     Moe Caceres MD  Pulmonary and Critical Care Physician

## 2024-05-12 NOTE — PROGRESS NOTES
Rapid Response Summary     Rapid response called at 1723 for: Shortness of breath , Increased oxygen demand , Altered mental status , and Tachycardia     VS: Tachycardia , Tachypneic , Hypertensive , and Hypoxic  (See Vitals Flowsheet)  Additional info: RRT, hospitality, resident, intensivist, RT  MD Paged: See above  Interventions:    Imaging/Tests: Chest X-ray and EKG    Labs: CBC, CMP, BMP, Lactic Acid, ABG , Blood Cultures, Troponin, BNP, Hbg, Blood Glucose , APTT, PT, INR, TEG, and Magnesium   Medications: See MAR    Other: PIV started  and RSI  Disposition: Did not improve  with rapid response team interventions. Primary RN updated on plan of care. Patient transferred to ICU.    Patient rapidly decompensated, spo2 sustaining 60's, crash cart opened, pads applied on zoll. Patient transported with multiple ACLS RN's, RT, MD's to [facility], pharmacy at bedside    Rapid Intubation  1753 20 etomidate  [id number] 50 ALLEN  7.5 ET 23 at the teeth  1757 + color change, bilateral breath sounds  1759 OG placed  1802 CVC placement  1804 Guidewire out; STAT cxr completed, multiple ekgs completed    1833 Time Out called immediately prior to bronch  1855 MD contacted daughter, updates provided on current and ongoing plan of care  1859 Time Out called immediately prior to right art line placement; placement failed

## 2024-05-13 ENCOUNTER — APPOINTMENT (OUTPATIENT)
Dept: CARDIOLOGY | Facility: MEDICAL CENTER | Age: 74
DRG: 273 | End: 2024-05-13
Attending: PHYSICIAN ASSISTANT
Payer: MEDICARE

## 2024-05-13 ENCOUNTER — APPOINTMENT (OUTPATIENT)
Dept: RADIOLOGY | Facility: MEDICAL CENTER | Age: 74
DRG: 273 | End: 2024-05-13
Attending: INTERNAL MEDICINE
Payer: MEDICARE

## 2024-05-13 LAB
ANION GAP SERPL CALC-SCNC: 13 MMOL/L (ref 7–16)
APTT PPP: 42.1 SEC (ref 24.7–36)
ARTERIAL PATENCY WRIST A: ABNORMAL
BASE EXCESS BLDA CALC-SCNC: 0 MMOL/L (ref -4–3)
BASOPHILS # BLD AUTO: 0.2 % (ref 0–1.8)
BASOPHILS # BLD: 0.02 K/UL (ref 0–0.12)
BODY TEMPERATURE: ABNORMAL DEGREES
BREATHS SETTING VENT: 20
BUN SERPL-MCNC: 26 MG/DL (ref 8–22)
CALCIUM SERPL-MCNC: 8.2 MG/DL (ref 8.5–10.5)
CHLORIDE SERPL-SCNC: 102 MMOL/L (ref 96–112)
CO2 BLDA-SCNC: 25 MMOL/L (ref 20–33)
CO2 SERPL-SCNC: 22 MMOL/L (ref 20–33)
CREAT SERPL-MCNC: 1.37 MG/DL (ref 0.5–1.4)
CRP SERPL HS-MCNC: 31.62 MG/DL (ref 0–0.75)
DELSYS IDSYS: ABNORMAL
END TIDAL CARBON DIOXIDE IECO2: 32 MMHG
EOSINOPHIL # BLD AUTO: 0.21 K/UL (ref 0–0.51)
EOSINOPHIL NFR BLD: 2.3 % (ref 0–6.9)
ERYTHROCYTE [DISTWIDTH] IN BLOOD BY AUTOMATED COUNT: 54.8 FL (ref 35.9–50)
ERYTHROCYTE [DISTWIDTH] IN BLOOD BY AUTOMATED COUNT: 55.4 FL (ref 35.9–50)
FUNGUS SPEC FUNGUS STN: NORMAL
GFR SERPLBLD CREATININE-BSD FMLA CKD-EPI: 40 ML/MIN/1.73 M 2
GLUCOSE SERPL-MCNC: 122 MG/DL (ref 65–99)
HCO3 BLDA-SCNC: 23.9 MMOL/L (ref 17–25)
HCT VFR BLD AUTO: 31.9 % (ref 37–47)
HCT VFR BLD AUTO: 33.3 % (ref 37–47)
HGB BLD-MCNC: 10.1 G/DL (ref 12–16)
HGB BLD-MCNC: 10.8 G/DL (ref 12–16)
HOROWITZ INDEX BLDA+IHG-RTO: 174 MM[HG]
IMM GRANULOCYTES # BLD AUTO: 0.03 K/UL (ref 0–0.11)
IMM GRANULOCYTES NFR BLD AUTO: 0.3 % (ref 0–0.9)
INR PPP: 1.83 (ref 0.87–1.13)
LACTATE BLD-SCNC: 0.8 MMOL/L (ref 0.5–2)
LYMPHOCYTES # BLD AUTO: 1.43 K/UL (ref 1–4.8)
LYMPHOCYTES NFR BLD: 16 % (ref 22–41)
MAGNESIUM SERPL-MCNC: 2.2 MG/DL (ref 1.5–2.5)
MCH RBC QN AUTO: 29.3 PG (ref 27–33)
MCH RBC QN AUTO: 29.7 PG (ref 27–33)
MCHC RBC AUTO-ENTMCNC: 31.7 G/DL (ref 32.2–35.5)
MCHC RBC AUTO-ENTMCNC: 32.4 G/DL (ref 32.2–35.5)
MCV RBC AUTO: 91.5 FL (ref 81.4–97.8)
MCV RBC AUTO: 92.5 FL (ref 81.4–97.8)
MODE IMODE: ABNORMAL
MONOCYTES # BLD AUTO: 0.71 K/UL (ref 0–0.85)
MONOCYTES NFR BLD AUTO: 7.9 % (ref 0–13.4)
NEUTROPHILS # BLD AUTO: 6.56 K/UL (ref 1.82–7.42)
NEUTROPHILS NFR BLD: 73.3 % (ref 44–72)
NRBC # BLD AUTO: 0 K/UL
NRBC BLD-RTO: 0 /100 WBC (ref 0–0.2)
O2/TOTAL GAS SETTING VFR VENT: 50 %
PCO2 BLDA: 35.8 MMHG (ref 26–37)
PCO2 TEMP ADJ BLDA: 35.9 MMHG (ref 26–37)
PEEP END EXPIRATORY PRESSURE IPEEP: 10 CMH20
PH BLDA: 7.43 [PH] (ref 7.4–7.5)
PH TEMP ADJ BLDA: 7.43 [PH] (ref 7.4–7.5)
PHOSPHATE SERPL-MCNC: 3.4 MG/DL (ref 2.5–4.5)
PLATELET # BLD AUTO: 188 K/UL (ref 164–446)
PLATELET # BLD AUTO: 203 K/UL (ref 164–446)
PMV BLD AUTO: 11.1 FL (ref 9–12.9)
PMV BLD AUTO: 11.2 FL (ref 9–12.9)
PO2 BLDA: 87 MMHG (ref 64–87)
PO2 TEMP ADJ BLDA: 88 MMHG (ref 64–87)
POTASSIUM SERPL-SCNC: 3.4 MMOL/L (ref 3.6–5.5)
PREALB SERPL-MCNC: 9.8 MG/DL (ref 18–38)
PROTHROMBIN TIME: 21.4 SEC (ref 12–14.6)
RBC # BLD AUTO: 3.45 M/UL (ref 4.2–5.4)
RBC # BLD AUTO: 3.64 M/UL (ref 4.2–5.4)
SAO2 % BLDA: 97 % (ref 93–99)
SIGNIFICANT IND 70042: NORMAL
SITE SITE: NORMAL
SODIUM SERPL-SCNC: 137 MMOL/L (ref 135–145)
SOURCE SOURCE: NORMAL
SPECIMEN DRAWN FROM PATIENT: ABNORMAL
TIDAL VOLUME IVT: 330 ML
TRIGL SERPL-MCNC: 121 MG/DL (ref 0–149)
WBC # BLD AUTO: 6.8 K/UL (ref 4.8–10.8)
WBC # BLD AUTO: 9 K/UL (ref 4.8–10.8)

## 2024-05-13 PROCEDURE — 99291 CRITICAL CARE FIRST HOUR: CPT | Performed by: INTERNAL MEDICINE

## 2024-05-13 RX ORDER — DIPHENHYDRAMINE HYDROCHLORIDE 50 MG/ML
50 INJECTION INTRAMUSCULAR; INTRAVENOUS ONCE
Status: DISCONTINUED | OUTPATIENT
Start: 2024-05-13 | End: 2024-05-13

## 2024-05-13 RX ORDER — HEPARIN SODIUM 5000 [USP'U]/100ML
INJECTION, SOLUTION INTRAVENOUS CONTINUOUS
Status: DISCONTINUED | OUTPATIENT
Start: 2024-05-13 | End: 2024-05-15

## 2024-05-13 RX ORDER — METHYLPREDNISOLONE SODIUM SUCCINATE 40 MG/ML
40 INJECTION, POWDER, LYOPHILIZED, FOR SOLUTION INTRAMUSCULAR; INTRAVENOUS EVERY 6 HOURS
Status: COMPLETED | OUTPATIENT
Start: 2024-05-13 | End: 2024-05-14

## 2024-05-13 RX ORDER — FUROSEMIDE 10 MG/ML
40 INJECTION INTRAMUSCULAR; INTRAVENOUS EVERY 8 HOURS
Status: DISCONTINUED | OUTPATIENT
Start: 2024-05-13 | End: 2024-05-14

## 2024-05-13 RX ORDER — DIPHENHYDRAMINE HYDROCHLORIDE 50 MG/ML
50 INJECTION INTRAMUSCULAR; INTRAVENOUS ONCE
Status: COMPLETED | OUTPATIENT
Start: 2024-05-14 | End: 2024-05-14

## 2024-05-13 RX ORDER — HEPARIN SODIUM 5000 [USP'U]/100ML
0-30 INJECTION, SOLUTION INTRAVENOUS CONTINUOUS
Status: DISCONTINUED | OUTPATIENT
Start: 2024-05-13 | End: 2024-05-13

## 2024-05-13 RX ADMIN — FUROSEMIDE 40 MG: 10 INJECTION INTRAMUSCULAR; INTRAVENOUS at 21:18

## 2024-05-13 RX ADMIN — METHYLPREDNISOLONE SODIUM SUCCINATE 40 MG: 40 INJECTION, POWDER, FOR SOLUTION INTRAMUSCULAR; INTRAVENOUS at 17:11

## 2024-05-13 RX ADMIN — CEFEPIME 2 G: 2 INJECTION, POWDER, FOR SOLUTION INTRAVENOUS at 17:08

## 2024-05-13 RX ADMIN — APIXABAN 5 MG: 5 TABLET, FILM COATED ORAL at 05:50

## 2024-05-13 RX ADMIN — POTASSIUM BICARBONATE 50 MEQ: 978 TABLET, EFFERVESCENT ORAL at 17:06

## 2024-05-13 RX ADMIN — IPRATROPIUM BROMIDE AND ALBUTEROL SULFATE 3 ML: 2.5; .5 SOLUTION RESPIRATORY (INHALATION) at 11:42

## 2024-05-13 RX ADMIN — IPRATROPIUM BROMIDE AND ALBUTEROL SULFATE 3 ML: 2.5; .5 SOLUTION RESPIRATORY (INHALATION) at 19:35

## 2024-05-13 RX ADMIN — LEVOTHYROXINE SODIUM 175 MCG: 0.17 TABLET ORAL at 05:52

## 2024-05-13 RX ADMIN — FUROSEMIDE 40 MG: 10 INJECTION INTRAMUSCULAR; INTRAVENOUS at 13:43

## 2024-05-13 RX ADMIN — ALLOPURINOL 100 MG: 100 TABLET ORAL at 05:50

## 2024-05-13 RX ADMIN — HEPARIN SODIUM 1200 UNITS/HR: 5000 INJECTION, SOLUTION INTRAVENOUS at 17:48

## 2024-05-13 RX ADMIN — LANSOPRAZOLE 30 MG: 30 TABLET, ORALLY DISINTEGRATING, DELAYED RELEASE ORAL at 05:55

## 2024-05-13 RX ADMIN — ATORVASTATIN CALCIUM 40 MG: 40 TABLET, FILM COATED ORAL at 17:07

## 2024-05-13 RX ADMIN — SENNOSIDES AND DOCUSATE SODIUM 2 TABLET: 50; 8.6 TABLET ORAL at 05:50

## 2024-05-13 RX ADMIN — DOXYCYCLINE 100 MG: 100 INJECTION, POWDER, LYOPHILIZED, FOR SOLUTION INTRAVENOUS at 06:43

## 2024-05-13 RX ADMIN — PROPOFOL 50 MCG/KG/MIN: 10 INJECTION, EMULSION INTRAVENOUS at 04:30

## 2024-05-13 RX ADMIN — METHYLPREDNISOLONE SODIUM SUCCINATE 40 MG: 40 INJECTION, POWDER, FOR SOLUTION INTRAMUSCULAR; INTRAVENOUS at 11:46

## 2024-05-13 RX ADMIN — PRAMIPEXOLE DIHYDROCHLORIDE 0.25 MG: 0.25 TABLET ORAL at 21:18

## 2024-05-13 RX ADMIN — DIGOXIN 125 MCG: 0.12 TABLET ORAL at 17:06

## 2024-05-13 RX ADMIN — DOXYCYCLINE 100 MG: 100 INJECTION, POWDER, LYOPHILIZED, FOR SOLUTION INTRAVENOUS at 17:44

## 2024-05-13 RX ADMIN — METHYLPREDNISOLONE SODIUM SUCCINATE 40 MG: 40 INJECTION, POWDER, FOR SOLUTION INTRAMUSCULAR; INTRAVENOUS at 23:43

## 2024-05-13 RX ADMIN — FUROSEMIDE 40 MG: 10 INJECTION INTRAMUSCULAR; INTRAVENOUS at 05:50

## 2024-05-13 RX ADMIN — CEFEPIME 2 G: 2 INJECTION, POWDER, FOR SOLUTION INTRAVENOUS at 05:51

## 2024-05-13 RX ADMIN — POTASSIUM BICARBONATE 50 MEQ: 978 TABLET, EFFERVESCENT ORAL at 07:51

## 2024-05-13 ASSESSMENT — COPD QUESTIONNAIRES
DURING THE PAST 4 WEEKS HOW MUCH DID YOU FEEL SHORT OF BREATH: NONE/LITTLE OF THE TIME
HAVE YOU SMOKED AT LEAST 100 CIGARETTES IN YOUR ENTIRE LIFE: NO/DON'T KNOW
DO YOU EVER COUGH UP ANY MUCUS OR PHLEGM?: YES, EVERY DAY
COPD SCREENING SCORE: 5

## 2024-05-13 ASSESSMENT — ENCOUNTER SYMPTOMS
PSYCHIATRIC NEGATIVE: 1
CARDIOVASCULAR NEGATIVE: 1
DIAPHORESIS: 0
LIGHT-HEADEDNESS: 0
CONSTITUTIONAL NEGATIVE: 1
MUSCULOSKELETAL NEGATIVE: 1
EYES NEGATIVE: 1
NAUSEA: 0
HEMATOLOGIC/LYMPHATIC NEGATIVE: 1
BRUISES/BLEEDS EASILY: 0
CHILLS: 0
CHEST TIGHTNESS: 0
DIARRHEA: 0
GASTROINTESTINAL NEGATIVE: 1
DIZZINESS: 0
ENDOCRINE NEGATIVE: 1
CONSTIPATION: 0
VOMITING: 0
PALPITATIONS: 0
NEUROLOGICAL NEGATIVE: 1
COUGH: 0
ALLERGIC/IMMUNOLOGIC NEGATIVE: 1
SHORTNESS OF BREATH: 1
FEVER: 0
FATIGUE: 0

## 2024-05-13 ASSESSMENT — PAIN DESCRIPTION - PAIN TYPE
TYPE: ACUTE PAIN

## 2024-05-13 ASSESSMENT — COGNITIVE AND FUNCTIONAL STATUS - GENERAL
MOBILITY SCORE: 18
SUGGESTED CMS G CODE MODIFIER DAILY ACTIVITY: CK
TOILETING: A LITTLE
SUGGESTED CMS G CODE MODIFIER MOBILITY: CK
DRESSING REGULAR LOWER BODY CLOTHING: A LITTLE
MOVING FROM LYING ON BACK TO SITTING ON SIDE OF FLAT BED: A LITTLE
DAILY ACTIVITIY SCORE: 18
WALKING IN HOSPITAL ROOM: A LITTLE
MOVING TO AND FROM BED TO CHAIR: A LITTLE
DRESSING REGULAR UPPER BODY CLOTHING: A LITTLE
EATING MEALS: A LITTLE
CLIMB 3 TO 5 STEPS WITH RAILING: A LITTLE
TURNING FROM BACK TO SIDE WHILE IN FLAT BAD: A LITTLE
HELP NEEDED FOR BATHING: A LOT
STANDING UP FROM CHAIR USING ARMS: A LITTLE

## 2024-05-13 ASSESSMENT — PULMONARY FUNCTION TESTS: FVC: 890

## 2024-05-13 ASSESSMENT — FIBROSIS 4 INDEX: FIB4 SCORE: 1.73

## 2024-05-13 NOTE — PROGRESS NOTES
Cardiology Follow Up Progress Note    Date of Service  5/13/2024    Attending Physician  Will Zepeda D.O.    Chief Complaint   A-fib with RVR    HPI  Azalea Gan is a 73 y.o. female with a history of paroxysmal atrial fibrillation, JOSH, moderate MR, NSVT and HFpEF admitted 5/8/2024 with atrial fibrillation with RVR and acute HFpEF exacerbation.    Interim Events  5/13/2024: No cardiac events overnight.  She was successfully taken off intubation earlier this morning.  Between junctional and sinus rhythm on telemetry with LBBB.  Vital signs stable.  SpO2 93 to 95% on 60 L OxyMask.  She reports she is feeling better today and that her breathing is mildly improved.  No chest pain or palpitations.  No dizziness or lightheadedness.  No syncope or presyncope.    Review of Systems  Review of Systems   Constitutional: Negative.  Negative for chills, diaphoresis, fatigue and fever.   HENT: Negative.     Eyes: Negative.    Respiratory:  Positive for shortness of breath. Negative for cough and chest tightness.    Cardiovascular: Negative.  Negative for chest pain, palpitations and leg swelling.   Gastrointestinal: Negative.  Negative for constipation, diarrhea, nausea and vomiting.   Endocrine: Negative.    Genitourinary: Negative.  Negative for decreased urine volume, difficulty urinating, dysuria and frequency.   Musculoskeletal: Negative.    Skin: Negative.    Allergic/Immunologic: Negative.    Neurological: Negative.  Negative for dizziness and light-headedness.   Hematological: Negative.  Does not bruise/bleed easily.   Psychiatric/Behavioral: Negative.         Vital signs in last 24 hours  Pulse:  [42-82] 66  Resp:  [13-67] 25  BP: ()/(44-87) 97/51  SpO2:  [87 %-99 %] 95 %    Physical Exam  Physical Exam  Vitals and nursing note reviewed.   Constitutional:       General: She is not in acute distress.     Appearance: Normal appearance. She is not toxic-appearing.   HENT:      Head: Normocephalic and atraumatic.       Right Ear: External ear normal.      Left Ear: External ear normal.      Nose: Nose normal.      Mouth/Throat:      Mouth: Mucous membranes are moist.      Pharynx: Oropharynx is clear.   Eyes:      General: No scleral icterus.     Extraocular Movements: Extraocular movements intact.      Conjunctiva/sclera: Conjunctivae normal.      Pupils: Pupils are equal, round, and reactive to light.   Neck:      Vascular: No JVD.   Cardiovascular:      Rate and Rhythm: Normal rate and regular rhythm.      Pulses: Normal pulses.      Heart sounds: Normal heart sounds. No murmur heard.     No friction rub. No gallop.   Pulmonary:      Effort: Pulmonary effort is normal.      Breath sounds: Rales present.   Abdominal:      General: Abdomen is flat. Bowel sounds are normal. There is no distension.      Palpations: Abdomen is soft.   Musculoskeletal:         General: Normal range of motion.      Cervical back: Normal range of motion and neck supple.      Right lower leg: No edema.      Left lower leg: No edema.   Skin:     General: Skin is warm and dry.      Capillary Refill: Capillary refill takes less than 2 seconds.      Coloration: Skin is not jaundiced.   Neurological:      General: No focal deficit present.      Mental Status: She is alert and oriented to person, place, and time.   Psychiatric:         Mood and Affect: Mood normal.         Behavior: Behavior normal.         Lab Review  Lab Results   Component Value Date/Time    WBC 9.0 05/13/2024 03:20 AM    RBC 3.64 (L) 05/13/2024 03:20 AM    HEMOGLOBIN 10.8 (L) 05/13/2024 03:20 AM    HEMATOCRIT 33.3 (L) 05/13/2024 03:20 AM    MCV 91.5 05/13/2024 03:20 AM    MCH 29.7 05/13/2024 03:20 AM    MCHC 32.4 05/13/2024 03:20 AM    MPV 11.2 05/13/2024 03:20 AM      Lab Results   Component Value Date/Time    SODIUM 137 05/13/2024 03:20 AM    POTASSIUM 3.4 (L) 05/13/2024 03:20 AM    CHLORIDE 102 05/13/2024 03:20 AM    CO2 22 05/13/2024 03:20 AM    GLUCOSE 122 (H) 05/13/2024 03:20 AM     BUN 26 (H) 05/13/2024 03:20 AM    CREATININE 1.37 05/13/2024 03:20 AM      Lab Results   Component Value Date/Time    ASTSGOT 15 05/09/2024 01:34 AM    ALTSGPT 10 05/09/2024 01:34 AM     Lab Results   Component Value Date/Time    TRIGLYCERIDE 121 05/13/2024 03:20 AM    TROPONINT 248 (H) 05/12/2024 04:15 AM       Recent Labs     05/10/24  1444 05/11/24  1830   NTPROBNP 1989* 5798*       Cardiac Imaging and Procedures Review    Echocardiogram: 5/10/2024  CONCLUSIONS  Normal left ventricular size, wall thickness, and systolic function.   The right ventricle is mildly dilated with preserved systolic function.  Moderately dilated left atrium.  Mild mitral regurgitation.  Mild tricuspid regurgitation.  Estimated right ventricular systolic pressure is 63 mmHg; severely   elevated.  Right atrial pressure is estimated to be 15 mmHg; elevated.  No pericardial effusion.  Compared to the prior study on 5/31/23, now higher estimated RVSP.     Cardiac Catheterization: Pending    Imaging  Chest X-Ray:  5/13/2024  FINDINGS:  Suboptimal positioning.  Removal of the pacing pad. Hardware otherwise stably positioned in its visualized extent.  HEART: Stable size.  LUNGS: Similar right greater than left opacities.  PLEURA: Possible small right pleural effusion.  IMPRESSION:  1.  Possible small right pleural effusion.  2.  Similar right greater than left airspace disease.  3.  Suboptimal patient positioning.  4.  Cardiomegaly.     Stress Test:  5/10/2024  Myocardial Perfusion   Report   NUCLEAR IMAGING INTERPRETATION   1.  Small partially reversible myocardial perfusion defect in the mid    inferior and basal inferior segments.   2.  Myocardial perfusion is otherwise normal.   3.  LVEF is 50%.   4.  Left ventricular dilation with stress maneuvering.   5.  Decreased wall motion in the inferior and septal walls, but wall    thickening is preserved.    Assessment/Plan    #HFpEF AHA C NYHA III  #Pulmonary hypertension  #Acute hypoxemic  respiratory failure  #Pulmonary edema versus aspiration pneumonia  -She is mildly volume overloaded on exam.  -Continue diuresis with IV Lasix 40 mg every 8 hours.  -Her renal function is borderline for Farxiga, but she will qualify for the initiation of Jardiance as an outpatient.  If renal function improves prior to discharge, can consider the initiation of Farxiga.  -GFR remains borderline for MRA and with pending LHC tomorrow, will continue to hold off until after LHC.    #Paroxysmal atrial fibrillation with RVR  #Possible VT or A-fib with aberrancy given LBBB  -Sinus rhythm with LBBB on telemetry.  -Off sotalol since 5/11/2024.  -Continue digoxin 125 mcg daily.  -Hold Eliquis in preparation of LHC and transition to heparin gtt.     #NSTEMI  #LBBB  #Iodine allergy  -Asymptomatic  -Continue high intensity statin.  -Anticoagulated with heparin gtt.  -Keep n.p.o. at midnight.  Plan for LHC tomorrow.  Discussed with both her and her daughter. The risks, benefits, and alternatives to coronary angiography with IV sedation were discussed in great detail. Specific risks mentioned include bleeding, infection, kidney damage, allergic reaction, cardiac perforation with possible tamponade requiring pericardiocentesis or possibly open heart surgery. In addition, we discussed that 10% of patients will experience small to moderate bruising at the site of the arterial puncture. Lastly, the risks of heart attack, stroke and death were discussed; the risk of major complications such as heart attack or stroke caused by the angiogram is approximately 1%; the risk of death is approximately 1 in 1000. The patient verbalized understanding of the potential complications and wishes to proceed with this procedure.   -She does have an iodine allergy and will be premedicated prior.    Please contact me with any questions.    Thank you for allowing me to participate in the care of Azalea ANNETTE Malik .    Yulisa Bourne PA-C, Cardiology  [facility]  [facility]  [facility], [facility]  [address], [place] [address]  [place] [address]  Phone: [phone]  Fax: [phone]    PLEASE NOTE: This note was created using voice recognition software. I have made every reasonable attempt to correct obvious errors, but I expect that there are errors of grammar and possibly content that I did not discover before finalizing the note.

## 2024-05-13 NOTE — FLOWSHEET NOTE
05/13/24 0808   Weaning Parameters   RR (bpm) 19   $ FVC / Vital Capacity (liters)  890   NIF (cm H2O)  -38   Rapid Shallow Breathing Index (RR/VT) 49   Spontaneous VE 5.8   Spontaneous      Pt can lift head off the pillow and is very alert.

## 2024-05-13 NOTE — CARE PLAN
The patient is Watcher - Medium risk of patient condition declining or worsening    Shift Goals  Clinical Goals: extubation, pulmonary toileting  Patient Goals: extubation  Family Goals: updates      Problem: Knowledge Deficit - Standard  Goal: Patient and family/care givers will demonstrate understanding of plan of care, disease process/condition, diagnostic tests and medications  Outcome: Progressing     Problem: Psychosocial  Goal: Patient's level of anxiety will decrease  Outcome: Progressing     Problem: Hemodynamics  Goal: Patient's hemodynamics, fluid balance and neurologic status will be stable or improve  Outcome: Progressing     Problem: Respiratory  Goal: Patient will achieve/maintain optimum respiratory ventilation and gas exchange  Outcome: Progressing     Problem: Pain - Standard  Goal: Alleviation of pain or a reduction in pain to the patient’s comfort goal  Outcome: Progressing     Problem: Skin Integrity  Goal: Skin integrity is maintained or improved  Outcome: Progressing

## 2024-05-13 NOTE — PROGRESS NOTES
"Critical Care Progress Note    Date of admission  5/8/2024    \"73 y.o. female w/PMH of Afib s/p DINESH cardioversion  (03/2024), HTN, Hypothyroidism, prior CVA, HLD, JOSH, Renal artery stenosis. Admitted 05/08/24 d/t Afib RVR as a transfer from [facility] d/t no bed availability. At outside facility she received Metoprolol 5 mg iv, Metoprolol 25 mg PO, Digoxin IV. On admission, decision was to hold rate controlled medications d/t labile BP. Started on Digoxin for rhythm controlled, Amiodarone not considered d/t h/o Hypothyroidism.      05/09. Rate controlled, only on rhythm control w/Digoxin d/t still having soft BP's.   05/10.  Nuclear medicine stress is done: Small partially reversible myocardial perfusion defect in the mid  inferior and basal inferior segments; decreased wall motion in inferior and septal walls; LVEF 50%. Cardiology was consulted, considered to be false positive w/artifact. Started on Sotalol 80 mg PO at 2151 hrs d/t recurrent PAF.   05/11. Cardiology following and added Dapaglifozin for HFpEF, also recommended to continue w/Sotalol. EKG in AM w/Qtc 412. CXR w/o acute abnormalities. AT 1730 hrs rapid response was called d/t acute decompensation of clinical and respiratory status in the setting of Polymorphic Vtach following patient attempting to get up from bed. Marked hypoxia, low 70's w/rapid decline in mental status. No hemodynamic instability. Patient was transferred to the RICU for emergent intubation. Received Magnesium push, Bicarbonate and IV Lasix. Central and arterial lines were placed. Bedside bronchoscopy did not reveal specific abnormalities. Cardiology (Dr. Ahumada) was contacted, recommended discontinuation of Sotalol and Prozac d/t Qtc in upper limit of normal and polymorphic Vtach (transitional). Lab work during rapid remarkable for mild leukocytosis, no electrolytes abnormalities, no lactic acidosis. Troponin elevated at 52.\"    Hospital Course - ICU  5/11 admitted to " the ICU, intubated   5/12 VD 2 PEEP 12/70%; bronch with BAL    Interval Problem Update  Reviewed last 24 hour events:  Remains critically ill   Off sedation, SAT RASS 0  Doing well on SBT and extubated this am.   On 12L oxymask, sat >92%  On lasix 40mg daily, UOP 500cc out.   Creatinine 1.2-1.3  HCO3 22  K 3.4, sodium 137  I/O negative 400cc in the past 24 hours, +3.5L since admission.   [facility] planned later today   Cefepime and doxycycline.   5/12 BAL cx NGTD  5/11 Bcx 2/2 NGTD      Review of Systems  Review of Systems   Unable to perform ROS: Critical illness   All other systems reviewed and are negative.       Vital Signs for last 24 hours   Pulse:  [42-69] 50  Resp:  [13-67] 17  BP: ()/(44-87) 110/54  SpO2:  [92 %-99 %] 93 %    Hemodynamic parameters for last 24 hours       Respiratory Information for the last 24 hours  Vent Mode: Spont  Rate (breaths/min): 20  Vt Target (mL): 330  PEEP/CPAP: 8  MAP: 9  Control VTE (exp VT): 368    Physical Exam   Physical Exam  Vitals and nursing note reviewed.   Constitutional:       Appearance: She is ill-appearing.   HENT:      Head: Normocephalic and atraumatic.      Mouth/Throat:      Mouth: Mucous membranes are moist.      Comments: ET tube   Eyes:      Pupils: Pupils are equal, round, and reactive to light.   Cardiovascular:      Rate and Rhythm: Normal rate.   Pulmonary:      Comments: Equal and symmetric breath sounds with mechanical ventilation  Abdominal:      General: Abdomen is flat. There is no distension.      Palpations: Abdomen is soft.      Tenderness: There is no abdominal tenderness. There is no guarding or rebound.   Musculoskeletal:      Right lower leg: Edema present.      Left lower leg: Edema present.   Skin:     General: Skin is warm and dry.      Capillary Refill: Capillary refill takes less than 2 seconds.   Neurological:      Comments: Deeply sedated on the ventilator          Medications  Current Facility-Administered Medications   Medication  Dose Route Frequency Provider Last Rate Last Admin    doxycycline (Vibramycin) 100 mg in  mL IVPB  100 mg Intravenous Q12HRS Blayne Carrasquillo M.D.   Stopped at 05/13/24 0743    furosemide (Lasix) injection 40 mg  40 mg Intravenous Q DAY Blayne Carrasquillo M.D.   40 mg at 05/13/24 0550    Pharmacy Consult: Enteral tube insertion - review meds/change route/product selection  1 Each Other PHARMACY TO DOSE Blayne Carrasquillo M.D.        allopurinol (Zyloprim) tablet 100 mg  100 mg Enteral Tube DAILY Blayne Carrasquillo M.D.   100 mg at 05/13/24 0550    apixaban (Eliquis) tablet 5 mg  5 mg Enteral Tube BID Blayne Carrasquillo M.D.   5 mg at 05/13/24 0550    atorvastatin (Lipitor) tablet 40 mg  40 mg Enteral Tube Q EVENING Blayne Carrasquillo M.D.   40 mg at 05/12/24 1809    digoxin (Lanoxin) tablet 125 mcg  125 mcg Enteral Tube DAILY AT 1800 Blayne Carrasquillo M.D.   125 mcg at 05/12/24 1809    levothyroxine (Synthroid) tablet 175 mcg  175 mcg Enteral Tube AM ES Blayne Carrasquillo M.D.   175 mcg at 05/13/24 0552    lansoprazole (Prevacid) solutab 30 mg  30 mg Enteral Tube DAILY Blayne Carrasquillo M.D.   30 mg at 05/13/24 0555    acetaminophen (Tylenol) tablet 650 mg  650 mg Enteral Tube Q4HRS PRN Blayne Carrasquillo M.D.        propofol (DIPRIVAN) injection  0-80 mcg/kg/min (Ideal) Intravenous Continuous Asael Calvert M.D.   Stopped at 05/13/24 0549    fentaNYL (SUBLIMAZE) 50 mcg/mL in 50mL (Continuous Infusion)   Intravenous Continuous Moe Caceres M.D.   Stopped at 05/13/24 0758    Respiratory Therapy Consult   Nebulization Continuous RT Moe Caceres M.D.        senna-docusate (Pericolace Or Senokot S) 8.6-50 MG per tablet 2 Tablet  2 Tablet Enteral Tube BID Moe Caceres M.D.   2 Tablet at 05/13/24 0550    And    polyethylene glycol/lytes (Miralax) Packet 1 Packet  1 Packet Enteral Tube QDAY PRN Moe Caceres M.D.        And    magnesium hydroxide (Milk Of Magnesia) suspension 30 mL  30 mL Enteral Tube  QDAY PRN Moe Caceres M.D.        And    bisacodyl (Dulcolax) suppository 10 mg  10 mg Rectal QDAY PRN Moe Caceres M.D. MD Alert...ICU Electrolyte Replacement per Pharmacy   Other PHARMACY TO DOSE Moe Caceres M.D.        lidocaine (Xylocaine) 1 % injection 2 mL  2 mL Tracheal Tube Q30 MIN PRN Moe Caceres M.D.        norepinephrine (Levophed) 8 mg in 250 mL NS infusion (premix)  0-1 mcg/kg/min (Ideal) Intravenous Continuous Moe Caceres M.D. 6.2 mL/hr at 05/13/24 0718 0.06 mcg/kg/min at 05/13/24 0718    And    vasopressin (Vasostrict) 20 Units in  mL Infusion  0.03 Units/min Intravenous Continuous Moe Caceres M.D.   Dose not Required at 05/11/24 1945    cefepime (Maxipime) 2 g in  mL IVPB  2 g Intravenous Q8HRS Moe Caceres M.D.   Stopped at 05/13/24 0621    albuterol inhaler 2 Puff  2 Puff Inhalation Q2HRS PRN Kimani Diana M.D.   2 Puff at 05/11/24 1718    ipratropium-albuterol (DUONEB) nebulizer solution  3 mL Nebulization Q4H PRN (RT) Kimani Diana M.D.   3 mL at 05/12/24 2221    pramipexole (Mirapex) tablet 0.25 mg  0.25 mg Oral Nightly Wali Flores M.D.   0.25 mg at 05/12/24 2151       Fluids    Intake/Output Summary (Last 24 hours) at 5/13/2024 0819  Last data filed at 5/13/2024 0600  Gross per 24 hour   Intake 1603.23 ml   Output 1995 ml   Net -391.77 ml       Laboratory  Recent Labs     05/11/24  1922 05/12/24  0352 05/13/24  0520   ISTATAPH 7.404 7.548* 7.432   ISTATAPCO2 39.5* 25.8* 35.8   ISTATAPO2 66 110* 87   ISTATATCO2 26 23 25   NOJVUUP8OYV 93 99 97   ISTATARTHCO3 24.7 22.5 23.9   ISTATARTBE 0 1 0   ISTATTEMP 101.5 F 98.8 F 98.8 F   ISTATFIO2 100  --  50   ISTATSPEC Arterial Arterial Arterial   ISTATAPHTC 7.380* 7.547* 7.431   NSGCVISG8ZA 73 111* 88*         Recent Labs     05/11/24  1830 05/12/24  0415 05/13/24  0320   SODIUM 139 138 137   POTASSIUM 4.3 3.6 3.4*   CHLORIDE 102 102 102   CO2 24 23 22   BUN 19 23* 26*   CREATININE 0.94  1.23 1.37   MAGNESIUM 2.3 1.9 2.2   PHOSPHORUS  --  2.2* 3.4   CALCIUM 8.8 8.7 8.2*     Recent Labs     05/11/24 1830 05/12/24 0415 05/13/24  0320   PREALBUMIN  --   --  9.8*   GLUCOSE 136* 123* 122*     Recent Labs     05/11/24 1830 05/12/24 0415 05/13/24  0320   WBC 11.0* 10.9* 9.0   NEUTSPOLYS 88.20* 71.10 73.30*   LYMPHOCYTES 5.50* 19.80* 16.00*   MONOCYTES 5.30 7.60 7.90   EOSINOPHILS 0.40 0.80 2.30   BASOPHILS 0.20 0.30 0.20     Recent Labs     05/11/24 1830 05/12/24 0415 05/13/24  0320   RBC 4.06* 3.88* 3.64*   HEMOGLOBIN 12.2 11.5* 10.8*   HEMATOCRIT 38.4 35.9* 33.3*   PLATELETCT 257 233 203       Imaging  X-Ray:  I have personally reviewed the images and compared with prior images.    Assessment/Plan  * Acute hypoxic respiratory failure (HCC)  Assessment & Plan  Acute secondary to pulmonary edema +/- aspiration pneumonia/pneumonitis.   SpO2 70s in setting of polymorphic Vtach and encephalopathy.  5/11 emergently intubated and transferred to the ICU  5/12 Bronchoscopy with BAL RML, aspirate noted; diffusely friable mucosa     Lung protective ventilation strategies  Optimize oxygenation, ventilation, and acid base balance  ABCDEF Bundle   Diuresis  Empiric antibiotics     Aspiration pneumonia (HCC)  Assessment & Plan  5/11 after AMS in the setting of vtach    Empiric cefepime + doxy. Treat for 5 days  MRSA nare negative   Follow BAL 5/12    Elevated troponin  Assessment & Plan  Likely demand ischemia   Troponin plateauing.   Pt denies chest pain       Septic shock (HCC)  Assessment & Plan  This is Sepsis Not present on admission  SIRS criteria identified on my evaluation include: Fever, with temperature greater than 100.9 deg F and Tachycardia, with heart rate greater than 90 BPM  Clinical indicators of end organ dysfunction include Acute Respiratory Failure - (mechanical ventilation or BiPap or PaO2/FiO2 ratio < 300)  Source is Pulmonary  Sepsis protocol initiated  Crystalloid Fluid Administration:  Held with concern for pulmonary edema  IV antibiotics as appropriate for source of sepsis: Linezolid, Cefepime    Empiric cefepime + doxy  MRSA Nare negative: discontinue linezolid  BAL 5/12 - follow cultures  Vasopressors for MAP > 65    Heart failure with preserved ejection fraction (HCC)  Assessment & Plan  Normal LVEF; dilated RV  RVSP 63  Keep negative fluid balance  Increase lasix 40 Q8H  Re-evaluate for GDMT after resolution of critical illness    Atrial fibrillation with rapid ventricular response (HCC)- (present on admission)  Assessment & Plan  Continue digoxin   DOAC  Had polymorphic vtach in setting of Sotalol on 5/11  Discontinue Sotalol and Prozac as per cardiology     Hypothyroidism- (present on admission)  Assessment & Plan  TSH (05/11/24): 0.91  Continue Levothyroxine 175 mcg daily    Ventricular tachyarrhythmia (HCC)- (present on admission)  Assessment & Plan  5/11 with AMS and respiratory failure  Sotalol and Prozac stopped  5/13 no arrhythmias episodes    Optimize electrolytes  Digoxin 125 mcg qd      Advance care planning- (present on admission)  Assessment & Plan  POLST indicates DNR; full code from 5/8/24 leading up to this intubation  05/12/24 spoke with Azalea Cochran's daughter, who said her mother was very clear on DNR/DNI in line with the prior POLST. It is unclear how she became a full code on this admission as its not documented. Her daughter will attempt to come to [place] in the next 1-2 days to further assess the situation but in the interim she believes the best course is DNR/I ok as her mother has made it very clear she would not want advanced life support or a reduced quality of life.    5/13 RN spoke with patient after extubation. Pt did confirm to us that pt would like to be intubated and resuscitated in the event of cardiac arrest    Essential hypertension- (present on admission)  Assessment & Plan  Reintroduce oral antihypertensives when appropriate           VTE:  NOAC  Ulcer:  PPI  Lines: Central Line  Ongoing indication addressed and Baird Catheter  Ongoing indication addressed    I have performed a physical exam and reviewed and updated ROS and Plan today (5/13/2024). In review of yesterday's note (5/12/2024), there are no changes except as documented above.     Discussed patient condition and risk of morbidity and/or mortality with Family, RN, RT, Pharmacy, and Charge nurse / hot rounds  The patient remains critically ill.  Critical care time = 80 minutes in directly providing and coordinating critical care and extensive data review.  No time overlap and excludes procedures.

## 2024-05-13 NOTE — CARE PLAN
Problem: Ventilation  Goal: Ability to achieve and maintain unassisted ventilation or tolerate decreased levels of ventilator support  Description: Target End Date:  4 days     Document on Vent flowsheet    1.  Support and monitor invasive and noninvasive mechanical ventilation  2.  Monitor ventilator weaning response  3.  Perform ventilator associated pneumonia prevention interventions  4.  Manage ventilation therapy by monitoring diagnostic test results  Outcome: Progressing     Ventilator Daily Summary    Vent Day #3  Airway: 7.5/22    Ventilator settings: 20/330/+10/50%  Weaning trials: n/a  Respiratory Procedures: n/a    Plan: Continue current ventilator settings and wean mechanical ventilation as tolerated per physician orders.

## 2024-05-13 NOTE — RESPIRATORY CARE
Extubation    Cuff leak noted yes  Stridor present no  $ SVN Performed: Yes (05/11/24 0014)  FiO2%: 40 % (05/13/24 0644)  O2 (LPM): 12 (05/13/24 0840)     Patient toleration, pt extubated to 12L oxymask, coughing up a lot of secretions.  RCP Complete? yes  Events/Summary/Plan: extubation to 12L oxymask (05/13/24 0840)

## 2024-05-13 NOTE — DIETARY
"Nutrition Support Assessment   Day 5 of admit.  Azalea Gan is a 73 y.o. female with admitting DX of Afib with RVR.      Current problem list:  Acute hypoxemic respiratory failure  Septic shock  Elevated troponin  Aspiration PNA  HF with preserved E  Fatigue  Normocytic anemia  Positive cardiac stress test  Atrial fibrillation with RVR  Hypotension  Depression  Hypothyroidism  Essential HTN  ACP  Ventricular tachyarrhythmia     Assessment:  Estimated Nutritional Needs: based on:   Height: 162.6 cm (5' 4.02\")  Weight: 107 kg (235 lb 7.2 oz) - bed scale  Weight to Use in Calculations: 107 kg (235 lb 7.2 oz)  Body mass index is 40.39 kg/m²., BMI classification: obesity class III  IBW: 120 lbs (54.5 kg)    Calculation/Equation: REE per MSJ x 1.0 = 1555 kcals  Total Calories / day: 1600 - 1800 (Calories / kg: 15 - 17)  Total Grams Protein / day: 82 - 109 (Grams Protein / k.5 - 2.0 of IBW)     Evaluation:   Consult received for TF.   Pt has NG tube in place, no KUB on file. Jevity 1.2 is running at 25 ml/hr.   Pt extubated this am and is on 8 L oxymask.   Skin: No wounds or edema.   Labs: K 3.4, glucose 122, BUN 26, GFR 40, A1C 5.4  Meds: allopurinol, lipitor, synthroid, solumedrol, Klyte, senna, bowel protocol, Levo at 0.03 mcg/kg/min  Last BM:    Pt is obese-appearing.  Low CHO high protein formula indicated to meet estimated needs.      Malnutrition Risk: Pt appears adequately nourished.      Recommendations/Plan:  Start TF Pivot 1.5 (or equivalent Impact Peptide 1.5) at 25 ml/hr and advance per protocol to goal rate 45 ml/hr to provide 1620 kcals, 101 gm protein, and 810 ml free water per day.   Fluids per MD.   Monitor pressor infusion rate.     RD following.               "

## 2024-05-13 NOTE — CARE PLAN
The patient is Watcher - Medium risk of patient condition declining or worsening    Shift Goals  Clinical Goals: MAP > 65, patient comfort  Patient Goals: DANE  Family Goals: updates via phone    Progress made toward(s) clinical / shift goals:   Problem: Fall Risk  Goal: Patient will remain free from falls  Outcome: Progressing     Problem: Hemodynamics  Goal: Patient's hemodynamics, fluid balance and neurologic status will be stable or improve  Outcome: Progressing     Problem: Respiratory  Goal: Patient will achieve/maintain optimum respiratory ventilation and gas exchange  Outcome: Progressing     Problem: Fluid Volume  Goal: Fluid volume balance will be maintained  Outcome: Progressing     Problem: Pain - Standard  Goal: Alleviation of pain or a reduction in pain to the patient’s comfort goal  Outcome: Progressing     Problem: Safety - Medical Restraint  Goal: Remains free of injury from restraints (Restraint for Interference with Medical Device)  Outcome: Progressing       Patient is not progressing towards the following goals:      Problem: Safety - Medical Restraint  Goal: Free from restraint(s) (Restraint for Interference with Medical Device)  Outcome: Not Met

## 2024-05-14 ENCOUNTER — APPOINTMENT (OUTPATIENT)
Dept: CARDIOLOGY | Facility: MEDICAL CENTER | Age: 74
DRG: 273 | End: 2024-05-14
Attending: PHYSICIAN ASSISTANT
Payer: MEDICARE

## 2024-05-14 PROBLEM — N17.9 ACUTE KIDNEY INJURY (HCC): Status: ACTIVE | Noted: 2024-05-14

## 2024-05-14 PROBLEM — J96.01 ACUTE RESPIRATORY FAILURE WITH HYPOXIA (HCC): Status: ACTIVE | Noted: 2024-05-14

## 2024-05-14 LAB
ACT BLD: 275 SEC (ref 74–137)
ANION GAP SERPL CALC-SCNC: 13 MMOL/L (ref 7–16)
APTT PPP: 105.7 SEC (ref 24.7–36)
APTT PPP: 139.1 SEC (ref 24.7–36)
APTT PPP: 152.5 SEC (ref 24.7–36)
BACTERIA BLD CULT: ABNORMAL
BACTERIA BLD CULT: ABNORMAL
BACTERIA SPEC RESP CULT: NORMAL
BACTERIA UR CULT: NORMAL
BASOPHILS # BLD AUTO: 0 % (ref 0–1.8)
BASOPHILS # BLD: 0 K/UL (ref 0–0.12)
BUN SERPL-MCNC: 40 MG/DL (ref 8–22)
CALCIUM SERPL-MCNC: 8.5 MG/DL (ref 8.5–10.5)
CHLORIDE SERPL-SCNC: 103 MMOL/L (ref 96–112)
CO2 SERPL-SCNC: 27 MMOL/L (ref 20–33)
CREAT SERPL-MCNC: 1.54 MG/DL (ref 0.5–1.4)
CRP SERPL HS-MCNC: 29.85 MG/DL (ref 0–0.75)
EKG IMPRESSION: NORMAL
EOSINOPHIL # BLD AUTO: 0 K/UL (ref 0–0.51)
EOSINOPHIL NFR BLD: 0 % (ref 0–6.9)
ERYTHROCYTE [DISTWIDTH] IN BLOOD BY AUTOMATED COUNT: 55.1 FL (ref 35.9–50)
GFR SERPLBLD CREATININE-BSD FMLA CKD-EPI: 35 ML/MIN/1.73 M 2
GLUCOSE SERPL-MCNC: 136 MG/DL (ref 65–99)
GRAM STN SPEC: NORMAL
HCT VFR BLD AUTO: 33.2 % (ref 37–47)
HGB BLD-MCNC: 10.5 G/DL (ref 12–16)
IMM GRANULOCYTES # BLD AUTO: 0.01 K/UL (ref 0–0.11)
IMM GRANULOCYTES NFR BLD AUTO: 0.2 % (ref 0–0.9)
LYMPHOCYTES # BLD AUTO: 0.41 K/UL (ref 1–4.8)
LYMPHOCYTES NFR BLD: 8.9 % (ref 22–41)
MAGNESIUM SERPL-MCNC: 2.3 MG/DL (ref 1.5–2.5)
MCH RBC QN AUTO: 29.5 PG (ref 27–33)
MCHC RBC AUTO-ENTMCNC: 31.6 G/DL (ref 32.2–35.5)
MCV RBC AUTO: 93.3 FL (ref 81.4–97.8)
MONOCYTES # BLD AUTO: 0.1 K/UL (ref 0–0.85)
MONOCYTES NFR BLD AUTO: 2.2 % (ref 0–13.4)
NEUTROPHILS # BLD AUTO: 4.07 K/UL (ref 1.82–7.42)
NEUTROPHILS NFR BLD: 88.7 % (ref 44–72)
NRBC # BLD AUTO: 0 K/UL
NRBC BLD-RTO: 0 /100 WBC (ref 0–0.2)
PHOSPHATE SERPL-MCNC: 4.2 MG/DL (ref 2.5–4.5)
PLATELET # BLD AUTO: 200 K/UL (ref 164–446)
PMV BLD AUTO: 11.5 FL (ref 9–12.9)
POTASSIUM SERPL-SCNC: 4.5 MMOL/L (ref 3.6–5.5)
PREALB SERPL-MCNC: 11 MG/DL (ref 18–38)
RBC # BLD AUTO: 3.56 M/UL (ref 4.2–5.4)
SIGNIFICANT IND 70042: ABNORMAL
SIGNIFICANT IND 70042: NORMAL
SIGNIFICANT IND 70042: NORMAL
SITE SITE: ABNORMAL
SITE SITE: NORMAL
SITE SITE: NORMAL
SODIUM SERPL-SCNC: 143 MMOL/L (ref 135–145)
SOURCE SOURCE: ABNORMAL
SOURCE SOURCE: NORMAL
SOURCE SOURCE: NORMAL
WBC # BLD AUTO: 4.6 K/UL (ref 4.8–10.8)

## 2024-05-14 PROCEDURE — 93458 L HRT ARTERY/VENTRICLE ANGIO: CPT | Mod: 26 | Performed by: INTERNAL MEDICINE

## 2024-05-14 PROCEDURE — 99152 MOD SED SAME PHYS/QHP 5/>YRS: CPT | Performed by: INTERNAL MEDICINE

## 2024-05-14 PROCEDURE — 99233 SBSQ HOSP IP/OBS HIGH 50: CPT | Performed by: INTERNAL MEDICINE

## 2024-05-14 PROCEDURE — 4A023N7 MEASUREMENT OF CARDIAC SAMPLING AND PRESSURE, LEFT HEART, PERCUTANEOUS APPROACH: ICD-10-PCS | Performed by: INTERNAL MEDICINE

## 2024-05-14 PROCEDURE — 93010 ELECTROCARDIOGRAM REPORT: CPT | Performed by: INTERNAL MEDICINE

## 2024-05-14 PROCEDURE — 99233 SBSQ HOSP IP/OBS HIGH 50: CPT | Performed by: HOSPITALIST

## 2024-05-14 PROCEDURE — B2111ZZ FLUOROSCOPY OF MULTIPLE CORONARY ARTERIES USING LOW OSMOLAR CONTRAST: ICD-10-PCS | Performed by: INTERNAL MEDICINE

## 2024-05-14 RX ORDER — MIDAZOLAM HYDROCHLORIDE 1 MG/ML
INJECTION INTRAMUSCULAR; INTRAVENOUS
Status: COMPLETED
Start: 2024-05-14 | End: 2024-05-14

## 2024-05-14 RX ORDER — SODIUM CHLORIDE 9 MG/ML
1.5 INJECTION, SOLUTION INTRAVENOUS CONTINUOUS
Status: ACTIVE | OUTPATIENT
Start: 2024-05-14 | End: 2024-05-15

## 2024-05-14 RX ORDER — METHYLPREDNISOLONE SODIUM SUCCINATE 125 MG/2ML
INJECTION, POWDER, LYOPHILIZED, FOR SOLUTION INTRAMUSCULAR; INTRAVENOUS
Status: COMPLETED
Start: 2024-05-14 | End: 2024-05-14

## 2024-05-14 RX ORDER — DIPHENHYDRAMINE HYDROCHLORIDE 50 MG/ML
INJECTION INTRAMUSCULAR; INTRAVENOUS
Status: COMPLETED
Start: 2024-05-14 | End: 2024-05-14

## 2024-05-14 RX ORDER — AMOXICILLIN AND CLAVULANATE POTASSIUM 875; 125 MG/1; MG/1
1 TABLET, FILM COATED ORAL EVERY 12 HOURS
Status: DISCONTINUED | OUTPATIENT
Start: 2024-05-14 | End: 2024-05-15

## 2024-05-14 RX ORDER — VERAPAMIL HYDROCHLORIDE 2.5 MG/ML
INJECTION, SOLUTION INTRAVENOUS
Status: COMPLETED
Start: 2024-05-14 | End: 2024-05-14

## 2024-05-14 RX ORDER — ASPIRIN 81 MG/1
TABLET, CHEWABLE ORAL
Status: COMPLETED
Start: 2024-05-14 | End: 2024-05-14

## 2024-05-14 RX ORDER — HEPARIN SODIUM 200 [USP'U]/100ML
INJECTION, SOLUTION INTRAVENOUS
Status: COMPLETED
Start: 2024-05-14 | End: 2024-05-14

## 2024-05-14 RX ORDER — LIDOCAINE HYDROCHLORIDE 20 MG/ML
INJECTION, SOLUTION INFILTRATION; PERINEURAL
Status: COMPLETED
Start: 2024-05-14 | End: 2024-05-14

## 2024-05-14 RX ORDER — HEPARIN SODIUM 1000 [USP'U]/ML
INJECTION, SOLUTION INTRAVENOUS; SUBCUTANEOUS
Status: COMPLETED
Start: 2024-05-14 | End: 2024-05-14

## 2024-05-14 RX ADMIN — ASPIRIN 324 MG: 81 TABLET, CHEWABLE ORAL at 17:37

## 2024-05-14 RX ADMIN — LEVOTHYROXINE SODIUM 175 MCG: 0.17 TABLET ORAL at 05:37

## 2024-05-14 RX ADMIN — VERAPAMIL HYDROCHLORIDE 2.5 MG: 2.5 INJECTION, SOLUTION INTRAVENOUS at 17:38

## 2024-05-14 RX ADMIN — HEPARIN SODIUM: 1000 INJECTION, SOLUTION INTRAVENOUS; SUBCUTANEOUS at 17:39

## 2024-05-14 RX ADMIN — IPRATROPIUM BROMIDE AND ALBUTEROL SULFATE 3 ML: 2.5; .5 SOLUTION RESPIRATORY (INHALATION) at 23:09

## 2024-05-14 RX ADMIN — METHYLPREDNISOLONE SODIUM SUCCINATE 40 MG: 40 INJECTION, POWDER, FOR SOLUTION INTRAMUSCULAR; INTRAVENOUS at 05:37

## 2024-05-14 RX ADMIN — ALLOPURINOL 100 MG: 100 TABLET ORAL at 05:37

## 2024-05-14 RX ADMIN — AMOXICILLIN AND CLAVULANATE POTASSIUM 1 TABLET: 875; 125 TABLET, FILM COATED ORAL at 21:02

## 2024-05-14 RX ADMIN — METHYLPREDNISOLONE SODIUM SUCCINATE 125 MG: 125 INJECTION, POWDER, FOR SOLUTION INTRAMUSCULAR; INTRAVENOUS at 17:37

## 2024-05-14 RX ADMIN — PRAMIPEXOLE DIHYDROCHLORIDE 0.25 MG: 0.25 TABLET ORAL at 21:02

## 2024-05-14 RX ADMIN — ATORVASTATIN CALCIUM 40 MG: 40 TABLET, FILM COATED ORAL at 21:01

## 2024-05-14 RX ADMIN — DIPHENHYDRAMINE HYDROCHLORIDE 50 MG: 50 INJECTION INTRAMUSCULAR; INTRAVENOUS at 17:48

## 2024-05-14 RX ADMIN — LIDOCAINE HYDROCHLORIDE: 20 INJECTION, SOLUTION INFILTRATION; PERINEURAL at 17:38

## 2024-05-14 RX ADMIN — DOXYCYCLINE 100 MG: 100 INJECTION, POWDER, LYOPHILIZED, FOR SOLUTION INTRAVENOUS at 06:15

## 2024-05-14 RX ADMIN — FUROSEMIDE 40 MG: 10 INJECTION INTRAMUSCULAR; INTRAVENOUS at 05:39

## 2024-05-14 RX ADMIN — DIPHENHYDRAMINE HYDROCHLORIDE 50 MG: 50 INJECTION, SOLUTION INTRAMUSCULAR; INTRAVENOUS at 17:48

## 2024-05-14 RX ADMIN — DIGOXIN 125 MCG: 0.12 TABLET ORAL at 21:01

## 2024-05-14 RX ADMIN — HEPARIN SODIUM 2000 UNITS: 200 INJECTION, SOLUTION INTRAVENOUS at 17:37

## 2024-05-14 RX ADMIN — SODIUM CHLORIDE 1.5 ML/KG/HR: 9 INJECTION, SOLUTION INTRAVENOUS at 21:11

## 2024-05-14 RX ADMIN — HEPARIN SODIUM 950 UNITS/HR: 5000 INJECTION, SOLUTION INTRAVENOUS at 15:31

## 2024-05-14 RX ADMIN — MIDAZOLAM HYDROCHLORIDE 2 MG: 2 INJECTION, SOLUTION INTRAMUSCULAR; INTRAVENOUS at 17:37

## 2024-05-14 RX ADMIN — NITROGLYCERIN 10 ML: 20 INJECTION INTRAVENOUS at 17:38

## 2024-05-14 RX ADMIN — IOHEXOL 20 ML: 350 INJECTION, SOLUTION INTRAVENOUS at 17:40

## 2024-05-14 RX ADMIN — FENTANYL CITRATE 100 MCG: 50 INJECTION, SOLUTION INTRAMUSCULAR; INTRAVENOUS at 17:39

## 2024-05-14 RX ADMIN — CEFEPIME 2 G: 2 INJECTION, POWDER, FOR SOLUTION INTRAVENOUS at 05:43

## 2024-05-14 RX ADMIN — DOXYCYCLINE 100 MG: 100 INJECTION, POWDER, LYOPHILIZED, FOR SOLUTION INTRAVENOUS at 19:11

## 2024-05-14 ASSESSMENT — ENCOUNTER SYMPTOMS
CHILLS: 0
DIARRHEA: 0
PALPITATIONS: 0
WEAKNESS: 1
ABDOMINAL PAIN: 0
BACK PAIN: 0
LOSS OF CONSCIOUSNESS: 0
FEVER: 0
COUGH: 0
VOMITING: 0
DIZZINESS: 0
SHORTNESS OF BREATH: 0
NAUSEA: 0
HEADACHES: 0

## 2024-05-14 ASSESSMENT — PAIN DESCRIPTION - PAIN TYPE
TYPE: ACUTE PAIN

## 2024-05-14 ASSESSMENT — FIBROSIS 4 INDEX: FIB4 SCORE: 1.76

## 2024-05-14 NOTE — ASSESSMENT & PLAN NOTE
Complete 5 days of antibiotics tomorrow  Cultures have been negative with exception of 1 of 2 bottles of a coag negative staph most likely contaminant  Completing abx

## 2024-05-14 NOTE — ASSESSMENT & PLAN NOTE
In part rate related due to A-fib RVR  Cardiology following  Completing ischemic workup with left heart cath today  Appropriately diuresed: Hold on further diuresis for the moment    Continue rate control. Eliquis  EP consulted. F/u rec.

## 2024-05-14 NOTE — CARE PLAN
The patient is Stable - Low risk of patient condition declining or worsening    Shift Goals  Clinical Goals: cath lab, VSS, lab values stable  Patient Goals: rest, get better  Family Goals: DANE- family not present    Progress made toward(s) clinical / shift goals:    Problem: Knowledge Deficit - Standard  Goal: Patient and family/care givers will demonstrate understanding of plan of care, disease process/condition, diagnostic tests and medications  Outcome: Progressing     Problem: Fall Risk  Goal: Patient will remain free from falls  Outcome: Progressing     Problem: Discharge Barriers/Planning  Goal: Patient's continuum of care needs are met  Outcome: Progressing     Problem: Hemodynamics  Goal: Patient's hemodynamics, fluid balance and neurologic status will be stable or improve  Outcome: Progressing     Problem: Respiratory  Goal: Patient will achieve/maintain optimum respiratory ventilation and gas exchange  Outcome: Progressing     Problem: Fluid Volume  Goal: Fluid volume balance will be maintained  Outcome: Progressing       Patient is not progressing towards the following goals:

## 2024-05-14 NOTE — ASSESSMENT & PLAN NOTE
Rising creatinine with good UOP. ?ATN from recent sepsis  Will stop lasix.   Monitor UOP  K and Mg okay.

## 2024-05-14 NOTE — DISCHARGE PLANNING
1030  NATALIE reviewed chart and patient was discussed in IDT rounds.  CM met with patient at [facility], she is AOX4, pt states that she feels 'not quite right' but she is able to communicate effectively.    She has a walker at home that was her husbands; she has a ramp to enter the house; shower chair; a walk in tub.  She lives in a 2 story home however, she tells me that she lives on the main level.    She uses O2 at night only and she is on 2L/nc.  Her DME provider is [employer].  NATALIE called [employer] to check on 4WW with the seat and they do not have any in inventory.  She will need a script when discharged.    She has paperwork at home that has Marshall Lamberto as her HC POA but currently her daughter Sammie is the HC POA.  She has not been able to get the HC POA paperwork notorized that has Marshall and her HC POA.    She was seen at [facility] but 'they did not find out the reason' she was having rhythm difficulty.

## 2024-05-14 NOTE — ASSESSMENT & PLAN NOTE
Patient intubated on May 11, and subsequently extubated May 13  Etiology felt to be acute pulmonary edema from cardiogenic etiology as well as possible pneumonia.  Complete course of antibiotics  Continue forced diuresis  Mobilize  O2 and RT protocols    Wheezing today  Added scheduled xopenex  Continue RT per protocol    Continue o2 per protocol.   Monitoring  No wheezing today

## 2024-05-14 NOTE — CARE PLAN
The patient is Watcher - Medium risk of patient condition declining or worsening    Shift Goals  Clinical Goals: heparin gtt, PTT check; NPO at midnight; left heart cath in AM; q2h turns  Patient Goals: rest; getting better  Family Goals: updates    Progress made toward(s) clinical / shift goals:    Problem: Knowledge Deficit - Standard  Goal: Patient and family/care givers will demonstrate understanding of plan of care, disease process/condition, diagnostic tests and medications  Outcome: Progressing     Problem: Fall Risk  Goal: Patient will remain free from falls  Outcome: Progressing     Problem: Psychosocial  Goal: Patient's level of anxiety will decrease  Outcome: Progressing  Goal: Patient's ability to verbalize feelings about condition will improve  Outcome: Progressing     Problem: Communication  Goal: The ability to communicate needs accurately and effectively will improve  Outcome: Progressing     Problem: Discharge Barriers/Planning  Goal: Patient's continuum of care needs are met  Outcome: Progressing     Problem: Hemodynamics  Goal: Patient's hemodynamics, fluid balance and neurologic status will be stable or improve  Outcome: Progressing     Problem: Respiratory  Goal: Patient will achieve/maintain optimum respiratory ventilation and gas exchange  Outcome: Progressing     Problem: Fluid Volume  Goal: Fluid volume balance will be maintained  Outcome: Progressing     Problem: Dysphagia  Goal: Dysphagia will improve  Outcome: Progressing     Problem: Risk for Aspiration  Goal: Patient's risk for aspiration will be absent or decrease  Outcome: Progressing     Problem: Nutrition  Goal: Patient's nutritional and fluid intake will be adequate or improve  Outcome: Progressing     Problem: Urinary Elimination  Goal: Establish and maintain regular urinary output  Outcome: Progressing     Problem: Bowel Elimination  Goal: Establish and maintain regular bowel function  Outcome: Progressing     Problem:  Gastrointestinal Irritability  Goal: Nausea and vomiting will be absent or improve  Outcome: Progressing     Problem: Mobility  Goal: Patient's capacity to carry out activities will improve  Outcome: Progressing     Problem: Self Care  Goal: Patient will have the ability to perform ADLs independently or with assistance (bathe, groom, dress, toilet and feed)  Outcome: Progressing     Problem: Infection - Standard  Goal: Patient will remain free from infection  Outcome: Progressing     Problem: Pain - Standard  Goal: Alleviation of pain or a reduction in pain to the patient’s comfort goal  Outcome: Progressing       Patient is not progressing towards the following goals:

## 2024-05-14 NOTE — ASSESSMENT & PLAN NOTE
Patient has been aggressively diuresed, and is perhaps on the dry side.  DC Lasix and monitor  Hold off on IV fluids for now and see if the patient normalizes without intervention  Daily BMP  Renally dose medications as appropriate  Cr 1.28  Monitoring urine output   decreased strength

## 2024-05-14 NOTE — PROGRESS NOTES
Report received and patient care assumed. Pt is resting in bed, A&O4, with no complaints of pain, and is on 4L oxmask. Tele box on. All fall precautions are in place, belongings at bedside table.  Pt was updated on POC, no questions or concerns. Pt educated on use of call light for assistance.

## 2024-05-14 NOTE — ASSESSMENT & PLAN NOTE
Ventricular tachycardia versus atrial fibrillation with aberrancy  Cardiology following  Continue telemetry  Completing ischemic workup  Cath neg  EP consulted.   Ablation recommended by EP.

## 2024-05-14 NOTE — ASSESSMENT & PLAN NOTE
Suspect untreated sleep apnea  Will need pulmonary follow-up once  respiratory status is maximized for an outpatient sleep study

## 2024-05-14 NOTE — PROGRESS NOTES
"Critical Care Progress Note    Date of admission  5/8/2024    \"73 y.o. female w/PMH of Afib s/p DINESH cardioversion  (03/2024), HTN, Hypothyroidism, prior CVA, HLD, JOSH, Renal artery stenosis. Admitted 05/08/24 d/t Afib RVR as a transfer from [facility] d/t no bed availability. At outside facility she received Metoprolol 5 mg iv, Metoprolol 25 mg PO, Digoxin IV. On admission, decision was to hold rate controlled medications d/t labile BP. Started on Digoxin for rhythm controlled, Amiodarone not considered d/t h/o Hypothyroidism.      05/09. Rate controlled, only on rhythm control w/Digoxin d/t still having soft BP's.   05/10.  Nuclear medicine stress is done: Small partially reversible myocardial perfusion defect in the mid  inferior and basal inferior segments; decreased wall motion in inferior and septal walls; LVEF 50%. Cardiology was consulted, considered to be false positive w/artifact. Started on Sotalol 80 mg PO at 2151 hrs d/t recurrent PAF.   05/11. Cardiology following and added Dapaglifozin for HFpEF, also recommended to continue w/Sotalol. EKG in AM w/Qtc 412. CXR w/o acute abnormalities. AT 1730 hrs rapid response was called d/t acute decompensation of clinical and respiratory status in the setting of Polymorphic Vtach following patient attempting to get up from bed. Marked hypoxia, low 70's w/rapid decline in mental status. No hemodynamic instability. Patient was transferred to the RICU for emergent intubation. Received Magnesium push, Bicarbonate and IV Lasix. Central and arterial lines were placed. Bedside bronchoscopy did not reveal specific abnormalities. Cardiology (Dr. Ahumada) was contacted, recommended discontinuation of Sotalol and Prozac d/t Qtc in upper limit of normal and polymorphic Vtach (transitional). Lab work during rapid remarkable for mild leukocytosis, no electrolytes abnormalities, no lactic acidosis. Troponin elevated at 52.\"    Hospital Course - ICU  5/11 admitted to " the ICU, intubated   5/12 VD 2 PEEP 12/70%; bronch with BAL  5/13 extubated. Off NE gtt. Lasix increased to 40 Q8H    Interval Problem Update  Reviewed last 24 hour events:  Remains critically ill  Extubated yesterday, doing well, on 4L NC sat >92%  No arrhtymias issues.   HR in 60s  SBP in 110s, MAP >65  Off NE gtt  Afebrile  WBC 4.6  Hgb 10.5, platelet 200K  Creatinine 1.54 (1.37), HCO3 27  -800cc every 4 hours.   I/O negative 1.6L in the past 24 hours, + 1.8L  K 4.5, Mg 2.3    On heparin gtt, holidng eliquis for LHC  On digoxin  On lasix 40 Q8H   LHC planned later today. Had premeds due to iodine allergy   Cefepime    5/12 BAL cx NGTD  5/11 Bcx 2/2 NGTD      Review of Systems  Review of Systems   Unable to perform ROS: Critical illness   All other systems reviewed and are negative.       Vital Signs for last 24 hours   Temp:  [36.3 °C (97.3 °F)-37 °C (98.6 °F)] 36.3 °C (97.3 °F)  Pulse:  [55-82] 64  Resp:  [11-40] 26  BP: ()/(50-62) 117/56  SpO2:  [87 %-97 %] 97 %    Hemodynamic parameters for last 24 hours       Respiratory Information for the last 24 hours       Physical Exam   Physical Exam  Vitals and nursing note reviewed.   Constitutional:       Appearance: She is ill-appearing.   HENT:      Head: Normocephalic and atraumatic.      Mouth/Throat:      Mouth: Mucous membranes are moist.      Comments: ET tube   Eyes:      Pupils: Pupils are equal, round, and reactive to light.   Cardiovascular:      Rate and Rhythm: Normal rate.   Pulmonary:      Comments: Equal and symmetric breath sounds with mechanical ventilation  Abdominal:      General: Abdomen is flat. There is no distension.      Palpations: Abdomen is soft.      Tenderness: There is no abdominal tenderness. There is no guarding or rebound.   Musculoskeletal:      Right lower leg: Edema present.      Left lower leg: Edema present.   Skin:     General: Skin is warm and dry.      Capillary Refill: Capillary refill takes less than 2 seconds.    Neurological:      Comments: Deeply sedated on the ventilator          Medications  Current Facility-Administered Medications   Medication Dose Route Frequency Provider Last Rate Last Admin    furosemide (Lasix) injection 40 mg  40 mg Intravenous Q8HRS CARLI Ellis.O.   40 mg at 05/14/24 0539    potassium bicarbonate (Klyte) effervescent tablet 50 mEq  50 mEq Enteral Tube BID CARLI Ellis.O.   50 mEq at 05/13/24 1706    cefepime (Maxipime) 2 g in  mL IVPB  2 g Intravenous Q12HRS CARLI Ellis.O.   Stopped at 05/14/24 0613    diphenhydrAMINE (Benadryl) injection 50 mg  50 mg Intravenous Once Yulisa Bourne P.A.-C.        heparin infusion 25,000 Units in 500 mL 0.45% NACL   Intravenous Continuous Yulisa Bourne P.A.-C.   Stopped. (Insulin or Heparin) at 05/14/24 0649    doxycycline (Vibramycin) 100 mg in  mL IVPB  100 mg Intravenous Q12HRS Blayne Carrasquillo M.D.   Stopped at 05/14/24 0715    Pharmacy Consult: Enteral tube insertion - review meds/change route/product selection  1 Each Other PHARMACY TO DOSE Blayne Carrasquillo M.D.        allopurinol (Zyloprim) tablet 100 mg  100 mg Enteral Tube DAILY Blayne Carrasquillo M.D.   100 mg at 05/14/24 0537    [Held by provider] apixaban (Eliquis) tablet 5 mg  5 mg Enteral Tube BID Blayne Carrasquillo M.D.   5 mg at 05/13/24 0550    atorvastatin (Lipitor) tablet 40 mg  40 mg Enteral Tube Q EVENING Blayne Carrasquillo M.D.   40 mg at 05/13/24 1707    digoxin (Lanoxin) tablet 125 mcg  125 mcg Enteral Tube DAILY AT 1800 Blayne Carrasquillo M.D.   125 mcg at 05/13/24 1706    levothyroxine (Synthroid) tablet 175 mcg  175 mcg Enteral Tube AM ES Blayne Carrasquillo M.D.   175 mcg at 05/14/24 0537    acetaminophen (Tylenol) tablet 650 mg  650 mg Enteral Tube Q4HRS PRN Blayne Carrasquillo M.D.        Respiratory Therapy Consult   Nebulization Continuous RT Moe Caceres M.D.        senna-docusate (Pericolace Or Senokot S) 8.6-50 MG per tablet 2 Tablet  2 Tablet Enteral Tube  BID Moe Caceres M.D.   2 Tablet at 05/13/24 0550    And    polyethylene glycol/lytes (Miralax) Packet 1 Packet  1 Packet Enteral Tube QDAY PRN Moe Caceres M.D.        And    magnesium hydroxide (Milk Of Magnesia) suspension 30 mL  30 mL Enteral Tube QDAY PRN Moe Caceres M.D.        And    bisacodyl (Dulcolax) suppository 10 mg  10 mg Rectal QDAY PRN Moe Caceres M.D. MD Alert...ICU Electrolyte Replacement per Pharmacy   Other PHARMACY TO DOSE Moe Caceres M.D.        norepinephrine (Levophed) 8 mg in 250 mL NS infusion (premix)  0-1 mcg/kg/min (Ideal) Intravenous Continuous Moe Caceres M.D.   Stopped at 05/13/24 1454    And    vasopressin (Vasostrict) 20 Units in  mL Infusion  0.03 Units/min Intravenous Continuous Moe Caceres M.D.   Dose not Required at 05/11/24 1945    albuterol inhaler 2 Puff  2 Puff Inhalation Q2HRS PRN Kmiani Diana M.D.   2 Puff at 05/11/24 1718    ipratropium-albuterol (DUONEB) nebulizer solution  3 mL Nebulization Q4H PRN (RT) Kimani Diana M.D.   3 mL at 05/13/24 1935    pramipexole (Mirapex) tablet 0.25 mg  0.25 mg Oral Nightly Wali Flores M.D.   0.25 mg at 05/13/24 2118       Fluids    Intake/Output Summary (Last 24 hours) at 5/14/2024 0707  Last data filed at 5/14/2024 0600  Gross per 24 hour   Intake 1383.76 ml   Output 3505 ml   Net -2121.24 ml       Laboratory  Recent Labs     05/11/24  1922 05/12/24  0352 05/13/24  0520   ISTATAPH 7.404 7.548* 7.432   ISTATAPCO2 39.5* 25.8* 35.8   ISTATAPO2 66 110* 87   ISTATATCO2 26 23 25   [id number] 93 99 97   ISTATARTHCO3 24.7 22.5 23.9   ISTATARTBE 0 1 0   ISTATTEMP 101.5 F 98.8 F 98.8 F   ISTATFIO2 100  --  50   ISTATSPEC Arterial Arterial Arterial   ISTATAPHTC 7.380* 7.547* 7.431   ESBATWJK4GH 73 111* 88*         Recent Labs     05/12/24  0415 05/13/24  0320 05/14/24  0345   SODIUM 138 137 143   POTASSIUM 3.6 3.4* 4.5   CHLORIDE 102 102 103   CO2 23 22 27   BUN 23* 26* 40*    CREATININE 1.23 1.37 1.54*   MAGNESIUM 1.9 2.2 2.3   PHOSPHORUS 2.2* 3.4 4.2   CALCIUM 8.7 8.2* 8.5     Recent Labs     05/12/24 0415 05/13/24 0320 05/14/24  0345   PREALBUMIN  --  9.8* 11.0*   GLUCOSE 123* 122* 136*     Recent Labs     05/12/24 0415 05/13/24 0320 05/13/24 1639 05/14/24  0345   WBC 10.9* 9.0 6.8 4.6*   NEUTSPOLYS 71.10 73.30*  --  88.70*   LYMPHOCYTES 19.80* 16.00*  --  8.90*   MONOCYTES 7.60 7.90  --  2.20   EOSINOPHILS 0.80 2.30  --  0.00   BASOPHILS 0.30 0.20  --  0.00     Recent Labs     05/13/24 0320 05/13/24 1639 05/13/24  2340 05/14/24 0345 05/14/24  0548   RBC 3.64* 3.45*  --  3.56*  --    HEMOGLOBIN 10.8* 10.1*  --  10.5*  --    HEMATOCRIT 33.3* 31.9*  --  33.2*  --    PLATELETCT 203 188  --  200  --    PROTHROMBTM  --  21.4*  --   --   --    APTT  --  42.1* 105.7*  --  152.5*   INR  --  1.83*  --   --   --        Imaging  X-Ray:  I have personally reviewed the images and compared with prior images.    Assessment/Plan  * Acute hypoxic respiratory failure (HCC)  Assessment & Plan  Acute secondary to pulmonary edema +/- aspiration pneumonia/pneumonitis.   SpO2 70s in setting of polymorphic Vtach and encephalopathy.  5/11 emergently intubated and transferred to the ICU  5/12 Bronchoscopy with BAL RML, aspirate noted; diffusely friable mucosa     Lung protective ventilation strategies  Optimize oxygenation, ventilation, and acid base balance  ABCDEF Bundle   Diuresis  Empiric antibiotics     Acute kidney injury (HCC)  Assessment & Plan  Rising creatinine with good UOP. ?ATN from recent sepsis  Will stop lasix.   Monitor UOP  K and Mg okay.     Aspiration pneumonia (HCC)  Assessment & Plan  5/11 after AMS in the setting of vtach    Empiric cefepime + doxy. Treat for 5 days (end date 5/18)  MRSA nare negative   Follow BAL 5/12    Elevated troponin  Assessment & Plan  Likely demand ischemia   Troponin plateauing.   Pt denies chest pain       Septic shock (HCC)  Assessment & Plan  This is  Sepsis Not present on admission  SIRS criteria identified on my evaluation include: Fever, with temperature greater than 100.9 deg F and Tachycardia, with heart rate greater than 90 BPM  Clinical indicators of end organ dysfunction include Acute Respiratory Failure - (mechanical ventilation or BiPap or PaO2/FiO2 ratio < 300)  Source is Pulmonary  Sepsis protocol initiated  Crystalloid Fluid Administration: Held with concern for pulmonary edema  IV antibiotics as appropriate for source of sepsis: Linezolid, Cefepime    Cefepime only. Treat for total 5 days  MRSA Nare negative: discontinue linezolid  BAL 5/12 - follow cultures  Vasopressors for MAP > 65    Heart failure with preserved ejection fraction (HCC)  Assessment & Plan  Normal LVEF; dilated RV  RVSP 63  Keep negative fluid balance  Increase lasix 40 Q8H  Re-evaluate for GDMT after resolution of critical illness    Atrial fibrillation with rapid ventricular response (HCC)- (present on admission)  Assessment & Plan  Continue digoxin   DOAC  Had polymorphic vtach in setting of Sotalol on 5/11  Discontinue Sotalol and Prozac as per cardiology     Hypothyroidism- (present on admission)  Assessment & Plan  TSH (05/11/24): 0.91  Continue Levothyroxine 175 mcg daily    Ventricular tachyarrhythmia (HCC)- (present on admission)  Assessment & Plan  Possible afib with abberancy in the setting of LBBB  Sotalol and Prozac stopped  5/13 no arrhythmias episodes    [facility] today   Optimize electrolytes  Digoxin 125 mcg qd      Advance care planning- (present on admission)  Assessment & Plan  POLST indicates DNR; full code from 5/8/24 leading up to this intubation  05/12/24 spoke with Azalea Cochran's daughter, who said her mother was very clear on DNR/DNI in line with the prior POLST. It is unclear how she became a full code on this admission as its not documented. Her daughter will attempt to come to [place] in the next 1-2 days to further assess the situation but in the interim she  believes the best course is DNR/I ok as her mother has made it very clear she would not want advanced life support or a reduced quality of life.    5/13 RN spoke with patient after extubation. Pt did confirm to us that pt would like to be intubated and resuscitated in the event of cardiac arrest    Essential hypertension- (present on admission)  Assessment & Plan  Reintroduce oral antihypertensives when appropriate           VTE:  NOAC  Ulcer: PPI  Lines: Central Line  Ongoing indication addressed and Baird Catheter  Ongoing indication addressed    I have performed a physical exam and reviewed and updated ROS and Plan today (5/14/2024). In review of yesterday's note (5/13/2024), there are no changes except as documented above.     Discussed patient condition and risk of morbidity and/or mortality with Family, RN, RT, Pharmacy, and Charge nurse / hot rounds    Can be transferred to telemetry floor.   D/w Hosp Medicine

## 2024-05-15 LAB
ANION GAP SERPL CALC-SCNC: 11 MMOL/L (ref 7–16)
BASOPHILS # BLD AUTO: 0 % (ref 0–1.8)
BASOPHILS # BLD: 0 K/UL (ref 0–0.12)
BUN SERPL-MCNC: 51 MG/DL (ref 8–22)
CALCIUM SERPL-MCNC: 8.4 MG/DL (ref 8.5–10.5)
CHLORIDE SERPL-SCNC: 102 MMOL/L (ref 96–112)
CO2 SERPL-SCNC: 27 MMOL/L (ref 20–33)
CREAT SERPL-MCNC: 1.74 MG/DL (ref 0.5–1.4)
EOSINOPHIL # BLD AUTO: 0 K/UL (ref 0–0.51)
EOSINOPHIL NFR BLD: 0 % (ref 0–6.9)
ERYTHROCYTE [DISTWIDTH] IN BLOOD BY AUTOMATED COUNT: 55.4 FL (ref 35.9–50)
GFR SERPLBLD CREATININE-BSD FMLA CKD-EPI: 30 ML/MIN/1.73 M 2
GLUCOSE SERPL-MCNC: 126 MG/DL (ref 65–99)
HCT VFR BLD AUTO: 29.5 % (ref 37–47)
HGB BLD-MCNC: 9.7 G/DL (ref 12–16)
IMM GRANULOCYTES # BLD AUTO: 0.01 K/UL (ref 0–0.11)
IMM GRANULOCYTES NFR BLD AUTO: 0.2 % (ref 0–0.9)
INR PPP: 1.36 (ref 0.87–1.13)
LYMPHOCYTES # BLD AUTO: 0.38 K/UL (ref 1–4.8)
LYMPHOCYTES NFR BLD: 7.7 % (ref 22–41)
MAGNESIUM SERPL-MCNC: 2.3 MG/DL (ref 1.5–2.5)
MCH RBC QN AUTO: 30.5 PG (ref 27–33)
MCHC RBC AUTO-ENTMCNC: 32.9 G/DL (ref 32.2–35.5)
MCV RBC AUTO: 92.8 FL (ref 81.4–97.8)
MONOCYTES # BLD AUTO: 0.13 K/UL (ref 0–0.85)
MONOCYTES NFR BLD AUTO: 2.6 % (ref 0–13.4)
NEUTROPHILS # BLD AUTO: 4.41 K/UL (ref 1.82–7.42)
NEUTROPHILS NFR BLD: 89.5 % (ref 44–72)
NRBC # BLD AUTO: 0 K/UL
NRBC BLD-RTO: 0 /100 WBC (ref 0–0.2)
PHOSPHATE SERPL-MCNC: 5.2 MG/DL (ref 2.5–4.5)
PLATELET # BLD AUTO: 202 K/UL (ref 164–446)
PMV BLD AUTO: 11.4 FL (ref 9–12.9)
POTASSIUM SERPL-SCNC: 4 MMOL/L (ref 3.6–5.5)
PROTHROMBIN TIME: 17 SEC (ref 12–14.6)
RBC # BLD AUTO: 3.18 M/UL (ref 4.2–5.4)
SODIUM SERPL-SCNC: 140 MMOL/L (ref 135–145)
UFH PPP CHRO-ACNC: >1.1 IU/ML
WBC # BLD AUTO: 4.9 K/UL (ref 4.8–10.8)

## 2024-05-15 PROCEDURE — 99233 SBSQ HOSP IP/OBS HIGH 50: CPT | Performed by: HOSPITALIST

## 2024-05-15 PROCEDURE — 99223 1ST HOSP IP/OBS HIGH 75: CPT | Performed by: INTERNAL MEDICINE

## 2024-05-15 RX ORDER — DOXYCYCLINE 100 MG/1
100 TABLET ORAL EVERY 12 HOURS
Status: DISCONTINUED | OUTPATIENT
Start: 2024-05-15 | End: 2024-05-15

## 2024-05-15 RX ORDER — ATORVASTATIN CALCIUM 40 MG/1
40 TABLET, FILM COATED ORAL EVERY EVENING
Status: DISCONTINUED | OUTPATIENT
Start: 2024-05-15 | End: 2024-05-22 | Stop reason: HOSPADM

## 2024-05-15 RX ORDER — AMOXICILLIN 250 MG
2 CAPSULE ORAL 2 TIMES DAILY
Status: DISCONTINUED | OUTPATIENT
Start: 2024-05-15 | End: 2024-05-22 | Stop reason: HOSPADM

## 2024-05-15 RX ORDER — BISACODYL 10 MG
10 SUPPOSITORY, RECTAL RECTAL
Status: DISCONTINUED | OUTPATIENT
Start: 2024-05-15 | End: 2024-05-22 | Stop reason: HOSPADM

## 2024-05-15 RX ORDER — LEVALBUTEROL INHALATION SOLUTION 0.63 MG/3ML
0.63 SOLUTION RESPIRATORY (INHALATION)
Status: DISCONTINUED | OUTPATIENT
Start: 2024-05-15 | End: 2024-05-18

## 2024-05-15 RX ORDER — PRAMIPEXOLE DIHYDROCHLORIDE 0.25 MG/1
0.25 TABLET ORAL NIGHTLY
Status: DISCONTINUED | OUTPATIENT
Start: 2024-05-15 | End: 2024-05-15

## 2024-05-15 RX ORDER — POLYETHYLENE GLYCOL 3350 17 G/17G
1 POWDER, FOR SOLUTION ORAL
Status: DISCONTINUED | OUTPATIENT
Start: 2024-05-15 | End: 2024-05-22 | Stop reason: HOSPADM

## 2024-05-15 RX ORDER — ALLOPURINOL 100 MG/1
100 TABLET ORAL DAILY
Status: DISCONTINUED | OUTPATIENT
Start: 2024-05-16 | End: 2024-05-22 | Stop reason: HOSPADM

## 2024-05-15 RX ORDER — DIGOXIN 125 MCG
125 TABLET ORAL DAILY
Status: DISCONTINUED | OUTPATIENT
Start: 2024-05-15 | End: 2024-05-22 | Stop reason: HOSPADM

## 2024-05-15 RX ORDER — DOXYCYCLINE 100 MG/1
100 TABLET ORAL EVERY 12 HOURS
Status: COMPLETED | OUTPATIENT
Start: 2024-05-15 | End: 2024-05-16

## 2024-05-15 RX ORDER — ACETAMINOPHEN 325 MG/1
650 TABLET ORAL EVERY 4 HOURS PRN
Status: DISCONTINUED | OUTPATIENT
Start: 2024-05-15 | End: 2024-05-22 | Stop reason: HOSPADM

## 2024-05-15 RX ORDER — AMOXICILLIN AND CLAVULANATE POTASSIUM 875; 125 MG/1; MG/1
1 TABLET, FILM COATED ORAL EVERY 12 HOURS
Status: COMPLETED | OUTPATIENT
Start: 2024-05-15 | End: 2024-05-16

## 2024-05-15 RX ORDER — AMOXICILLIN AND CLAVULANATE POTASSIUM 875; 125 MG/1; MG/1
1 TABLET, FILM COATED ORAL EVERY 12 HOURS
Status: DISCONTINUED | OUTPATIENT
Start: 2024-05-15 | End: 2024-05-15

## 2024-05-15 RX ORDER — PRAMIPEXOLE DIHYDROCHLORIDE 0.25 MG/1
0.25 TABLET ORAL NIGHTLY
Status: DISCONTINUED | OUTPATIENT
Start: 2024-05-15 | End: 2024-05-22 | Stop reason: HOSPADM

## 2024-05-15 RX ADMIN — DOXYCYCLINE 100 MG: 100 TABLET, FILM COATED ORAL at 17:07

## 2024-05-15 RX ADMIN — DIGOXIN 125 MCG: 0.12 TABLET ORAL at 17:07

## 2024-05-15 RX ADMIN — ATORVASTATIN CALCIUM 40 MG: 40 TABLET, FILM COATED ORAL at 17:08

## 2024-05-15 RX ADMIN — LEVALBUTEROL HYDROCHLORIDE 0.63 MG: 0.63 SOLUTION RESPIRATORY (INHALATION) at 14:19

## 2024-05-15 RX ADMIN — DOXYCYCLINE 100 MG: 100 INJECTION, POWDER, LYOPHILIZED, FOR SOLUTION INTRAVENOUS at 05:09

## 2024-05-15 RX ADMIN — AMOXICILLIN AND CLAVULANATE POTASSIUM 1 TABLET: 875; 125 TABLET, FILM COATED ORAL at 04:59

## 2024-05-15 RX ADMIN — APIXABAN 5 MG: 5 TABLET, FILM COATED ORAL at 17:08

## 2024-05-15 RX ADMIN — ALLOPURINOL 100 MG: 100 TABLET ORAL at 04:59

## 2024-05-15 RX ADMIN — LEVALBUTEROL HYDROCHLORIDE 0.63 MG: 0.63 SOLUTION RESPIRATORY (INHALATION) at 20:17

## 2024-05-15 RX ADMIN — AMOXICILLIN AND CLAVULANATE POTASSIUM 1 TABLET: 875; 125 TABLET, FILM COATED ORAL at 17:07

## 2024-05-15 RX ADMIN — LEVOTHYROXINE SODIUM 175 MCG: 0.17 TABLET ORAL at 04:59

## 2024-05-15 RX ADMIN — PRAMIPEXOLE DIHYDROCHLORIDE 0.25 MG: 0.25 TABLET ORAL at 21:01

## 2024-05-15 ASSESSMENT — COGNITIVE AND FUNCTIONAL STATUS - GENERAL
WALKING IN HOSPITAL ROOM: A LITTLE
DRESSING REGULAR UPPER BODY CLOTHING: A LITTLE
STANDING UP FROM CHAIR USING ARMS: A LITTLE
TOILETING: A LITTLE
MOBILITY SCORE: 20
DRESSING REGULAR LOWER BODY CLOTHING: A LOT
SUGGESTED CMS G CODE MODIFIER MOBILITY: CJ
HELP NEEDED FOR BATHING: A LITTLE
DAILY ACTIVITIY SCORE: 17
CLIMB 3 TO 5 STEPS WITH RAILING: A LITTLE
MOVING TO AND FROM BED TO CHAIR: A LITTLE
PERSONAL GROOMING: A LITTLE
SUGGESTED CMS G CODE MODIFIER DAILY ACTIVITY: CK
EATING MEALS: A LITTLE

## 2024-05-15 ASSESSMENT — ENCOUNTER SYMPTOMS
LIGHT-HEADEDNESS: 0
SHORTNESS OF BREATH: 1
PSYCHIATRIC NEGATIVE: 1
VOMITING: 0
EYES NEGATIVE: 1
WEAKNESS: 1
DIAPHORESIS: 0
HEMATOLOGIC/LYMPHATIC NEGATIVE: 1
NAUSEA: 0
ALLERGIC/IMMUNOLOGIC NEGATIVE: 1
BRUISES/BLEEDS EASILY: 0
GASTROINTESTINAL NEGATIVE: 1
DIARRHEA: 0
ABDOMINAL PAIN: 0
NEUROLOGICAL NEGATIVE: 1
CONSTITUTIONAL NEGATIVE: 1
CARDIOVASCULAR NEGATIVE: 1
HEADACHES: 0
LOSS OF CONSCIOUSNESS: 0
CHILLS: 0
CONSTIPATION: 0
ENDOCRINE NEGATIVE: 1
BACK PAIN: 0
DIZZINESS: 0
MUSCULOSKELETAL NEGATIVE: 1
FATIGUE: 0
PALPITATIONS: 0
FEVER: 0
SHORTNESS OF BREATH: 0
CHEST TIGHTNESS: 0
COUGH: 0

## 2024-05-15 ASSESSMENT — GAIT ASSESSMENTS
GAIT LEVEL OF ASSIST: MINIMAL ASSIST
ASSISTIVE DEVICE: FRONT WHEEL WALKER
DISTANCE (FEET): 100
DEVIATION: BRADYKINETIC;DECREASED BASE OF SUPPORT

## 2024-05-15 ASSESSMENT — FIBROSIS 4 INDEX: FIB4 SCORE: 1.74

## 2024-05-15 ASSESSMENT — ACTIVITIES OF DAILY LIVING (ADL): TOILETING: INDEPENDENT

## 2024-05-15 NOTE — PROGRESS NOTES
Bedside report received by RN and patient care assumed. Tele Box in place, patient is A&O4, resting in bed, and on 5L oxymask. Patient states 0/10 pain. Fall precautions in place and patient educated on use of call light for assistance. Pt updated on POC with no questions or concerns. Hourly monitoring initiated.

## 2024-05-15 NOTE — THERAPY
Physical Therapy   Initial Evaluation     Patient Name: Azalea Gan  Age:  73 y.o., Sex:  female  Medical Record #: [id number]  Today's Date: 5/15/2024     Precautions  Precautions: Nasogastric Tube    Assessment  Patient is 73 y.o. female with past medical history of atrial fibrillation status post recent ablation in March 2024, hypertension, who presented 5/8/2024 with atrial fibrillation with rapid ventricular response.  .  Additional factors influencing patient status / progress : Cardiac Cath done on 5/14 showed  Normal coronary arteries. Today pt is most limited by coughing, SOB with ambulation on 6L. Pt lives alone, reports challenged by daily chores, but son is coming to live with her in June to assist. Pt reports chronic L knee pain with plan to have TKA when medically appropriate. Pt will benefit from being up to walk in hallway using FWW with nsg supervision to build her endurance. PT to follow. .      Plan    Physical Therapy Initial Treatment Plan   Treatment Plan : Bed Mobility, Gait Training, Neuro Re-Education / Balance, Therapeutic Activities, Therapeutic Exercise  Treatment Frequency: 3 Times per Week  Duration: Until Therapy Goals Met    DC Equipment Recommendations: None  Discharge Recommendations: Recommend home health for continued physical therapy services          Objective       05/15/24 1415   Charge Group   PT Evaluation PT Evaluation Mod   Total Time Spent   PT Total Time Yes   PT Evaluation Time Spent (Mins) 30   PT Total Time Spent (Calculated) 30   Vitals   O2 (LPM) 5   O2 Delivery Device Nasal Cannula   Pain 0 - 10 Group   Therapist Pain Assessment During Activity;Nurse Notified;0   Prior Living Situation   Prior Services Skilled Home Health Services   Housing / Facility 2 Story House  (lives first floor only)   Steps Into Home 0  (ramp)   Steps In Home 0   Equipment Owned Front-Wheel Walker;Hospital Bed   Lives with - Patient's Self Care Capacity Alone and Able to Care For Self    Prior Level of Functional Mobility   Bed Mobility Independent   Transfer Status Independent   Ambulation Independent   Ambulation Distance community   Assistive Devices Used Front-Wheel Walker   History of Falls   History of Falls Yes   Date of Last Fall   (dec 2023, jan 2024)   Cognition    Cognition / Consciousness WDL   Level of Consciousness Alert   Comments pleasant, cooperative, motivated   Active ROM Lower Body    Active ROM Lower Body  WDL   Strength Lower Body   Lower Body Strength  WDL   Comments B LE functional for transfers, ambulation, pt reports plan to have L TKA as soon as medically able due to ongoing, chronic pain L knee   Balance Assessment   Sitting Balance (Static) Fair   Sitting Balance (Dynamic) Fair -   Standing Balance (Static) Fair   Standing Balance (Dynamic) Fair -   Weight Shift Sitting Fair   Weight Shift Standing Fair   Comments with FWW   Bed Mobility    Supine to Sit Supervised  (with HOB up, used rail)   Sit to Supine   (up chair)   Scooting Supervised   Rolling   (pivoted)   Gait Analysis   Gait Level Of Assist Minimal Assist  (1x LOB on slanted bathroom floor, min A for safety)   Assistive Device Front Wheel Walker   Distance (Feet) 100   # of Times Distance was Traveled 1   Deviation Bradykinetic;Decreased Base Of Support   # of Stairs Climbed 0   Weight Bearing Status no restrictions   Functional Mobility   Sit to Stand Contact Guard Assist   Bed, Chair, Wheelchair Transfer Contact Guard Assist   Transfer Method Stand Pivot   6 Clicks Assessment - How much HELP from from another person do you currently need... (If the patient hasn't done an activity recently, how much help from another person do you think he/she would need if he/she tried?)   Turning from your back to your side while in a flat bed without using bedrails? 4   Moving from lying on your back to sitting on the side of a flat bed without using bedrails? 4   Moving to and from a bed to a chair (including a  wheelchair)? 3   Standing up from a chair using your arms (e.g., wheelchair, or bedside chair)? 3   Walking in hospital room? 3   Climbing 3-5 steps with a railing? 3   6 clicks Mobility Score 20   Activity Tolerance   Sitting in Chair 15+   Standing 6   Short Term Goals    Short Term Goal # 1 Pt will perform bed mobility with mod indep in 6 visits to improve functional indep   Short Term Goal # 2 Pt will transfer bed to chair with supervision in 6 visits to improve functional indep   Short Term Goal # 3 Pt will ambulate x 200 feet using FWW with supervision in 6 visits to improve functional indep   Education Group   Education Provided Role of Physical Therapist   Role of Physical Therapist Patient Response Patient;Acceptance;Explanation;Verbal Demonstration   Physical Therapy Initial Treatment Plan    Treatment Plan  Bed Mobility;Gait Training;Neuro Re-Education / Balance;Therapeutic Activities;Therapeutic Exercise   Treatment Frequency 3 Times per Week   Duration Until Therapy Goals Met   Problem List    Problems Impaired Transfers;Impaired Ambulation;Decreased Activity Tolerance;Impaired Balance   Anticipated Discharge Equipment and Recommendations   DC Equipment Recommendations None   Discharge Recommendations Recommend home health for continued physical therapy services   Interdisciplinary Plan of Care Collaboration   IDT Collaboration with  Nursing   Patient Position at End of Therapy   (handed off to OT after PT)   Collaboration Comments nsg updated   Session Information   Date / Session Number  5/15-1 (1/3, 5/21)

## 2024-05-15 NOTE — THERAPY
"Speech Language Pathology   Clinical Swallow Evaluation     Patient Name: Azalea Gan  AGE:  73 y.o., SEX:  female  Medical Record #: [id number]  Date of Service: 5/15/2024      History of Present Illness    74 y/o admitted on 5/8 for afib with RVR. Recent ablation March 2024. Not seen by SLP before at [facility].    Dx chest 5/13: \"1.  Possible small right pleural effusion.  2.  Similar right greater than left airspace disease.  3.  Suboptimal patient positioning.\"    PMH: HTN, CVA, hyperlipidemia, L sided sciatica, renal artery stenosis, afib          General Information:  Vitals  O2 (LPM): 5  O2 Delivery Device: Silicone Nasal Cannula  Level of Consciousness: Alert  Follows Directives: Yes      Prior Living Situation & Level of Function:  Housing / Facility: 2 Story House  Lives with - Patient's Self Care Capacity: Alone and Able to Care For Self           Oral Mechanism Evaluation:  Dentition: Good   Facial Symmetry: Equal  Labial Observations: WFL   Lingual Observations: Midline  Motor Speech: WFL            Laryngeal Function:  Secretion Management: Adequate  Voice Quality: WFL  Cough: Perceptually WNL         Subjective  Pt currently has NG but it is not being used. Pt denied difficulty with swallowing s/p extubation, but endorsed mild pain in L side of throat.      Assessment  Current Method of Nutrition: Oral diet (regular/thin liquids) (has NG in place but not being used)  Positioning: Frazier's (60-90 degrees)  Bolus Administration: Patient  O2 (LPM): 5 O2 Delivery Device: Silicone Nasal Cannula  Factor(s) Affecting Performance: None         Swallowing Trials:  Swallowing Trials  Thin Liquid (TN0): WFL  Pureed (PU4): WFL  Regular (RG7): WFL      Comments:  No overt s/sx of aspiration noted with trials of thin liquids via cup sip, pudding, or solids. Pt fed self independently without obvious difficulty. Vocal quality clear following the swallow. Mastication and suspected AP propulsion timely. Pt denied globus " "sensation, but endorsed mild odynophagia likely d/t TF. No obvious oral residue noted after the swallow.       Clinical Impressions  Pt is presenting with a functional oropharyngeal swallow. Recommend continuation of a regular/thin liquid diet. If okay per MD, would recommend d/c of TF as it is not currently being used and pt is able to consume a regular diet. SLP to follow.    Recommendations  Diet Consistency: regular/thin liquid diet  Instrumentation: None indicated at this time  Medication: As tolerated  Supervision: Independent  Positioning: Fully upright and midline during oral intake  Risk Management : None  Oral Care: Q4h         SLP Treatment Plan  Treatment Plan: Dysphagia Treatment  SLP Frequency: 3x Per Week  Estimated Duration: Until Therapy Goals Met      Anticipated Discharge Needs  Discharge Recommendations: Anticipate that the patient will have no further speech therapy needs after discharge from the hospital   Therapy Recommendations Upon DC: Not Indicated        Patient / Family Goals  Patient / Family Goal #1: \"Can this tube (NG) come out\"  Short Term Goals  Short Term Goal # 1: Pt will consume a regular/thin liquid diet with no overt s/sx of aspiration      MEG Martin   "

## 2024-05-15 NOTE — ASSESSMENT & PLAN NOTE
Monitoring .  Weaning off o2.   Continue breathing treatments  Continue prednisone.   Continue weaning of oxygen

## 2024-05-15 NOTE — PROGRESS NOTES
Cardiology Follow Up Progress Note    Date of Service  5/15/2024    Attending Physician  Pranay Mejia    Chief Complaint   A-fib with RVR    HPI  Azalea Gan is a 73 y.o. female with a history of paroxysmal atrial fibrillation, JOSH, moderate MR, NSVT and HFpEF admitted 5/8/2024 with atrial fibrillation with RVR and acute HFpEF exacerbation.    Interim Events  5/15/2024: She underwent coronary angiogram yesterday which did not reveal any coronary disease. No cardiac events overnight.   Between junctional and sinus rhythm on telemetry with LBBB.  Vital signs stable. SpO2 91 to 97% on 5L NC.  She reports she is feeling better today and that her breathing is markedly improved.  No chest pain or palpitations.  No dizziness or lightheadedness.  No syncope or presyncope.    Review of Systems  Review of Systems   Constitutional: Negative.  Negative for chills, diaphoresis, fatigue and fever.   HENT: Negative.     Eyes: Negative.    Respiratory:  Positive for shortness of breath. Negative for cough and chest tightness.    Cardiovascular: Negative.  Negative for chest pain, palpitations and leg swelling.   Gastrointestinal: Negative.  Negative for constipation, diarrhea, nausea and vomiting.   Endocrine: Negative.    Genitourinary: Negative.  Negative for decreased urine volume, difficulty urinating, dysuria and frequency.   Musculoskeletal: Negative.    Skin: Negative.    Allergic/Immunologic: Negative.    Neurological: Negative.  Negative for dizziness and light-headedness.   Hematological: Negative.  Does not bruise/bleed easily.   Psychiatric/Behavioral: Negative.         Vital signs in last 24 hours  Temp:  [36 °C (96.8 °F)-36.9 °C (98.5 °F)] 36.5 °C (97.7 °F)  Pulse:  [55-73] 64  Resp:  [12-21] 16  BP: (102-150)/(53-89) 121/67  SpO2:  [91 %-98 %] 97 %    Physical Exam  Physical Exam  Vitals and nursing note reviewed.   Constitutional:       General: She is not in acute distress.     Appearance: Normal  appearance. She is not toxic-appearing.   HENT:      Head: Normocephalic and atraumatic.      Right Ear: External ear normal.      Left Ear: External ear normal.      Nose: Nose normal.      Mouth/Throat:      Mouth: Mucous membranes are moist.      Pharynx: Oropharynx is clear.   Eyes:      General: No scleral icterus.     Extraocular Movements: Extraocular movements intact.      Conjunctiva/sclera: Conjunctivae normal.      Pupils: Pupils are equal, round, and reactive to light.   Neck:      Vascular: No JVD.   Cardiovascular:      Rate and Rhythm: Normal rate and regular rhythm.      Pulses: Normal pulses.      Heart sounds: Normal heart sounds. No murmur heard.     No friction rub. No gallop.   Pulmonary:      Effort: Pulmonary effort is normal.      Breath sounds: Normal breath sounds.   Abdominal:      General: Abdomen is flat. Bowel sounds are normal. There is no distension.      Palpations: Abdomen is soft.   Musculoskeletal:         General: Normal range of motion.      Cervical back: Normal range of motion and neck supple.      Right lower leg: No edema.      Left lower leg: No edema.   Skin:     General: Skin is warm and dry.      Capillary Refill: Capillary refill takes less than 2 seconds.      Coloration: Skin is not jaundiced.   Neurological:      General: No focal deficit present.      Mental Status: She is alert and oriented to person, place, and time.   Psychiatric:         Mood and Affect: Mood normal.         Behavior: Behavior normal.         Lab Review  Lab Results   Component Value Date/Time    WBC 4.9 05/15/2024 12:34 AM    RBC 3.18 (L) 05/15/2024 12:34 AM    HEMOGLOBIN 9.7 (L) 05/15/2024 12:34 AM    HEMATOCRIT 29.5 (L) 05/15/2024 12:34 AM    MCV 92.8 05/15/2024 12:34 AM    MCH 30.5 05/15/2024 12:34 AM    MCHC 32.9 05/15/2024 12:34 AM    MPV 11.4 05/15/2024 12:34 AM      Lab Results   Component Value Date/Time    SODIUM 140 05/15/2024 12:34 AM    POTASSIUM 4.0 05/15/2024 12:34 AM    CHLORIDE  "102 05/15/2024 12:34 AM    CO2 27 05/15/2024 12:34 AM    GLUCOSE 126 (H) 05/15/2024 12:34 AM    BUN 51 (H) 05/15/2024 12:34 AM    CREATININE 1.74 (H) 05/15/2024 12:34 AM      Lab Results   Component Value Date/Time    ASTSGOT 15 05/09/2024 01:34 AM    ALTSGPT 10 05/09/2024 01:34 AM     Lab Results   Component Value Date/Time    TRIGLYCERIDE 121 05/13/2024 03:20 AM    TROPONINT 248 (H) 05/12/2024 04:15 AM       No results for input(s): \"NTPROBNP\" in the last 72 hours.      Cardiac Imaging and Procedures Review    Echocardiogram: 5/10/2024  CONCLUSIONS  Normal left ventricular size, wall thickness, and systolic function.   The right ventricle is mildly dilated with preserved systolic function.  Moderately dilated left atrium.  Mild mitral regurgitation.  Mild tricuspid regurgitation.  Estimated right ventricular systolic pressure is 63 mmHg; severely   elevated.  Right atrial pressure is estimated to be 15 mmHg; elevated.  No pericardial effusion.  Compared to the prior study on 5/31/23, now higher estimated RVSP.     Cardiac Catheterization: 5/14/2024  Findings   Hemodynamics:   Aorta: 125/70 mmHg  LVEDP: 29 mmHg  No significant pullback gradient across the aortic valve  Coronary Anatomy              Left Main: Normal              LAD: Normal              LCx: Normal              RCA: Dominant, Normal  IMPRESSIONS:  1. Normal coronary arteries  2. Elevated LVEDP at 29 mmHg  Recommendations:  Further recommendations per the rounding team  Pre-procedure diagnosis: NSTEMI  Post-procedure diagnosis: Same    Imaging  Chest X-Ray:  5/13/2024  FINDINGS:  Suboptimal positioning.  Removal of the pacing pad. Hardware otherwise stably positioned in its visualized extent.  HEART: Stable size.  LUNGS: Similar right greater than left opacities.  PLEURA: Possible small right pleural effusion.  IMPRESSION:  1.  Possible small right pleural effusion.  2.  Similar right greater than left airspace disease.  3.  Suboptimal patient " positioning.  4.  Cardiomegaly.     Stress Test:  5/10/2024  Myocardial Perfusion   Report   NUCLEAR IMAGING INTERPRETATION   1.  Small partially reversible myocardial perfusion defect in the mid    inferior and basal inferior segments.   2.  Myocardial perfusion is otherwise normal.   3.  LVEF is 50%.   4.  Left ventricular dilation with stress maneuvering.   5.  Decreased wall motion in the inferior and septal walls, but wall    thickening is preserved.    Assessment/Plan    #HFpEF AHA C NYHA III  #Pulmonary hypertension  #Acute hypoxemic respiratory failure  #Pulmonary edema versus aspiration pneumonia  -Her shortness of breath has markedly improved.  - She appears euvolemic on exam.  -Elevated LVEDP on cath, but had an ALCIRA with diuresis and appears dry on exam.   - Consider PRN diuretic on DC with lasix 20mg daily PRN.   -Her renal function precludes the initiation of MRA and Farxiga.  Consider the initiation of Jardiance as an outpatient.    #Paroxysmal atrial fibrillation with RVR  #Possible VT or A-fib with aberrancy given LBBB  -Sinus rhythm with LBBB on telemetry.  -Off sotalol since 5/11/2024.  -Some bradycardia. Continue digoxin 125 mcg daily until further recommendations from EP.  -Stop heparin.  Resume Eliquis.  -EP has been consulted.    #NSTEMI  -Asymptomatic  -S/p [facility] with normal coronaries    General cardiology will sign off.    Thank you for allowing me to participate in the care of Azalea Gan .    Yulisa Bourne PA-C, Cardiology  [facility] Vascular [facility]  [address] [address]  Phone: [phone]  Fax: [phone]    PLEASE NOTE: This note was created using voice recognition software. I have made every reasonable attempt to correct obvious errors, but I expect that there are errors of grammar and possibly content that I did not discover before finalizing the note.

## 2024-05-15 NOTE — PROGRESS NOTES
Hospital Medicine Daily Progress Note    Date of Service  5/15/2024    Chief Complaint  Azalea Gan is a 73 y.o. female admitted 5/8/2024 with palpitations    Hospital Course  74yo PMHx PAFib with abalation in March this year, HTN, pulmonary HTN, JOSH, BMI 40, previous CVA, hypothyroid, TV regurgitatio.  Presented on 5/8 with  AF[place]VR [place] [facility]  On 5/11 transferred to ICU due to worsening resp status and required intubation for pulmonary edema.  In ICU treated for possible pneumonia as well as cardiogenic pulmonary edema with forced diuresis.  Had an episode of polymorphic VT, ?AFib with aberrancy (underlying LBBB)  Extubated 5/13    Interval Problem Update  Patient states she is feeling good this morning.  Denies shortness of breath or any pain.  Does note that she is weak, has not been out of bed in several days.  No other complaints.        5/15 patient is new to me today, she is in bed, NG tube in place, patient is tolerating diet, will DC NG tube today, patient is wheezing started on Xopenex, note from cardiology reviewed, EP has been consulted, continue telemetry, discussed with bedside nurse charge nurse  pharmacist requesting discharge.      I have discussed this patient's plan of care and discharge plan at IDT rounds today with Case Management, Nursing, Nursing leadership, and other members of the IDT team.    Consultants/Specialty  cardiology    Code Status  Full Code    Disposition  The patient is not medically cleared for discharge to home or a post-acute facility.      I have placed the appropriate orders for post-discharge needs.    Review of Systems  Review of Systems   Constitutional:  Negative for chills and fever.   Respiratory:  Negative for cough and shortness of breath.    Cardiovascular:  Negative for chest pain, palpitations and leg swelling.   Gastrointestinal:  Negative for abdominal pain, diarrhea, nausea and vomiting.   Genitourinary:  Negative for dysuria and urgency.    Musculoskeletal:  Negative for back pain.   Skin:  Negative for rash.   Neurological:  Positive for weakness. Negative for dizziness, loss of consciousness and headaches.        Physical Exam  Temp:  [36 °C (96.8 °F)-36.9 °C (98.5 °F)] 36.6 °C (97.9 °F)  Pulse:  [55-69] 67  Resp:  [12-20] 17  BP: (102-150)/(61-89) 126/71  SpO2:  [91 %-98 %] 98 %    Physical Exam  Constitutional:       General: She is not in acute distress.     Appearance: Normal appearance. She is well-developed. She is obese. She is not diaphoretic.   HENT:      Head: Normocephalic and atraumatic.   Neck:      Vascular: No JVD.   Cardiovascular:      Rate and Rhythm: Normal rate and regular rhythm.      Heart sounds: Murmur heard.   Pulmonary:      Effort: Pulmonary effort is normal. No respiratory distress.      Breath sounds: No stridor. Wheezing present. No rales.   Abdominal:      Palpations: Abdomen is soft.      Tenderness: There is no abdominal tenderness. There is no guarding or rebound.   Musculoskeletal:      Right lower leg: No edema.      Left lower leg: No edema.   Skin:     General: Skin is warm and dry.      Capillary Refill: Capillary refill takes less than 2 seconds.      Coloration: Skin is pale.      Findings: No rash.   Neurological:      Mental Status: She is oriented to person, place, and time.   Psychiatric:         Mood and Affect: Mood normal.         Behavior: Behavior normal.         Thought Content: Thought content normal.         Fluids    Intake/Output Summary (Last 24 hours) at 5/15/2024 1343  Last data filed at 5/14/2024 2300  Gross per 24 hour   Intake 300 ml   Output 250 ml   Net 50 ml       Laboratory  Recent Labs     05/13/24  1639 05/14/24  0345 05/15/24  0034   WBC 6.8 4.6* 4.9   RBC 3.45* 3.56* 3.18*   HEMOGLOBIN 10.1* 10.5* 9.7*   HEMATOCRIT 31.9* 33.2* 29.5*   MCV 92.5 93.3 92.8   MCH 29.3 29.5 30.5   MCHC 31.7* 31.6* 32.9   RDW 55.4* 55.1* 55.4*   PLATELETCT 188 200 202   MPV 11.1 11.5 11.4     Recent  Labs     05/13/24  0320 05/14/24  0345 05/15/24  0034   SODIUM 137 143 140   POTASSIUM 3.4* 4.5 4.0   CHLORIDE 102 103 102   CO2 22 27 27   GLUCOSE 122* 136* 126*   BUN 26* 40* 51*   CREATININE 1.37 1.54* 1.74*   CALCIUM 8.2* 8.5 8.4*     Recent Labs     05/13/24  1639 05/13/24  2340 05/14/24  0548 05/14/24  1447 05/15/24  0034   APTT 42.1* 105.7* 152.5* 139.1*  --    INR 1.83*  --   --   --  1.36*         Recent Labs     05/13/24  0320   TRIGLYCERIDE 121       Imaging  DX-CHEST-PORTABLE (1 VIEW)   Final Result      1.  Possible small right pleural effusion.   2.  Similar right greater than left airspace disease.   3.  Suboptimal patient positioning.   4.  Cardiomegaly.      DX-CHEST-PORTABLE (1 VIEW)   Final Result      1.  Worsening hypoinflation   2.  Improvement of multifocal RIGHT lung consolidation.   3.  Supportive tubing is unchanged.      DX-CHEST-PORTABLE (1 VIEW)   Final Result      1.  Lines and tubes appear appropriately located      2.  Development of right lung airspace process which could be pneumonia or edema      DX-CHEST-PORTABLE (1 VIEW)   Final Result      Cardiomegaly and pulmonary vascular dilation or again noted. No acute process.      EC-ECHOCARDIOGRAM COMPLETE W/O CONT   Final Result      NM-CARDIAC STRESS TEST   Final Result      CL-LEFT HEART CATHETERIZATION WITH POSSIBLE INTERVENTION    (Results Pending)   EC-ECHOCARDIOGRAM COMPLETE W/O CONT    (Results Pending)        Assessment/Plan  * Acute hypoxic respiratory failure (HCC)  Assessment & Plan  Patient intubated on May 11, and subsequently extubated May 13  Etiology felt to be acute pulmonary edema from cardiogenic etiology as well as possible pneumonia.  Complete course of antibiotics  Continue forced diuresis  Mobilize  O2 and RT protocols    Wheezing today  Added scheduled xopenex      Acute kidney injury (HCC)  Assessment & Plan  Patient has been aggressively diuresed, and is perhaps on the dry side.  DC Lasix and monitor  Hold off  on IV fluids for now and see if the patient normalizes without intervention  Daily BMP  Renally dose medications as appropriate  Cr 1.74     Acute respiratory failure with hypoxia (HCC)- (present on admission)  Assessment & Plan  Monitoring .  Weaning off o2.     Aspiration pneumonia (HCC)  Assessment & Plan  Complete 5 days of antibiotics tomorrow  Cultures have been negative with exception of 1 of 2 bottles of a coag negative staph most likely contaminant  Completing abx today     Fatigue  Assessment & Plan  Suspect untreated sleep apnea  Will need pulmonary follow-up once  respiratory status is maximized for an outpatient sleep study    Heart failure with preserved ejection fraction (HCC)  Assessment & Plan  In part rate related due to A-fib RVR  Cardiology following  Completing ischemic workup with left heart cath today  Appropriately diuresed: Hold on further diuresis for the moment    Continue rate control. Eliquis  EP consulted. F/u rec.     Positive cardiac stress test  Assessment & Plan  Left heart cath    Hypotension  Assessment & Plan  Improved.     Atrial fibrillation with rapid ventricular response (HCC)- (present on admission)  Assessment & Plan    She is s/p DINESH+DCCV at [facility] in March.   Her last echo in 3/2024 with moderate MR, moderate dilatation of LA, EF of 45-50% but in the setting of arrhythmia.   Anticoagulation with apixaban: Currently on hold with heparin drip pending left heart cath  Rate control with digoxin  Soft pressures have limited the ability to use beta-blockade or calcium channel blockers  Cardiology following  Continue telemetry    EP consulted by gen cardio.       Hypothyroidism- (present on admission)  Assessment & Plan  TSH wnl this admission, likely not contributing to afib.   - Continue with home levothyroxine     Depression- (present on admission)  Assessment & Plan  Continue with prozac     Ventricular tachyarrhythmia (HCC)- (present on admission)  Assessment &  Plan  Ventricular tachycardia versus atrial fibrillation with aberrancy  Cardiology following  Continue telemetry  Completing ischemic workup  Cath neg  EP consulted.     Essential hypertension- (present on admission)  Assessment & Plan  Blood pressure borderline, holding home BP meds   Monitoring.          VTE prophylaxis: eliquis    I have performed a physical exam and reviewed and updated ROS and Plan today (5/15/2024). In review of yesterday's note (5/14/2024), there are no changes except as documented above.    Total time of 51 minutes spent prepping to see patient (e.g. reviewing  tests/imaging results, notes from consultants, bedside nurse, night shift ) obtaining and/or reviewing separately obtained history. Performing a medically appropriate examination and evaluation.  Counseling and educating the patient.  Ordering medications, tests, or procedures.  Referring and communicating with other health care professionals.  Documenting clinical information in EPIC.  Independently interpreting results and communicating results to patient.  Care coordination.

## 2024-05-15 NOTE — THERAPY
Occupational Therapy   Initial Evaluation     Patient Name: Azalea Gan  Age:  73 y.o., Sex:  female  Medical Record #: [id number]  Today's Date: 5/15/2024     Precautions  Precautions: Fall Risk, Nasogastric Tube (s/p heart cath)    Assessment    Patient is 73 y.o. female admitted with a-fib with RVR. Pt s/p cardiac cath 5/14. Other pertinent medical history includes a=-fib with recent ablation March 2024, HTN, depression, hypothyroidism, heart failure, and ALCIRA. Pt seen for OT evaluation. Pt required SBA-CGA for functional mobility/standing g/h and max A to don/doff socks. Pt lives alone and reported recent increased difficulty completing ADLs/IADLs. Pt current functional performance limited by impaired activity tolerance and generalized weakness. Pt will benefit from skilled OT while admitted to acute care.     Plan    Occupational Therapy Initial Treatment Plan   Treatment Interventions: Self Care / Activities of Daily Living, Adaptive Equipment, Neuro Re-Education / Balance, Therapeutic Exercises, Therapeutic Activity  Treatment Frequency: 3 Times per Week  Duration: Until Therapy Goals Met    DC Equipment Recommendations: Unable to determine at this time  Discharge Recommendations: Recommend post-acute placement for additional occupational therapy services prior to discharge home      Objective     05/15/24 1425   Prior Living Situation   Prior Services None   Housing / Facility 2 Story House   Steps Into Home 0  (ramp)   Steps In Home   (FOS, stays on first floor)   Bathroom Set up Walk In Shower;Bathtub / Shower Combination;Shower Chair;Grab Bars   Equipment Owned Front-Wheel Walker;Sock Aid;Raised Toilet Seat With Arms;Grab Bar(s) By Toilet;Grab Bar(s) In Tub / Shower;Tub / Shower Seat   Lives with - Patient's Self Care Capacity Alone and Able to Care For Self   Comments Pt lives alone and cares for herself. She reported she has some support potentially, but was unsure of level of assist or regularity of care  they'd be able to provide.   Prior Level of ADL Function   Self Feeding Independent   Grooming / Hygiene Independent   Bathing Independent   Dressing Independent   Toileting Independent   Comments with some difficulty   Prior Level of IADL Function   Medication Management Independent   Laundry Independent   Kitchen Mobility Independent   Finances Independent   Home Management Independent   Shopping Independent   Prior Level Of Mobility Independent With Device in Community;Independent With Device in Home   Driving / Transportation Driving Independent  (very short distances only)   Occupation (Pre-Hospital Vocational)   (makes artwork)   Leisure Interests Crafts   Comments with difficulty, reported it is becoming harder and harder to manage [place] living [place]   History of Falls   History of Falls Yes   Date of Last Fall   (December 2023, January 2023, unable to describe mechanism)   Precautions   Precautions Fall Risk;Nasogastric Tube  (s/p heart cath)   Vitals   Pulse 72   Pulse Oximetry 99 %   O2 (LPM) 5   O2 Delivery Device Nasal Cannula   Pain   Pain Scales 0 to 10 Scale    Pain 0 - 10 Group   Therapist Pain Assessment Post Activity Pain Same as Prior to Activity;Nurse Notified  (not rated, agreeable to session)   Cognition    Cognition / Consciousness X   Level of Consciousness Alert   New Learning Impaired   Attention Impaired   Comments Very pleasant and cooperative, receptive to education   Passive ROM Upper Body   Passive ROM Upper Body WDL   Active ROM Upper Body   Active ROM Upper Body  WDL   Strength Upper Body   Comments grossly 3+/5,  impaired   Upper Body Muscle Tone   Upper Body Muscle Tone  WDL   Coordination Upper Body   Coordination WDL   Balance Assessment   Sitting Balance (Static) Fair +   Sitting Balance (Dynamic) Fair   Standing Balance (Static) Fair -   Standing Balance (Dynamic) Fair -   Weight Shift Sitting Fair   Weight Shift Standing Fair   Comments w/ FWW, bradykinetic   Bed  Mobility    Supine to Sit Contact Guard Assist   Sit to Supine   (up to chair post)   Scooting Standby Assist   Rolling Standby Assist   Comments HOB elevated, has adjustable bed at home   ADL Assessment   Grooming Contact Guard Assist;Standing;Seated  (washed hands/face at sink, shampoo cap/combed hair while seated at end of eval)   Lower Body Dressing   (maximal assist, uses AE at home for LB dressing)   Toileting   (declined the need)   Functional Mobility   Sit to Stand Standby Assist   Bed, Chair, Wheelchair Transfer Contact Guard Assist   Transfer Method Stand Step   Mobility received with PT>bathroom>chair   Comments w/ FWW   Visual Perception   Visual Perception    (wears glasses, seemed very disoriented without them, vision not formally tested)   Activity Tolerance   Sitting in Chair up to chair post   Sitting Edge of Bed <2-3 min   Standing received with PT   Comments limited by weakness/fatigue   Patient / Family Goals   Patient / Family Goal #1 to get stronger   Short Term Goals   Short Term Goal # 1 Pt will perform LB dressing w/ supv and AE PRN   Short Term Goal # 2 Pt will perform toilet transfer w/ supv   Short Term Goal # 3 Pt will perform full g/h routine while standing w/ supv   Education Group   Education Provided Role of Occupational Therapist;Activities of Daily Living;Pathology of bedrest  (discussed long handled shoe horn and therapist returned to provide handout on where to get one per pt request)   Role of Occupational Therapist Patient Response Patient;Acceptance;Explanation;Verbal Demonstration   ADL Patient Response Patient;Acceptance;Explanation;Demonstration;Verbal Demonstration;Action Demonstration   Pathology of Bedrest Patient Response Patient;Acceptance;Explanation;Verbal Demonstration   Occupational Therapy Initial Treatment Plan    Treatment Interventions Self Care / Activities of Daily Living;Adaptive Equipment;Neuro Re-Education / Balance;Therapeutic Exercises;Therapeutic  Activity   Treatment Frequency 3 Times per Week   Duration Until Therapy Goals Met   Problem List   Problem List Decreased Active Daily Living Skills;Decreased Homemaking Skills;Decreased Functional Mobility;Decreased Activity Tolerance;Decreased Upper Extremity Strength Right;Decreased Upper Extremity Strength Left;Impaired Postural Control / Balance

## 2024-05-15 NOTE — CARE PLAN
The patient is Stable - Low risk of patient condition declining or worsening    Shift Goals  Clinical Goals: remove TR band, hemodynamic stability  Patient Goals: comfort, rest  Family Goals: DANE    Progress made toward(s) clinical / shift goals:      Problem: Knowledge Deficit - Standard  Goal: Patient and family/care givers will demonstrate understanding of plan of care, disease process/condition, diagnostic tests and medications  Outcome: Progressing     Problem: Fall Risk  Goal: Patient will remain free from falls  Outcome: Progressing     Problem: Hemodynamics  Goal: Patient's hemodynamics, fluid balance and neurologic status will be stable or improve  Outcome: Progressing       Patient is not progressing towards the following goals:

## 2024-05-15 NOTE — PROGRESS NOTES
Critical Xa >1.10. Heparin infusion has been held since 1600 5/14. No signs of active bleeding. MD notified, no new orders at this time.

## 2024-05-15 NOTE — PROGRESS NOTES
4 Eyes Skin Assessment Completed by CHICHI Cronin and CHICHI Olmstead.    Head WDL  Ears WDL  Nose WDL  Mouth WDL  Neck WDL  Breast/Chest WDL  Shoulder Blades WDL  Spine WDL  (R) Arm/Elbow/Hand WDL  (L) Arm/Elbow/Hand WDL  Abdomen Scar and Incision  Groin WDL  Scrotum/Coccyx/Buttocks WDL  (R) Leg WDL  (L) Leg WDL  (R) Heel/Foot/Toe WDL  (L) Heel/Foot/Toe WDL          Devices In Places Tele Box, Blood Pressure Cuff, and Pulse Ox      Interventions In Place Pillows    Possible Skin Injury No    Pictures Uploaded Into Epic N/A  Wound Consult Placed N/A  RN Wound Prevention Protocol Ordered No

## 2024-05-15 NOTE — PROGRESS NOTES
Handoff report received from night shift nurse and pt care assumed. Pt is currently resting in bed, A&Ox4 with no reports of pain or visible/reported signs of distress, on 5L O2 nasal cannula, and VSS. Tele box on, rate and rhythm verified. Call light and belongings within reach, bed in lowest and locked position, fall precautions in place, bed alarm on. Pt educated on use of call light, all needs addressed. Hourly rounding in place.

## 2024-05-15 NOTE — PROCEDURES
"CARDIAC CATHETERIZATION REPORT    REFERRING: Gabino Messina M.D.    PROCEDURE PHYSICIAN: Ricky Roberson MD, [facility], [facility]  ASSISTANT: None    IMPRESSIONS:  1. Normal coronary arteries  2. Elevated LVEDP at 29 mmHg    Recommendations:  Further recommendations per the rounding team    Pre-procedure diagnosis: NSTEMI  Post-procedure diagnosis: Same    Procedure performed  Selective coronary angiography  Left heart catheterization    Conscious sedation was supervised by myself and administered by trained personnel using fentanyl and versed between 1728 and 1738. The patient tolerated sedation without complication.     Procedure Description  1. Access: 5/6 [place] right radial artery Micropuncture technique was utilized following local anesthesia with lidocaine.  A radial cocktail containing verapamil and saline was administered in the radial artery sheath    2. Diagnostic description: The catheter was passed to the central circulation with the aide of J tipped 0.35\" wire. A 5F TIG 4.0 and 6F EBU 3.0 were used to inject the coronary circulation and enter the left ventricle during invasive hemodynamic monitoring.     3. Closing: At completion of the procedure the relevant equipment was removed from the body and hemostasis achieved by Radial band    Findings   Hemodynamics:   Aorta: 125/70 mmHg  LVEDP: 29 mmHg  No significant pullback gradient across the aortic valve    Coronary Anatomy   Left Main: Normal   LAD: Normal   LCx: Normal   RCA: Dominant, Normal     Technical Factors  1. Complications: None  2. Estimated Blood Loss: <50 cc  3. Specimens: None  4. Contrast Volume: 20 ml  5. Medications: Radial cocktail (Verapamil 2.5 mg, Nitroglycerin 100 mcg) Heparin 5000 Units  6. Radiation (air kerma): 121 mGy    "

## 2024-05-15 NOTE — DISCHARGE PLANNING
1218  Agency/Facility Name: [facility]   Spoke To: Jenna   Outcome: Jenna notified DPA that Pt is on service with [facility], and is currently on hospital hold.

## 2024-05-15 NOTE — CARE PLAN
The patient is Stable - Low risk of patient condition declining or worsening    Shift Goals  Clinical Goals: Hemodynamic stability, Wean O2  Patient Goals: Comfort, Remove NG  Family Goals: DANE    Progress made toward(s) clinical / shift goals:        Problem: Knowledge Deficit - Standard  Goal: Patient and family/care givers will demonstrate understanding of plan of care, disease process/condition, diagnostic tests and medications  Outcome: Progressing     Problem: Fall Risk  Goal: Patient will remain free from falls  Outcome: Progressing     Problem: Communication  Goal: The ability to communicate needs accurately and effectively will improve  Outcome: Progressing     Problem: Hemodynamics  Goal: Patient's hemodynamics, fluid balance and neurologic status will be stable or improve  Outcome: Progressing    Patient had NG tube removed today, patient verbalizing happiness about removal. Patient tolerating diet appropriately. Patient updated on POC, patient verbalizing understanding. Patient remained free from falls, patient educated on fall risk, precautions in place, bed alarm on.        Patient is not progressing towards the following goals:

## 2024-05-16 ENCOUNTER — APPOINTMENT (OUTPATIENT)
Dept: CARDIOLOGY | Facility: MEDICAL CENTER | Age: 74
DRG: 273 | End: 2024-05-16
Attending: NURSE PRACTITIONER
Payer: MEDICARE

## 2024-05-16 LAB
ANION GAP SERPL CALC-SCNC: 11 MMOL/L (ref 7–16)
BACTERIA BLD CULT: NORMAL
BASOPHILS # BLD AUTO: 0 % (ref 0–1.8)
BASOPHILS # BLD: 0 K/UL (ref 0–0.12)
BUN SERPL-MCNC: 61 MG/DL (ref 8–22)
CALCIUM SERPL-MCNC: 8.2 MG/DL (ref 8.5–10.5)
CHLORIDE SERPL-SCNC: 103 MMOL/L (ref 96–112)
CO2 SERPL-SCNC: 27 MMOL/L (ref 20–33)
CREAT SERPL-MCNC: 1.7 MG/DL (ref 0.5–1.4)
EOSINOPHIL # BLD AUTO: 0 K/UL (ref 0–0.51)
EOSINOPHIL NFR BLD: 0 % (ref 0–6.9)
ERYTHROCYTE [DISTWIDTH] IN BLOOD BY AUTOMATED COUNT: 55.7 FL (ref 35.9–50)
GFR SERPLBLD CREATININE-BSD FMLA CKD-EPI: 31 ML/MIN/1.73 M 2
GLUCOSE SERPL-MCNC: 112 MG/DL (ref 65–99)
HCT VFR BLD AUTO: 29.5 % (ref 37–47)
HGB BLD-MCNC: 9.2 G/DL (ref 12–16)
IMM GRANULOCYTES # BLD AUTO: 0.04 K/UL (ref 0–0.11)
IMM GRANULOCYTES NFR BLD AUTO: 0.7 % (ref 0–0.9)
LV EJECT FRACT  99904: 55
LV EJECT FRACT MOD 2C 99903: 45.76
LV EJECT FRACT MOD 4C 99902: 51.58
LV EJECT FRACT MOD BP 99901: 50.09
LYMPHOCYTES # BLD AUTO: 0.77 K/UL (ref 1–4.8)
LYMPHOCYTES NFR BLD: 12.5 % (ref 22–41)
MAGNESIUM SERPL-MCNC: 2.2 MG/DL (ref 1.5–2.5)
MCH RBC QN AUTO: 29.2 PG (ref 27–33)
MCHC RBC AUTO-ENTMCNC: 31.2 G/DL (ref 32.2–35.5)
MCV RBC AUTO: 93.7 FL (ref 81.4–97.8)
MONOCYTES # BLD AUTO: 0.32 K/UL (ref 0–0.85)
MONOCYTES NFR BLD AUTO: 5.2 % (ref 0–13.4)
NEUTROPHILS # BLD AUTO: 5.01 K/UL (ref 1.82–7.42)
NEUTROPHILS NFR BLD: 81.6 % (ref 44–72)
NRBC # BLD AUTO: 0 K/UL
NRBC BLD-RTO: 0 /100 WBC (ref 0–0.2)
PHOSPHATE SERPL-MCNC: 4 MG/DL (ref 2.5–4.5)
PLATELET # BLD AUTO: 203 K/UL (ref 164–446)
PMV BLD AUTO: 10.9 FL (ref 9–12.9)
POTASSIUM SERPL-SCNC: 4.3 MMOL/L (ref 3.6–5.5)
RBC # BLD AUTO: 3.15 M/UL (ref 4.2–5.4)
SIGNIFICANT IND 70042: NORMAL
SITE SITE: NORMAL
SODIUM SERPL-SCNC: 141 MMOL/L (ref 135–145)
SOURCE SOURCE: NORMAL
WBC # BLD AUTO: 6.1 K/UL (ref 4.8–10.8)

## 2024-05-16 PROCEDURE — 99233 SBSQ HOSP IP/OBS HIGH 50: CPT | Performed by: HOSPITALIST

## 2024-05-16 PROCEDURE — 93306 TTE W/DOPPLER COMPLETE: CPT | Mod: 26 | Performed by: INTERNAL MEDICINE

## 2024-05-16 RX ORDER — LORAZEPAM 0.5 MG/1
0.5 TABLET ORAL EVERY 4 HOURS PRN
Status: DISCONTINUED | OUTPATIENT
Start: 2024-05-16 | End: 2024-05-22 | Stop reason: HOSPADM

## 2024-05-16 RX ORDER — PREDNISONE 20 MG/1
40 TABLET ORAL DAILY
Status: COMPLETED | OUTPATIENT
Start: 2024-05-16 | End: 2024-05-20

## 2024-05-16 RX ADMIN — LEVALBUTEROL HYDROCHLORIDE 0.63 MG: 0.63 SOLUTION RESPIRATORY (INHALATION) at 21:46

## 2024-05-16 RX ADMIN — ALLOPURINOL 100 MG: 100 TABLET ORAL at 05:28

## 2024-05-16 RX ADMIN — LEVALBUTEROL HYDROCHLORIDE 0.63 MG: 0.63 SOLUTION RESPIRATORY (INHALATION) at 15:21

## 2024-05-16 RX ADMIN — LEVALBUTEROL HYDROCHLORIDE 0.63 MG: 0.63 SOLUTION RESPIRATORY (INHALATION) at 02:12

## 2024-05-16 RX ADMIN — AMOXICILLIN AND CLAVULANATE POTASSIUM 1 TABLET: 875; 125 TABLET, FILM COATED ORAL at 05:28

## 2024-05-16 RX ADMIN — ATORVASTATIN CALCIUM 40 MG: 40 TABLET, FILM COATED ORAL at 17:25

## 2024-05-16 RX ADMIN — LORAZEPAM 0.5 MG: 0.5 TABLET ORAL at 21:12

## 2024-05-16 RX ADMIN — DIGOXIN 125 MCG: 0.12 TABLET ORAL at 17:25

## 2024-05-16 RX ADMIN — DOXYCYCLINE 100 MG: 100 TABLET, FILM COATED ORAL at 05:28

## 2024-05-16 RX ADMIN — LEVOTHYROXINE SODIUM 175 MCG: 0.12 TABLET ORAL at 05:28

## 2024-05-16 RX ADMIN — APIXABAN 5 MG: 5 TABLET, FILM COATED ORAL at 05:28

## 2024-05-16 RX ADMIN — LEVALBUTEROL HYDROCHLORIDE 0.63 MG: 0.63 SOLUTION RESPIRATORY (INHALATION) at 07:36

## 2024-05-16 RX ADMIN — PREDNISONE 40 MG: 20 TABLET ORAL at 17:26

## 2024-05-16 RX ADMIN — APIXABAN 5 MG: 5 TABLET, FILM COATED ORAL at 17:26

## 2024-05-16 RX ADMIN — PRAMIPEXOLE DIHYDROCHLORIDE 0.25 MG: 0.25 TABLET ORAL at 20:54

## 2024-05-16 RX ADMIN — DOXYCYCLINE 100 MG: 100 TABLET, FILM COATED ORAL at 17:26

## 2024-05-16 RX ADMIN — AMOXICILLIN AND CLAVULANATE POTASSIUM 1 TABLET: 875; 125 TABLET, FILM COATED ORAL at 17:26

## 2024-05-16 ASSESSMENT — ENCOUNTER SYMPTOMS
FEVER: 0
PALPITATIONS: 0
BACK PAIN: 0
ABDOMINAL PAIN: 0
WEAKNESS: 1
DIZZINESS: 0
NAUSEA: 0
COUGH: 0
VOMITING: 0
SHORTNESS OF BREATH: 0
CHILLS: 0
LOSS OF CONSCIOUSNESS: 0
HEADACHES: 0
DIARRHEA: 0

## 2024-05-16 ASSESSMENT — FIBROSIS 4 INDEX: FIB4 SCORE: 1.74

## 2024-05-16 NOTE — PROGRESS NOTES
Report received from offgoing RN    Pt A&Ox4, resting in bed. Bed exit on. No complaints at this time.    Bed on lowest setting, call light in reach.

## 2024-05-16 NOTE — FACE TO FACE
"Face to Face Note  -  Durable Medical Equipment    Pranay Mejia M.D. - NPI: [id number]  I certify that this patient is under my care and that they had a durable medical equipment(DME)face to face encounter by myself that meets the physician DME face-to-face encounter requirements with this patient on:    Date of encounter:   Patient:                    MRN:                       YOB: 2025  Azalea Gan  [id number]  1950     The encounter with the patient was in whole, or in part, for the following medical condition, which is the primary reason for durable medical equipment:  Other - pulmonary htn.     I certify that, based on my findings, the following durable medical equipment is medically necessary:    Oxygen   HOME O2 Saturation Measurements:(Values must be present for Home Oxygen orders)  Room air sat at rest: 90  Room air sat with amb: 85  With liters of O2: 2, O2 sat at rest with O2: 95  With Liters of O2: 2, O2 sat with amb with O2 : 92  Is the patient mobile?: Yes  If patient feels more short of breath, they can go up to 6 liters per minute and contact healthcare provider.    Supporting Symptoms: The patient requires supplemental oxygen, as the following interventions have been tried with limited or no improvement: \"Incentive spirometry.    My Clinical findings support the need for the above equipment due to:  Hypoxia  "

## 2024-05-16 NOTE — CARE PLAN
The patient is Stable - Low risk of patient condition declining or worsening    Shift Goals  Clinical Goals: VSS, wean O2  Patient Goals: sleep  Family Goals: hao    Progress made toward(s) clinical / shift goals:    Problem: Knowledge Deficit - Standard  Goal: Patient and family/care givers will demonstrate understanding of plan of care, disease process/condition, diagnostic tests and medications  Outcome: Progressing     Problem: Fall Risk  Goal: Patient will remain free from falls  Outcome: Progressing     Problem: Psychosocial  Goal: Patient's level of anxiety will decrease  Outcome: Progressing     Problem: Urinary Elimination  Goal: Establish and maintain regular urinary output  Outcome: Progressing     Problem: Bowel Elimination  Goal: Establish and maintain regular bowel function  Outcome: Progressing     Problem: Mobility  Goal: Patient's capacity to carry out activities will improve  Outcome: Progressing     Problem: Pain - Standard  Goal: Alleviation of pain or a reduction in pain to the patient’s comfort goal  Outcome: Progressing     Problem: Skin Integrity  Goal: Skin integrity is maintained or improved  Outcome: Progressing       Patient is not progressing towards the following goals:

## 2024-05-16 NOTE — CONSULTS
Physical Medicine and Rehabilitation Consultation          Date of initial consultation: 5/16/2024  Consulting provider: Pranay Mejia M.D.   Reason for consultation: assess for acute inpatient rehab appropriateness  LOS: 8 Day(s)    Chief complaint: Atrial fibrillation    HPI: The patient is a 73 y.o. right hand dominant female with a past medical history of atrial fibrillation with recent cardioversion, hypertension, pulmonary hypertension, central sleep apnea, obesity, history of CVA, hypothyroidism;  who presented on 5/8/2024  8:59 PM with A-fib with RVR, found to have cardiogenic pulmonary edema with possible pneumonia.  Patient was seen by EP today who noted hesitancy to initiate antiarrhythmic given her high dose of Prozac and EKG showing QTc greater than 650.  Dr. Ahumada recommended AF ablation to be scheduled electively.    The patient currently reports overall feeling well.  Patient reports she was supposed to get her ablation today but the case was pushed.  Documentation seems to support an outpatient procedure now.  Patient lives in [place].  She is on 2 L nasal cannula oxygen. Patient reports she does have friends that can stay with her on discharge.  She feels well enough to discharge home, functionally.    ROS  Pertinent positives are mentioned in the HPI, all others reviewed and are negative.    Social Hx:  2 SH  0 REBECCA  With: Alone, in [place].        THERAPY:  Restrictions: Fall risk  PT: Functional mobility   5/15: Walking 100 feet with front wheel walker at min assist    OT: ADLs  5/15: Max assist lower body dressing    SLP:   5/15: Regular diet, thin liquids    IMAGING:  CXR 5/13/2024  1.  Possible small right pleural effusion.  2.  Similar right greater than left airspace disease.  3.  Suboptimal patient positioning.  4.  Cardiomegaly.    PROCEDURES:  Cardiac catheterization 5/14/2024  1. Normal coronary arteries  2. Elevated LVEDP at 29 mmHg    PMH:  Past Medical History:    Diagnosis Date    Hypertension     Pulmonary HTN (HCC)     moderate     Sleep apnea     mild JOSH    not on CPAP or O2    Stroke (HCC)     Thyroid disease     Tricuspid valve regurgitation        PSH:  Past Surgical History:   Procedure Laterality Date    LAP, PLACE ADJUST NISREEN RESTRICT DE* N/A 11/2/2022    Procedure: LAPAROSCOPIC ADJUSTABLE GASTRIC BANDING, PORT REMOVAL,ESOPHAGOGASTRODUODENOSCOPY;  Surgeon: Eitan Preston M.D.;  Location: SURGERY [facility];  Service: Gastroenterology    [place] UPPER GI ENDOSCOPY,DIAGNOSIS N/A 11/2/2022    Procedure: GASTROSCOPY;  Surgeon: Eitan Preston M.D.;  Location: SURGERY [facility];  Service: Gastroenterology    COLONOSCOPY - ENDO  12/8/2016    Procedure: COLONOSCOPY - ENDO with Anesthesia;  Surgeon: Raffaele Ritter M.D.;  Location: SURGERY [facility];  Service:     KNEE REPLACEMENT, TOTAL Right 3/15/2016    Dr Horowitz    CATARACT EXTRACTION WITH IOL Bilateral 2016    CARDIAC CATH, RIGHT/LEFT HEART  12/2015    nonobstructive CAD EF 60%    THYROIDECTOMY  3/2012    COLONOSCOPY  2010    GASTRIC BYPASS LAPAROSCOPIC  2006    Chris-en-Y    HYSTERECTOMY, TOTAL ABDOMINAL         FHX:  Family History   Problem Relation Age of Onset    Heart Attack Mother        Medications:  Current Facility-Administered Medications   Medication Dose    amoxicillin-clavulanate (Augmentin) 875-125 MG per tablet 1 Tablet  1 Tablet    doxycycline monohydrate (Adoxa) tablet 100 mg  100 mg    allopurinol (Zyloprim) tablet 100 mg  100 mg    apixaban (Eliquis) tablet 5 mg  5 mg    atorvastatin (Lipitor) tablet 40 mg  40 mg    digoxin (Lanoxin) tablet 125 mcg  125 mcg    levothyroxine (Synthroid) tablet 175 mcg  175 mcg    pramipexole (Mirapex) tablet 0.25 mg  0.25 mg    senna-docusate (Pericolace Or Senokot S) 8.6-50 MG per tablet 2 Tablet  2 Tablet    And    polyethylene glycol/lytes (Miralax) Packet 1 Packet  1 Packet    And    magnesium hydroxide (Milk Of Magnesia) suspension 30 mL  30 mL     "And    bisacodyl (Dulcolax) suppository 10 mg  10 mg    acetaminophen (Tylenol) tablet 650 mg  650 mg    levalbuterol (Xopenex) 0.63 MG/3ML nebulizer solution 0.63 mg  0.63 mg    Respiratory Therapy Consult         Allergies:  Allergies   Allergen Reactions    Iodine Anaphylaxis    Iodine Contrast [Diagnostic X-Ray Materials] Anaphylaxis    Povidone Iodine Anaphylaxis         Physical Exam:  Vitals: /70   Pulse 61   Temp 36.6 °C (97.9 °F) (Temporal)   Resp 18   Ht 1.626 m (5' 4\")   Wt 110 kg (242 lb 4.6 oz)   SpO2 94%   Gen: NAD  Head: NC/AT  Eyes/ Nose/ Mouth: PERRLA, moist mucous membranes  Cardio: RRR, good distal perfusion, warm extremities  Pulm: normal respiratory effort, no cyanosis   Abd: Soft NTND, negative borborygmi   Ext: No peripheral edema. No calf tenderness. No clubbing.    Mental status:  A&Ox4 (person, place, date, situation) answers questions appropriately follows commands  Speech: fluent, no aphasia or dysarthria    Motor:      Upper Extremity  Myotome R L   Shoulder flexion C5 5 5   Elbow flexion C5 5 5   Wrist extension C6 5 5   Elbow extension C7 5 5   Finger flexion C8 5 5   Finger abduction T1 5 5     Lower Extremity Myotome R L   Hip flexion L2 5 5   Knee extension L3 5 5   Ankle dorsiflexion L4 5 5   Toe extension L5 5 5   Ankle plantarflexion S1 5 5       Labs: Reviewed and significant for   Recent Labs     05/13/24  1639 05/13/24  2340 05/14/24  0345 05/14/24  0548 05/14/24  1447 05/15/24  0034 05/16/24  0251   RBC 3.45*  --  3.56*  --   --  3.18* 3.15*   HEMOGLOBIN 10.1*  --  10.5*  --   --  9.7* 9.2*   HEMATOCRIT 31.9*  --  33.2*  --   --  29.5* 29.5*   PLATELETCT 188  --  200  --   --  202 203   PROTHROMBTM 21.4*  --   --   --   --  17.0*  --    APTT 42.1* 105.7*  --  152.5* 139.1*  --   --    INR 1.83*  --   --   --   --  1.36*  --      Recent Labs     05/14/24  0345 05/15/24  0034 05/16/24  0251   SODIUM 143 140 141   POTASSIUM 4.5 4.0 4.3   CHLORIDE 103 102 103   CO2 27 " 27 27   GLUCOSE 136* 126* 112*   BUN 40* 51* 61*   CREATININE 1.54* 1.74* 1.70*   CALCIUM 8.5 8.4* 8.2*     Recent Results (from the past 24 hour(s))   CBC with Differential    Collection Time: 05/16/24  2:51 AM   Result Value Ref Range    WBC 6.1 4.8 - 10.8 K/uL    RBC 3.15 (L) 4.20 - 5.40 M/uL    Hemoglobin 9.2 (L) 12.0 - 16.0 g/dL    Hematocrit 29.5 (L) 37.0 - 47.0 %    MCV 93.7 81.4 - 97.8 fL    MCH 29.2 27.0 - 33.0 pg    MCHC 31.2 (L) 32.2 - 35.5 g/dL    RDW 55.7 (H) 35.9 - 50.0 fL    Platelet Count 203 164 - 446 K/uL    MPV 10.9 9.0 - 12.9 fL    Neutrophils-Polys 81.60 (H) 44.00 - 72.00 %    Lymphocytes 12.50 (L) 22.00 - 41.00 %    Monocytes 5.20 0.00 - 13.40 %    Eosinophils 0.00 0.00 - 6.90 %    Basophils 0.00 0.00 - 1.80 %    Immature Granulocytes 0.70 0.00 - 0.90 %    Nucleated RBC 0.00 0.00 - 0.20 /100 WBC    Neutrophils (Absolute) 5.01 1.82 - 7.42 K/uL    Lymphs (Absolute) 0.77 (L) 1.00 - 4.80 K/uL    Monos (Absolute) 0.32 0.00 - 0.85 K/uL    Eos (Absolute) 0.00 0.00 - 0.51 K/uL    Baso (Absolute) 0.00 0.00 - 0.12 K/uL    Immature Granulocytes (abs) 0.04 0.00 - 0.11 K/uL    NRBC (Absolute) 0.00 K/uL   Phosphorus    Collection Time: 05/16/24  2:51 AM   Result Value Ref Range    Phosphorus 4.0 2.5 - 4.5 mg/dL   Basic Metabolic Panel    Collection Time: 05/16/24  2:51 AM   Result Value Ref Range    Sodium 141 135 - 145 mmol/L    Potassium 4.3 3.6 - 5.5 mmol/L    Chloride 103 96 - 112 mmol/L    Co2 27 20 - 33 mmol/L    Glucose 112 (H) 65 - 99 mg/dL    Bun 61 (H) 8 - 22 mg/dL    Creatinine 1.70 (H) 0.50 - 1.40 mg/dL    Calcium 8.2 (L) 8.5 - 10.5 mg/dL    Anion Gap 11.0 7.0 - 16.0   MAGNESIUM    Collection Time: 05/16/24  2:51 AM   Result Value Ref Range    Magnesium 2.2 1.5 - 2.5 mg/dL   ESTIMATED GFR    Collection Time: 05/16/24  2:51 AM   Result Value Ref Range    GFR (CKD-EPI) 31 (A) >60 mL/min/1.73 m 2         ASSESSMENT:  Patient is a 73 y.o. female admitted with A-fib with RVR      Rehabilitation:  Impaired ADLs and mobility  Barriers to transfer include: Insurance authorization, TCCs to verify disposition, medical clearance and bed availability. All cases are subject to administrative review.     Disposition recommendations:  -Patient is functioning at too high of a level to qualify for IPR.  Recommend discharge home.   -PMR will sign off, please reconsult or reach out via Voalte if further evaluation or medical management is requested    Medical Complexity:    Debility secondary to heart failure with preserved ejection fraction  -Patient is mobilizing well  -Continue PT OT while in-house, and outpatient PT OT as needed on discharge.  -Anticipate discharge home when medically cleared    A-fib with RVR  -Rate control with digoxin 125 mcg daily  -Anticoagulation with Eliquis 5 mg twice daily  -Cardiology recommending ablation, to be scheduled electively  -Follow-up with Dr. Artie hanks MD    Acute hypoxemic respiratory failure  -Intubated 5/11 - 5/13  -Currently on 2 L nasal cannula oxygen  -Encourage incentive spirometer use  -RT following    Depression  -Continue home Prozac    DVT PPX: Eliquis      Thank you for allowing us to participate in the care of this patient.     Patient was seen for >80 minutes on unit/floor of which > 50% of time was spent on counseling and coordination of care regarding the above, including prognosis, risk reduction, benefits of treatment, and options for next stage of care.    Matt Miranda, DO   Physical Medicine and Rehabilitation     Please note that this dictation was created using voice recognition software. I have made every reasonable attempt to correct obvious errors, but there may be errors of grammar and possibly content that I did not discover before finalizing the note.
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
## 2024-05-16 NOTE — CONSULTS
EP Consult Note    DOS: 5/15/2024 (note being written for encounter yesterday afternoon)    Consulting physician: Pranay Mejia    Chief complaint/Reason for consult: AF    HPI:  Pt is a 74 yo F. Lives in in [place] by herself. Son who is currently incarcerated about to be released in June will come live with her. Has history of JOSH, prior stroke, moderate HTN, systemic essential HTN and symptomatic AF. She says at baseline she has been very much fatigued throughout the day, but when she gets AF episodes very much worse exertional status, barely able to move around the house without being SOB. More recently hospitalized at [facility] for AF. Underwent cardioversion. No antiarrhythmics. Presents with more symptomatic AF, here. Sotalol was initiated. She had random event where she had hypoxia and respiratory decompensation. Required intubation. R lung with diffuse infiltrates. At that time had short bursts of polymorphic VT. She had also been on concurrent supra-therapeutic does of Prozac as well as the sotalol with uneven renal clearance. QT at the time not excessive, but subsequent QTcs > 650 msec. I stopped the sotalol and prozac. QT has shortened and she has been extubated. EP consulted for AF management.    ROS (+ highlighted in red):  Constitutional: Fevers/chills/fatigue/weightloss  HEENT: Blurry vision/eye pain/sore throat/hearing loss  Respiratory: Shortness of breath/cough  Cardiovascular: Chest pain/palpitations/edema/orthopnea/syncope  GI: Nausea/vomitting/diarrhea  MSK: Arthralgias/myagias/muscle weakness  Skin: Rash/sores  Neurological: Numbness/tremors/vertigo  Endocrine: Excessive thirst/polyuria/cold intolerance/heat intolerance  Psych: Depression/anxiety    Past Medical History:   Diagnosis Date    Hypertension     Pulmonary HTN (HCC)     moderate     Sleep apnea     mild JOSH    not on CPAP or O2    Stroke (HCC)     Thyroid disease     Tricuspid valve regurgitation        Past Surgical History:    Procedure Laterality Date    LAP, PLACE ADJUST NISREEN RESTRICT DE* N/A 11/2/2022    Procedure: LAPAROSCOPIC ADJUSTABLE GASTRIC BANDING, PORT REMOVAL,ESOPHAGOGASTRODUODENOSCOPY;  Surgeon: Eitan Preston M.D.;  Location: SURGERY [facility];  Service: Gastroenterology    [place] UPPER GI ENDOSCOPY,DIAGNOSIS N/A 11/2/2022    Procedure: GASTROSCOPY;  Surgeon: Eitan Preston M.D.;  Location: SURGERY [facility];  Service: Gastroenterology    COLONOSCOPY - ENDO  12/8/2016    Procedure: COLONOSCOPY - ENDO with Anesthesia;  Surgeon: Raffaele Ritter M.D.;  Location: SURGERY [facility];  Service:     KNEE REPLACEMENT, TOTAL Right 3/15/2016    Dr Horowitz    CATARACT EXTRACTION WITH IOL Bilateral 2016    CARDIAC CATH, RIGHT/LEFT HEART  12/2015    nonobstructive CAD EF 60%    THYROIDECTOMY  3/2012    COLONOSCOPY  2010    GASTRIC BYPASS LAPAROSCOPIC  2006    Chris-en-Y    HYSTERECTOMY, TOTAL ABDOMINAL         Social History     Socioeconomic History    Marital status:      Spouse name: Not on file    Number of children: Not on file    Years of education: Not on file    Highest education level: Not on file   Occupational History    Not on file   Tobacco Use    Smoking status: Never    Smokeless tobacco: Never   Vaping Use    Vaping status: Never Used   Substance and Sexual Activity    Alcohol use: No    Drug use: No    Sexual activity: Not on file   Other Topics Concern    Not on file   Social History Narrative    Not on file     Social Determinants of Health     Financial Resource Strain: Not on file   Food Insecurity: High Risk (3/20/2024)    Received from [facility]    SINCERE Food Insecurity     In the last month, did you feel there was not enough money for food?: Yes   Transportation Needs: High Risk (3/20/2024)    Received from [facility]    SINCERE Transportation Needs     In the last month, have you not seen a doctor because you didn't have a way to get to the clinic or  hospital?: Yes   Physical Activity: Not on file   Stress: Not on file   Social Connections: Not on file   Intimate Partner Violence: Low Risk  (3/20/2024)    Received from [facility]    History of Abuse     Have you ever been afraid of, threatened, neglected, or abused by someone?: No   Housing Stability: High Risk (3/20/2024)    Received from [facility]    SINCERE Housing Needs     In the last month, was there a time when you were not able to pay your mortgage or rent?: Yes     In the last month, have you slept outside, in a shelter, in a car, or any place not meant for sleeping?: Not on file       Family History   Problem Relation Age of Onset    Heart Attack Mother        Allergies   Allergen Reactions    Iodine Anaphylaxis    Iodine Contrast [Diagnostic X-Ray Materials] Anaphylaxis    Povidone Iodine Anaphylaxis       Current Facility-Administered Medications   Medication Dose Route Frequency Provider Last Rate Last Admin    amoxicillin-clavulanate (Augmentin) 875-125 MG per tablet 1 Tablet  1 Tablet Oral Q12HRS Pranay Mejia M.D.   1 Tablet at 05/16/24 0528    doxycycline monohydrate (Adoxa) tablet 100 mg  100 mg Oral Q12HRS Pranay Mejia M.D.   100 mg at 05/16/24 0528    allopurinol (Zyloprim) tablet 100 mg  100 mg Oral DAILY Pranay Mejia M.D.   100 mg at 05/16/24 0528    apixaban (Eliquis) tablet 5 mg  5 mg Oral BID Pranay Mejia M.D.   5 mg at 05/16/24 0528    atorvastatin (Lipitor) tablet 40 mg  40 mg Oral Q EVENING Pranay Mejia M.D.   40 mg at 05/15/24 1708    digoxin (Lanoxin) tablet 125 mcg  125 mcg Oral DAILY AT 1800 Pranay Mejia M.D.   125 mcg at 05/15/24 1707    levothyroxine (Synthroid) tablet 175 mcg  175 mcg Oral AM ES Pranay Mejia M.D.   175 mcg at 05/16/24 0528    pramipexole (Mirapex) tablet 0.25 mg  0.25 mg Oral Nightly BIB CanelaD.   0.25 mg at 05/15/24 2101    senna-docusate (Pericolace Or  Senokot S) 8.6-50 MG per tablet 2 Tablet  2 Tablet Oral BID Pranay Mejia M.D.        And    polyethylene glycol/lytes (Miralax) Packet 1 Packet  1 Packet Oral QDAY PRN Pranay Mejia M.D.        And    magnesium hydroxide (Milk Of Magnesia) suspension 30 mL  30 mL Oral QDAY PRN Pranay Mejia M.D.        And    bisacodyl (Dulcolax) suppository 10 mg  10 mg Rectal QDAY PRN Pranay Mejia M.D.        acetaminophen (Tylenol) tablet 650 mg  650 mg Oral Q4HRS PRN Pranay Mejia M.D.        levalbuterol (Xopenex) 0.63 MG/3ML nebulizer solution 0.63 mg  0.63 mg Nebulization Q6HRS (RT) Pranay Mejia M.D.   0.63 mg at 05/16/24 0736    Respiratory Therapy Consult   Nebulization Continuous RT Moe Caceres M.D.           Physical Exam:  Vitals:    05/16/24 0212 05/16/24 0336 05/16/24 0737 05/16/24 0742   BP:  124/63  (!) 146/75   Pulse: 60 60 (!) 59 60   Resp: 16 16 17 18   Temp:  36.4 °C (97.5 °F)  36.3 °C (97.3 °F)   TempSrc:  Temporal  Temporal   SpO2: 95% 95% 94% 99%   Weight:       Height:         General appearance: NAD, conversant, chronically ill-appearing   Eyes: anicteric sclerae, moist conjunctivae; no lid-lag; PERRLA  HENT: Atraumatic; oropharynx clear with moist mucous membranes and no mucosal ulcerations; normal hard and soft palate  Neck: Trachea midline; FROM, supple, no thyromegaly or lymphadenopathy  Lungs: Decreased BS R > L  CV: RRR, no MRGs, no JVD   Abdomen: Soft, non-tender; no masses or HSM  Extremities: No peripheral edema or extremity lymphadenopathy  Skin: Normal temperature, turgor and texture; no rash, ulcers or subcutaneous nodules  Psych: Appropriate affect, alert and oriented to person, place and time    Data:  Labs reviewed    Prior echo:  LVEF is preserved    CXR interpreted by me:  Diffuse infiltrates R lung    EKG interpreted by me:   Sinus, PAC    Impression/Plan:  1) Polymorphic VT  2) Paroxysmal (possibly persistent atrial  fibrillation)  3) HTN  4) JOSH  5) Prolonged QT/sotalol toxicity    -I am hesitant with the high dose prozac for any class III antiarrhythmics given her one EKG showing QTc > 650 msec or so, and now propensity for possible TDP  -I think perhaps AF ablation may be an option for her  -I discussed this with her, including risks/benefits and success rates and she wants to proceed  -We will schedule this electively    Artie Ahumada MD

## 2024-05-16 NOTE — DISCHARGE PLANNING
0941  Agency/Facility Name: [facility]   Outcome: DPA left VM to ask if [facility] has any information of pt's PCP. DPA awaiting call back.     0948  Agency/Facility Name: [facility]   Spoke To: Jenna   Outcome: Shannon called to notify DPA that pt's PCP is Zandra Zuniga.

## 2024-05-16 NOTE — PROGRESS NOTES
Hospital Medicine Daily Progress Note    Date of Service  5/16/2024    Chief Complaint  Azalea Gan is a 73 y.o. female admitted 5/8/2024 with palpitations    Hospital Course  72yo PMHx PAFib with abalation in March this year, HTN, pulmonary HTN, JOSH, BMI 40, previous CVA, hypothyroid, TV regurgitatio.  Presented on 5/8 with  AFib RVR to [facility].  On 5/11 transferred to ICU due to worsening resp status and required intubation for pulmonary edema.  In ICU treated for possible pneumonia as well as cardiogenic pulmonary edema with forced diuresis.  Had an episode of polymorphic VT, ?AFib with aberrancy (underlying LBBB)  Extubated 5/13    Interval Problem Update  Patient states she is feeling good this morning.  Denies shortness of breath or any pain.  Does note that she is weak, has not been out of bed in several days.  No other complaints.        5/15 patient is new to me today, she is in bed, NG tube in place, patient is tolerating diet, will DC NG tube today, patient is wheezing started on Xopenex, note from cardiology reviewed, EP has been consulted, continue telemetry, discussed with bedside nurse charge nurse  pharmacist requesting discharge.  5/16 patient is resting in bed, she is alert oriented follows commands, still requiring 2 L of oxygen, discussed with EP team, initially plan for ablation today but after further evaluation EP recommended outpatient follow-up, will continue monitoring, hopefully will be able to DC tomorrow home with home health care.  Discussed with bedside nurse charge nurse  pharmacist      I have discussed this patient's plan of care and discharge plan at IDT rounds today with Case Management, Nursing, Nursing leadership, and other members of the IDT team.    Consultants/Specialty  cardiology    Code Status  Full Code    Disposition  The patient is not medically cleared for discharge to home or a post-acute facility.      I have placed the appropriate orders for  post-discharge needs.    Review of Systems  Review of Systems   Constitutional:  Negative for chills and fever.   Respiratory:  Negative for cough and shortness of breath.    Cardiovascular:  Negative for chest pain, palpitations and leg swelling.   Gastrointestinal:  Negative for abdominal pain, diarrhea, nausea and vomiting.   Genitourinary:  Negative for dysuria and urgency.   Musculoskeletal:  Negative for back pain.   Skin:  Negative for rash.   Neurological:  Positive for weakness. Negative for dizziness, loss of consciousness and headaches.        Physical Exam  Temp:  [36.3 °C (97.3 °F)-36.6 °C (97.9 °F)] 36.6 °C (97.9 °F)  Pulse:  [59-94] 65  Resp:  [16-18] 18  BP: (106-146)/(63-75) 127/66  SpO2:  [93 %-99 %] 95 %    Physical Exam  Constitutional:       General: She is not in acute distress.     Appearance: Normal appearance. She is well-developed. She is obese. She is not diaphoretic.   HENT:      Head: Normocephalic and atraumatic.   Neck:      Vascular: No JVD.   Cardiovascular:      Rate and Rhythm: Normal rate and regular rhythm.      Heart sounds: Murmur heard.   Pulmonary:      Effort: Pulmonary effort is normal. No respiratory distress.      Breath sounds: No stridor. Wheezing present. No rales.   Abdominal:      Palpations: Abdomen is soft.      Tenderness: There is no abdominal tenderness. There is no guarding or rebound.   Musculoskeletal:      Right lower leg: No edema.      Left lower leg: No edema.   Skin:     General: Skin is warm and dry.      Capillary Refill: Capillary refill takes less than 2 seconds.      Coloration: Skin is pale.      Findings: No rash.   Neurological:      Mental Status: She is oriented to person, place, and time.   Psychiatric:         Mood and Affect: Mood normal.         Behavior: Behavior normal.         Thought Content: Thought content normal.         Fluids    Intake/Output Summary (Last 24 hours) at 5/16/2024 1651  Last data filed at 5/16/2024 1555  Gross per 24  hour   Intake 1146.62 ml   Output 750 ml   Net 396.62 ml       Laboratory  Recent Labs     05/14/24  0345 05/15/24  0034 05/16/24  0251   WBC 4.6* 4.9 6.1   RBC 3.56* 3.18* 3.15*   HEMOGLOBIN 10.5* 9.7* 9.2*   HEMATOCRIT 33.2* 29.5* 29.5*   MCV 93.3 92.8 93.7   MCH 29.5 30.5 29.2   MCHC 31.6* 32.9 31.2*   RDW 55.1* 55.4* 55.7*   PLATELETCT 200 202 203   MPV 11.5 11.4 10.9     Recent Labs     05/14/24 0345 05/15/24  0034 05/16/24  0251   SODIUM 143 140 141   POTASSIUM 4.5 4.0 4.3   CHLORIDE 103 102 103   CO2 27 27 27   GLUCOSE 136* 126* 112*   BUN 40* 51* 61*   CREATININE 1.54* 1.74* 1.70*   CALCIUM 8.5 8.4* 8.2*     Recent Labs     05/13/24  2340 05/14/24  0548 05/14/24  1447 05/15/24  0034   APTT 105.7* 152.5* 139.1*  --    INR  --   --   --  1.36*                 Imaging  DX-CHEST-PORTABLE (1 VIEW)   Final Result      1.  Possible small right pleural effusion.   2.  Similar right greater than left airspace disease.   3.  Suboptimal patient positioning.   4.  Cardiomegaly.      DX-CHEST-PORTABLE (1 VIEW)   Final Result      1.  Worsening hypoinflation   2.  Improvement of multifocal RIGHT lung consolidation.   3.  Supportive tubing is unchanged.      DX-CHEST-PORTABLE (1 VIEW)   Final Result      1.  Lines and tubes appear appropriately located      2.  Development of right lung airspace process which could be pneumonia or edema      DX-CHEST-PORTABLE (1 VIEW)   Final Result      Cardiomegaly and pulmonary vascular dilation or again noted. No acute process.      EC-ECHOCARDIOGRAM COMPLETE W/O CONT   Final Result      NM-CARDIAC STRESS TEST   Final Result      CL-LEFT HEART CATHETERIZATION WITH POSSIBLE INTERVENTION    (Results Pending)   EC-ECHOCARDIOGRAM COMPLETE W/O CONT    (Results Pending)   CL-EP ABLATION ATRIAL FIBRILLATION    (Results Pending)   EC-DINESH W/O CONT    (Results Pending)        Assessment/Plan  * Acute hypoxic respiratory failure (HCC)  Assessment & Plan  Patient intubated on May 11, and  subsequently extubated May 13  Etiology felt to be acute pulmonary edema from cardiogenic etiology as well as possible pneumonia.  Complete course of antibiotics  Continue forced diuresis  Mobilize  O2 and RT protocols    Wheezing today  Added scheduled xopenex  Continue RT per protocol        Acute kidney injury (HCC)  Assessment & Plan  Patient has been aggressively diuresed, and is perhaps on the dry side.  DC Lasix and monitor  Hold off on IV fluids for now and see if the patient normalizes without intervention  Daily BMP  Renally dose medications as appropriate  Cr 1.70    Acute respiratory failure with hypoxia (HCC)- (present on admission)  Assessment & Plan  Monitoring .  Weaning off o2.   Continue breathing treatments  Will add p.o. steroids    Aspiration pneumonia (HCC)  Assessment & Plan  Complete 5 days of antibiotics tomorrow  Cultures have been negative with exception of 1 of 2 bottles of a coag negative staph most likely contaminant  Completing abx     Fatigue  Assessment & Plan  Suspect untreated sleep apnea  Will need pulmonary follow-up once  respiratory status is maximized for an outpatient sleep study    Heart failure with preserved ejection fraction (HCC)  Assessment & Plan  In part rate related due to A-fib RVR  Cardiology following  Completing ischemic workup with left heart cath today  Appropriately diuresed: Hold on further diuresis for the moment    Continue rate control. Eliquis  EP consulted. F/u rec.     Positive cardiac stress test  Assessment & Plan  Left heart cath    Hypotension  Assessment & Plan  Improved.     Atrial fibrillation with rapid ventricular response (HCC)- (present on admission)  Assessment & Plan    She is s/p DINESH+DCCV at [facility] in March.   Her last echo in 3/2024 with moderate MR, moderate dilatation of LA, EF of 45-50% but in the setting of arrhythmia.   Anticoagulation with apixaban: Currently on hold with heparin drip pending left heart cath  Rate control with  digoxin  Soft pressures have limited the ability to use beta-blockade or calcium channel blockers  Cardiology following  Continue telemetry    EP eval today, recommending outpatient EP studies/ablation      Hypothyroidism- (present on admission)  Assessment & Plan  TSH wnl this admission, likely not contributing to afib.   - Continue with home levothyroxine     Depression- (present on admission)  Assessment & Plan  Continue with prozac     Ventricular tachyarrhythmia (HCC)- (present on admission)  Assessment & Plan  Ventricular tachycardia versus atrial fibrillation with aberrancy  Cardiology following  Continue telemetry  Completing ischemic workup  Cath neg  EP consulted.     Essential hypertension- (present on admission)  Assessment & Plan  Blood pressure borderline, holding home BP meds   Monitoring.          VTE prophylaxis: eliquis    I have performed a physical exam and reviewed and updated ROS and Plan today (5/16/2024). In review of yesterday's note (5/15/2024), there are no changes except as documented above.    My total time spent caring for the patient on the day of the encounter was 52 minutes.   This does not include time spent on separately billable procedures/tests.

## 2024-05-16 NOTE — THERAPY
Speech Language Therapy Contact Note    Patient Name: Azalea Gan  Age:  73 y.o., Sex:  female  Medical Record #: [id number]  Today's Date: 5/16/2024 05/16/24 0858   Treatment Variance   Reason For Missed Therapy Medical - Other (Please Comment)   Interdisciplinary Plan of Care Collaboration   IDT Collaboration with  Nursing   Collaboration Comments Per RN, pt currently NPO for procedure. SLP to follow for dysphagia therapy on a later date.

## 2024-05-16 NOTE — DISCHARGE PLANNING
[facility] Transitional Care Coordination    Referral from: Dr. Mejia    Insurance Provider on Facesheet: [employer] M[employer]    Potential Rehab Diagnosis: Debility    Chart review indicates patient may have on going medical management and may have therapy needs to possibly meet inpatient rehab facility criteria with the goal of returning to community.    D/C support will need to be verified: Daughter    Physiatry consultation forwarded per protocol.      Thank you for the referral.

## 2024-05-16 NOTE — DIETARY
Nutrition  Brief Update: TF discontinued on 5/15, Diet started per MD on 5/14. Cardiac Rehab diet education pending.     Day 8 of admit.  Azalea Gan is a 73 y.o. female with admitting DX of Atrial fibrillation with rapid ventricular response, Acute respiratory failure with hypoxia.   Patient being followed to optimize nutrition.    Current Diet: Cardiac.   No Meal documented.   RD met with pt, pt reports she did not get breakfast this am due to possible procedure but ate ~50% of dinner last night and confirmed if she gets foods she likes, she eats well.   Pt with Heart failure per MD notes. RD attempted heart healthy diet education, pt requested to read information on her own and the Heart failure booklet were provided. RD encouraged pt to contact us if she would like to go over handouts at another time.     Problem: Nutritional:  Goal: Achieve adequate nutritional intake  Description: Patient will consume >50% of meals  Outcome: Progressing.       Plan/rec:   Continue current diet per MD orders.  RD to monitor for consistently adequate intake.   Re-consult RD if pt would like to have further diet education, RD available PRN.

## 2024-05-17 ENCOUNTER — TELEPHONE (OUTPATIENT)
Dept: CARDIOLOGY | Facility: MEDICAL CENTER | Age: 74
End: 2024-05-17
Payer: MEDICARE

## 2024-05-17 DIAGNOSIS — I48.0 PAROXYSMAL ATRIAL FIBRILLATION (HCC): ICD-10-CM

## 2024-05-17 LAB
ANION GAP SERPL CALC-SCNC: 12 MMOL/L (ref 7–16)
BASOPHILS # BLD AUTO: 0 % (ref 0–1.8)
BASOPHILS # BLD: 0 K/UL (ref 0–0.12)
BUN SERPL-MCNC: 58 MG/DL (ref 8–22)
CALCIUM SERPL-MCNC: 8.5 MG/DL (ref 8.5–10.5)
CHLORIDE SERPL-SCNC: 103 MMOL/L (ref 96–112)
CO2 SERPL-SCNC: 26 MMOL/L (ref 20–33)
CREAT SERPL-MCNC: 1.28 MG/DL (ref 0.5–1.4)
EOSINOPHIL # BLD AUTO: 0 K/UL (ref 0–0.51)
EOSINOPHIL NFR BLD: 0 % (ref 0–6.9)
ERYTHROCYTE [DISTWIDTH] IN BLOOD BY AUTOMATED COUNT: 54.5 FL (ref 35.9–50)
GFR SERPLBLD CREATININE-BSD FMLA CKD-EPI: 44 ML/MIN/1.73 M 2
GLUCOSE SERPL-MCNC: 131 MG/DL (ref 65–99)
HCT VFR BLD AUTO: 31.6 % (ref 37–47)
HGB BLD-MCNC: 10 G/DL (ref 12–16)
IMM GRANULOCYTES # BLD AUTO: 0.03 K/UL (ref 0–0.11)
IMM GRANULOCYTES NFR BLD AUTO: 0.6 % (ref 0–0.9)
LYMPHOCYTES # BLD AUTO: 0.45 K/UL (ref 1–4.8)
LYMPHOCYTES NFR BLD: 9.3 % (ref 22–41)
MAGNESIUM SERPL-MCNC: 2.2 MG/DL (ref 1.5–2.5)
MCH RBC QN AUTO: 29.3 PG (ref 27–33)
MCHC RBC AUTO-ENTMCNC: 31.6 G/DL (ref 32.2–35.5)
MCV RBC AUTO: 92.7 FL (ref 81.4–97.8)
MONOCYTES # BLD AUTO: 0.1 K/UL (ref 0–0.85)
MONOCYTES NFR BLD AUTO: 2.1 % (ref 0–13.4)
NEUTROPHILS # BLD AUTO: 4.24 K/UL (ref 1.82–7.42)
NEUTROPHILS NFR BLD: 88 % (ref 44–72)
NRBC # BLD AUTO: 0 K/UL
NRBC BLD-RTO: 0 /100 WBC (ref 0–0.2)
PHOSPHATE SERPL-MCNC: 3.1 MG/DL (ref 2.5–4.5)
PLATELET # BLD AUTO: 211 K/UL (ref 164–446)
PMV BLD AUTO: 10.6 FL (ref 9–12.9)
POTASSIUM SERPL-SCNC: 4.9 MMOL/L (ref 3.6–5.5)
RBC # BLD AUTO: 3.41 M/UL (ref 4.2–5.4)
SODIUM SERPL-SCNC: 141 MMOL/L (ref 135–145)
WBC # BLD AUTO: 4.8 K/UL (ref 4.8–10.8)

## 2024-05-17 PROCEDURE — 99232 SBSQ HOSP IP/OBS MODERATE 35: CPT | Performed by: NURSE PRACTITIONER

## 2024-05-17 PROCEDURE — 99232 SBSQ HOSP IP/OBS MODERATE 35: CPT | Performed by: HOSPITALIST

## 2024-05-17 RX ADMIN — DIGOXIN 125 MCG: 0.12 TABLET ORAL at 17:21

## 2024-05-17 RX ADMIN — LEVALBUTEROL HYDROCHLORIDE 0.63 MG: 0.63 SOLUTION RESPIRATORY (INHALATION) at 07:34

## 2024-05-17 RX ADMIN — PREDNISONE 40 MG: 20 TABLET ORAL at 05:38

## 2024-05-17 RX ADMIN — APIXABAN 5 MG: 5 TABLET, FILM COATED ORAL at 05:38

## 2024-05-17 RX ADMIN — APIXABAN 5 MG: 5 TABLET, FILM COATED ORAL at 17:21

## 2024-05-17 RX ADMIN — ALLOPURINOL 100 MG: 100 TABLET ORAL at 05:38

## 2024-05-17 RX ADMIN — LEVALBUTEROL HYDROCHLORIDE 0.63 MG: 0.63 SOLUTION RESPIRATORY (INHALATION) at 21:23

## 2024-05-17 RX ADMIN — LEVALBUTEROL HYDROCHLORIDE 0.63 MG: 0.63 SOLUTION RESPIRATORY (INHALATION) at 15:02

## 2024-05-17 RX ADMIN — LEVOTHYROXINE SODIUM 175 MCG: 0.12 TABLET ORAL at 05:38

## 2024-05-17 RX ADMIN — PRAMIPEXOLE DIHYDROCHLORIDE 0.25 MG: 0.25 TABLET ORAL at 21:19

## 2024-05-17 RX ADMIN — ACETAMINOPHEN 650 MG: 325 TABLET, FILM COATED ORAL at 19:11

## 2024-05-17 RX ADMIN — LEVALBUTEROL HYDROCHLORIDE 0.63 MG: 0.63 SOLUTION RESPIRATORY (INHALATION) at 02:02

## 2024-05-17 RX ADMIN — ATORVASTATIN CALCIUM 40 MG: 40 TABLET, FILM COATED ORAL at 17:21

## 2024-05-17 ASSESSMENT — ENCOUNTER SYMPTOMS
DIARRHEA: 0
BACK PAIN: 0
ABDOMINAL PAIN: 0
HEADACHES: 0
COUGH: 0
FEVER: 0
LOSS OF CONSCIOUSNESS: 0
WEAKNESS: 1
PALPITATIONS: 0
CHILLS: 0
DIZZINESS: 0
NAUSEA: 0
SHORTNESS OF BREATH: 0
CHEST TIGHTNESS: 0
BLOOD IN STOOL: 0
VOMITING: 0

## 2024-05-17 ASSESSMENT — PAIN DESCRIPTION - PAIN TYPE
TYPE: ACUTE PAIN
TYPE: ACUTE PAIN

## 2024-05-17 ASSESSMENT — FIBROSIS 4 INDEX: FIB4 SCORE: 1.66

## 2024-05-17 NOTE — DISCHARGE PLANNING
"Case Management Discharge Planning    Admission Date: 5/8/2024  GMLOS: 4.2  ALOS: 9    6-Clicks ADL Score: 17  6-Clicks Mobility Score: 20  PT and/or OT Eval ordered: Yes  Post-acute Referrals Ordered: Yes  Post-acute Choice Obtained: Yes  Has referral(s) been sent to post-acute provider:  Yes      Anticipated Discharge Dispo: Discharge Disposition: D/T to home under HHA care in anticipation of covered skilled care (06)    DME Needed: Yes   Home Oxygen    Action(s) Taken: Updated Provider/Nurse on Discharge Plan    1100, Pt was discussed in IDT rounds. Per Dr. Mejia, still waiting for cardiology if they will do inpatient Ablation or outpatient.\"    1113, Received message through Vo[employer] from [facility] and she said, \"Patient will be staying for inpatient ablation next week.\" Dr. Mejia and IDT team informed through Voalte.     Escalations Completed: None    Medically Clear: No    Next Steps: CM will continue to assist Pt with discharge needs.      Barriers to Discharge: Medical clearance, DME, and Transportation            "

## 2024-05-17 NOTE — CARE PLAN
Problem: Bronchoconstriction  Goal: Improve in air movement and diminished wheezing  Description: Target End Date:  2 to 3 days    1.  Implement inhaled treatments  2.  Evaluate and manage medication effects  Outcome: Progressing      INFECTIOUS DISEASE PROGRESS NOTE   Portia Heller is a 83 year old female patient admitted on 2023.  Reason for admission: PAD (peripheral artery disease) (CMD) [I73.9]    SUBJECTIVE/OBJECTIVE   Today, she underwent percutaneous revascularization of left anterior tibial artery.  Currently appears delirious    PHYSICAL EXAM   Vitals: Minimum/Maximum (last 24 hours):  Temperature Blood Pressure Heart Rate Resp Rate SpO2   Temp  Av.8 °F (36.6 °C)  Min: 97.5 °F (36.4 °C)  Max: 98.4 °F (36.9 °C) BP  Min: 105/37  Max: 180/72 Pulse  Av.9  Min: 71  Max: 99 Resp  Av.3  Min: 16  Max: 22 SpO2  Av.4 %  Min: 95 %  Max: 100 %   Last Vitals:  Visit Vitals  BP (!) 161/70   Pulse 89   Temp 97.7 °F (36.5 °C) (Oral)   Resp 18   Ht 5' 2\" (1.575 m)   Wt 57.9 kg (127 lb 10.3 oz)   SpO2 100%   BMI 23.35 kg/m²     HEENT:  Normocephalic  Neck:  Supple,  Lung:  Air entry is satisfactory and clear to auscultation bilaterally  Heart:  S1 and S2 heard  Abdomen:  Soft, non-distended. No obvious masses or organomegaly. No tenderness  Neurological: Delirious  Extremities:  No clubbing or cyanosis  Left distal foot is cold, dry and gangrenous as shown.  No drainage        Lab Results     Recent Labs     23  2340 23  0609   SODIUM 137 138   POTASSIUM 4.4 4.1   CO2 26 25   ANIONGAP 11 13   GLUCOSE 95 98   BUN 26* 25*   CREATININE 1.17* 1.08*   GFRESTIMATE 46* 51*   CALCIUM 7.9* 8.5   BILIRUBIN 0.2 0.3   AST 11 15   GPT 18 19   ALKPT 81 90   TOTPROTEIN 5.7* 6.4   ALBUMIN 2.1* 2.3*   GLOB 3.6 4.1*   AGR 0.6* 0.6*     Cath/PV Case  · Ost L JAYLON to Dist L JAYLON lesion with 100% stenosis.  · Ost L PTA to Dist L PTA lesion with 100% stenosis.     Procedures performed/conclusion  1.  Ultrasound-guided access left common femoral artery in antegrade   fashion  2.  Left superficial femoral angiogram  3.  Left anterior tibial angiogram  4.  Complex percutaneous revascularization of the left anterior tibial   artery which was  long chronic total occlusion with successful percutaneous   revascularization this should significantly improve wound healing and   prevent major amputation of the left lower extremity.      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 Triplett covering from tomorrow    Thank you for requesting my participation in the care of this patient    This note was partially generated using voice recognition software.  If you require clarification or   feel that there has been an error in the note please contact me through [email or web address].  Thank you.      Mathew Edmonds MD  7/19/2023   7:08 PM

## 2024-05-17 NOTE — TELEPHONE ENCOUNTER
We are out of network with this patient's insurance. JS notified she will have to establish with an in network facility for her insurance to cover any outpatient care.

## 2024-05-17 NOTE — PROGRESS NOTES
Cardiology Follow Up Progress Note    Date of Service  5/17/2024    Attending Physician  ELI Canela.*    Chief Complaint   Dizziness and lightheadedness     EP consulted for AF rhythm management     HPI  Azalea Gan is a 73 y.o. female admitted on 5/8/2024 with symptomatic AF with RVR.     Patient was started on Sotalol on 5/10/2024. On 5/11/2024 patient developed hypoxia with respiratory decompensation requiring intubation.  Chest imaging showed right lung with diffuse infiltrates. Patient also had short bursts of polymorphic VT at this time.  QT was not excessive but subsequent QTc's were greater than 650 ms.  Patient's Prozac and sotalol was discontinued.      Other past medical history significant for pAF H/O DCCV x 2, hypothyroidism, pulmonary hypertension (RVSP 63 mmHg), JOSH, prior CVA and hypertension with h/o renal artery stenosis.    Interim Events  Patient reports feeling better in normal sinus rhythm. No acute vents overnight.   Discussed discharge with outpatient ablation at [facility] due to her insurance being out of network versus inpatient ablation next week, patient strongly feels that she would like to stay inpatient and undergo A-fib ablation ASAP.  A-fib ablation able to be arranged for Tuesday 5/21 at 3 PM with Dr. Ahumada.  ALCIRA hay, Cr 1.2 this am.   Remains on 2L via nasal cannula.     Review of Systems  Review of Systems   Constitutional:  Negative for fever.   Respiratory:  Negative for chest tightness and shortness of breath.    Cardiovascular:  Negative for chest pain, palpitations and leg swelling.   Gastrointestinal:  Negative for abdominal pain and blood in stool.   Genitourinary:  Negative for hematuria.   Musculoskeletal:  Negative for gait problem.   Neurological:  Positive for weakness. Negative for dizziness and syncope.       Vital signs in last 24 hours  Temp:  [36.1 °C (97 °F)-36.5 °C (97.7 °F)] 36.5 °C (97.7 °F)  Pulse:  [55-79] 79  Resp:  [18] 18  BP:  "(119-149)/(55-81) 123/57  SpO2:  [94 %-99 %] 95 %    Physical Exam  Physical Exam  Constitutional:       General: She is not in acute distress.     Appearance: Normal appearance.   HENT:      Head: Normocephalic.   Cardiovascular:      Rate and Rhythm: Normal rate and regular rhythm.      Pulses: Normal pulses.      Heart sounds: Normal heart sounds.   Pulmonary:      Effort: Pulmonary effort is normal.      Breath sounds: Decreased breath sounds and wheezing present.   Abdominal:      Palpations: Abdomen is soft.      Tenderness: There is no abdominal tenderness.   Musculoskeletal:      Cervical back: Normal range of motion.      Right lower leg: No edema.      Left lower leg: No edema.   Skin:     General: Skin is warm and dry.   Neurological:      Mental Status: She is alert and oriented to person, place, and time.   Psychiatric:         Mood and Affect: Mood normal.         Behavior: Behavior normal.         Thought Content: Thought content normal.         Lab Review  Lab Results   Component Value Date/Time    WBC 4.8 05/17/2024 01:16 AM    RBC 3.41 (L) 05/17/2024 01:16 AM    HEMOGLOBIN 10.0 (L) 05/17/2024 01:16 AM    HEMATOCRIT 31.6 (L) 05/17/2024 01:16 AM    MCV 92.7 05/17/2024 01:16 AM    MCH 29.3 05/17/2024 01:16 AM    MCHC 31.6 (L) 05/17/2024 01:16 AM    MPV 10.6 05/17/2024 01:16 AM      Lab Results   Component Value Date/Time    SODIUM 141 05/17/2024 01:16 AM    POTASSIUM 4.9 05/17/2024 01:16 AM    CHLORIDE 103 05/17/2024 01:16 AM    CO2 26 05/17/2024 01:16 AM    GLUCOSE 131 (H) 05/17/2024 01:16 AM    BUN 58 (H) 05/17/2024 01:16 AM    CREATININE 1.28 05/17/2024 01:16 AM      Lab Results   Component Value Date/Time    ASTSGOT 15 05/09/2024 01:34 AM    ALTSGPT 10 05/09/2024 01:34 AM     Lab Results   Component Value Date/Time    TRIGLYCERIDE 121 05/13/2024 03:20 AM    TROPONINT 248 (H) 05/12/2024 04:15 AM       No results for input(s): \"NTPROBNP\" in the last 72 hours.    Cardiac Imaging and Procedures " "Review  Echocardiogram 5/10/2024:  CONCLUSIONS  Normal left ventricular size, wall thickness, and systolic function.   The right ventricle is mildly dilated with preserved systolic function.  Moderately dilated left atrium.  Mild mitral regurgitation.  Mild tricuspid regurgitation.  Estimated right ventricular systolic pressure is 63 mmHg; severely elevated.  Right atrial pressure is estimated to be 15 mmHg; elevated.  No pericardial effusion.     Compared to the prior study on 5/31/23, now higher estimated RVSP.      Assessment/Plan  1) Polymorphic VT  2) Paroxysmal (possibly persistent atrial fibrillation)  3) HTN  4) JOSH  5) Prolonged QT/sotalol toxicity  6) ALCIRA, resolved  7) Anticoagulated (Eliquis)  8) pHTN (RVSP 63 mmHg)    -Dr. Ahumada consulted, per Dr. Ahumada \"hesitant with the high dose prozac for any class III antiarrhythmics given her one EKG showing QTc > 650 msec or so, and now propensity for possible TDP\".   - Recommended AF ablation to avoid AAD therapy. Inpatient AF ablation arranged for Tuesday 5/21/2024 at 1500 with Dr. Ahumada . NPO at [facility] on Monday 5/20/2024.  - Monitor closely for volume overload due to history of HFpEF and pHTN with recent discontinuation of Lasix d/t ALCIRA (now resolved).   - Continue digoxin 125 mcg daily and OAC Eliquis 5 mg BID (no need to hold prior to AF ablation).     Thank you for allowing me to participate in the care of this patient.    Please contact me with any questions.    SANDRO Calderon.   [facility]  [phone]        "

## 2024-05-17 NOTE — CARE PLAN
The patient is Stable - Low risk of patient condition declining or worsening    Shift Goals  Clinical Goals: mobility, maintain O2, IV abx  Patient Goals: updates, ablation  Family Goals: DANE    Progress made toward(s) clinical / shift goals:      Problem: Knowledge Deficit - Standard  Goal: Patient and family/care givers will demonstrate understanding of plan of care, disease process/condition, diagnostic tests and medications  Outcome: Progressing     Problem: Hemodynamics  Goal: Patient's hemodynamics, fluid balance and neurologic status will be stable or improve  Outcome: Progressing     Problem: Respiratory  Goal: Patient will achieve/maintain optimum respiratory ventilation and gas exchange  Outcome: Progressing       Patient is not progressing towards the following goals:

## 2024-05-17 NOTE — PROGRESS NOTES
Report received from RN, pt care assumed, tele box on. VSS, pt assessment complete. Pt AAOx4, no signs of distress noted at this time. POC discussed with pt and verbalizes no questions. Pt denies any additional needs at this time. Bed in lowest position, bed alarm on, pt educated on fall risk and verbalized understanding, call light within reach, hourly rounding initiated.

## 2024-05-17 NOTE — DISCHARGE PLANNING
Referral sent per choice to [facility]    0954- Spoke To: Luli  Agency/Facility Name: [facility]   Plan or Request: [facility]ett is processing 02 order    1005- Per epic link, [facility] accepted

## 2024-05-17 NOTE — THERAPY
Physical Therapy Contact Note    Patient Name: Azalea Gan  Age:  73 y.o., Sex:  female  Medical Record #: [id number]  Today's Date: 5/17/2024    Attempted PT session, pt endorsing ambulating in hallway with staff and FWW without difficulty. Has a FWW at home and is used to managing O2 tubing. Denies any concerns with mobility upon d/c, is more worried about going home and having to get careflighted back to [facility] due to cardiac issues. Pt awaiting plan for IP vs OP ablation. Will continue to monitor pt and if does have ablation IP will follow up after. Continue to recommend HH PT if available.     Nick Vela PT, DPT        05/17/24 1220   Precautions   Precautions Fall Risk   Comments NG tube removed   Pain 0 - 10 Group   Therapist Pain Assessment Post Activity Pain Same as Prior to Activity;Nurse Notified;0   Cognition    Cognition / Consciousness WDL   Comments Pleasant and cooperative   Education Group   Education Provided Role of Physical Therapist   Role of Physical Therapist Patient Response Patient;Acceptance;Explanation;Verbal Demonstration   Additional Comments Receptive to self management and compensatory strategies upon d/c home, importance of continued mobility, activity pacing and progression, home safety   Physical Therapy Treatment Plan   Physical Therapy Treatment Plan Continue Current Treatment Plan   Anticipated Discharge Equipment and Recommendations   DC Equipment Recommendations None   Discharge Recommendations Recommend home health for continued physical therapy services   Interdisciplinary Plan of Care Collaboration   IDT Collaboration with  Nursing   Patient Position at End of Therapy In Bed;Bed Alarm On;Call Light within Reach;Tray Table within Reach;Phone within Reach   Session Information   Date / Session Number  5/17- 2 (2/3, 5/21) edu only 5/17

## 2024-05-17 NOTE — PROGRESS NOTES
Hospital Medicine Daily Progress Note    Date of Service  5/17/2024    Chief Complaint  Azalea Gan is a 73 y.o. female admitted 5/8/2024 with palpitations    Hospital Course  72yo PMHx PAFib with abalation in March this year, HTN, pulmonary HTN, JOSH, BMI 40, previous CVA, hypothyroid, TV regurgitatio.  Presented on 5/8 with  AFib RVR to [facility].  On 5/11 transferred to ICU due to worsening resp status and required intubation for pulmonary edema.  In ICU treated for possible pneumonia as well as cardiogenic pulmonary edema with forced diuresis.  Had an episode of polymorphic VT, ?AFib with aberrancy (underlying LBBB)  Extubated 5/13    Interval Problem Update  Patient states she is feeling good this morning.  Denies shortness of breath or any pain.  Does note that she is weak, has not been out of bed in several days.  No other complaints.        5/15 patient is new to me today, she is in bed, NG tube in place, patient is tolerating diet, will DC NG tube today, patient is wheezing started on Xopenex, note from cardiology reviewed, EP has been consulted, continue telemetry, discussed with bedside nurse charge nurse  pharmacist requesting discharge.  5/16 patient is resting in bed, she is alert oriented follows commands, still requiring 2 L of oxygen, discussed with EP team, initially plan for ablation today but after further evaluation EP recommended outpatient follow-up, will continue monitoring, hopefully will be able to DC tomorrow home with home health care.  Discussed with bedside nurse charge nurse  pharmacist  5/17 patient is in chair, no palpitation, discussed with EP team, no wheezing today, waiting time for ablation. Discussed with nurse staff, , charge nurse.       I have discussed this patient's plan of care and discharge plan at IDT rounds today with Case Management, Nursing, Nursing leadership, and other members of the IDT  team.    Consultants/Specialty  cardiology    Code Status  Full Code    Disposition  The patient is not medically cleared for discharge to home or a post-acute facility.      I have placed the appropriate orders for post-discharge needs.    Review of Systems  Review of Systems   Constitutional:  Negative for chills and fever.   Respiratory:  Negative for cough and shortness of breath.    Cardiovascular:  Negative for chest pain, palpitations and leg swelling.   Gastrointestinal:  Negative for abdominal pain, diarrhea, nausea and vomiting.   Genitourinary:  Negative for dysuria and urgency.   Musculoskeletal:  Negative for back pain.   Skin:  Negative for rash.   Neurological:  Positive for weakness. Negative for dizziness, loss of consciousness and headaches.        Physical Exam  Temp:  [36.1 °C (97 °F)-36.6 °C (97.9 °F)] 36.5 °C (97.7 °F)  Pulse:  [55-70] 70  Resp:  [18] 18  BP: (119-149)/(55-81) 133/64  SpO2:  [93 %-99 %] 96 %    Physical Exam  Constitutional:       General: She is not in acute distress.     Appearance: Normal appearance. She is well-developed. She is obese. She is not diaphoretic.   HENT:      Head: Normocephalic and atraumatic.   Neck:      Vascular: No JVD.   Cardiovascular:      Rate and Rhythm: Normal rate and regular rhythm.      Heart sounds: Murmur heard.   Pulmonary:      Effort: Pulmonary effort is normal. No respiratory distress.      Breath sounds: No stridor. Wheezing present. No rales.   Abdominal:      Palpations: Abdomen is soft.      Tenderness: There is no abdominal tenderness. There is no guarding or rebound.   Musculoskeletal:      Right lower leg: No edema.      Left lower leg: No edema.   Skin:     General: Skin is warm and dry.      Capillary Refill: Capillary refill takes less than 2 seconds.      Coloration: Skin is pale.      Findings: No rash.   Neurological:      Mental Status: She is oriented to person, place, and time.   Psychiatric:         Mood and Affect: Mood  normal.         Behavior: Behavior normal.         Thought Content: Thought content normal.         Fluids    Intake/Output Summary (Last 24 hours) at 5/17/2024 1232  Last data filed at 5/17/2024 0946  Gross per 24 hour   Intake 740 ml   Output 850 ml   Net -110 ml       Laboratory  Recent Labs     05/15/24  0034 05/16/24  0251 05/17/24  0116   WBC 4.9 6.1 4.8   RBC 3.18* 3.15* 3.41*   HEMOGLOBIN 9.7* 9.2* 10.0*   HEMATOCRIT 29.5* 29.5* 31.6*   MCV 92.8 93.7 92.7   MCH 30.5 29.2 29.3   MCHC 32.9 31.2* 31.6*   RDW 55.4* 55.7* 54.5*   PLATELETCT 202 203 211   MPV 11.4 10.9 10.6     Recent Labs     05/15/24  0034 05/16/24  0251 05/17/24  0116   SODIUM 140 141 141   POTASSIUM 4.0 4.3 4.9   CHLORIDE 102 103 103   CO2 27 27 26   GLUCOSE 126* 112* 131*   BUN 51* 61* 58*   CREATININE 1.74* 1.70* 1.28   CALCIUM 8.4* 8.2* 8.5     Recent Labs     05/14/24  1447 05/15/24  0034   APTT 139.1*  --    INR  --  1.36*                 Imaging  DX-CHEST-PORTABLE (1 VIEW)   Final Result      1.  Possible small right pleural effusion.   2.  Similar right greater than left airspace disease.   3.  Suboptimal patient positioning.   4.  Cardiomegaly.      DX-CHEST-PORTABLE (1 VIEW)   Final Result      1.  Worsening hypoinflation   2.  Improvement of multifocal RIGHT lung consolidation.   3.  Supportive tubing is unchanged.      DX-CHEST-PORTABLE (1 VIEW)   Final Result      1.  Lines and tubes appear appropriately located      2.  Development of right lung airspace process which could be pneumonia or edema      DX-CHEST-PORTABLE (1 VIEW)   Final Result      Cardiomegaly and pulmonary vascular dilation or again noted. No acute process.      EC-ECHOCARDIOGRAM COMPLETE W/O CONT   Final Result      EC-ECHOCARDIOGRAM COMPLETE W/O CONT   Final Result      NM-CARDIAC STRESS TEST   Final Result      CL-LEFT HEART CATHETERIZATION WITH POSSIBLE INTERVENTION    (Results Pending)   EC-DINESH W/O CONT    (Results Pending)        Assessment/Plan  * Acute  hypoxic respiratory failure (HCC)  Assessment & Plan  Patient intubated on May 11, and subsequently extubated May 13  Etiology felt to be acute pulmonary edema from cardiogenic etiology as well as possible pneumonia.  Complete course of antibiotics  Continue forced diuresis  Mobilize  O2 and RT protocols    Wheezing today  Added scheduled xopenex  Continue RT per protocol    Continue o2 per protocol.         Acute kidney injury (HCC)  Assessment & Plan  Patient has been aggressively diuresed, and is perhaps on the dry side.  DC Lasix and monitor  Hold off on IV fluids for now and see if the patient normalizes without intervention  Daily BMP  Renally dose medications as appropriate  Cr 1.28    Acute respiratory failure with hypoxia (HCC)- (present on admission)  Assessment & Plan  Monitoring .  Weaning off o2.   Continue breathing treatments  Continue prednisone.     Aspiration pneumonia (HCC)  Assessment & Plan  Complete 5 days of antibiotics tomorrow  Cultures have been negative with exception of 1 of 2 bottles of a coag negative staph most likely contaminant  Completing abx     Fatigue  Assessment & Plan  Suspect untreated sleep apnea  Will need pulmonary follow-up once  respiratory status is maximized for an outpatient sleep study    Heart failure with preserved ejection fraction (HCC)  Assessment & Plan  In part rate related due to A-fib RVR  Cardiology following  Completing ischemic workup with left heart cath today  Appropriately diuresed: Hold on further diuresis for the moment    Continue rate control. Eliquis  EP consulted. F/u rec.     Positive cardiac stress test  Assessment & Plan  Left heart cath    Hypotension  Assessment & Plan  Improved.     Atrial fibrillation with rapid ventricular response (HCC)- (present on admission)  Assessment & Plan    She is s/p DINESH+DCCV at [facility] in March.   Her last echo in 3/2024 with moderate MR, moderate dilatation of LA, EF of 45-50% but in the setting of  arrhythmia.   Anticoagulation with apixaban: Currently on hold with heparin drip pending left heart cath  Rate control with digoxin  Soft pressures have limited the ability to use beta-blockade or calcium channel blockers  Cardiology following  Continue telemetry    EP eval today, recommending outpatient EP studies/ablation    EP recommending ablation before dc.         Hypothyroidism- (present on admission)  Assessment & Plan  TSH wnl this admission, likely not contributing to afib.   - Continue with home levothyroxine     Depression- (present on admission)  Assessment & Plan  Continue with prozac     Ventricular tachyarrhythmia (HCC)- (present on admission)  Assessment & Plan  Ventricular tachycardia versus atrial fibrillation with aberrancy  Cardiology following  Continue telemetry  Completing ischemic workup  Cath neg  EP consulted.   Ablation recommended by EP.     Essential hypertension- (present on admission)  Assessment & Plan  Blood pressure borderline, holding home BP meds   Monitoring.          VTE prophylaxis: eliquis    I have performed a physical exam and reviewed and updated ROS and Plan today (5/17/2024). In review of yesterday's note (5/16/2024), there are no changes except as documented above.

## 2024-05-17 NOTE — TELEPHONE ENCOUNTER
Recvd call from JILLIAN - patient will need to be scheduled for an outpatient afib ablation with Dr. Ahumada. No meds to hold.    I will call this patient once she is discharged to schedule this procedure.

## 2024-05-17 NOTE — CARE PLAN
The patient is Stable - Low risk of patient condition declining or worsening    Shift Goals  Clinical Goals: monitor O2, VSS  Patient Goals: updates, sleep  Family Goals: hao    Progress made toward(s) clinical / shift goals:    Problem: Knowledge Deficit - Standard  Goal: Patient and family/care givers will demonstrate understanding of plan of care, disease process/condition, diagnostic tests and medications  Outcome: Progressing     Problem: Fall Risk  Goal: Patient will remain free from falls  Outcome: Progressing     Problem: Psychosocial  Goal: Patient's ability to verbalize feelings about condition will improve  Outcome: Progressing     Problem: Discharge Barriers/Planning  Goal: Patient's continuum of care needs are met  Outcome: Progressing     Problem: Nutrition  Goal: Patient's nutritional and fluid intake will be adequate or improve  Outcome: Progressing     Problem: Urinary Elimination  Goal: Establish and maintain regular urinary output  Outcome: Progressing     Problem: Bowel Elimination  Goal: Establish and maintain regular bowel function  Outcome: Progressing     Problem: Mobility  Goal: Patient's capacity to carry out activities will improve  Outcome: Progressing     Problem: Self Care  Goal: Patient will have the ability to perform ADLs independently or with assistance (bathe, groom, dress, toilet and feed)  Outcome: Progressing     Problem: Pain - Standard  Goal: Alleviation of pain or a reduction in pain to the patient’s comfort goal  Outcome: Progressing     Problem: Skin Integrity  Goal: Skin integrity is maintained or improved  Outcome: Progressing       Patient is not progressing towards the following goals:      Problem: Psychosocial  Goal: Patient's level of anxiety will decrease  Outcome: Not Met

## 2024-05-17 NOTE — DISCHARGE PLANNING
Please review the consult from Dr. Miranda regarding post acute recommendations.  TCC will no longer follow.  Please reach out to myself with any questions.

## 2024-05-18 LAB
BASOPHILS # BLD AUTO: 0 % (ref 0–1.8)
BASOPHILS # BLD: 0 K/UL (ref 0–0.12)
EOSINOPHIL # BLD AUTO: 0.02 K/UL (ref 0–0.51)
EOSINOPHIL NFR BLD: 0.3 % (ref 0–6.9)
ERYTHROCYTE [DISTWIDTH] IN BLOOD BY AUTOMATED COUNT: 54.2 FL (ref 35.9–50)
HCT VFR BLD AUTO: 30.9 % (ref 37–47)
HGB BLD-MCNC: 9.9 G/DL (ref 12–16)
IMM GRANULOCYTES # BLD AUTO: 0.04 K/UL (ref 0–0.11)
IMM GRANULOCYTES NFR BLD AUTO: 0.7 % (ref 0–0.9)
LYMPHOCYTES # BLD AUTO: 1.7 K/UL (ref 1–4.8)
LYMPHOCYTES NFR BLD: 28.3 % (ref 22–41)
MCH RBC QN AUTO: 29.6 PG (ref 27–33)
MCHC RBC AUTO-ENTMCNC: 32 G/DL (ref 32.2–35.5)
MCV RBC AUTO: 92.2 FL (ref 81.4–97.8)
MONOCYTES # BLD AUTO: 0.67 K/UL (ref 0–0.85)
MONOCYTES NFR BLD AUTO: 11.1 % (ref 0–13.4)
NEUTROPHILS # BLD AUTO: 3.58 K/UL (ref 1.82–7.42)
NEUTROPHILS NFR BLD: 59.6 % (ref 44–72)
NRBC # BLD AUTO: 0 K/UL
NRBC BLD-RTO: 0 /100 WBC (ref 0–0.2)
PHOSPHATE SERPL-MCNC: 2.7 MG/DL (ref 2.5–4.5)
PLATELET # BLD AUTO: 214 K/UL (ref 164–446)
PMV BLD AUTO: 10.4 FL (ref 9–12.9)
RBC # BLD AUTO: 3.35 M/UL (ref 4.2–5.4)
WBC # BLD AUTO: 6 K/UL (ref 4.8–10.8)

## 2024-05-18 PROCEDURE — 99232 SBSQ HOSP IP/OBS MODERATE 35: CPT | Performed by: HOSPITALIST

## 2024-05-18 RX ORDER — LEVALBUTEROL INHALATION SOLUTION 0.63 MG/3ML
0.63 SOLUTION RESPIRATORY (INHALATION)
Status: DISCONTINUED | OUTPATIENT
Start: 2024-05-18 | End: 2024-05-22 | Stop reason: HOSPADM

## 2024-05-18 RX ORDER — AMLODIPINE BESYLATE 5 MG/1
5 TABLET ORAL DAILY
Status: DISCONTINUED | OUTPATIENT
Start: 2024-05-18 | End: 2024-05-22 | Stop reason: HOSPADM

## 2024-05-18 RX ADMIN — ALLOPURINOL 100 MG: 100 TABLET ORAL at 05:27

## 2024-05-18 RX ADMIN — LEVALBUTEROL HYDROCHLORIDE 0.63 MG: 0.63 SOLUTION RESPIRATORY (INHALATION) at 21:29

## 2024-05-18 RX ADMIN — SENNOSIDES AND DOCUSATE SODIUM 2 TABLET: 50; 8.6 TABLET ORAL at 16:40

## 2024-05-18 RX ADMIN — PREDNISONE 40 MG: 20 TABLET ORAL at 05:26

## 2024-05-18 RX ADMIN — AMLODIPINE BESYLATE 5 MG: 5 TABLET ORAL at 16:40

## 2024-05-18 RX ADMIN — ACETAMINOPHEN 650 MG: 325 TABLET, FILM COATED ORAL at 21:05

## 2024-05-18 RX ADMIN — LORAZEPAM 0.5 MG: 0.5 TABLET ORAL at 21:08

## 2024-05-18 RX ADMIN — DIGOXIN 125 MCG: 0.12 TABLET ORAL at 16:40

## 2024-05-18 RX ADMIN — APIXABAN 5 MG: 5 TABLET, FILM COATED ORAL at 05:27

## 2024-05-18 RX ADMIN — LEVOTHYROXINE SODIUM 175 MCG: 0.12 TABLET ORAL at 05:26

## 2024-05-18 RX ADMIN — APIXABAN 5 MG: 5 TABLET, FILM COATED ORAL at 16:40

## 2024-05-18 RX ADMIN — PRAMIPEXOLE DIHYDROCHLORIDE 0.25 MG: 0.25 TABLET ORAL at 21:06

## 2024-05-18 RX ADMIN — ATORVASTATIN CALCIUM 40 MG: 40 TABLET, FILM COATED ORAL at 16:40

## 2024-05-18 ASSESSMENT — ENCOUNTER SYMPTOMS
CHILLS: 0
LOSS OF CONSCIOUSNESS: 0
PALPITATIONS: 0
HEADACHES: 0
WEAKNESS: 1
SHORTNESS OF BREATH: 0
VOMITING: 0
COUGH: 0
ABDOMINAL PAIN: 0
DIZZINESS: 0
DIARRHEA: 0
NAUSEA: 0
FEVER: 0
BACK PAIN: 0

## 2024-05-18 ASSESSMENT — FIBROSIS 4 INDEX: FIB4 SCORE: 1.64

## 2024-05-18 ASSESSMENT — PAIN DESCRIPTION - PAIN TYPE: TYPE: CHRONIC PAIN

## 2024-05-18 NOTE — THERAPY
"Speech Language Pathology   Daily Treatment     Patient Name: Azalea Gan  AGE:  73 y.o., SEX:  female  Medical Record #: [id number]  Date of Service: 5/17/2024      Precautions:  Precautions: Fall Risk         Subjective  RN cleared patient for session. Pt received awake, denied any difficulty with eating/drinking. Agreeable to participate in PO with SLP.       Assessment  Pt seen this date for dysphagia management. PO of thin liquids, puree, and regular solids presented. Adequate oral bolus acceptance, manipulation and clearance. Functional mastication of solids. No overt s/sx of airway invasion. Vocal quality remained clear. No increased WOB observed. Single swallow completed per bolus.       Clinical Impressions  Patient presents with a functional oropharyngeal swallow. Recommend continuation of oral diet. Further acute speech therapy intervention is not indicated. SLP to sign off; please re-consult with any changes.      Recommendations  Diet Consistency: regular/thin liquid diet  Instrumentation: None indicated at this time  Medication: As tolerated  Supervision: Independent  Positioning: Fully upright and midline during oral intake  Risk Management : None  Oral Care: BID      SLP Treatment Plan  Treatment Plan: None Indicated       Anticipated Discharge Needs  Discharge Recommendations: Anticipate that the patient will have no further speech therapy needs after discharge from the hospital  Therapy Recommendations Upon DC: Not Indicated      Patient / Family Goals  Patient / Family Goal #1: \"Can this tube (NG) come out\"  Goal #1 Outcome: Goal met  Short Term Goals  Short Term Goal # 1: Pt will consume a regular/thin liquid diet with no overt s/sx of aspiration      MEG Grayson  "

## 2024-05-18 NOTE — CARE PLAN
The patient is Stable - Low risk of patient condition declining or worsening    Shift Goals  Clinical Goals: maintain O2, IV antibx, VSS  Patient Goals: sleep  Family Goals: hao    Progress made toward(s) clinical / shift goals:    Problem: Knowledge Deficit - Standard  Goal: Patient and family/care givers will demonstrate understanding of plan of care, disease process/condition, diagnostic tests and medications  Outcome: Progressing     Problem: Fall Risk  Goal: Patient will remain free from falls  Outcome: Progressing     Problem: Psychosocial  Goal: Patient's level of anxiety will decrease  Outcome: Progressing     Problem: Communication  Goal: The ability to communicate needs accurately and effectively will improve  Outcome: Progressing     Problem: Respiratory  Goal: Patient will achieve/maintain optimum respiratory ventilation and gas exchange  Outcome: Progressing     Problem: Urinary Elimination  Goal: Establish and maintain regular urinary output  Outcome: Progressing     Problem: Gastrointestinal Irritability  Goal: Diarrhea will be absent or improved  Outcome: Progressing     Problem: Mobility  Goal: Patient's capacity to carry out activities will improve  Outcome: Progressing     Problem: Pain - Standard  Goal: Alleviation of pain or a reduction in pain to the patient’s comfort goal  Outcome: Progressing     Problem: Skin Integrity  Goal: Skin integrity is maintained or improved  Outcome: Progressing       Patient is not progressing towards the following goals:

## 2024-05-18 NOTE — PROGRESS NOTES
Hospital Medicine Daily Progress Note    Date of Service  5/18/2024    Chief Complaint  Azalea Gan is a 73 y.o. female admitted 5/8/2024 with palpitations    Hospital Course  72yo PMHx PAFib with abalation in March this year, HTN, pulmonary HTN, JOSH, BMI 40, previous CVA, hypothyroid, TV regurgitatio.  Presented on 5/8 with  AFib RVR to [facility].  On 5/11 transferred to ICU due to worsening resp status and required intubation for pulmonary edema.  In ICU treated for possible pneumonia as well as cardiogenic pulmonary edema with forced diuresis.  Had an episode of polymorphic VT, ?AFib with aberrancy (underlying LBBB)  Extubated 5/13    Interval Problem Update  Patient states she is feeling good this morning.  Denies shortness of breath or any pain.  Does note that she is weak, has not been out of bed in several days.  No other complaints.        5/15 patient is new to me today, she is in bed, NG tube in place, patient is tolerating diet, will DC NG tube today, patient is wheezing started on Xopenex, note from cardiology reviewed, EP has been consulted, continue telemetry, discussed with bedside nurse charge nurse  pharmacist requesting discharge.  5/16 patient is resting in bed, she is alert oriented follows commands, still requiring 2 L of oxygen, discussed with EP team, initially plan for ablation today but after further evaluation EP recommended outpatient follow-up, will continue monitoring, hopefully will be able to DC tomorrow home with home health care.  Discussed with bedside nurse charge nurse  pharmacist  5/17 patient is in chair, no palpitation, discussed with EP team, no wheezing today, waiting time for ablation. Discussed with nurse staff, , charge nurse.   5/18 patient resting in bed, no new complaints, blood pressure is trending up, will restart amlodipine 5 mg daily, note from EP reviewed, discussed bedside nurse charge nurse , all questions been  answered.      I have discussed this patient's plan of care and discharge plan at IDT rounds today with Case Management, Nursing, Nursing leadership, and other members of the IDT team.    Consultants/Specialty  cardiology    Code Status  Full Code    Disposition  The patient is not medically cleared for discharge to home or a post-acute facility.      I have placed the appropriate orders for post-discharge needs.    Review of Systems  Review of Systems   Constitutional:  Negative for chills and fever.   Respiratory:  Negative for cough and shortness of breath.    Cardiovascular:  Negative for chest pain, palpitations and leg swelling.   Gastrointestinal:  Negative for abdominal pain, diarrhea, nausea and vomiting.   Genitourinary:  Negative for dysuria and urgency.   Musculoskeletal:  Negative for back pain.   Skin:  Negative for rash.   Neurological:  Positive for weakness. Negative for dizziness, loss of consciousness and headaches.        Physical Exam  Temp:  [36.1 °C (97 °F)-36.5 °C (97.7 °F)] 36.2 °C (97.2 °F)  Pulse:  [56-79] 61  Resp:  [16-18] 18  BP: (123-161)/(57-84) 161/78  SpO2:  [92 %-99 %] 94 %    Physical Exam  Vitals and nursing note reviewed.   Constitutional:       General: She is not in acute distress.     Appearance: Normal appearance. She is well-developed. She is obese. She is not diaphoretic.   HENT:      Head: Normocephalic and atraumatic.   Neck:      Vascular: No JVD.   Cardiovascular:      Rate and Rhythm: Normal rate and regular rhythm.      Heart sounds: Murmur heard.   Pulmonary:      Effort: Pulmonary effort is normal. No respiratory distress.      Breath sounds: No stridor. No wheezing or rales.   Abdominal:      Palpations: Abdomen is soft.      Tenderness: There is no abdominal tenderness. There is no guarding or rebound.   Musculoskeletal:      Right lower leg: No edema.      Left lower leg: No edema.   Skin:     General: Skin is warm and dry.      Capillary Refill: Capillary  refill takes less than 2 seconds.      Coloration: Skin is pale.      Findings: No rash.   Neurological:      Mental Status: She is oriented to person, place, and time.   Psychiatric:         Mood and Affect: Mood normal.         Behavior: Behavior normal.         Thought Content: Thought content normal.         Fluids    Intake/Output Summary (Last 24 hours) at 5/18/2024 1328  Last data filed at 5/18/2024 0900  Gross per 24 hour   Intake 1800 ml   Output 600 ml   Net 1200 ml       Laboratory  Recent Labs     05/16/24  0251 05/17/24  0116 05/18/24  0452   WBC 6.1 4.8 6.0   RBC 3.15* 3.41* 3.35*   HEMOGLOBIN 9.2* 10.0* 9.9*   HEMATOCRIT 29.5* 31.6* 30.9*   MCV 93.7 92.7 92.2   MCH 29.2 29.3 29.6   MCHC 31.2* 31.6* 32.0*   RDW 55.7* 54.5* 54.2*   PLATELETCT 203 211 214   MPV 10.9 10.6 10.4     Recent Labs     05/16/24  0251 05/17/24  0116   SODIUM 141 141   POTASSIUM 4.3 4.9   CHLORIDE 103 103   CO2 27 26   GLUCOSE 112* 131*   BUN 61* 58*   CREATININE 1.70* 1.28   CALCIUM 8.2* 8.5                       Imaging  DX-CHEST-PORTABLE (1 VIEW)   Final Result      1.  Possible small right pleural effusion.   2.  Similar right greater than left airspace disease.   3.  Suboptimal patient positioning.   4.  Cardiomegaly.      DX-CHEST-PORTABLE (1 VIEW)   Final Result      1.  Worsening hypoinflation   2.  Improvement of multifocal RIGHT lung consolidation.   3.  Supportive tubing is unchanged.      DX-CHEST-PORTABLE (1 VIEW)   Final Result      1.  Lines and tubes appear appropriately located      2.  Development of right lung airspace process which could be pneumonia or edema      DX-CHEST-PORTABLE (1 VIEW)   Final Result      Cardiomegaly and pulmonary vascular dilation or again noted. No acute process.      EC-ECHOCARDIOGRAM COMPLETE W/O CONT   Final Result      EC-ECHOCARDIOGRAM COMPLETE W/O CONT   Final Result      NM-CARDIAC STRESS TEST   Final Result      CL-LEFT HEART CATHETERIZATION WITH POSSIBLE INTERVENTION     (Results Pending)   EC-DINESH W/O CONT    (Results Pending)   CL-EP ABLATION ATRIAL FIBRILLATION    (Results Pending)        Assessment/Plan  * Acute hypoxic respiratory failure (HCC)  Assessment & Plan  Patient intubated on May 11, and subsequently extubated May 13  Etiology felt to be acute pulmonary edema from cardiogenic etiology as well as possible pneumonia.  Complete course of antibiotics  Continue forced diuresis  Mobilize  O2 and RT protocols    Wheezing today  Added scheduled xopenex  Continue RT per protocol    Continue o2 per protocol.   Monitoring  No wheezing today      Acute kidney injury (HCC)  Assessment & Plan  Patient has been aggressively diuresed, and is perhaps on the dry side.  DC Lasix and monitor  Hold off on IV fluids for now and see if the patient normalizes without intervention  Daily BMP  Renally dose medications as appropriate  Cr 1.28  Monitoring urine output    Acute respiratory failure with hypoxia (HCC)- (present on admission)  Assessment & Plan  Monitoring .  Weaning off o2.   Continue breathing treatments  Continue prednisone.   Continue weaning of oxygen    Aspiration pneumonia (HCC)  Assessment & Plan  Complete 5 days of antibiotics tomorrow  Cultures have been negative with exception of 1 of 2 bottles of a coag negative staph most likely contaminant  Completing abx     Fatigue  Assessment & Plan  Suspect untreated sleep apnea  Will need pulmonary follow-up once  respiratory status is maximized for an outpatient sleep study    Heart failure with preserved ejection fraction (HCC)  Assessment & Plan  In part rate related due to A-fib RVR  Cardiology following  Completing ischemic workup with left heart cath today  Appropriately diuresed: Hold on further diuresis for the moment    Continue rate control. Eliquis  EP consulted. F/u rec.     Positive cardiac stress test  Assessment & Plan  Left heart cath    Hypotension  Assessment & Plan  Improved.   Resolved    Atrial fibrillation with  rapid ventricular response (HCC)- (present on admission)  Assessment & Plan    She is s/p DINESH+DCCV at [facility] in March.   Her last echo in 3/2024 with moderate MR, moderate dilatation of LA, EF of 45-50% but in the setting of arrhythmia.   Anticoagulation with apixaban: Currently on hold with heparin drip pending left heart cath  Rate control with digoxin  Soft pressures have limited the ability to use beta-blockade or calcium channel blockers  Cardiology following  Continue telemetry    EP eval today, recommending outpatient EP studies/ablation    EP recommending ablation before dc, probably next week Tuesday        Hypothyroidism- (present on admission)  Assessment & Plan  TSH wnl this admission, likely not contributing to afib.   - Continue with home levothyroxine     Depression- (present on admission)  Assessment & Plan  Continue with prozac     Ventricular tachyarrhythmia (HCC)- (present on admission)  Assessment & Plan  Ventricular tachycardia versus atrial fibrillation with aberrancy  Cardiology following  Continue telemetry  Completing ischemic workup  Cath neg  EP consulted.   Ablation recommended by EP.     Essential hypertension- (present on admission)  Assessment & Plan  Blood pressure borderline, holding home BP meds   Monitoring.   Blood pressure is trending up, will restart amlodipine         VTE prophylaxis: eliquis    I have performed a physical exam and reviewed and updated ROS and Plan today (5/18/2024). In review of yesterday's note (5/17/2024), there are no changes except as documented above.

## 2024-05-19 LAB
ANION GAP SERPL CALC-SCNC: 7 MMOL/L (ref 7–16)
BUN SERPL-MCNC: 47 MG/DL (ref 8–22)
CALCIUM SERPL-MCNC: 8.6 MG/DL (ref 8.5–10.5)
CHLORIDE SERPL-SCNC: 106 MMOL/L (ref 96–112)
CO2 SERPL-SCNC: 27 MMOL/L (ref 20–33)
CREAT SERPL-MCNC: 1.07 MG/DL (ref 0.5–1.4)
ERYTHROCYTE [DISTWIDTH] IN BLOOD BY AUTOMATED COUNT: 54.6 FL (ref 35.9–50)
GFR SERPLBLD CREATININE-BSD FMLA CKD-EPI: 54 ML/MIN/1.73 M 2
GLUCOSE SERPL-MCNC: 94 MG/DL (ref 65–99)
HCT VFR BLD AUTO: 30 % (ref 37–47)
HGB BLD-MCNC: 9.4 G/DL (ref 12–16)
MAGNESIUM SERPL-MCNC: 2.2 MG/DL (ref 1.5–2.5)
MCH RBC QN AUTO: 29.5 PG (ref 27–33)
MCHC RBC AUTO-ENTMCNC: 31.3 G/DL (ref 32.2–35.5)
MCV RBC AUTO: 94 FL (ref 81.4–97.8)
PLATELET # BLD AUTO: 215 K/UL (ref 164–446)
PMV BLD AUTO: 10.4 FL (ref 9–12.9)
POTASSIUM SERPL-SCNC: 5.1 MMOL/L (ref 3.6–5.5)
RBC # BLD AUTO: 3.19 M/UL (ref 4.2–5.4)
SODIUM SERPL-SCNC: 140 MMOL/L (ref 135–145)
WBC # BLD AUTO: 6.8 K/UL (ref 4.8–10.8)

## 2024-05-19 PROCEDURE — 99232 SBSQ HOSP IP/OBS MODERATE 35: CPT | Performed by: HOSPITALIST

## 2024-05-19 RX ADMIN — ALLOPURINOL 100 MG: 100 TABLET ORAL at 04:11

## 2024-05-19 RX ADMIN — ATORVASTATIN CALCIUM 40 MG: 40 TABLET, FILM COATED ORAL at 16:08

## 2024-05-19 RX ADMIN — PREDNISONE 40 MG: 20 TABLET ORAL at 04:11

## 2024-05-19 RX ADMIN — APIXABAN 5 MG: 5 TABLET, FILM COATED ORAL at 04:11

## 2024-05-19 RX ADMIN — ACETAMINOPHEN 650 MG: 325 TABLET, FILM COATED ORAL at 19:51

## 2024-05-19 RX ADMIN — DIGOXIN 125 MCG: 0.12 TABLET ORAL at 16:08

## 2024-05-19 RX ADMIN — PRAMIPEXOLE DIHYDROCHLORIDE 0.25 MG: 0.25 TABLET ORAL at 22:16

## 2024-05-19 RX ADMIN — LORAZEPAM 0.5 MG: 0.5 TABLET ORAL at 22:57

## 2024-05-19 RX ADMIN — LEVOTHYROXINE SODIUM 175 MCG: 0.12 TABLET ORAL at 04:11

## 2024-05-19 RX ADMIN — APIXABAN 5 MG: 5 TABLET, FILM COATED ORAL at 16:08

## 2024-05-19 RX ADMIN — AMLODIPINE BESYLATE 5 MG: 5 TABLET ORAL at 04:11

## 2024-05-19 ASSESSMENT — ENCOUNTER SYMPTOMS
LOSS OF CONSCIOUSNESS: 0
COUGH: 0
HEADACHES: 0
WEAKNESS: 1
DIARRHEA: 0
FEVER: 0
VOMITING: 0
PALPITATIONS: 0
BACK PAIN: 0
CHILLS: 0
DIZZINESS: 0
ABDOMINAL PAIN: 0
NAUSEA: 0
SHORTNESS OF BREATH: 0

## 2024-05-19 ASSESSMENT — PAIN DESCRIPTION - PAIN TYPE
TYPE: CHRONIC PAIN
TYPE: ACUTE PAIN

## 2024-05-19 ASSESSMENT — FIBROSIS 4 INDEX: FIB4 SCORE: 1.63

## 2024-05-19 NOTE — PROGRESS NOTES
Hospital Medicine Daily Progress Note    Date of Service  5/19/2024    Chief Complaint  Azalea Gan is a 73 y.o. female admitted 5/8/2024 with palpitations    Hospital Course  72yo PMHx PAFib with abalation in March this year, HTN, pulmonary HTN, JOSH, BMI 40, previous CVA, hypothyroid, TV regurgitatio.  Presented on 5/8 with  [place] [facility]  On 5/11 transferred to ICU due to worsening resp status and required intubation for pulmonary edema.  In ICU treated for possible pneumonia as well as cardiogenic pulmonary edema with forced diuresis.  Had an episode of polymorphic VT, ?AFib with aberrancy (underlying LBBB)  Extubated 5/13    Interval Problem Update  Patient states she is feeling good this morning.  Denies shortness of breath or any pain.  Does note that she is weak, has not been out of bed in several days.  No other complaints.        5/15 patient is new to me today, she is in bed, NG tube in place, patient is tolerating diet, will DC NG tube today, patient is wheezing started on Xopenex, note from cardiology reviewed, EP has been consulted, continue telemetry, discussed with bedside nurse charge nurse  pharmacist requesting discharge.  5/16 patient is resting in bed, she is alert oriented follows commands, still requiring 2 L of oxygen, discussed with EP team, initially plan for ablation today but after further evaluation EP recommended outpatient follow-up, will continue monitoring, hopefully will be able to DC tomorrow home with home health care.  Discussed with bedside nurse charge nurse  pharmacist  5/17 patient is in chair, no palpitation, discussed with EP team, no wheezing today, waiting time for ablation. Discussed with nurse staff, , charge nurse.   5/18 patient resting in bed, no new complaints, blood pressure is trending up, will restart amlodipine 5 mg daily, note from EP reviewed, discussed bedside nurse charge nurse , all questions been  answered.  5/19 patient is in bed, no fever or chills, not in distress, no nausea or vomiting, patient denies palpitation, tolerating diet. Discussed with bedside nurse, charge nurse, pharmacist, . Ablation on Tuesday. Continue telemetry.       I have discussed this patient's plan of care and discharge plan at IDT rounds today with Case Management, Nursing, Nursing leadership, and other members of the IDT team.    Consultants/Specialty  cardiology    Code Status  Full Code    Disposition  The patient is not medically cleared for discharge to home or a post-acute facility.      I have placed the appropriate orders for post-discharge needs.    Review of Systems  Review of Systems   Constitutional:  Negative for chills and fever.   Respiratory:  Negative for cough and shortness of breath.    Cardiovascular:  Negative for chest pain, palpitations and leg swelling.   Gastrointestinal:  Negative for abdominal pain, diarrhea, nausea and vomiting.   Genitourinary:  Negative for dysuria and urgency.   Musculoskeletal:  Negative for back pain.   Skin:  Negative for rash.   Neurological:  Positive for weakness. Negative for dizziness, loss of consciousness and headaches.        Physical Exam  Temp:  [36.3 °C (97.3 °F)-36.6 °C (97.8 °F)] 36.4 °C (97.5 °F)  Pulse:  [60-70] 70  Resp:  [16-20] 18  BP: (137-153)/(62-93) 150/67  SpO2:  [90 %-98 %] 96 %    Physical Exam  Vitals and nursing note reviewed.   Constitutional:       General: She is not in acute distress.     Appearance: Normal appearance. She is well-developed. She is obese. She is not diaphoretic.   HENT:      Head: Normocephalic and atraumatic.   Neck:      Vascular: No JVD.   Cardiovascular:      Rate and Rhythm: Normal rate and regular rhythm.      Heart sounds: Murmur heard.   Pulmonary:      Effort: Pulmonary effort is normal. No respiratory distress.      Breath sounds: No stridor. No wheezing or rales.   Abdominal:      Palpations: Abdomen is soft.       Tenderness: There is no abdominal tenderness. There is no guarding or rebound.   Musculoskeletal:      Right lower leg: No edema.      Left lower leg: No edema.   Skin:     General: Skin is warm and dry.      Capillary Refill: Capillary refill takes less than 2 seconds.      Coloration: Skin is pale.      Findings: No rash.   Neurological:      Mental Status: She is oriented to person, place, and time.   Psychiatric:         Mood and Affect: Mood normal.         Behavior: Behavior normal.         Thought Content: Thought content normal.         Fluids    Intake/Output Summary (Last 24 hours) at 5/19/2024 1309  Last data filed at 5/19/2024 0400  Gross per 24 hour   Intake 340 ml   Output 400 ml   Net -60 ml       Laboratory  Recent Labs     05/17/24  0116 05/18/24  0452 05/19/24  0130   WBC 4.8 6.0 6.8   RBC 3.41* 3.35* 3.19*   HEMOGLOBIN 10.0* 9.9* 9.4*   HEMATOCRIT 31.6* 30.9* 30.0*   MCV 92.7 92.2 94.0   MCH 29.3 29.6 29.5   MCHC 31.6* 32.0* 31.3*   RDW 54.5* 54.2* 54.6*   PLATELETCT 211 214 215   MPV 10.6 10.4 10.4     Recent Labs     05/17/24  0116 05/19/24  0130   SODIUM 141 140   POTASSIUM 4.9 5.1   CHLORIDE 103 106   CO2 26 27   GLUCOSE 131* 94   BUN 58* 47*   CREATININE 1.28 1.07   CALCIUM 8.5 8.6                       Imaging  DX-CHEST-PORTABLE (1 VIEW)   Final Result      1.  Possible small right pleural effusion.   2.  Similar right greater than left airspace disease.   3.  Suboptimal patient positioning.   4.  Cardiomegaly.      DX-CHEST-PORTABLE (1 VIEW)   Final Result      1.  Worsening hypoinflation   2.  Improvement of multifocal RIGHT lung consolidation.   3.  Supportive tubing is unchanged.      DX-CHEST-PORTABLE (1 VIEW)   Final Result      1.  Lines and tubes appear appropriately located      2.  Development of right lung airspace process which could be pneumonia or edema      DX-CHEST-PORTABLE (1 VIEW)   Final Result      Cardiomegaly and pulmonary vascular dilation or again noted. No acute  process.      EC-ECHOCARDIOGRAM COMPLETE W/O CONT   Final Result      EC-ECHOCARDIOGRAM COMPLETE W/O CONT   Final Result      NM-CARDIAC STRESS TEST   Final Result      CL-LEFT HEART CATHETERIZATION WITH POSSIBLE INTERVENTION    (Results Pending)   EC-DINESH W/O CONT    (Results Pending)   CL-EP ABLATION ATRIAL FIBRILLATION    (Results Pending)        Assessment/Plan  * Acute hypoxic respiratory failure (HCC)  Assessment & Plan  Patient intubated on May 11, and subsequently extubated May 13  Etiology felt to be acute pulmonary edema from cardiogenic etiology as well as possible pneumonia.  Complete course of antibiotics  Continue forced diuresis  Mobilize  O2 and RT protocols    Wheezing today  Added scheduled xopenex  Continue RT per protocol    Continue o2 per protocol.   Monitoring  No wheezing today      Acute kidney injury (HCC)  Assessment & Plan  Patient has been aggressively diuresed, and is perhaps on the dry side.  DC Lasix and monitor  Hold off on IV fluids for now and see if the patient normalizes without intervention  Daily BMP  Renally dose medications as appropriate  Cr 1.28  Monitoring urine output    Acute respiratory failure with hypoxia (HCC)- (present on admission)  Assessment & Plan  Monitoring .  Weaning off o2.   Continue breathing treatments  Continue prednisone.   Continue weaning of oxygen    Aspiration pneumonia (HCC)  Assessment & Plan  Complete 5 days of antibiotics tomorrow  Cultures have been negative with exception of 1 of 2 bottles of a coag negative staph most likely contaminant  Completing abx     Fatigue  Assessment & Plan  Suspect untreated sleep apnea  Will need pulmonary follow-up once  respiratory status is maximized for an outpatient sleep study    Heart failure with preserved ejection fraction (HCC)  Assessment & Plan  In part rate related due to A-fib RVR  Cardiology following  Completing ischemic workup with left heart cath today  Appropriately diuresed: Hold on further  diuresis for the moment    Continue rate control. Eliquis  EP consulted. F/u rec.     Positive cardiac stress test  Assessment & Plan  Left heart cath    Hypotension  Assessment & Plan  Improved.   Resolved    Atrial fibrillation with rapid ventricular response (HCC)- (present on admission)  Assessment & Plan    She is s/p DINESH+DCCV at [facility] in March.   Her last echo in 3/2024 with moderate MR, moderate dilatation of LA, EF of 45-50% but in the setting of arrhythmia.   Anticoagulation with apixaban: Currently on hold with heparin drip pending left heart cath  Rate control with digoxin  Soft pressures have limited the ability to use beta-blockade or calcium channel blockers  Cardiology following  Continue telemetry    EP eval today, recommending outpatient EP studies/ablation    EP recommending ablation before dc, probably next week Tuesday        Hypothyroidism- (present on admission)  Assessment & Plan  TSH wnl this admission, likely not contributing to afib.   - Continue with home levothyroxine     Depression- (present on admission)  Assessment & Plan  Continue with prozac     Ventricular tachyarrhythmia (HCC)- (present on admission)  Assessment & Plan  Ventricular tachycardia versus atrial fibrillation with aberrancy  Cardiology following  Continue telemetry  Completing ischemic workup  Cath neg  EP consulted.   Ablation recommended by EP.     Essential hypertension- (present on admission)  Assessment & Plan  Blood pressure borderline, holding home BP meds   Monitoring.   Blood pressure is trending up, will restart amlodipine         VTE prophylaxis: eliquis    I have performed a physical exam and reviewed and updated ROS and Plan today (5/19/2024). In review of yesterday's note (5/18/2024), there are no changes except as documented above.      My total time spent caring for the patient on the day of the encounter was 37 minutes.   This does not include time spent on separately billable  procedures/tests.

## 2024-05-19 NOTE — CARE PLAN
The patient is Stable - Low risk of patient condition declining or worsening    Shift Goals  Clinical Goals: vss, maintain safety  Patient Goals: rest  Family Goals: hao    Progress made toward(s) clinical / shift goals:      Problem: Fall Risk  Goal: Patient will remain free from falls  Description: Target End Date:  Prior to discharge or change in level of care    Document interventions on the [place] Fall Risk Assessment    1.  Assess for fall risk factors  2.  Implement fall precautions  Outcome: Progressing     Problem: Knowledge Deficit - Standard  Goal: Patient and family/care givers will demonstrate understanding of plan of care, disease process/condition, diagnostic tests and medications  Description: Target End Date:  1-3 days or as soon as patient condition allows    Document in Patient Education    1.  Patient and family/caregiver oriented to unit, equipment, visitation policy and means for communicating concern  2.  Complete/review Learning Assessment  3.  Assess knowledge level of disease process/condition, treatment plan, diagnostic tests and medications  4.  Explain disease process/condition, treatment plan, diagnostic tests and medications  Outcome: Progressing       Patient is not progressing towards the following goals:      Problem: Hemodynamics  Goal: Patient's hemodynamics, fluid balance and neurologic status will be stable or improve  Description: Target End Date:  Prior to discharge or change in level of care    Document on Assessment and I/O flowsheet templates    1.  Monitor vital signs, pulse oximetry and cardiac monitor per provider order and/or policy  2.  Maintain blood pressure per provider order  3.  Hemodynamic monitoring per provider order  4.  Manage IV fluids and IV infusions  5.  Monitor intake and output  6.  Daily weights per unit policy or provider order  7.  Assess peripheral pulses and capillary refill  8.  Assess color and body temperature  9.  Position patient for  maximum circulation/cardiac output  10. Monitor for signs/symptoms of excessive bleeding  11. Assess mental status, restlessness and changes in level of consciousness  12. Monitor temperature and report fever or hypothermia to provider immediately. Consideration of targeted temperature management.  Outcome: Not Progressing     Problem: Physical Regulation  Goal: Diagnostic test results will improve  Description: Target End Date:  Prior to discharge or change in level of care    1.  Monitor lactic acid levels  2.  Monitor ABG's  3.  Monitor diagnostic test results  Outcome: Not Progressing  Goal: Signs and symptoms of infection will decrease  Description: Target End Date:  Prior to discharge or change in level of care    1.  Remove potential routes of infection, such as central lines and urinary catheter  2.  Follow facility protocol for changing IV tubing and sites  3.  Collaborate with Infectious Disease  4.  Antibiotic therapy per provider order  5.  Note drug effects and monitor for antibiotic toxicity  Outcome: Not Progressing

## 2024-05-19 NOTE — PROGRESS NOTES
Assumed care on patient post bedside report. Alert and oriented x 4. On 2LNC and respiration unlabored. Tele on and heart rhythm and rate checked on monitor. Denies pain.    Plan of care - monitor heart rhythm and rate, lab values and I and Os.    Safety precautions in place are nonskid socks on, side rails up x 2, bed to lowest level and alarms on. Appropriate to use call light for needs and assistance. Will round hourly and as needed.

## 2024-05-20 PROCEDURE — 99232 SBSQ HOSP IP/OBS MODERATE 35: CPT | Performed by: HOSPITALIST

## 2024-05-20 RX ADMIN — ACETAMINOPHEN 650 MG: 325 TABLET, FILM COATED ORAL at 15:08

## 2024-05-20 RX ADMIN — DIGOXIN 125 MCG: 0.12 TABLET ORAL at 16:06

## 2024-05-20 RX ADMIN — APIXABAN 5 MG: 5 TABLET, FILM COATED ORAL at 05:54

## 2024-05-20 RX ADMIN — AMLODIPINE BESYLATE 5 MG: 5 TABLET ORAL at 05:55

## 2024-05-20 RX ADMIN — ALLOPURINOL 100 MG: 100 TABLET ORAL at 05:56

## 2024-05-20 RX ADMIN — LORAZEPAM 0.5 MG: 0.5 TABLET ORAL at 22:50

## 2024-05-20 RX ADMIN — LEVALBUTEROL HYDROCHLORIDE 0.63 MG: 0.63 SOLUTION RESPIRATORY (INHALATION) at 14:26

## 2024-05-20 RX ADMIN — ATORVASTATIN CALCIUM 40 MG: 40 TABLET, FILM COATED ORAL at 16:06

## 2024-05-20 RX ADMIN — LEVOTHYROXINE SODIUM 175 MCG: 0.12 TABLET ORAL at 05:54

## 2024-05-20 RX ADMIN — PRAMIPEXOLE DIHYDROCHLORIDE 0.25 MG: 0.25 TABLET ORAL at 20:43

## 2024-05-20 RX ADMIN — PREDNISONE 40 MG: 20 TABLET ORAL at 05:54

## 2024-05-20 RX ADMIN — APIXABAN 5 MG: 5 TABLET, FILM COATED ORAL at 16:06

## 2024-05-20 ASSESSMENT — PAIN DESCRIPTION - PAIN TYPE: TYPE: ACUTE PAIN

## 2024-05-20 ASSESSMENT — ENCOUNTER SYMPTOMS
SPEECH DIFFICULTY: 0
NUMBNESS: 0
VOMITING: 0
SHORTNESS OF BREATH: 0
WEAKNESS: 1
WHEEZING: 0
NAUSEA: 0
PALPITATIONS: 0
DIARRHEA: 0
BACK PAIN: 0
FATIGUE: 0
CHILLS: 0
COUGH: 0
LOSS OF CONSCIOUSNESS: 0
DIZZINESS: 0
WEAKNESS: 0
TROUBLE SWALLOWING: 0
FEVER: 0
HEADACHES: 0
LIGHT-HEADEDNESS: 0
CHEST TIGHTNESS: 0
ABDOMINAL PAIN: 0

## 2024-05-20 ASSESSMENT — FIBROSIS 4 INDEX: FIB4 SCORE: 1.63

## 2024-05-20 NOTE — CARE PLAN
The patient is Stable - Low risk of patient condition declining or worsening    Shift Goals  Clinical Goals: VSS, tele monitoring, Ablasion monday  Patient Goals: Sleep  Family Goals: hao    Progress made toward(s) clinical / shift goals:    Problem: Fall Risk  Goal: Patient will remain free from falls  Outcome: Progressing     Problem: Psychosocial  Goal: Patient's level of anxiety will decrease  Outcome: Progressing       Patient is not progressing towards the following goals:

## 2024-05-20 NOTE — PROGRESS NOTES
Hospital Medicine Daily Progress Note    Date of Service  5/20/2024    Chief Complaint  Azalea Gan is a 73 y.o. female admitted 5/8/2024 with palpitations    Hospital Course  72yo PMHx PAFib with abalation in March this year, HTN, pulmonary HTN, JOSH, BMI 40, previous CVA, hypothyroid, TV regurgitatio.  Presented on 5/8 with  [place] [facility]  On 5/11 transferred to ICU due to worsening resp status and required intubation for pulmonary edema.  In ICU treated for possible pneumonia as well as cardiogenic pulmonary edema with forced diuresis.  Had an episode of polymorphic VT, ?AFib with aberrancy (underlying LBBB)  Extubated 5/13    Interval Problem Update  Patient states she is feeling good this morning.  Denies shortness of breath or any pain.  Does note that she is weak, has not been out of bed in several days.  No other complaints.        5/15 patient is new to me today, she is in bed, NG tube in place, patient is tolerating diet, will DC NG tube today, patient is wheezing started on Xopenex, note from cardiology reviewed, EP has been consulted, continue telemetry, discussed with bedside nurse charge nurse  pharmacist requesting discharge.  5/16 patient is resting in bed, she is alert oriented follows commands, still requiring 2 L of oxygen, discussed with EP team, initially plan for ablation today but after further evaluation EP recommended outpatient follow-up, will continue monitoring, hopefully will be able to DC tomorrow home with home health care.  Discussed with bedside nurse charge nurse  pharmacist  5/17 patient is in chair, no palpitation, discussed with EP team, no wheezing today, waiting time for ablation. Discussed with nurse staff, , charge nurse.   5/18 patient resting in bed, no new complaints, blood pressure is trending up, will restart amlodipine 5 mg daily, note from EP reviewed, discussed bedside nurse charge nurse , all questions been  answered.  5/19 patient is in bed, no fever or chills, not in distress, no nausea or vomiting, patient denies palpitation, tolerating diet. Discussed with bedside nurse, charge nurse, pharmacist, . Ablation on Tuesday. Continue telemetry.   5/20 patient is resting in bed, she is in no distress, she is complaining of mild wheezing earlier today but none on examination her lungs are clear, she is able to speak in full sentences, oxygen at baseline, hopefully going for ablation tomorrow, continue telemetry, continue supportive treatment, discussed with bedside nurse charge nurse  pharmacist, EP, all questions answered.      I have discussed this patient's plan of care and discharge plan at IDT rounds today with Case Management, Nursing, Nursing leadership, and other members of the IDT team.    Consultants/Specialty  cardiology    Code Status  Full Code    Disposition  The patient is not medically cleared for discharge to home or a post-acute facility.      I have placed the appropriate orders for post-discharge needs.    Review of Systems  Review of Systems   Constitutional:  Negative for chills and fever.   Respiratory:  Negative for cough and shortness of breath.    Cardiovascular:  Negative for chest pain, palpitations and leg swelling.   Gastrointestinal:  Negative for abdominal pain, diarrhea, nausea and vomiting.   Genitourinary:  Negative for dysuria and urgency.   Musculoskeletal:  Negative for back pain.   Skin:  Negative for rash.   Neurological:  Positive for weakness. Negative for dizziness, loss of consciousness and headaches.        Physical Exam  Temp:  [36.4 °C (97.5 °F)-36.5 °C (97.7 °F)] 36.5 °C (97.7 °F)  Pulse:  [54-62] 57  Resp:  [16-19] 16  BP: (135-151)/(55-72) 138/70  SpO2:  [91 %-97 %] 94 %    Physical Exam  Vitals and nursing note reviewed.   Constitutional:       General: She is not in acute distress.     Appearance: Normal appearance. She is well-developed. She is  obese. She is not diaphoretic.   HENT:      Head: Normocephalic and atraumatic.   Neck:      Vascular: No JVD.   Cardiovascular:      Rate and Rhythm: Normal rate and regular rhythm.      Heart sounds: Murmur heard.   Pulmonary:      Effort: Pulmonary effort is normal. No respiratory distress.      Breath sounds: No stridor. No wheezing or rales.   Abdominal:      Palpations: Abdomen is soft.      Tenderness: There is no abdominal tenderness. There is no guarding or rebound.   Musculoskeletal:      Right lower leg: No edema.      Left lower leg: No edema.   Skin:     General: Skin is warm and dry.      Capillary Refill: Capillary refill takes less than 2 seconds.      Coloration: Skin is pale.      Findings: No rash.   Neurological:      Mental Status: She is oriented to person, place, and time.   Psychiatric:         Mood and Affect: Mood normal.         Behavior: Behavior normal.         Thought Content: Thought content normal.         Fluids    Intake/Output Summary (Last 24 hours) at 5/20/2024 1300  Last data filed at 5/20/2024 0800  Gross per 24 hour   Intake 480 ml   Output --   Net 480 ml       Laboratory  Recent Labs     05/18/24  0452 05/19/24  0130   WBC 6.0 6.8   RBC 3.35* 3.19*   HEMOGLOBIN 9.9* 9.4*   HEMATOCRIT 30.9* 30.0*   MCV 92.2 94.0   MCH 29.6 29.5   MCHC 32.0* 31.3*   RDW 54.2* 54.6*   PLATELETCT 214 215   MPV 10.4 10.4     Recent Labs     05/19/24  0130   SODIUM 140   POTASSIUM 5.1   CHLORIDE 106   CO2 27   GLUCOSE 94   BUN 47*   CREATININE 1.07   CALCIUM 8.6                       Imaging  DX-CHEST-PORTABLE (1 VIEW)   Final Result      1.  Possible small right pleural effusion.   2.  Similar right greater than left airspace disease.   3.  Suboptimal patient positioning.   4.  Cardiomegaly.      DX-CHEST-PORTABLE (1 VIEW)   Final Result      1.  Worsening hypoinflation   2.  Improvement of multifocal RIGHT lung consolidation.   3.  Supportive tubing is unchanged.      DX-CHEST-PORTABLE (1 VIEW)    Final Result      1.  Lines and tubes appear appropriately located      2.  Development of right lung airspace process which could be pneumonia or edema      DX-CHEST-PORTABLE (1 VIEW)   Final Result      Cardiomegaly and pulmonary vascular dilation or again noted. No acute process.      EC-ECHOCARDIOGRAM COMPLETE W/O CONT   Final Result      EC-ECHOCARDIOGRAM COMPLETE W/O CONT   Final Result      NM-CARDIAC STRESS TEST   Final Result      CL-LEFT HEART CATHETERIZATION WITH POSSIBLE INTERVENTION    (Results Pending)   EC-DINESH W/O CONT    (Results Pending)   CL-EP ABLATION ATRIAL FIBRILLATION    (Results Pending)        Assessment/Plan  * Acute hypoxic respiratory failure (HCC)  Assessment & Plan  Patient intubated on May 11, and subsequently extubated May 13  Etiology felt to be acute pulmonary edema from cardiogenic etiology as well as possible pneumonia.  Complete course of antibiotics  Continue forced diuresis  Mobilize  O2 and RT protocols    Wheezing today  Added scheduled xopenex  Continue RT per protocol    Continue o2 per protocol.   Monitoring  No wheezing today      Acute kidney injury (HCC)  Assessment & Plan  Patient has been aggressively diuresed, and is perhaps on the dry side.  DC Lasix and monitor  Hold off on IV fluids for now and see if the patient normalizes without intervention  Daily BMP  Renally dose medications as appropriate  Cr 1.28  Monitoring urine output    Acute respiratory failure with hypoxia (HCC)- (present on admission)  Assessment & Plan  Monitoring .  Weaning off o2.   Continue breathing treatments  Continue prednisone.   Continue weaning of oxygen    Aspiration pneumonia (HCC)  Assessment & Plan  Complete 5 days of antibiotics tomorrow  Cultures have been negative with exception of 1 of 2 bottles of a coag negative staph most likely contaminant  Completing abx     Fatigue  Assessment & Plan  Suspect untreated sleep apnea  Will need pulmonary follow-up once  respiratory status is  maximized for an outpatient sleep study    Heart failure with preserved ejection fraction (HCC)  Assessment & Plan  In part rate related due to A-fib RVR  Cardiology following  Completing ischemic workup with left heart cath today  Appropriately diuresed: Hold on further diuresis for the moment    Continue rate control. Eliquis  EP consulted. F/u rec.     Positive cardiac stress test  Assessment & Plan  Left heart cath    Hypotension  Assessment & Plan  Improved.   Resolved    Atrial fibrillation with rapid ventricular response (HCC)- (present on admission)  Assessment & Plan    She is s/p DINESH[facility]DCCV at [facility] in March.   Her last echo in 3/2024 with moderate MR, moderate dilatation of LA, EF of 45-50% but in the setting of arrhythmia.   Anticoagulation with apixaban: Currently on hold with heparin drip pending left heart cath  Rate control with digoxin  Soft pressures have limited the ability to use beta-blockade or calcium channel blockers  Cardiology following  Continue telemetry    EP eval today, recommending outpatient EP studies/ablation    EP recommending ablation before dc, probably next week Tuesday        Hypothyroidism- (present on admission)  Assessment & Plan  TSH wnl this admission, likely not contributing to afib.   - Continue with home levothyroxine     Depression- (present on admission)  Assessment & Plan  Continue with prozac     Ventricular tachyarrhythmia (HCC)- (present on admission)  Assessment & Plan  Ventricular tachycardia versus atrial fibrillation with aberrancy  Cardiology following  Continue telemetry  Completing ischemic workup  Cath neg  EP consulted.   Ablation recommended by EP.     Essential hypertension- (present on admission)  Assessment & Plan  Blood pressure borderline, holding home BP meds   Monitoring.   Blood pressure is trending up, will restart amlodipine         VTE prophylaxis: eliquis    I have performed a physical exam and reviewed and updated ROS and Plan today  (5/20/2024). In review of yesterday's note (5/19/2024), there are no changes except as documented above.    My total time spent caring for the patient on the day of the encounter was 36 minutes.   This does not include time spent on separately billable procedures/tests.    I have ordered blood work for in a.m. CBC BMP mag and Phos

## 2024-05-20 NOTE — PROGRESS NOTES
Monitor Summary  Rhythm: SB/SR  Rate: 57-61  Ectopy: PVC, PAC 5B SVT  Measurements: 0.17/0.10/0.35  ---12 hr Chart Review---

## 2024-05-20 NOTE — PROGRESS NOTES
"Received bedside report from RN. Patient is A&Ox4 and is complaining of 3/10 \"aching pain\" in her legs. Patient will be medicated as per MAR. Physical assessment completed. Patient questions and concerns addressed and patient verbalizes an understanding. Personal belongings and call light are within reach. Plan of care is ongoing.   "

## 2024-05-20 NOTE — PROGRESS NOTES
Cardiology Follow Up Progress Note    Date of Service  5/20/2024    Attending Physician  ELI Canela.*    Chief Complaint/Reason for consult   AF rhythm management     HPI  Azalea Gan is a 73 y.o. female admitted 5/8/2024 with a past medical history significant for prior persistent AF S/P dccv with early return of AF back in march at [facility], hypothyroidism, pulmonary hypertension, JOSH, prior CVA and HTN with JOSE LUIS.  She was admitted with symptomatic AF with RVR, was started on Sotalol for rhythm control, subsequently with respiratory failure requiring intubation and slavos of VT.  Now extubated.  Her QT noted to be long post.  Sotalol was stopped.  Planned for inpatient ablation.      Interim Events  She is remaining in sinus presently.  No events overnight.   Vitals are stable.   Labs reviewed.     Review of Systems  Review of Systems   Constitutional:  Negative for chills, fatigue and fever.   HENT:  Negative for nosebleeds and trouble swallowing.    Respiratory:  Negative for cough, chest tightness, shortness of breath and wheezing.    Cardiovascular:  Negative for chest pain, palpitations and leg swelling.   Neurological:  Negative for dizziness, syncope, speech difficulty, weakness, light-headedness and numbness.       Vital signs in last 24 hours  Temp:  [36.4 °C (97.5 °F)-36.5 °C (97.7 °F)] 36.4 °C (97.5 °F)  Pulse:  [54-70] 54  Resp:  [16-19] 16  BP: (135-151)/(55-72) 140/63  SpO2:  [91 %-97 %] 97 %    Physical Exam  Physical Exam  Vitals and nursing note reviewed.   Constitutional:       Appearance: Normal appearance.   HENT:      Head: Normocephalic and atraumatic.   Eyes:      Extraocular Movements: Extraocular movements intact.      Conjunctiva/sclera: Conjunctivae normal.      Pupils: Pupils are equal, round, and reactive to light.   Cardiovascular:      Rate and Rhythm: Normal rate and regular rhythm.      Pulses: Normal pulses.      Heart sounds: Normal heart sounds. No murmur heard.     No  "friction rub. No gallop.   Pulmonary:      Effort: Pulmonary effort is normal.      Breath sounds: Examination of the right-middle field reveals decreased breath sounds. Examination of the left-middle field reveals decreased breath sounds. Examination of the right-lower field reveals decreased breath sounds. Examination of the left-lower field reveals decreased breath sounds. Decreased breath sounds present. No wheezing, rhonchi or rales.      Comments: 02 2 LPM  Musculoskeletal:         General: Normal range of motion.      Cervical back: Normal range of motion.      Right lower leg: No edema.      Left lower leg: No edema.   Skin:     General: Skin is warm and dry.      Capillary Refill: Capillary refill takes 2 to 3 seconds.   Neurological:      Mental Status: She is alert and oriented to person, place, and time.   Psychiatric:         Mood and Affect: Mood normal.         Behavior: Behavior normal.         Thought Content: Thought content normal.         Judgment: Judgment normal.         Lab Review  Lab Results   Component Value Date/Time    WBC 6.8 05/19/2024 01:30 AM    RBC 3.19 (L) 05/19/2024 01:30 AM    HEMOGLOBIN 9.4 (L) 05/19/2024 01:30 AM    HEMATOCRIT 30.0 (L) 05/19/2024 01:30 AM    MCV 94.0 05/19/2024 01:30 AM    MCH 29.5 05/19/2024 01:30 AM    MCHC 31.3 (L) 05/19/2024 01:30 AM    MPV 10.4 05/19/2024 01:30 AM      Lab Results   Component Value Date/Time    SODIUM 140 05/19/2024 01:30 AM    POTASSIUM 5.1 05/19/2024 01:30 AM    CHLORIDE 106 05/19/2024 01:30 AM    CO2 27 05/19/2024 01:30 AM    GLUCOSE 94 05/19/2024 01:30 AM    BUN 47 (H) 05/19/2024 01:30 AM    CREATININE 1.07 05/19/2024 01:30 AM      Lab Results   Component Value Date/Time    ASTSGOT 15 05/09/2024 01:34 AM    ALTSGPT 10 05/09/2024 01:34 AM     Lab Results   Component Value Date/Time    TRIGLYCERIDE 121 05/13/2024 03:20 AM    TROPONINT 248 (H) 05/12/2024 04:15 AM       No results for input(s): \"NTPROBNP\" in the last 72 hours.    Cardiac " Imaging and Procedures Review  Echocardiogram:  5/10/24  Normal left ventricular size, wall thickness, and systolic function.   The right ventricle is mildly dilated with preserved systolic function.  Moderately dilated left atrium.  Mild mitral regurgitation.  Mild tricuspid regurgitation.  Estimated right ventricular systolic pressure is 63 mmHg; severely   elevated.  Right atrial pressure is estimated to be 15 mmHg; elevated.  No pericardial effusion.     Imaging  Stress Test:  5/10/24  Report   NUCLEAR IMAGING INTERPRETATION   1.  Small partially reversible myocardial perfusion defect in the mid    inferior and basal inferior segments.   2.  Myocardial perfusion is otherwise normal.   3.  LVEF is 50%.   4.  Left ventricular dilation with stress maneuvering.   5.  Decreased wall motion in the inferior and septal walls, but wall    thickening is preserved.   ECG INTERPRETATION   Negative stress ECG for ischemia.    Assessment/Plan  Afib with RVR  PMVT  QT prolongation with use of Sotalol.   Chronic use of anticoagulation (eliquis)     - Admission for Afib with RVR.  She was tried on class III AAD (sotalol 80 mg BID), notably with PMVT with respiratory failure.  QT noted to be prolonged post event, Dr Ahumada recommended avoidance of class III agents given prolonged QT with salvos of PMVT.    - She is scheduled for AFib ablation with Dr. Ahumada tomorrow afternoon.  I have discussed procedure again today, she remains wanting to proceed.  We discussed associated risks of the procedure.    - She is presently in sinus rhythm.  - Continue Eliquis without holding doses.   - Please keep NPO at MN.     JAMESON Alford   [facility]  [phone]

## 2024-05-21 ENCOUNTER — HOSPITAL ENCOUNTER (OUTPATIENT)
Dept: CARDIOLOGY | Facility: MEDICAL CENTER | Age: 74
DRG: 273 | End: 2024-05-21
Attending: NURSE PRACTITIONER
Payer: MEDICARE

## 2024-05-21 ENCOUNTER — ANESTHESIA EVENT (OUTPATIENT)
Dept: CARDIOLOGY | Facility: MEDICAL CENTER | Age: 74
DRG: 273 | End: 2024-05-21
Payer: MEDICARE

## 2024-05-21 ENCOUNTER — ANESTHESIA (OUTPATIENT)
Dept: CARDIOLOGY | Facility: MEDICAL CENTER | Age: 74
DRG: 273 | End: 2024-05-21
Payer: MEDICARE

## 2024-05-21 LAB
ACT BLD: 256 SEC (ref 74–137)
ANION GAP SERPL CALC-SCNC: 8 MMOL/L (ref 7–16)
BUN SERPL-MCNC: 34 MG/DL (ref 8–22)
CALCIUM SERPL-MCNC: 8.4 MG/DL (ref 8.5–10.5)
CHLORIDE SERPL-SCNC: 105 MMOL/L (ref 96–112)
CO2 SERPL-SCNC: 28 MMOL/L (ref 20–33)
CREAT SERPL-MCNC: 0.85 MG/DL (ref 0.5–1.4)
ERYTHROCYTE [DISTWIDTH] IN BLOOD BY AUTOMATED COUNT: 53.7 FL (ref 35.9–50)
GFR SERPLBLD CREATININE-BSD FMLA CKD-EPI: 71 ML/MIN/1.73 M 2
GLUCOSE SERPL-MCNC: 88 MG/DL (ref 65–99)
HCT VFR BLD AUTO: 30 % (ref 37–47)
HGB BLD-MCNC: 9.6 G/DL (ref 12–16)
MAGNESIUM SERPL-MCNC: 2.1 MG/DL (ref 1.5–2.5)
MCH RBC QN AUTO: 29.6 PG (ref 27–33)
MCHC RBC AUTO-ENTMCNC: 32 G/DL (ref 32.2–35.5)
MCV RBC AUTO: 92.6 FL (ref 81.4–97.8)
PHOSPHATE SERPL-MCNC: 3.4 MG/DL (ref 2.5–4.5)
PLATELET # BLD AUTO: 250 K/UL (ref 164–446)
PMV BLD AUTO: 10.1 FL (ref 9–12.9)
POTASSIUM SERPL-SCNC: 5 MMOL/L (ref 3.6–5.5)
RBC # BLD AUTO: 3.24 M/UL (ref 4.2–5.4)
SODIUM SERPL-SCNC: 141 MMOL/L (ref 135–145)
WBC # BLD AUTO: 8 K/UL (ref 4.8–10.8)

## 2024-05-21 PROCEDURE — 93657 TX L/R ATRIAL FIB ADDL: CPT | Performed by: INTERNAL MEDICINE

## 2024-05-21 PROCEDURE — 02K83ZZ MAP CONDUCTION MECHANISM, PERCUTANEOUS APPROACH: ICD-10-PCS | Performed by: INTERNAL MEDICINE

## 2024-05-21 PROCEDURE — 4A0234Z MEASUREMENT OF CARDIAC ELECTRICAL ACTIVITY, PERCUTANEOUS APPROACH: ICD-10-PCS | Performed by: INTERNAL MEDICINE

## 2024-05-21 PROCEDURE — 02583ZZ DESTRUCTION OF CONDUCTION MECHANISM, PERCUTANEOUS APPROACH: ICD-10-PCS | Performed by: INTERNAL MEDICINE

## 2024-05-21 PROCEDURE — 93656 COMPRE EP EVAL ABLTJ ATR FIB: CPT | Performed by: INTERNAL MEDICINE

## 2024-05-21 PROCEDURE — 99232 SBSQ HOSP IP/OBS MODERATE 35: CPT | Performed by: STUDENT IN AN ORGANIZED HEALTH CARE EDUCATION/TRAINING PROGRAM

## 2024-05-21 PROCEDURE — 99232 SBSQ HOSP IP/OBS MODERATE 35: CPT | Performed by: NURSE PRACTITIONER

## 2024-05-21 RX ORDER — HYDROMORPHONE HYDROCHLORIDE 1 MG/ML
0.2 INJECTION, SOLUTION INTRAMUSCULAR; INTRAVENOUS; SUBCUTANEOUS
Status: CANCELLED | OUTPATIENT
Start: 2024-05-21

## 2024-05-21 RX ORDER — OXYCODONE HCL 5 MG/5 ML
5 SOLUTION, ORAL ORAL
Status: DISCONTINUED | OUTPATIENT
Start: 2024-05-21 | End: 2024-05-21 | Stop reason: HOSPADM

## 2024-05-21 RX ORDER — LABETALOL HYDROCHLORIDE 5 MG/ML
5 INJECTION, SOLUTION INTRAVENOUS
Status: CANCELLED | OUTPATIENT
Start: 2024-05-21

## 2024-05-21 RX ORDER — ONDANSETRON 2 MG/ML
4 INJECTION INTRAMUSCULAR; INTRAVENOUS
Status: CANCELLED | OUTPATIENT
Start: 2024-05-21

## 2024-05-21 RX ORDER — ONDANSETRON 2 MG/ML
4 INJECTION INTRAMUSCULAR; INTRAVENOUS
Status: DISCONTINUED | OUTPATIENT
Start: 2024-05-21 | End: 2024-05-21 | Stop reason: HOSPADM

## 2024-05-21 RX ORDER — ONDANSETRON 2 MG/ML
4 INJECTION INTRAMUSCULAR; INTRAVENOUS EVERY 6 HOURS PRN
Status: DISCONTINUED | OUTPATIENT
Start: 2024-05-21 | End: 2024-05-22 | Stop reason: HOSPADM

## 2024-05-21 RX ORDER — ACETAMINOPHEN 325 MG/1
650 TABLET ORAL EVERY 4 HOURS PRN
Status: DISCONTINUED | OUTPATIENT
Start: 2024-05-21 | End: 2024-05-21

## 2024-05-21 RX ORDER — SODIUM CHLORIDE, SODIUM LACTATE, POTASSIUM CHLORIDE, CALCIUM CHLORIDE 600; 310; 30; 20 MG/100ML; MG/100ML; MG/100ML; MG/100ML
INJECTION, SOLUTION INTRAVENOUS CONTINUOUS
Status: CANCELLED | OUTPATIENT
Start: 2024-05-21

## 2024-05-21 RX ORDER — SODIUM CHLORIDE, SODIUM LACTATE, POTASSIUM CHLORIDE, CALCIUM CHLORIDE 600; 310; 30; 20 MG/100ML; MG/100ML; MG/100ML; MG/100ML
INJECTION, SOLUTION INTRAVENOUS
Status: DISCONTINUED | OUTPATIENT
Start: 2024-05-21 | End: 2024-05-21 | Stop reason: SURG

## 2024-05-21 RX ORDER — OXYCODONE HCL 5 MG/5 ML
5 SOLUTION, ORAL ORAL
Status: CANCELLED | OUTPATIENT
Start: 2024-05-21

## 2024-05-21 RX ORDER — HYDRALAZINE HYDROCHLORIDE 20 MG/ML
5 INJECTION INTRAMUSCULAR; INTRAVENOUS
Status: DISCONTINUED | OUTPATIENT
Start: 2024-05-21 | End: 2024-05-21 | Stop reason: HOSPADM

## 2024-05-21 RX ORDER — HYDRALAZINE HYDROCHLORIDE 20 MG/ML
5 INJECTION INTRAMUSCULAR; INTRAVENOUS
Status: CANCELLED | OUTPATIENT
Start: 2024-05-21

## 2024-05-21 RX ORDER — PROTAMINE SULFATE 10 MG/ML
INJECTION, SOLUTION INTRAVENOUS
Status: COMPLETED
Start: 2024-05-21 | End: 2024-05-21

## 2024-05-21 RX ORDER — HEPARIN SODIUM 200 [USP'U]/100ML
INJECTION, SOLUTION INTRAVENOUS
Status: COMPLETED
Start: 2024-05-21 | End: 2024-05-21

## 2024-05-21 RX ORDER — HYDROMORPHONE HYDROCHLORIDE 1 MG/ML
0.4 INJECTION, SOLUTION INTRAMUSCULAR; INTRAVENOUS; SUBCUTANEOUS
Status: DISCONTINUED | OUTPATIENT
Start: 2024-05-21 | End: 2024-05-21 | Stop reason: HOSPADM

## 2024-05-21 RX ORDER — HYDROMORPHONE HYDROCHLORIDE 1 MG/ML
0.2 INJECTION, SOLUTION INTRAMUSCULAR; INTRAVENOUS; SUBCUTANEOUS
Status: DISCONTINUED | OUTPATIENT
Start: 2024-05-21 | End: 2024-05-21 | Stop reason: HOSPADM

## 2024-05-21 RX ORDER — HALOPERIDOL 5 MG/ML
1 INJECTION INTRAMUSCULAR
Status: DISCONTINUED | OUTPATIENT
Start: 2024-05-21 | End: 2024-05-21 | Stop reason: HOSPADM

## 2024-05-21 RX ORDER — SODIUM CHLORIDE, SODIUM LACTATE, POTASSIUM CHLORIDE, CALCIUM CHLORIDE 600; 310; 30; 20 MG/100ML; MG/100ML; MG/100ML; MG/100ML
INJECTION, SOLUTION INTRAVENOUS CONTINUOUS
Status: DISCONTINUED | OUTPATIENT
Start: 2024-05-21 | End: 2024-05-21 | Stop reason: HOSPADM

## 2024-05-21 RX ORDER — HYDROMORPHONE HYDROCHLORIDE 1 MG/ML
0.4 INJECTION, SOLUTION INTRAMUSCULAR; INTRAVENOUS; SUBCUTANEOUS
Status: CANCELLED | OUTPATIENT
Start: 2024-05-21

## 2024-05-21 RX ORDER — BUPIVACAINE HYDROCHLORIDE 5 MG/ML
INJECTION, SOLUTION EPIDURAL; INTRACAUDAL
Status: COMPLETED
Start: 2024-05-21 | End: 2024-05-21

## 2024-05-21 RX ORDER — HYDROMORPHONE HYDROCHLORIDE 1 MG/ML
0.1 INJECTION, SOLUTION INTRAMUSCULAR; INTRAVENOUS; SUBCUTANEOUS
Status: DISCONTINUED | OUTPATIENT
Start: 2024-05-21 | End: 2024-05-21 | Stop reason: HOSPADM

## 2024-05-21 RX ORDER — METOPROLOL TARTRATE 1 MG/ML
1 INJECTION, SOLUTION INTRAVENOUS
Status: CANCELLED | OUTPATIENT
Start: 2024-05-21

## 2024-05-21 RX ORDER — DEXAMETHASONE SODIUM PHOSPHATE 4 MG/ML
INJECTION, SOLUTION INTRA-ARTICULAR; INTRALESIONAL; INTRAMUSCULAR; INTRAVENOUS; SOFT TISSUE PRN
Status: DISCONTINUED | OUTPATIENT
Start: 2024-05-21 | End: 2024-05-21 | Stop reason: SURG

## 2024-05-21 RX ORDER — HYDROMORPHONE HYDROCHLORIDE 1 MG/ML
0.1 INJECTION, SOLUTION INTRAMUSCULAR; INTRAVENOUS; SUBCUTANEOUS
Status: CANCELLED | OUTPATIENT
Start: 2024-05-21

## 2024-05-21 RX ORDER — LIDOCAINE HYDROCHLORIDE 20 MG/ML
INJECTION, SOLUTION INFILTRATION; PERINEURAL
Status: COMPLETED
Start: 2024-05-21 | End: 2024-05-21

## 2024-05-21 RX ORDER — OXYCODONE HCL 5 MG/5 ML
10 SOLUTION, ORAL ORAL
Status: CANCELLED | OUTPATIENT
Start: 2024-05-21

## 2024-05-21 RX ORDER — LIDOCAINE HYDROCHLORIDE 20 MG/ML
INJECTION, SOLUTION EPIDURAL; INFILTRATION; INTRACAUDAL; PERINEURAL PRN
Status: DISCONTINUED | OUTPATIENT
Start: 2024-05-21 | End: 2024-05-21 | Stop reason: SURG

## 2024-05-21 RX ORDER — OXYCODONE HCL 5 MG/5 ML
10 SOLUTION, ORAL ORAL
Status: DISCONTINUED | OUTPATIENT
Start: 2024-05-21 | End: 2024-05-21 | Stop reason: HOSPADM

## 2024-05-21 RX ORDER — METOPROLOL TARTRATE 1 MG/ML
1 INJECTION, SOLUTION INTRAVENOUS
Status: DISCONTINUED | OUTPATIENT
Start: 2024-05-21 | End: 2024-05-21 | Stop reason: HOSPADM

## 2024-05-21 RX ORDER — HALOPERIDOL 5 MG/ML
1 INJECTION INTRAMUSCULAR
Status: CANCELLED | OUTPATIENT
Start: 2024-05-21

## 2024-05-21 RX ORDER — LABETALOL HYDROCHLORIDE 5 MG/ML
5 INJECTION, SOLUTION INTRAVENOUS
Status: DISCONTINUED | OUTPATIENT
Start: 2024-05-21 | End: 2024-05-21 | Stop reason: HOSPADM

## 2024-05-21 RX ORDER — HEPARIN SODIUM 1000 [USP'U]/ML
INJECTION, SOLUTION INTRAVENOUS; SUBCUTANEOUS
Status: COMPLETED
Start: 2024-05-21 | End: 2024-05-21

## 2024-05-21 RX ADMIN — DEXAMETHASONE SODIUM PHOSPHATE 8 MG: 4 INJECTION INTRA-ARTICULAR; INTRALESIONAL; INTRAMUSCULAR; INTRAVENOUS; SOFT TISSUE at 13:53

## 2024-05-21 RX ADMIN — LEVOTHYROXINE SODIUM 175 MCG: 0.12 TABLET ORAL at 04:20

## 2024-05-21 RX ADMIN — PROPOFOL 150 MG: 10 INJECTION, EMULSION INTRAVENOUS at 13:47

## 2024-05-21 RX ADMIN — LIDOCAINE HYDROCHLORIDE 60 MG: 20 INJECTION, SOLUTION EPIDURAL; INFILTRATION; INTRACAUDAL at 13:47

## 2024-05-21 RX ADMIN — SODIUM CHLORIDE, POTASSIUM CHLORIDE, SODIUM LACTATE AND CALCIUM CHLORIDE: 600; 310; 30; 20 INJECTION, SOLUTION INTRAVENOUS at 13:39

## 2024-05-21 RX ADMIN — APIXABAN 5 MG: 5 TABLET, FILM COATED ORAL at 04:20

## 2024-05-21 RX ADMIN — ALLOPURINOL 100 MG: 100 TABLET ORAL at 04:20

## 2024-05-21 RX ADMIN — HEPARIN SODIUM: 1000 INJECTION INTRAVENOUS; SUBCUTANEOUS at 14:39

## 2024-05-21 RX ADMIN — AMLODIPINE BESYLATE 5 MG: 5 TABLET ORAL at 04:20

## 2024-05-21 RX ADMIN — HEPARIN SODIUM 4000 UNITS: 200 INJECTION, SOLUTION INTRAVENOUS at 14:04

## 2024-05-21 RX ADMIN — BUPIVACAINE HYDROCHLORIDE: 5 INJECTION, SOLUTION EPIDURAL; INTRACAUDAL at 14:00

## 2024-05-21 RX ADMIN — LIDOCAINE HYDROCHLORIDE: 20 INJECTION, SOLUTION INFILTRATION; PERINEURAL at 14:00

## 2024-05-21 RX ADMIN — FENTANYL CITRATE 100 MCG: 50 INJECTION, SOLUTION INTRAMUSCULAR; INTRAVENOUS at 13:47

## 2024-05-21 RX ADMIN — APIXABAN 5 MG: 5 TABLET, FILM COATED ORAL at 18:17

## 2024-05-21 RX ADMIN — ATORVASTATIN CALCIUM 40 MG: 40 TABLET, FILM COATED ORAL at 18:18

## 2024-05-21 RX ADMIN — PROTAMINE SULFATE 50 MG: 10 INJECTION, SOLUTION INTRAVENOUS at 15:02

## 2024-05-21 RX ADMIN — ROCURONIUM BROMIDE 10 MG: 10 INJECTION, SOLUTION INTRAVENOUS at 14:26

## 2024-05-21 RX ADMIN — GLYCOPYRROLATE 0.4 MG: 0.2 INJECTION INTRAMUSCULAR; INTRAVENOUS at 14:19

## 2024-05-21 RX ADMIN — PRAMIPEXOLE DIHYDROCHLORIDE 0.25 MG: 0.25 TABLET ORAL at 20:37

## 2024-05-21 RX ADMIN — ACETAMINOPHEN 650 MG: 325 TABLET, FILM COATED ORAL at 18:17

## 2024-05-21 RX ADMIN — ROCURONIUM BROMIDE 10 MG: 10 INJECTION, SOLUTION INTRAVENOUS at 14:44

## 2024-05-21 RX ADMIN — ROCURONIUM BROMIDE 60 MG: 10 INJECTION, SOLUTION INTRAVENOUS at 13:47

## 2024-05-21 ASSESSMENT — ENCOUNTER SYMPTOMS
SHORTNESS OF BREATH: 0
ABDOMINAL PAIN: 0
EYE REDNESS: 0
LIGHT-HEADEDNESS: 0
LOSS OF CONSCIOUSNESS: 0
HEADACHES: 0
CHEST TIGHTNESS: 0
PALPITATIONS: 0
DIARRHEA: 0
WHEEZING: 0
WHEEZING: 1
NAUSEA: 0
WEAKNESS: 0
FEVER: 0
SINUS PAIN: 0
BACK PAIN: 0
CONSTIPATION: 0
WEAKNESS: 1
DIZZINESS: 0
EYE PAIN: 0
SPEECH DIFFICULTY: 0
FATIGUE: 0
NUMBNESS: 0
TROUBLE SWALLOWING: 0
CHILLS: 0
COUGH: 0
SORE THROAT: 0
VOMITING: 0

## 2024-05-21 ASSESSMENT — PAIN DESCRIPTION - PAIN TYPE
TYPE: ACUTE PAIN
TYPE: SURGICAL PAIN

## 2024-05-21 ASSESSMENT — PAIN SCALES - GENERAL: PAIN_LEVEL: 0

## 2024-05-21 ASSESSMENT — FIBROSIS 4 INDEX: FIB4 SCORE: 1.4

## 2024-05-21 NOTE — ANESTHESIA PROCEDURE NOTES
Airway    Date/Time: 5/21/2024 1:48 PM    Performed by: Rufino Rasmussen M.D.  Authorized by: Rufino Rasmussen M.D.    Location:  OR  Urgency:  Elective  Indications for Airway Management:  Anesthesia      Spontaneous Ventilation: absent    Sedation Level:  Deep  Preoxygenated: Yes    Patient Position:  Sniffing  Mask Difficulty Assessment:  1 - vent by mask  Final Airway Type:  Endotracheal airway  Final Endotracheal Airway:  ETT  Cuffed: Yes    Technique Used for Successful ETT Placement:  Direct laryngoscopy  Devices/Methods Used in Placement:  Anterior pressure/BURP and intubating stylet    Insertion Site:  Oral  Blade Type:  [place]  Laryngoscope Blade/Videolaryngoscope Blade Size:  2  ETT Size (mm):  6.5  Measured from:  Teeth  ETT to Teeth (cm):  22  Placement Verified by: auscultation and capnometry    Cormack-Lehane Classification:  Grade IIb - view of arytenoids or posterior of glottis only  Number of Attempts at Approach:  2

## 2024-05-21 NOTE — ANESTHESIA TIME REPORT
Anesthesia Start and Stop Event Times       Date Time Event    5/21/2024 1338 Ready for Procedure     1339 Anesthesia Start     1526 Anesthesia Stop          Responsible Staff  05/21/24      Name Role Begin End    Rufino Rasmussen M.D. Anesth 1339 1526          Overtime Reason:  no overtime (within assigned shift)    Comments:

## 2024-05-21 NOTE — PROGRESS NOTES
Cardiology Follow Up Progress Note    Date of Service  5/21/2024    Attending Physician  ELI Canela.*    Chief Complaint/Reason for consult   AF rhythm management     HPI  Azalea Gan is a 73 y.o. female admitted 5/8/2024 with a past medical history significant for prior persistent AF S/P dccv with early return of AF back in march at [facility], hypothyroidism, pulmonary hypertension, JOSH, prior CVA and HTN with JOSE LUIS.  She was admitted with symptomatic AF with RVR, was started on Sotalol for rhythm control, subsequently with respiratory failure requiring intubation and slavos of VT.  Now extubated.  Her QT noted to be long post.  Sotalol was stopped.  Planned for inpatient ablation.      Interim Events  She is remaining in sinus.  No events overnight.   Vitals remain stable. Labs reviewed.   NPO for ablation today.     Review of Systems  Review of Systems   Constitutional:  Negative for chills, fatigue and fever.   HENT:  Negative for nosebleeds and trouble swallowing.    Respiratory:  Negative for cough, chest tightness, shortness of breath and wheezing.    Cardiovascular:  Negative for chest pain, palpitations and leg swelling.   Neurological:  Negative for dizziness, syncope, speech difficulty, weakness, light-headedness and numbness.       Vital signs in last 24 hours  Temp:  [36.5 °C (97.7 °F)-37 °C (98.6 °F)] 36.6 °C (97.9 °F)  Pulse:  [57-78] 68  Resp:  [15-18] 18  BP: (109-138)/(51-70) 138/64  SpO2:  [94 %-98 %] 94 %    Physical Exam  Physical Exam  Vitals and nursing note reviewed.   Constitutional:       Appearance: Normal appearance.   HENT:      Head: Normocephalic and atraumatic.   Eyes:      Extraocular Movements: Extraocular movements intact.      Conjunctiva/sclera: Conjunctivae normal.      Pupils: Pupils are equal, round, and reactive to light.   Cardiovascular:      Rate and Rhythm: Normal rate and regular rhythm.      Pulses: Normal pulses.      Heart sounds: Normal heart sounds. No murmur  "heard.     No friction rub. No gallop.   Pulmonary:      Effort: Pulmonary effort is normal.      Breath sounds: No decreased breath sounds, wheezing, rhonchi or rales.      Comments: 02 2 LPM  Musculoskeletal:         General: Normal range of motion.      Cervical back: Normal range of motion.      Right lower leg: No edema.      Left lower leg: No edema.   Skin:     General: Skin is warm and dry.      Capillary Refill: Capillary refill takes 2 to 3 seconds.   Neurological:      Mental Status: She is alert and oriented to person, place, and time.   Psychiatric:         Mood and Affect: Mood normal.         Behavior: Behavior normal.         Thought Content: Thought content normal.         Judgment: Judgment normal.         Lab Review  Lab Results   Component Value Date/Time    WBC 8.0 05/21/2024 03:47 AM    RBC 3.24 (L) 05/21/2024 03:47 AM    HEMOGLOBIN 9.6 (L) 05/21/2024 03:47 AM    HEMATOCRIT 30.0 (L) 05/21/2024 03:47 AM    MCV 92.6 05/21/2024 03:47 AM    MCH 29.6 05/21/2024 03:47 AM    MCHC 32.0 (L) 05/21/2024 03:47 AM    MPV 10.1 05/21/2024 03:47 AM      Lab Results   Component Value Date/Time    SODIUM 141 05/21/2024 03:47 AM    POTASSIUM 5.0 05/21/2024 03:47 AM    CHLORIDE 105 05/21/2024 03:47 AM    CO2 28 05/21/2024 03:47 AM    GLUCOSE 88 05/21/2024 03:47 AM    BUN 34 (H) 05/21/2024 03:47 AM    CREATININE 0.85 05/21/2024 03:47 AM      Lab Results   Component Value Date/Time    ASTSGOT 15 05/09/2024 01:34 AM    ALTSGPT 10 05/09/2024 01:34 AM     Lab Results   Component Value Date/Time    TRIGLYCERIDE 121 05/13/2024 03:20 AM    TROPONINT 248 (H) 05/12/2024 04:15 AM       No results for input(s): \"NTPROBNP\" in the last 72 hours.    Cardiac Imaging and Procedures Review  Echocardiogram:  5/10/24  Normal left ventricular size, wall thickness, and systolic function.   The right ventricle is mildly dilated with preserved systolic function.  Moderately dilated left atrium.  Mild mitral regurgitation.  Mild tricuspid " regurgitation.  Estimated right ventricular systolic pressure is 63 mmHg; severely   elevated.  Right atrial pressure is estimated to be 15 mmHg; elevated.  No pericardial effusion.     Imaging  Stress Test:  5/10/24  Report   NUCLEAR IMAGING INTERPRETATION   1.  Small partially reversible myocardial perfusion defect in the mid    inferior and basal inferior segments.   2.  Myocardial perfusion is otherwise normal.   3.  LVEF is 50%.   4.  Left ventricular dilation with stress maneuvering.   5.  Decreased wall motion in the inferior and septal walls, but wall    thickening is preserved.   ECG INTERPRETATION   Negative stress ECG for ischemia.    Assessment/Plan  Afib with RVR  PMVT  QT prolongation with use of Sotalol.   Chronic use of anticoagulation (eliquis)     - Admission for Afib with RVR.  She was tried on class III AAD (sotalol 80 mg BID), notably with PMVT, prolonged post respiratory event, Dr Ahumada recommended avoidance of class III agents given prolonged QT with salvos of PMVT.    - She is scheduled for AFib ablation this afternoon with Dr. Ahumada.  I have discussed procedure again today, she remains wanting to proceed.  All questions answered at this time.  - She is remaining in sinus rhythm at this time.  - Continue Eliquis without holding doses.       SANDRO Alford.   [facility]  [phone]

## 2024-05-21 NOTE — OR NURSING
Patient arrived in PACU, VSS. Right femoral sheath site closed with 2 vascades and 1 perclose, covered with guaze and tegaderm, CDI. EKG complete showing sinus rhythm.   Called report to CHICHI Dahl.  Transported on [facility] back to T725 on 2 liters O2

## 2024-05-21 NOTE — CARE PLAN
The patient is Stable - Low risk of patient condition declining or worsening    Shift Goals  Clinical Goals: Ablation, hemodynamic stability  Patient Goals: comfort, rest  Family Goals: DANE    Progress made toward(s) clinical / shift goals:      Problem: Knowledge Deficit - Standard  Goal: Patient and family/care givers will demonstrate understanding of plan of care, disease process/condition, diagnostic tests and medications  Outcome: Progressing     Problem: Discharge Barriers/Planning  Goal: Patient's continuum of care needs are met  Outcome: Progressing     Problem: Hemodynamics  Goal: Patient's hemodynamics, fluid balance and neurologic status will be stable or improve  Outcome: Progressing       Patient is not progressing towards the following goals:

## 2024-05-21 NOTE — OP REPORT
[facility]  Electrophysiology Procedure Note    Procedure(s) Performed:   1) Atrial fibrillation ablation and EP study  2) Ablation of additional atrial fibrillation substrate    Indication(s):  PAF    Physician(s): Artie Ahumada M.D.     Resident/Assistant(s): None     Anesthesia: General endotracheal anesthetic.     Specimen(s) Removed: None     Estimated Blood Loss:  30cc     Complications:  None     Description of Procedure:   After informed written consent, the patient was brought to the EP lab in the fasting, non-sedated state. The patient was prepped and draped in the usual sterile fashion. Femoral venous access was obtained using the modified Seldinger technique. In the right femoral vein, 3 sheaths (8,8,7 Fr) were inserted over 0.035” guidewires. A deflectable decapolar catheter was advanced to the CS position. Baseline rhythm sinus 1158 msec. An intracardiac echo (ICE) catheter was advanced to the right atrium. ICE was used to identify the atrial septum, left atrial appendage, and pulmonary veins and bobbi the anatomic structures to superimpose on our 3D electroanatomic map using CARTOSound. During the procedure, ICE was utilized to localize the ablation catheter, monitor for thrombus formation, and to exclude pericardial effusion. Intravenous heparin was administered to maintain -350 seconds. Transseptal left heart catheterization was performed under intracardiac echo with a Versacross system. Mapping of the pulmonary veins and left atrium was performed using CARTO3 FAM and a deflectable multipolar mapping catheter (Biosense OctaRay). The Versacross sheath was exchanged for a Faradrive sheath. A Farawave catheter was inserted for PFA of the PVs and PW. Eight applications divided between flower and basket configurations were applied per PV and additional lesions in the flower configuration were applied in the roof and floor of the LA to isolate the posterior wall. Successful PV and LA posterior  wall isolation was verified with repeat voltage map. At the end of the procedure, heparin was reversed with protamine, the catheter and sheaths were removed, and hemostasis was achieved by Perclose of the larger sheath, Vascade MVP of smaller sheath, and figure of 8 suture to prevent track ooze. Following recovery from anesthesia, the patient was transferred back to monitored bed in good condition.       Total ablation time: 52 applications of Faradrive PFA    Fluoroscopy time: 11.2 minutes     Electrophysiologic Findings:    1. Sinus  1158 ms, AH 93 ms, HV 39 ms.    Impressions:    1. Paroxysmal atrial fibrillation.    2. Successful PFA for PV and PW isolation     Recommendations:  1. Resume anticoagulation.  2. Transfer to monitored bedrest.

## 2024-05-21 NOTE — PROGRESS NOTES
EDUCATIONAL PURPOSES ONLY    Cardiology Follow Up Progress Note    Date of Service  5/21/2024    Attending Physician  Mamadou John*    Chief Complaint   AF rhythm management    HPI  Azalea Gan is a 73 y.o. female admitted 5/8/2024 with symptomatic AF with RVR which was treated with Sotalol for rhythm control. Patient then developed respiratory failure necessitating intubation and is now extubated. Sotalol was subsequently stopped due to long QT. Inpatient ablation is scheduled for later this afternoon. Patient has a PMH of prior persistent AF s/p dccv, hypothyroidism, pulmonary hypertension, JOSH, prior CVA and HTN.     Interim Events  Patient is currently in sinus rhythm. No acute events overnight, however does mention being unable to sleep for the past 5 days since she has not been given Trazadone. Patient also concerned about discharge since she lives in [place] and does not have a ride back home.   Vital signs are stable. Labs were reviewed.     Review of Systems  Review of Systems   Constitutional:  Negative for chills and fever.   HENT:  Negative for congestion, ear pain, sinus pain, sore throat and tinnitus.    Eyes:  Negative for pain and redness.   Respiratory:  Positive for wheezing. Negative for chest tightness and shortness of breath.    Cardiovascular:  Negative for chest pain, palpitations and leg swelling.   Gastrointestinal:  Negative for abdominal pain, constipation, diarrhea and nausea.   Endocrine: Negative for cold intolerance and heat intolerance.   Genitourinary:  Negative for difficulty urinating and dysuria.   Neurological:  Negative for dizziness, weakness and headaches.       Vital signs in last 24 hours  Temp:  [36.5 °C (97.7 °F)-37 °C (98.6 °F)] 36.6 °C (97.9 °F)  Pulse:  [57-78] 60  Resp:  [15-18] 18  BP: (109-138)/(51-70) 109/62  SpO2:  [94 %-98 %] 97 %    Physical Exam  Physical Exam  Constitutional:       General: She is not in acute distress.  HENT:      Mouth/Throat:       Mouth: Mucous membranes are moist.   Eyes:      Extraocular Movements: Extraocular movements intact.      Conjunctiva/sclera: Conjunctivae normal.      Pupils: Pupils are equal, round, and reactive to light.   Cardiovascular:      Rate and Rhythm: Normal rate and regular rhythm.      Pulses: Normal pulses.      Heart sounds: No murmur heard.     No gallop.   Pulmonary:      Breath sounds: No stridor. Wheezing present. No rhonchi.      Comments: Mild wheezing noted on left upper lobe  On 2L NC  Abdominal:      General: Abdomen is flat. There is no distension.      Tenderness: There is no abdominal tenderness.   Musculoskeletal:      Right lower leg: No edema.      Left lower leg: No edema.   Skin:     General: Skin is warm and dry.      Capillary Refill: Capillary refill takes less than 2 seconds.      Findings: Bruising present.      Comments: Ecchymosis noted on right forearm from previous IV site puncture   Neurological:      Mental Status: She is alert and oriented to person, place, and time.   Psychiatric:         Mood and Affect: Mood normal.         Judgment: Judgment normal.         Lab Review  Lab Results   Component Value Date/Time    WBC 8.0 05/21/2024 03:47 AM    RBC 3.24 (L) 05/21/2024 03:47 AM    HEMOGLOBIN 9.6 (L) 05/21/2024 03:47 AM    HEMATOCRIT 30.0 (L) 05/21/2024 03:47 AM    MCV 92.6 05/21/2024 03:47 AM    MCH 29.6 05/21/2024 03:47 AM    MCHC 32.0 (L) 05/21/2024 03:47 AM    MPV 10.1 05/21/2024 03:47 AM      Lab Results   Component Value Date/Time    SODIUM 141 05/21/2024 03:47 AM    POTASSIUM 5.0 05/21/2024 03:47 AM    CHLORIDE 105 05/21/2024 03:47 AM    CO2 28 05/21/2024 03:47 AM    GLUCOSE 88 05/21/2024 03:47 AM    BUN 34 (H) 05/21/2024 03:47 AM    CREATININE 0.85 05/21/2024 03:47 AM      Lab Results   Component Value Date/Time    ASTSGOT 15 05/09/2024 01:34 AM    ALTSGPT 10 05/09/2024 01:34 AM     Lab Results   Component Value Date/Time    TRIGLYCERIDE 121 05/13/2024 03:20 AM    TROPONINT  "248 (H) 05/12/2024 04:15 AM       No results for input(s): \"NTPROBNP\" in the last 72 hours.    Cardiac Imaging and Procedures Review  Echocardiogram:  5/10/24  Normal left ventricular size, wall thickness, and systolic function.   The right ventricle is mildly dilated with preserved systolic function.  Moderately dilated left atrium.  Mild mitral regurgitation.  Mild tricuspid regurgitation.  Estimated right ventricular systolic pressure is 63 mmHg; severely   elevated.  Right atrial pressure is estimated to be 15 mmHg; elevated.  No pericardial effusion.     Imaging  Stress Test:  5/10/24  Report   NUCLEAR IMAGING INTERPRETATION   1.  Small partially reversible myocardial perfusion defect in the mid    inferior and basal inferior segments.   2.  Myocardial perfusion is otherwise normal.   3.  LVEF is 50%.   4.  Left ventricular dilation with stress maneuvering.   5.  Decreased wall motion in the inferior and septal walls, but wall    thickening is preserved.   ECG INTERPRETATION   Negative stress ECG for ischemia.     Assessment/Plan  Afib with RVR  PMVT  QT prolongation with use of Sotalol.   Chronic use of anticoagulation (eliquis)      - Admission for Afib with RVR.  She was tried on class III AAD (sotalol 80 mg BID), notably with PMVT with respiratory failure.  QT noted to be prolonged post event, Dr Ahumada recommended avoidance of class III agents given prolonged QT with salvos of PMVT.    - She is scheduled for AFib ablation with Dr. Ahumada this afternoon.   - She is presently in sinus rhythm.  - Continue Eliquis without holding doses.   - Discussed with patient why Trazadone is not appropriate at this time given long QT presentation.  - Case management aware of transportation issues    Amy Fountain, Student   [facility]  [phone]        "

## 2024-05-21 NOTE — DIETARY
Nutrition Update:    Day 13 of admit.  Azalea Gan is a 73 y.o. female with admitting DX of Atrial fibrillation with rapid ventricular response (HCC) [I48.91]  Acute respiratory failure with hypoxia (HCC) [J96.01].  Patient being followed to optimize nutrition.    Current Diet: NPO, but cardiac diet prior.   Per ADLs, most meals >50% eaten. Pt NPO d/t ablation today.     Problem: Nutritional:  Goal: Achieve adequate nutritional intake  Description: Patient will consume >50% of meals  Outcome: Met.    RD will monitor PRN per department policy.

## 2024-05-21 NOTE — ANESTHESIA POSTPROCEDURE EVALUATION
Patient: Azalea Gan    Procedure Summary       Date: 05/21/24 Room / Location: [facility] - Cath Lab [facility]    Anesthesia Start: 1339 Anesthesia Stop:     Procedure: CL-EP ABLATION ATRIAL FIBRILLATION Diagnosis: (See Assoicated Dx)    Scheduled Providers: Artie Ahumada M.D.; Rufino Rasmussen M.D. Responsible Provider: Rufino Rasmussen M.D.    Anesthesia Type: general ASA Status: 3            Final Anesthesia Type: general  Last vitals  BP        Temp        Pulse       Resp        SpO2          Anesthesia Post Evaluation    Patient location during evaluation: PACU  Patient participation: complete - patient participated  Level of consciousness: awake and alert  Pain score: 0    Airway patency: patent  Anesthetic complications: no  Cardiovascular status: hemodynamically stable  Respiratory status: acceptable  Hydration status: euvolemic    PONV: none          No notable events documented.     Nurse Pain Score: 0 (NPRS)

## 2024-05-21 NOTE — PROGRESS NOTES
Hospital Medicine Daily Progress Note    Date of Service  5/21/2024    Chief Complaint  Azalea Gan is a 73 y.o. female admitted 5/8/2024 with palpitations    Hospital Course  74yo PMHx PAFib with abalation in March this year, HTN, pulmonary HTN, JOSH, BMI 40, previous CVA, hypothyroid, TV regurgitatio.  Presented on 5/8 with  AFib RVR to [facility].  On 5/11 transferred to ICU due to worsening resp status and required intubation for pulmonary edema.  In ICU treated for possible pneumonia as well as cardiogenic pulmonary edema with forced diuresis.  Had an episode of polymorphic VT, ?AFib with aberrancy (underlying LBBB)  Extubated 5/13    5/15 patient is new to me today, she is in bed, NG tube in place, patient is tolerating diet, will DC NG tube today, patient is wheezing started on Xopenex, note from cardiology reviewed, EP has been consulted, continue telemetry, discussed with bedside nurse charge nurse  pharmacist requesting discharge.  5/16 patient is resting in bed, she is alert oriented follows commands, still requiring 2 L of oxygen, discussed with EP team, initially plan for ablation today but after further evaluation EP recommended outpatient follow-up, will continue monitoring, hopefully will be able to DC tomorrow home with home health care.  Discussed with bedside nurse charge nurse  pharmacist  5/17 patient is in chair, no palpitation, discussed with EP team, no wheezing today, waiting time for ablation. Discussed with nurse staff, , charge nurse.   5/18 patient resting in bed, no new complaints, blood pressure is trending up, will restart amlodipine 5 mg daily, note from EP reviewed, discussed bedside nurse charge nurse , all questions been answered.  5/19 patient is in bed, no fever or chills, not in distress, no nausea or vomiting, patient denies palpitation, tolerating diet. Discussed with bedside nurse, charge nurse, pharmacist, . Ablation on  Tuesday. Continue telemetry.   5/20 patient is resting in bed, she is in no distress, she is complaining of mild wheezing earlier today but none on examination her lungs are clear, she is able to speak in full sentences, oxygen at baseline, hopefully going for ablation tomorrow, continue telemetry, continue supportive treatment, discussed with bedside nurse charge nurse  pharmacist, LESLEE, all questions answered.    Interval Problem Update  Patient states she is feeling good this morning.  Denies shortness of breath or any pain.  Does note that she is weak, has not been out of bed in several days.  No other complaints.    In NAD this AM  Stable vitals  On 2 L nasal cannula (at baseline)  Normocytic anemia stable around 9  Creatinine trending down  Cardiac electrophysiology following  Aim for atrial fibrillation ablation today  Cardiac monitoring  Labs on AM    I have discussed this patient's plan of care and discharge plan at IDT rounds today with Case Management, Nursing, Nursing leadership, and other members of the IDT team.    Consultants/Specialty  cardiology    Code Status  Full Code    Disposition  The patient is not medically cleared for discharge to home or a post-acute facility.     I have placed the appropriate orders for post-discharge needs.    Review of Systems  Review of Systems   Constitutional:  Negative for chills and fever.   Respiratory:  Negative for cough and shortness of breath.    Cardiovascular:  Negative for chest pain, palpitations and leg swelling.   Gastrointestinal:  Negative for abdominal pain, diarrhea, nausea and vomiting.   Genitourinary:  Negative for dysuria and urgency.   Musculoskeletal:  Negative for back pain.   Skin:  Negative for rash.   Neurological:  Positive for weakness. Negative for dizziness, loss of consciousness and headaches.        Physical Exam  Temp:  [36.5 °C (97.7 °F)-37 °C (98.6 °F)] 36.7 °C (98.1 °F)  Pulse:  [57-78] 69  Resp:  [15-18] 18  BP:  (120-138)/(51-70) 122/62  SpO2:  [94 %-98 %] 98 %    Physical Exam  Vitals and nursing note reviewed.   Constitutional:       General: She is not in acute distress.     Appearance: Normal appearance. She is well-developed. She is obese. She is not diaphoretic.   HENT:      Head: Normocephalic and atraumatic.   Neck:      Vascular: No JVD.   Cardiovascular:      Rate and Rhythm: Normal rate and regular rhythm.      Heart sounds: Murmur heard.   Pulmonary:      Effort: Pulmonary effort is normal. No respiratory distress.      Breath sounds: No stridor. No wheezing or rales.   Abdominal:      Palpations: Abdomen is soft.      Tenderness: There is no abdominal tenderness. There is no guarding or rebound.   Musculoskeletal:      Right lower leg: No edema.      Left lower leg: No edema.   Skin:     General: Skin is warm and dry.      Capillary Refill: Capillary refill takes less than 2 seconds.      Coloration: Skin is pale.      Findings: No rash.   Neurological:      Mental Status: She is oriented to person, place, and time.   Psychiatric:         Mood and Affect: Mood normal.         Behavior: Behavior normal.         Thought Content: Thought content normal.         Fluids    Intake/Output Summary (Last 24 hours) at 5/21/2024 0754  Last data filed at 5/20/2024 2040  Gross per 24 hour   Intake 600 ml   Output --   Net 600 ml       Laboratory  Recent Labs     05/19/24  0130 05/21/24  0347   WBC 6.8 8.0   RBC 3.19* 3.24*   HEMOGLOBIN 9.4* 9.6*   HEMATOCRIT 30.0* 30.0*   MCV 94.0 92.6   MCH 29.5 29.6   MCHC 31.3* 32.0*   RDW 54.6* 53.7*   PLATELETCT 215 250   MPV 10.4 10.1     Recent Labs     05/19/24  0130 05/21/24  0347   SODIUM 140 141   POTASSIUM 5.1 5.0   CHLORIDE 106 105   CO2 27 28   GLUCOSE 94 88   BUN 47* 34*   CREATININE 1.07 0.85   CALCIUM 8.6 8.4*                       Imaging  DX-CHEST-PORTABLE (1 VIEW)   Final Result      1.  Possible small right pleural effusion.   2.  Similar right greater than left  airspace disease.   3.  Suboptimal patient positioning.   4.  Cardiomegaly.      DX-CHEST-PORTABLE (1 VIEW)   Final Result      1.  Worsening hypoinflation   2.  Improvement of multifocal RIGHT lung consolidation.   3.  Supportive tubing is unchanged.      DX-CHEST-PORTABLE (1 VIEW)   Final Result      1.  Lines and tubes appear appropriately located      2.  Development of right lung airspace process which could be pneumonia or edema      DX-CHEST-PORTABLE (1 VIEW)   Final Result      Cardiomegaly and pulmonary vascular dilation or again noted. No acute process.      EC-ECHOCARDIOGRAM COMPLETE W/O CONT   Final Result      EC-ECHOCARDIOGRAM COMPLETE W/O CONT   Final Result      NM-CARDIAC STRESS TEST   Final Result      CL-LEFT HEART CATHETERIZATION WITH POSSIBLE INTERVENTION    (Results Pending)   EC-DINESH W/O CONT    (Results Pending)   CL-EP ABLATION ATRIAL FIBRILLATION    (Results Pending)        Assessment/Plan  * Acute hypoxic respiratory failure (HCC)  Assessment & Plan  Patient intubated on May 11, and subsequently extubated May 13  Etiology felt to be acute pulmonary edema from cardiogenic etiology as well as possible pneumonia.  Complete course of antibiotics  Continue forced diuresis  Mobilize  O2 and RT protocols    Wheezing today  Added scheduled xopenex  Continue RT per protocol    Continue o2 per protocol.   Monitoring  No wheezing today      Acute kidney injury (HCC)  Assessment & Plan  Patient has been aggressively diuresed, and is perhaps on the dry side.  DC Lasix and monitor  Hold off on IV fluids for now and see if the patient normalizes without intervention  Daily BMP  Renally dose medications as appropriate  Cr 1.28  Monitoring urine output    Acute respiratory failure with hypoxia (HCC)- (present on admission)  Assessment & Plan  Monitoring .  Weaning off o2.   Continue breathing treatments  Continue prednisone.   Continue weaning of oxygen    Aspiration pneumonia (HCC)  Assessment &  Plan  Complete 5 days of antibiotics tomorrow  Cultures have been negative with exception of 1 of 2 bottles of a coag negative staph most likely contaminant  Completing abx     Fatigue  Assessment & Plan  Suspect untreated sleep apnea  Will need pulmonary follow-up once  respiratory status is maximized for an outpatient sleep study    Heart failure with preserved ejection fraction (HCC)  Assessment & Plan  In part rate related due to A-fib RVR  Cardiology following  Completing ischemic workup with left heart cath today  Appropriately diuresed: Hold on further diuresis for the moment    Continue rate control. Eliquis  EP consulted. F/u rec.     Positive cardiac stress test  Assessment & Plan  Left heart cath    Hypotension  Assessment & Plan  Improved.   Resolved    Atrial fibrillation with rapid ventricular response (HCC)- (present on admission)  Assessment & Plan    She is s/p DINESH[facility]DCCV at [facility] in March.   Her last echo in 3/2024 with moderate MR, moderate dilatation of LA, EF of 45-50% but in the setting of arrhythmia.   Anticoagulation with apixaban: Currently on hold with heparin drip pending left heart cath  Rate control with digoxin  Soft pressures have limited the ability to use beta-blockade or calcium channel blockers  Cardiology following  Continue telemetry    EP eval today, recommending outpatient EP studies/ablation    EP recommending ablation before dc, probably next week Tuesday        Hypothyroidism- (present on admission)  Assessment & Plan  TSH wnl this admission, likely not contributing to afib.   - Continue with home levothyroxine     Depression- (present on admission)  Assessment & Plan  Continue with prozac     Ventricular tachyarrhythmia (HCC)- (present on admission)  Assessment & Plan  Ventricular tachycardia versus atrial fibrillation with aberrancy  Cardiology following  Continue telemetry  Completing ischemic workup  Cath neg  EP consulted.   Ablation recommended by EP.      Essential hypertension- (present on admission)  Assessment & Plan  Blood pressure borderline, holding home BP meds   Monitoring.   Blood pressure is trending up, will restart amlodipine       VTE prophylaxis: eliquis    I have performed a physical exam and reviewed and updated ROS and Plan today (5/21/2024). In review of yesterday's note (5/20/2024), there are no changes except as documented above.

## 2024-05-21 NOTE — ANESTHESIA POSTPROCEDURE EVALUATION
Patient: Azalea Gan    Procedure Summary       Date: 05/21/24 Room / Location: [facility] - Cath Lab [facility]    Anesthesia Start: 1339 Anesthesia Stop:     Procedure: CL-EP ABLATION ATRIAL FIBRILLATION Diagnosis: (See Assoicated Dx)    Scheduled Providers: Artie Ahumada M.D.; Rufino Rasmussen M.D. Responsible Provider: Rufino Rasmussen M.D.    Anesthesia Type: general ASA Status: 3            Final Anesthesia Type: general  Last vitals  BP   138/63   Temp   97.2   Pulse   63   Resp   16   SpO2   100%     Anesthesia Post Evaluation    Patient location during evaluation: PACU  Patient participation: complete - patient participated  Level of consciousness: awake and alert  Pain score: 0    Airway patency: patent  Anesthetic complications: no  Cardiovascular status: hemodynamically stable  Respiratory status: acceptable  Hydration status: euvolemic    PONV: none          No notable events documented.     Nurse Pain Score: 0 (NPRS)

## 2024-05-21 NOTE — CARE PLAN
The patient is Stable - Low risk of patient condition declining or worsening    Shift Goals  Clinical Goals: hemodynamic stability  Patient Goals: rset  Family Goals: hao    Progress made toward(s) clinical / shift goals:    Problem: Knowledge Deficit - Standard  Goal: Patient and family/care givers will demonstrate understanding of plan of care, disease process/condition, diagnostic tests and medications  Outcome: Progressing     Problem: Fall Risk  Goal: Patient will remain free from falls  Outcome: Progressing     Problem: Hemodynamics  Goal: Patient's hemodynamics, fluid balance and neurologic status will be stable or improve  Outcome: Progressing     Problem: Respiratory  Goal: Patient will achieve/maintain optimum respiratory ventilation and gas exchange  Outcome: Progressing     Problem: Fluid Volume  Goal: Fluid volume balance will be maintained  Outcome: Progressing     Problem: Self Care  Goal: Patient will have the ability to perform ADLs independently or with assistance (bathe, groom, dress, toilet and feed)  Outcome: Progressing     Problem: Infection - Standard  Goal: Patient will remain free from infection  Outcome: Progressing     Problem: Physical Regulation  Goal: Diagnostic test results will improve  Outcome: Progressing     Problem: Skin Integrity  Goal: Skin integrity is maintained or improved  Outcome: Progressing       Patient is not progressing towards the following goals:

## 2024-05-21 NOTE — ANESTHESIA PREPROCEDURE EVALUATION
Date/Time: 05/21/24 1530    Scheduled providers: Artie Ahumada M.D.; Rufino Rasmussen M.D.    Procedure: CL-EP ABLATION ATRIAL FIBRILLATION    Indications: See Assoicated Dx    Location: [facility] - Cath Lab - [facility]            Relevant Problems   PULMONARY   (positive) Aspiration pneumonia (HCC)   (positive) SOB (shortness of breath)      CARDIAC   (positive) Atrial fibrillation with rapid ventricular response (HCC)   (positive) Coronary atherosclerosis   (positive) Essential hypertension   (positive) Paroxysmal atrial fibrillation (HCC)   (positive) Pulmonary hypertension (HCC)   (positive) Renal artery stenosis (HCC)         (positive) ALCIRA (acute kidney injury) (HCC)   (positive) Acute kidney injury (HCC)   (positive) CKD (chronic kidney disease)      ENDO   (positive) Hypothyroidism      Other   (positive) Chronic fatigue   (positive) History of stroke   (positive) Hx of laparoscopic gastric banding       Physical Exam    Airway   Mallampati: III  TM distance: >3 FB  Neck ROM: full       Cardiovascular - normal exam  Rhythm: regular  Rate: normal  (-) murmur     Dental - normal exam           Pulmonary - normal exam  Breath sounds clear to auscultation     Abdominal    Neurological - normal exam                   Anesthesia Plan    ASA 3   ASA physical status 3 criteria: morbid obesity - BMI greater than or equal to 40    Plan - general       Airway plan will be ETT          Induction: intravenous    Postoperative Plan: Postoperative administration of opioids is intended.    Pertinent diagnostic labs and testing reviewed    Informed Consent:    Anesthetic plan and risks discussed with patient.

## 2024-05-21 NOTE — PROGRESS NOTES
Patient back from PACU, VSS, monitor room notified. Right femoral site is clean, dry, soft. Bedrest until 1915. Post-op vitals started.

## 2024-05-21 NOTE — DISCHARGE PLANNING
Case Management Discharge Planning    Admission Date: 5/8/2024  GMLOS: 4.2  ALOS: 13    6-Clicks ADL Score: 17  6-Clicks Mobility Score: 20  PT and/or OT Eval ordered: Yes  Post-acute Referrals Ordered: Yes  Post-acute Choice Obtained: Yes  Has referral(s) been sent to post-acute provider:  Yes      Anticipated Discharge Dispo: Discharge Disposition: D/T to home under HHA care in anticipation of covered skilled care (06)    DME Needed: Yes    DME Ordered: Yes  Home Oxygen    Action(s) Taken: Updated Provider/Nurse on Discharge Plan    Pt will have Ablation today.    Escalations Completed: None    Medically Clear: No    Next Steps: CM will continue to assist Pt with discharge needs.      Barriers to Discharge: Medical clearance

## 2024-05-22 ENCOUNTER — PHARMACY VISIT (OUTPATIENT)
Dept: PHARMACY | Facility: MEDICAL CENTER | Age: 74
End: 2024-05-22
Payer: COMMERCIAL

## 2024-05-22 VITALS
TEMPERATURE: 96.8 F | RESPIRATION RATE: 18 BRPM | SYSTOLIC BLOOD PRESSURE: 149 MMHG | OXYGEN SATURATION: 95 % | DIASTOLIC BLOOD PRESSURE: 69 MMHG | HEIGHT: 64 IN | BODY MASS INDEX: 41.33 KG/M2 | HEART RATE: 66 BPM | WEIGHT: 242.06 LBS

## 2024-05-22 LAB
ANION GAP SERPL CALC-SCNC: 9 MMOL/L (ref 7–16)
BASOPHILS # BLD AUTO: 0 % (ref 0–1.8)
BASOPHILS # BLD: 0 K/UL (ref 0–0.12)
BUN SERPL-MCNC: 28 MG/DL (ref 8–22)
CALCIUM SERPL-MCNC: 8.3 MG/DL (ref 8.5–10.5)
CHLORIDE SERPL-SCNC: 101 MMOL/L (ref 96–112)
CO2 SERPL-SCNC: 25 MMOL/L (ref 20–33)
CREAT SERPL-MCNC: 0.76 MG/DL (ref 0.5–1.4)
EKG IMPRESSION: NORMAL
EKG IMPRESSION: NORMAL
EOSINOPHIL # BLD AUTO: 0.01 K/UL (ref 0–0.51)
EOSINOPHIL NFR BLD: 0.1 % (ref 0–6.9)
ERYTHROCYTE [DISTWIDTH] IN BLOOD BY AUTOMATED COUNT: 54.4 FL (ref 35.9–50)
FUNGUS SPEC CULT: NORMAL
FUNGUS SPEC FUNGUS STN: NORMAL
GFR SERPLBLD CREATININE-BSD FMLA CKD-EPI: 82 ML/MIN/1.73 M 2
GLUCOSE SERPL-MCNC: 96 MG/DL (ref 65–99)
HCT VFR BLD AUTO: 28.8 % (ref 37–47)
HGB BLD-MCNC: 9.2 G/DL (ref 12–16)
IMM GRANULOCYTES # BLD AUTO: 0.05 K/UL (ref 0–0.11)
IMM GRANULOCYTES NFR BLD AUTO: 0.5 % (ref 0–0.9)
LYMPHOCYTES # BLD AUTO: 1.2 K/UL (ref 1–4.8)
LYMPHOCYTES NFR BLD: 12.4 % (ref 22–41)
MAGNESIUM SERPL-MCNC: 1.9 MG/DL (ref 1.5–2.5)
MCH RBC QN AUTO: 29.7 PG (ref 27–33)
MCHC RBC AUTO-ENTMCNC: 31.9 G/DL (ref 32.2–35.5)
MCV RBC AUTO: 92.9 FL (ref 81.4–97.8)
MONOCYTES # BLD AUTO: 0.92 K/UL (ref 0–0.85)
MONOCYTES NFR BLD AUTO: 9.5 % (ref 0–13.4)
NEUTROPHILS # BLD AUTO: 7.48 K/UL (ref 1.82–7.42)
NEUTROPHILS NFR BLD: 77.5 % (ref 44–72)
NRBC # BLD AUTO: 0 K/UL
NRBC BLD-RTO: 0 /100 WBC (ref 0–0.2)
PLATELET # BLD AUTO: 259 K/UL (ref 164–446)
PMV BLD AUTO: 10 FL (ref 9–12.9)
POTASSIUM SERPL-SCNC: 5 MMOL/L (ref 3.6–5.5)
RBC # BLD AUTO: 3.1 M/UL (ref 4.2–5.4)
SIGNIFICANT IND 70042: NORMAL
SITE SITE: NORMAL
SODIUM SERPL-SCNC: 135 MMOL/L (ref 135–145)
SOURCE SOURCE: NORMAL
WBC # BLD AUTO: 9.7 K/UL (ref 4.8–10.8)

## 2024-05-22 PROCEDURE — 99239 HOSP IP/OBS DSCHRG MGMT >30: CPT | Performed by: STUDENT IN AN ORGANIZED HEALTH CARE EDUCATION/TRAINING PROGRAM

## 2024-05-22 PROCEDURE — 93010 ELECTROCARDIOGRAM REPORT: CPT | Performed by: INTERNAL MEDICINE

## 2024-05-22 PROCEDURE — RXMED WILLOW AMBULATORY MEDICATION CHARGE: Performed by: STUDENT IN AN ORGANIZED HEALTH CARE EDUCATION/TRAINING PROGRAM

## 2024-05-22 RX ORDER — ATORVASTATIN CALCIUM 40 MG/1
40 TABLET, FILM COATED ORAL EVERY EVENING
Qty: 30 TABLET | Refills: 0 | Status: ON HOLD | OUTPATIENT
Start: 2024-05-22 | End: 2024-05-31

## 2024-05-22 RX ORDER — DIGOXIN 125 MCG
125 TABLET ORAL DAILY
Qty: 30 TABLET | Refills: 0 | Status: ON HOLD | OUTPATIENT
Start: 2024-05-22 | End: 2024-05-31

## 2024-05-22 RX ADMIN — LEVOTHYROXINE SODIUM 175 MCG: 0.12 TABLET ORAL at 04:37

## 2024-05-22 RX ADMIN — LORAZEPAM 0.5 MG: 0.5 TABLET ORAL at 01:47

## 2024-05-22 RX ADMIN — APIXABAN 5 MG: 5 TABLET, FILM COATED ORAL at 04:37

## 2024-05-22 RX ADMIN — ALLOPURINOL 100 MG: 100 TABLET ORAL at 04:37

## 2024-05-22 RX ADMIN — AMLODIPINE BESYLATE 5 MG: 5 TABLET ORAL at 04:37

## 2024-05-22 ASSESSMENT — ENCOUNTER SYMPTOMS
FATIGUE: 0
SINUS PAIN: 0
SORE THROAT: 0
SPEECH DIFFICULTY: 0
SHORTNESS OF BREATH: 0
CONSTIPATION: 0
VOMITING: 0
WEAKNESS: 0
FEVER: 0
CHEST TIGHTNESS: 0
PALPITATIONS: 0
COUGH: 0
LIGHT-HEADEDNESS: 0
NAUSEA: 0
CHILLS: 0
WHEEZING: 0
DIAPHORESIS: 0
TROUBLE SWALLOWING: 0
DIZZINESS: 0
DIARRHEA: 0
NUMBNESS: 0
HEADACHES: 0

## 2024-05-22 ASSESSMENT — COGNITIVE AND FUNCTIONAL STATUS - GENERAL
DAILY ACTIVITIY SCORE: 19
SUGGESTED CMS G CODE MODIFIER DAILY ACTIVITY: CK
TOILETING: A LITTLE
DRESSING REGULAR UPPER BODY CLOTHING: A LITTLE
PERSONAL GROOMING: A LITTLE
DRESSING REGULAR LOWER BODY CLOTHING: A LITTLE
HELP NEEDED FOR BATHING: A LITTLE

## 2024-05-22 ASSESSMENT — FIBROSIS 4 INDEX: FIB4 SCORE: 1.4

## 2024-05-22 ASSESSMENT — PAIN DESCRIPTION - PAIN TYPE: TYPE: ACUTE PAIN

## 2024-05-22 NOTE — PROGRESS NOTES
EDUCATIONAL PURPOSES ONLY    Cardiology Follow Up Progress Note    Date of Service  5/22/2024    Attending Physician  Mamadou John*    Chief Complaint   AF rhythm management    HPI  Azalea Gan is a 73 y.o. female admitted 5/8/2024 with symptomatic AF with RVR which was treated with Sotalol for rhythm control. Patient then developed respiratory failure necessitating intubation and is now extubated. Sotalol was subsequently stopped due to long QT. Inpatient ablation performed. Patient has a PMH of prior persistent AF s/p dccv, hypothyroidism, pulmonary hypertension, JOSH, prior CVA and HTN.     Interim Events  Patient is currently in sinus rhythm. Had a one time episode of non-sustained wide complex tachycardia of 17 beats. Long QT has also resolved. Denies any pain to incision site and reports being ready to be discharged.  Vital signs stable. Labs reviewed.    Review of Systems  Review of Systems   Constitutional:  Negative for chills, diaphoresis and fever.   HENT:  Negative for congestion, ear pain, sinus pain and sore throat.    Respiratory:  Negative for cough, chest tightness, shortness of breath and wheezing.    Cardiovascular:  Negative for chest pain, palpitations and leg swelling.   Gastrointestinal:  Negative for constipation, diarrhea, nausea and vomiting.   Genitourinary:  Negative for dysuria, hematuria and pelvic pain.   Neurological:  Negative for dizziness, weakness, numbness and headaches.       Vital signs in last 24 hours  Temp:  [36.2 °C (97.2 °F)-36.6 °C (97.9 °F)] 36.6 °C (97.9 °F)  Pulse:  [59-82] 61  Resp:  [13-18] 18  BP: ()/(51-86) 123/72  SpO2:  [94 %-100 %] 95 %    Physical Exam  Physical Exam  Constitutional:       General: She is not in acute distress.     Appearance: She is obese.   HENT:      Mouth/Throat:      Mouth: Mucous membranes are moist.   Eyes:      Extraocular Movements: Extraocular movements intact.      Pupils: Pupils are equal, round, and reactive to  "light.   Cardiovascular:      Rate and Rhythm: Normal rate and regular rhythm.      Pulses: Normal pulses.      Heart sounds: No murmur heard.     No gallop.   Pulmonary:      Effort: Pulmonary effort is normal. No respiratory distress.      Breath sounds: Normal breath sounds. No wheezing, rhonchi or rales.   Abdominal:      General: Abdomen is flat. There is no distension.      Palpations: Abdomen is soft.      Tenderness: There is no abdominal tenderness.   Musculoskeletal:      Right lower leg: No edema.      Left lower leg: No edema.   Skin:     General: Skin is warm and dry.      Capillary Refill: Capillary refill takes less than 2 seconds.      Comments: Incision site is clean with only mild serosanguineous discharge. No erythema, swelling or warmth. Suture was removed successfully.    Neurological:      Mental Status: She is alert.         Lab Review  Lab Results   Component Value Date/Time    WBC 8.0 05/21/2024 03:47 AM    RBC 3.24 (L) 05/21/2024 03:47 AM    HEMOGLOBIN 9.6 (L) 05/21/2024 03:47 AM    HEMATOCRIT 30.0 (L) 05/21/2024 03:47 AM    MCV 92.6 05/21/2024 03:47 AM    MCH 29.6 05/21/2024 03:47 AM    MCHC 32.0 (L) 05/21/2024 03:47 AM    MPV 10.1 05/21/2024 03:47 AM      Lab Results   Component Value Date/Time    SODIUM 141 05/21/2024 03:47 AM    POTASSIUM 5.0 05/21/2024 03:47 AM    CHLORIDE 105 05/21/2024 03:47 AM    CO2 28 05/21/2024 03:47 AM    GLUCOSE 88 05/21/2024 03:47 AM    BUN 34 (H) 05/21/2024 03:47 AM    CREATININE 0.85 05/21/2024 03:47 AM      Lab Results   Component Value Date/Time    ASTSGOT 15 05/09/2024 01:34 AM    ALTSGPT 10 05/09/2024 01:34 AM     Lab Results   Component Value Date/Time    TRIGLYCERIDE 121 05/13/2024 03:20 AM    TROPONINT 248 (H) 05/12/2024 04:15 AM       No results for input(s): \"NTPROBNP\" in the last 72 hours.    Cardiac Imaging and Procedures Review  Echocardiogram:  5/10/24  Normal left ventricular size, wall thickness, and systolic function.   The right ventricle is " mildly dilated with preserved systolic function.  Moderately dilated left atrium.  Mild mitral regurgitation.  Mild tricuspid regurgitation.  Estimated right ventricular systolic pressure is 63 mmHg; severely   elevated.  Right atrial pressure is estimated to be 15 mmHg; elevated.  No pericardial effusion.     Imaging  Stress Test:  5/10/24  Report   NUCLEAR IMAGING INTERPRETATION   1.  Small partially reversible myocardial perfusion defect in the mid    inferior and basal inferior segments.   2.  Myocardial perfusion is otherwise normal.   3.  LVEF is 50%.   4.  Left ventricular dilation with stress maneuvering.   5.  Decreased wall motion in the inferior and septal walls, but wall    thickening is preserved.   ECG INTERPRETATION   Negative stress ECG for ischemia.     Assessment/Plan  Afib with RVR  PMVT  QT prolongation with use of Sotalol.   Chronic use of anticoagulation (eliquis)      - Admission for Afib with RVR.  She was tried on class III AAD (sotalol 80 mg BID), notably with PMVT with respiratory failure.  QT noted to be prolonged post event, Dr Ahumada recommended avoidance of class III agents given prolonged QT with salvos of PMVT.    - AFib ablation performed on 5/21/24 with Dr. Ahumada  - She is presently in sinus rhythm.  - Continue Eliquis and emphasized the importance of not missing any doses   - Discussed avoiding strenuous exercise for one week. She can shower tomorrow and take the dressings off incision site.   - Advised to follow-up with cardiologist at [facility] in 2-3 weeks.     Amy Fountain, Student   [facility]  [phone]

## 2024-05-22 NOTE — DISCHARGE SUMMARY
Discharge Summary    CHIEF COMPLAINT ON ADMISSION  No chief complaint on file.      Reason for Admission  A-fib w/ RVR     Admission Date  5/8/2024    CODE STATUS  Full Code    HPI & HOSPITAL COURSE  72yo PMHx PAFib with abalation in March this year, HTN, pulmonary HTN, JOSH, BMI 40, previous CVA, hypothyroid, TV regurgitatio.  Presented on 5/8 with AFib RVR to [facility] [place].  She was started on sotalol on 5/11 transferred to ICU due to worsening resp status and required intubation for pulmonary edema.  In ICU treated for possible pneumonia as well as cardiogenic pulmonary edema with forced diuresis. Extubated 5/13.  NG tube was discontinued on 5/15 as patient tolerating meals.  Cardiac electrophysiology following for which sotalol was discontinued and patient underwent atrial fibrillation ablation on 5/21/2024 which she tolerated well and noted to be back to sinus rhythm. She was continued on Eliquis.  Stable patient with in chronic condition was discharged home and instructed to follow-up with her primary care provider and cardiology physiology in the outpatient setting.  All results and plan of action discussed with the patient for she voiced understanding and agreement with the primary care team.  Patient was instructed to return to emergency department if symptoms were to worsen.     Therefore, she is discharged in good and stable condition to home with close outpatient follow-up.    The patient met 2-midnight criteria for an inpatient stay at the time of discharge.    Discharge Date  5/22/2024    FOLLOW UP ITEMS POST DISCHARGE  Primary care provider follow posthospital discharge care    DISCHARGE DIAGNOSES  Principal Problem:    Atrial fibrillation with rapid ventricular response (HCC) (POA: Yes)  Active Problems:    Essential hypertension (POA: Yes)    Advance care planning (POA: Yes)    Ventricular tachyarrhythmia (HCC) (POA: Yes)    Depression (POA: Yes)    Hypothyroidism (POA: Yes)    Hypotension (POA: Unknown)     Normocytic anemia (POA: Unknown)    Positive cardiac stress test (POA: Unknown)    Heart failure with preserved ejection fraction (HCC) (POA: Unknown)    Fatigue (POA: Unknown)    Acute hypoxic respiratory failure (HCC) (POA: Yes)    Septic shock (HCC) (POA: Unknown)    Elevated troponin (POA: Unknown)    Aspiration pneumonia (HCC) (POA: Unknown)    Acute respiratory failure with hypoxia (HCC) (POA: Yes)    Acute kidney injury (HCC) (POA: Yes)  Resolved Problems:    Pulmonary edema (POA: Unknown)      FOLLOW UP  No future appointments.  [address]  [facility] [address]  [phone]          MEDICATIONS ON DISCHARGE     Medication List        START taking these medications        Instructions   atorvastatin 40 MG Tabs  Commonly known as: Lipitor   Take 1 Tablet by mouth every evening.  Dose: 40 mg     digoxin 125 MCG Tabs  Commonly known as: Lanoxin   Take 1 Tablet by mouth every day at 6 PM.  Dose: 125 mcg            CONTINUE taking these medications        Instructions   allopurinol 300 MG Tabs  Commonly known as: Zyloprim   Take 300 mg by mouth every day.  Dose: 300 mg     amLODIPine 10 MG Tabs  Commonly known as: Norvasc   TAKE 1 TABLET DAILY  Dose: 10 mg     apixaban 5mg Tabs  Commonly known as: Eliquis   Take 1 Tablet by mouth 2 times a day.  Dose: 1 Tablet     fluoxetine 40 MG capsule  Commonly known as: PROzac   Take 120 mg by mouth every day. 120 mg = 3capsules  Dose: 120 mg     fluticasone 50 MCG/ACT nasal spray  Commonly known as: Flonase   Administer 1 Spray into affected nostril(S) 1 time a day as needed (allergies).  Dose: 1 Spray     levothyroxine 175 MCG Tabs  Commonly known as: Synthroid   Take 175 mcg by mouth every morning on an empty stomach.  Dose: 175 mcg     nystatin powder  Commonly known as: Mycostatin   Apply 1 Application topically 3 times a day as needed (itchy).  Dose: 1 Application     pramipexole 0.25 MG Tabs  Commonly known as: Mirapex   Take 1  Tablet by mouth every evening. Indications: Restless Leg Syndrome  Dose: 0.25 mg     traMADol 50 MG Tabs  Commonly known as: Ultram   Take 100 mg by mouth every evening as needed for Severe Pain. 2 tablets = 100mg  Dose: 100 mg            STOP taking these medications      furosemide 40 MG Tabs  Commonly known as: Lasix     potassium chloride SA 10 MEQ Tbcr  Commonly known as: K-Dur              Allergies  Allergies   Allergen Reactions    Iodine Anaphylaxis    Iodine Contrast [Diagnostic X-Ray Materials] Anaphylaxis    Povidone Iodine Anaphylaxis       DIET  Orders Placed This Encounter   Procedures    Diet Order Diet: Cardiac     Standing Status:   Standing     Number of Occurrences:   1     Order Specific Question:   Diet:     Answer:   Cardiac [6]       ACTIVITY  As tolerated.  Weight bearing as tolerated    CONSULTATIONS  Cardiology  Critical care medicine  Pulmonology  Cardiac electrophysiology    PROCEDURES  Atrial fibrillation ablation    LABORATORY  Lab Results   Component Value Date    SODIUM 141 05/21/2024    POTASSIUM 5.0 05/21/2024    CHLORIDE 105 05/21/2024    CO2 28 05/21/2024    GLUCOSE 88 05/21/2024    BUN 34 (H) 05/21/2024    CREATININE 0.85 05/21/2024        Lab Results   Component Value Date    WBC 8.0 05/21/2024    HEMOGLOBIN 9.6 (L) 05/21/2024    HEMATOCRIT 30.0 (L) 05/21/2024    PLATELETCT 250 05/21/2024        Total time of the discharge process exceeds 34 minutes.

## 2024-05-22 NOTE — DISCHARGE PLANNING
Case Management Discharge Planning    Admission Date: 5/8/2024  GMLOS: 3.6  ALOS: 14    6-Clicks ADL Score: 17  6-Clicks Mobility Score: 20  PT and/or OT Eval ordered: Yes  Post-acute Referrals Ordered: Yes  Post-acute Choice Obtained: Yes  Has referral(s) been sent to post-acute provider:  Yes      Anticipated Discharge Dispo: Discharge Disposition: D/T to home under HHA care in anticipation of covered skilled care (06)    DME Needed: No  Per Home O2 eval today Pt will not need Home Oxygen    Action(s) Taken: Updated Provider/Nurse on Discharge Plan and Transport Arranged     1015, RN NATALIE visited Pt at room and she is getting ready for discharge. Pt said she has no friends or family to provide transport and also cannot afford transport cost. Pt said she can ride an Uber.    1020, RN NATALIE requested assistance from [employer] for transport.    1136, CM  Trish CROWELL informed to set up transport for [employer] per [employer] approval and CHICHI LONGORIA will escort Pt to uber ride.    Escalations Completed: None    Medically Clear: Yes    Next Steps: CM will continue to assist Pt with discharge needs.      Barriers to Discharge: Transportation

## 2024-05-22 NOTE — PROGRESS NOTES
Cardiology Follow Up Progress Note    Date of Service  5/22/2024    Attending Physician  ELI Canela.*    Chief Complaint/Reason for consult   AF rhythm management     HPI  Azalea Gan is a 73 y.o. female admitted 5/8/2024 with a past medical history significant for prior persistent AF S/P dccv with early return of AF back in march at [facility], hypothyroidism, pulmonary hypertension, JOSH, prior CVA and HTN with [facility].  She was admitted with symptomatic AF with RVR, was started on Sotalol for rhythm control, subsequently with respiratory failure requiring intubation and slavos of VT.  Now extubated.  Her QT noted to be long post.  Sotalol was stopped.  Planned for inpatient ablation.      Interim Events  PFA ablation with Dr. Ahumada yesterday.  Maintaining sinus this AM.  Overnight  non sustained WCT, asymptomatic.   VS stable this AM.   Has been OOB.     Review of Systems  Review of Systems   Constitutional:  Negative for chills, fatigue and fever.   HENT:  Negative for nosebleeds and trouble swallowing.    Respiratory:  Negative for cough, chest tightness, shortness of breath and wheezing.    Cardiovascular:  Negative for chest pain, palpitations and leg swelling.   Neurological:  Negative for dizziness, syncope, speech difficulty, weakness, light-headedness and numbness.       Vital signs in last 24 hours  Temp:  [36 °C (96.8 °F)-36.6 °C (97.9 °F)] 36 °C (96.8 °F)  Pulse:  [59-82] 66  Resp:  [13-18] 18  BP: ()/(51-86) 149/69  SpO2:  [94 %-100 %] 95 %    Physical Exam  Physical Exam  Vitals and nursing note reviewed.   Constitutional:       Appearance: Normal appearance.   HENT:      Head: Normocephalic and atraumatic.   Eyes:      Extraocular Movements: Extraocular movements intact.      Conjunctiva/sclera: Conjunctivae normal.      Pupils: Pupils are equal, round, and reactive to light.   Cardiovascular:      Rate and Rhythm: Normal rate and regular rhythm.      Pulses: Normal pulses.      Heart sounds:  "Normal heart sounds. No murmur heard.     No friction rub. No gallop.      Comments: Right femoral venous access figure of eight suture was removed in entirety.   Pulmonary:      Effort: Pulmonary effort is normal.      Breath sounds: No decreased breath sounds, wheezing, rhonchi or rales.      Comments: 02 2 LPM  Musculoskeletal:         General: Normal range of motion.      Cervical back: Normal range of motion.      Right lower leg: No edema.      Left lower leg: No edema.   Skin:     General: Skin is warm and dry.      Capillary Refill: Capillary refill takes 2 to 3 seconds.   Neurological:      Mental Status: She is alert and oriented to person, place, and time.   Psychiatric:         Mood and Affect: Mood normal.         Behavior: Behavior normal.         Thought Content: Thought content normal.         Judgment: Judgment normal.         Lab Review  Lab Results   Component Value Date/Time    WBC 8.0 05/21/2024 03:47 AM    RBC 3.24 (L) 05/21/2024 03:47 AM    HEMOGLOBIN 9.6 (L) 05/21/2024 03:47 AM    HEMATOCRIT 30.0 (L) 05/21/2024 03:47 AM    MCV 92.6 05/21/2024 03:47 AM    MCH 29.6 05/21/2024 03:47 AM    MCHC 32.0 (L) 05/21/2024 03:47 AM    MPV 10.1 05/21/2024 03:47 AM      Lab Results   Component Value Date/Time    SODIUM 141 05/21/2024 03:47 AM    POTASSIUM 5.0 05/21/2024 03:47 AM    CHLORIDE 105 05/21/2024 03:47 AM    CO2 28 05/21/2024 03:47 AM    GLUCOSE 88 05/21/2024 03:47 AM    BUN 34 (H) 05/21/2024 03:47 AM    CREATININE 0.85 05/21/2024 03:47 AM      Lab Results   Component Value Date/Time    ASTSGOT 15 05/09/2024 01:34 AM    ALTSGPT 10 05/09/2024 01:34 AM     Lab Results   Component Value Date/Time    TRIGLYCERIDE 121 05/13/2024 03:20 AM    TROPONINT 248 (H) 05/12/2024 04:15 AM       No results for input(s): \"NTPROBNP\" in the last 72 hours.    Cardiac Imaging and Procedures Review  Echocardiogram:  5/10/24  Normal left ventricular size, wall thickness, and systolic function.   The right ventricle is " mildly dilated with preserved systolic function.  Moderately dilated left atrium.  Mild mitral regurgitation.  Mild tricuspid regurgitation.  Estimated right ventricular systolic pressure is 63 mmHg; severely   elevated.  Right atrial pressure is estimated to be 15 mmHg; elevated.  No pericardial effusion.     Imaging  Stress Test:  5/10/24  Report   NUCLEAR IMAGING INTERPRETATION   1.  Small partially reversible myocardial perfusion defect in the mid    inferior and basal inferior segments.   2.  Myocardial perfusion is otherwise normal.   3.  LVEF is 50%.   4.  Left ventricular dilation with stress maneuvering.   5.  Decreased wall motion in the inferior and septal walls, but wall    thickening is preserved.   ECG INTERPRETATION   Negative stress ECG for ischemia.    EP Procedures 5/21/24:   Total ablation time: 52 applications of Faradrive PFA  Fluoroscopy time: 11.2 minutes  Electrophysiologic Findings:    1. Sinus  1158 ms, AH 93 ms, HV 39 ms.  Impressions:    1. Paroxysmal atrial fibrillation.    2. Successful PFA for PV and PW isolation    Assessment/Plan  Afib with RVR  PMVT  QT prolongation with use of Sotalol.   Chronic use of anticoagulation (eliquis)     - S/P PFA ablation, targeting pulmonary veins and posterior wall for symptomatic atrial fibrillation.   - Clinically doing well post ablation.  - She is sinus rhythm presently.   - Continue Eliquis and medications as ordered.   - QT no longer prolonged on ecg.   - I have discussed post ablation instructions with the patient and she verbalizes understanding.  Printed copy to be provided with DC paperwork.  I also discussed with the patient that her insurance is not contracted with [facility] for outpt visits and that she will need to schedule post ablation follow up with [facility].  She does verbalize understanding of this.     SANDRO Alford.   [facility]  [phone]

## 2024-05-22 NOTE — PROGRESS NOTES
Patient discharged in stable condition. IV and tele box removed. All follow-up appointments discussed. AVS paperwork reviewed and all questions addressed. Bedside medication delivered. Patient taken in wheelchair down to uber.

## 2024-05-22 NOTE — THERAPY
Occupational Therapy  Daily Treatment     Patient Name: Azalea aGn  Age:  73 y.o., Sex:  female  Medical Record #: [id number]  Today's Date: 5/22/2024     Precautions  Precautions: Fall Risk  Comments: NG tube removed    Assessment    Pt seen for OT treatment. Pt required supv for bed mobility, standing, and donning/doffing socks using AE. Pt reported she has been getting to/from bathroom and walking in the hallway with no difficulty. Pt provided/reviewed DME handout and energy conservation handout. Pt planned to d/c today but should pt remain in house, pt will benefit from continued OT.     Plan    Treatment Plan Status: Continue Current Treatment Plan  Type of Treatment: Self Care / Activities of Daily Living, Adaptive Equipment, Neuro Re-Education / Balance, Therapeutic Exercises, Therapeutic Activity  Treatment Frequency: 3 Times per Week  Treatment Duration: Until Therapy Goals Met    DC Equipment Recommendations: Reacher, Sock Aide, Long Handled Shoehorn  Discharge Recommendations: Recommend home health for continued occupational therapy services     Objective     05/22/24 1112   Pain   Pain Scales 0 to 10 Scale    Pain 0 - 10 Group   Therapist Pain Assessment Post Activity Pain Same as Prior to Activity;Nurse Notified  (no pain reported, agreeable to session)   Non Verbal Descriptors   Non Verbal Scale  Calm   Cognition    Cognition / Consciousness WDL   Level of Consciousness Alert   Comments very pleasant, cooperative, receptive to education   Other Treatments   Other Treatments Provided Provided education regarding home safety and energy conservation principles (handout provided)   Balance   Sitting Balance (Static) Fair +   Sitting Balance (Dynamic) Fair   Standing Balance (Static) Fair   Standing Balance (Dynamic) Fair   Weight Shift Sitting Good   Weight Shift Standing Good   Skilled Intervention Verbal Cuing;Facilitation   Bed Mobility    Supine to Sit Supervised   Sit to Supine Supervised   Scooting  Supervised   Comments HOB flat, no use of rails   Activities of Daily Living   Lower Body Dressing Supervision  (don/doff socks using AE, education regarding using AE for shoes and pants (declined participation))   Toileting   (declined the need)   Functional Mobility   Sit to Stand Supervised   Bed, Chair, Wheelchair Transfer Supervised   Transfer Method Stand Step   Mobility EOB>stand>supine   Comments reported being up with RN in hallway and to/from bathroom with no difficulty   Activity Tolerance   Sitting in Chair NT   Sitting Edge of Bed >5 min   Standing <1 min   Comments functional   Patient / Family Goals   Patient / Family Goal #1 to get stronger   Goal #1 Outcome Progressing as expected   Short Term Goals   Short Term Goal # 1 Pt will perform LB dressing w/ supv and AE PRN   Goal Outcome # 1 Goal met   Short Term Goal # 2 Pt will perform toilet transfer w/ supv   Goal Outcome # 2 Goal not met   Short Term Goal # 3 Pt will perform full g/h routine while standing w/ supv   Goal Outcome # 3 Goal not met   Education Group   Education Provided Role of Occupational Therapist;Activities of Daily Living;Adaptive Equipment;Energy Conservation   Role of Occupational Therapist Patient Response Patient;Acceptance;Explanation;Verbal Demonstration   Energy Conservation Patient Response Patient;Acceptance;Explanation;Handout;Verbal Demonstration   ADL Patient Response Patient;Acceptance;Explanation;Demonstration;Verbal Demonstration;Action Demonstration   Adaptive Equipment Patient Response Patient;Acceptance;Demonstration;Explanation;Verbal Demonstration;Handout   Occupational Therapy Treatment Plan    O.T. Treatment Plan Continue Current Treatment Plan

## 2024-05-22 NOTE — DISCHARGE INSTRUCTIONS
[facility] Post Ablation Patient Instructions:  No lifting > 10 lbs x 1 week.      No soaking in baths, hot tubs, pools x 1 week.  May shower the day after discharge and take off groin dressings and leave  sites uncovered.  Continue to monitor sites daily for warmth, redness, discolored drainage.  It is common to have a small lump in the area where the cather was (usually the size of a marble); this will go away but takes approximately 6 weeks to normalize.     3.   Please take all medications as prescribed to you; please do not stop any medications prescribed post ablation unless directed by your healthcare provider.      4.   Please do not miss any doses of your blood thinner (if you have been started on, or take chronic blood thinners) without discussion with your healthcare provider first.     5.   Please walk and take deep breaths after discharge.  After discharge, if you experience neurological changes/signs of stroke or high fever you should be seen in the emergency dept.     6.   It is possible you may experience some chest discomfort or chest tightness post ablation.  This is usually secondary to inflammation and irritation of the tissues at the area of the ablation.  If this occurs, it is advised to try 400 mg of Ibuprofen with food as needed up to three times a day for a maximum of two days.  This should help to decrease pain and tissue inflammation.          **Please notify the office ([phone]) if this occurs.         ** DO NOT TAKE Ibuprofen IF HISTORY OF ALLERGY, SIGNIFICANT BLEEDING OR KIDNEY DISEASE WITHOUT DISCUSSING WITH YOUR CARDIOLOGY PROVIDER FIRST.          ** If pain becomes severe or you have additional symptoms you may need to be medically evaluated; please contact your cardiology office  for further direction.     7. It is possible that you may experience arrhythmia/Atrial Fibrillation post ablation.  This is secondary to irritation and inflammation  of the cardiac tissues from the ablation.  If you have atrial fibrillation all day or feel poorly with it, please notify your cardiologist's via phone     8.  Please contact call our office ([phone]) or message via [employer] if you have any questions or concerns post procedurally.    9. You need to be seen for post ablation follow up 3-4 weeks post procedure. Please call [facility]iology ([phone]) for follow up appointment post ablation in 2-3 weeks.

## 2024-05-22 NOTE — CARE PLAN
The patient is Stable - Low risk of patient condition declining or worsening    Shift Goals  Clinical Goals: hemodynamic stability  Patient Goals: sleep  Family Goals: hao    Progress made toward(s) clinical / shift goals:    Problem: Knowledge Deficit - Standard  Goal: Patient and family/care givers will demonstrate understanding of plan of care, disease process/condition, diagnostic tests and medications  Outcome: Progressing     Problem: Fall Risk  Goal: Patient will remain free from falls  Outcome: Progressing     Problem: Psychosocial  Goal: Patient's level of anxiety will decrease  Outcome: Progressing     Problem: Hemodynamics  Goal: Patient's hemodynamics, fluid balance and neurologic status will be stable or improve  Outcome: Progressing     Problem: Respiratory  Goal: Patient will achieve/maintain optimum respiratory ventilation and gas exchange  Outcome: Progressing     Problem: Fluid Volume  Goal: Fluid volume balance will be maintained  Outcome: Progressing     Problem: Self Care  Goal: Patient will have the ability to perform ADLs independently or with assistance (bathe, groom, dress, toilet and feed)  Outcome: Progressing     Problem: Infection - Standard  Goal: Patient will remain free from infection  Outcome: Progressing       Patient is not progressing towards the following goals:

## 2024-05-30 ENCOUNTER — APPOINTMENT (OUTPATIENT)
Dept: RADIOLOGY | Facility: MEDICAL CENTER | Age: 74
End: 2024-05-30
Attending: EMERGENCY MEDICINE
Payer: MEDICARE

## 2024-05-30 ENCOUNTER — HOSPITAL ENCOUNTER (OUTPATIENT)
Facility: MEDICAL CENTER | Age: 74
End: 2024-05-31
Attending: EMERGENCY MEDICINE | Admitting: STUDENT IN AN ORGANIZED HEALTH CARE EDUCATION/TRAINING PROGRAM
Payer: MEDICARE

## 2024-05-30 DIAGNOSIS — M1A.09X0 IDIOPATHIC CHRONIC GOUT OF MULTIPLE SITES WITHOUT TOPHUS: ICD-10-CM

## 2024-05-30 DIAGNOSIS — F41.9 ANXIETY: ICD-10-CM

## 2024-05-30 DIAGNOSIS — I48.91 ATRIAL FIBRILLATION WITH RAPID VENTRICULAR RESPONSE (HCC): ICD-10-CM

## 2024-05-30 DIAGNOSIS — R06.02 SOB (SHORTNESS OF BREATH): ICD-10-CM

## 2024-05-30 DIAGNOSIS — R09.81 CHRONIC NASAL CONGESTION: ICD-10-CM

## 2024-05-30 DIAGNOSIS — I27.20 PULMONARY HYPERTENSION (HCC): ICD-10-CM

## 2024-05-30 DIAGNOSIS — B37.2 SKIN YEAST INFECTION: ICD-10-CM

## 2024-05-30 DIAGNOSIS — R07.9 CHEST PAIN, UNSPECIFIED TYPE: ICD-10-CM

## 2024-05-30 DIAGNOSIS — E03.9 HYPOTHYROIDISM, UNSPECIFIED TYPE: ICD-10-CM

## 2024-05-30 DIAGNOSIS — M25.552 LEFT HIP PAIN: ICD-10-CM

## 2024-05-30 DIAGNOSIS — R09.02 HYPOXIA: ICD-10-CM

## 2024-05-30 DIAGNOSIS — R79.89 ELEVATED TROPONIN: ICD-10-CM

## 2024-05-30 DIAGNOSIS — G25.81 RESTLESS LEG: ICD-10-CM

## 2024-05-30 PROBLEM — Z98.890 HISTORY OF CARDIAC RADIOFREQUENCY ABLATION: Status: ACTIVE | Noted: 2024-05-30

## 2024-05-30 LAB
ALBUMIN SERPL BCP-MCNC: 3.6 G/DL (ref 3.2–4.9)
ALBUMIN/GLOB SERPL: 1.3 G/DL
ALP SERPL-CCNC: 92 U/L (ref 30–99)
ALT SERPL-CCNC: 17 U/L (ref 2–50)
ANION GAP SERPL CALC-SCNC: 17 MMOL/L (ref 7–16)
AST SERPL-CCNC: 30 U/L (ref 12–45)
BASOPHILS # BLD AUTO: 0.5 % (ref 0–1.8)
BASOPHILS # BLD: 0.03 K/UL (ref 0–0.12)
BILIRUB SERPL-MCNC: 0.5 MG/DL (ref 0.1–1.5)
BUN SERPL-MCNC: 13 MG/DL (ref 8–22)
CALCIUM ALBUM COR SERPL-MCNC: 9.6 MG/DL (ref 8.5–10.5)
CALCIUM SERPL-MCNC: 9.3 MG/DL (ref 8.5–10.5)
CHLORIDE SERPL-SCNC: 107 MMOL/L (ref 96–112)
CO2 SERPL-SCNC: 20 MMOL/L (ref 20–33)
COHGB MFR BLD: 1.8 % (ref 0–4.9)
CREAT SERPL-MCNC: 0.82 MG/DL (ref 0.5–1.4)
EKG IMPRESSION: NORMAL
EOSINOPHIL # BLD AUTO: 0.15 K/UL (ref 0–0.51)
EOSINOPHIL NFR BLD: 2.7 % (ref 0–6.9)
ERYTHROCYTE [DISTWIDTH] IN BLOOD BY AUTOMATED COUNT: 56.5 FL (ref 35.9–50)
GFR SERPLBLD CREATININE-BSD FMLA CKD-EPI: 75 ML/MIN/1.73 M 2
GLOBULIN SER CALC-MCNC: 2.8 G/DL (ref 1.9–3.5)
GLUCOSE SERPL-MCNC: 80 MG/DL (ref 65–99)
HCT VFR BLD AUTO: 29 % (ref 37–47)
HGB BLD-MCNC: 9.4 G/DL (ref 12–16)
IMM GRANULOCYTES # BLD AUTO: 0.01 K/UL (ref 0–0.11)
IMM GRANULOCYTES NFR BLD AUTO: 0.2 % (ref 0–0.9)
LYMPHOCYTES # BLD AUTO: 1.32 K/UL (ref 1–4.8)
LYMPHOCYTES NFR BLD: 24.1 % (ref 22–41)
MCH RBC QN AUTO: 30.4 PG (ref 27–33)
MCHC RBC AUTO-ENTMCNC: 32.4 G/DL (ref 32.2–35.5)
MCV RBC AUTO: 93.9 FL (ref 81.4–97.8)
MONOCYTES # BLD AUTO: 0.41 K/UL (ref 0–0.85)
MONOCYTES NFR BLD AUTO: 7.5 % (ref 0–13.4)
NEUTROPHILS # BLD AUTO: 3.55 K/UL (ref 1.82–7.42)
NEUTROPHILS NFR BLD: 65 % (ref 44–72)
NRBC # BLD AUTO: 0 K/UL
NRBC BLD-RTO: 0 /100 WBC (ref 0–0.2)
PLATELET # BLD AUTO: 295 K/UL (ref 164–446)
PMV BLD AUTO: 10.2 FL (ref 9–12.9)
POTASSIUM SERPL-SCNC: 3.9 MMOL/L (ref 3.6–5.5)
PROT SERPL-MCNC: 6.4 G/DL (ref 6–8.2)
RBC # BLD AUTO: 3.09 M/UL (ref 4.2–5.4)
SODIUM SERPL-SCNC: 144 MMOL/L (ref 135–145)
TROPONIN T SERPL-MCNC: 41 NG/L (ref 6–19)
TROPONIN T SERPL-MCNC: 44 NG/L (ref 6–19)
WBC # BLD AUTO: 5.5 K/UL (ref 4.8–10.8)

## 2024-05-30 PROCEDURE — 700102 HCHG RX REV CODE 250 W/ 637 OVERRIDE(OP)

## 2024-05-30 PROCEDURE — A9270 NON-COVERED ITEM OR SERVICE: HCPCS

## 2024-05-30 PROCEDURE — 85025 COMPLETE CBC W/AUTO DIFF WBC: CPT

## 2024-05-30 PROCEDURE — 84484 ASSAY OF TROPONIN QUANT: CPT | Mod: 91

## 2024-05-30 PROCEDURE — 71045 X-RAY EXAM CHEST 1 VIEW: CPT

## 2024-05-30 PROCEDURE — G0378 HOSPITAL OBSERVATION PER HR: HCPCS

## 2024-05-30 PROCEDURE — 700101 HCHG RX REV CODE 250

## 2024-05-30 PROCEDURE — 93005 ELECTROCARDIOGRAM TRACING: CPT | Performed by: EMERGENCY MEDICINE

## 2024-05-30 PROCEDURE — 82375 ASSAY CARBOXYHB QUANT: CPT

## 2024-05-30 PROCEDURE — 80053 COMPREHEN METABOLIC PANEL: CPT

## 2024-05-30 PROCEDURE — 94640 AIRWAY INHALATION TREATMENT: CPT

## 2024-05-30 PROCEDURE — 99223 1ST HOSP IP/OBS HIGH 75: CPT

## 2024-05-30 PROCEDURE — 99285 EMERGENCY DEPT VISIT HI MDM: CPT

## 2024-05-30 PROCEDURE — 36415 COLL VENOUS BLD VENIPUNCTURE: CPT

## 2024-05-30 RX ORDER — AMLODIPINE BESYLATE 10 MG/1
10 TABLET ORAL DAILY
Status: DISCONTINUED | OUTPATIENT
Start: 2024-05-30 | End: 2024-05-31 | Stop reason: HOSPADM

## 2024-05-30 RX ORDER — FLUOXETINE HYDROCHLORIDE 20 MG/1
60 CAPSULE ORAL DAILY
Status: DISCONTINUED | OUTPATIENT
Start: 2024-05-31 | End: 2024-05-31 | Stop reason: HOSPADM

## 2024-05-30 RX ORDER — MORPHINE SULFATE 4 MG/ML
2 INJECTION INTRAVENOUS
Status: DISCONTINUED | OUTPATIENT
Start: 2024-05-30 | End: 2024-05-31 | Stop reason: HOSPADM

## 2024-05-30 RX ORDER — LEVALBUTEROL INHALATION SOLUTION 1.25 MG/3ML
1.25 SOLUTION RESPIRATORY (INHALATION)
Status: DISCONTINUED | OUTPATIENT
Start: 2024-05-30 | End: 2024-05-31 | Stop reason: HOSPADM

## 2024-05-30 RX ORDER — ATORVASTATIN CALCIUM 40 MG/1
40 TABLET, FILM COATED ORAL EVERY EVENING
Status: DISCONTINUED | OUTPATIENT
Start: 2024-05-30 | End: 2024-05-31 | Stop reason: HOSPADM

## 2024-05-30 RX ORDER — HYDRALAZINE HYDROCHLORIDE 20 MG/ML
10 INJECTION INTRAMUSCULAR; INTRAVENOUS EVERY 6 HOURS PRN
Status: DISCONTINUED | OUTPATIENT
Start: 2024-05-30 | End: 2024-05-31 | Stop reason: HOSPADM

## 2024-05-30 RX ORDER — TRAMADOL HYDROCHLORIDE 50 MG/1
100 TABLET ORAL NIGHTLY
Status: DISCONTINUED | OUTPATIENT
Start: 2024-05-30 | End: 2024-05-31 | Stop reason: HOSPADM

## 2024-05-30 RX ORDER — ASPIRIN 81 MG/1
324 TABLET, CHEWABLE ORAL DAILY
Status: DISCONTINUED | OUTPATIENT
Start: 2024-05-30 | End: 2024-05-31 | Stop reason: HOSPADM

## 2024-05-30 RX ORDER — ASPIRIN 300 MG/1
300 SUPPOSITORY RECTAL DAILY
Status: DISCONTINUED | OUTPATIENT
Start: 2024-05-30 | End: 2024-05-31 | Stop reason: HOSPADM

## 2024-05-30 RX ORDER — ASPIRIN 325 MG
325 TABLET ORAL DAILY
Status: DISCONTINUED | OUTPATIENT
Start: 2024-05-30 | End: 2024-05-31 | Stop reason: HOSPADM

## 2024-05-30 RX ORDER — PRAMIPEXOLE DIHYDROCHLORIDE 0.25 MG/1
0.25 TABLET ORAL NIGHTLY
Status: DISCONTINUED | OUTPATIENT
Start: 2024-05-30 | End: 2024-05-31 | Stop reason: HOSPADM

## 2024-05-30 RX ORDER — LEVOTHYROXINE SODIUM 175 UG/1
175 TABLET ORAL
Status: DISCONTINUED | OUTPATIENT
Start: 2024-05-31 | End: 2024-05-31 | Stop reason: HOSPADM

## 2024-05-30 RX ORDER — DIGOXIN 125 MCG
125 TABLET ORAL DAILY
Status: DISCONTINUED | OUTPATIENT
Start: 2024-05-30 | End: 2024-05-31 | Stop reason: HOSPADM

## 2024-05-30 RX ADMIN — PRAMIPEXOLE DIHYDROCHLORIDE 0.25 MG: 0.25 TABLET ORAL at 20:28

## 2024-05-30 RX ADMIN — DIGOXIN 125 MCG: 0.12 TABLET ORAL at 18:36

## 2024-05-30 RX ADMIN — ATORVASTATIN CALCIUM 40 MG: 40 TABLET, FILM COATED ORAL at 18:35

## 2024-05-30 RX ADMIN — TRAMADOL HYDROCHLORIDE 100 MG: 50 TABLET ORAL at 20:27

## 2024-05-30 RX ADMIN — APIXABAN 5 MG: 5 TABLET, FILM COATED ORAL at 18:36

## 2024-05-30 RX ADMIN — ASPIRIN 325 MG: 325 TABLET ORAL at 18:35

## 2024-05-30 RX ADMIN — AMLODIPINE BESYLATE 10 MG: 10 TABLET ORAL at 18:35

## 2024-05-30 RX ADMIN — METOPROLOL TARTRATE 25 MG: 25 TABLET, FILM COATED ORAL at 18:35

## 2024-05-30 RX ADMIN — LEVALBUTEROL 1.25 MG: 1.25 SOLUTION RESPIRATORY (INHALATION) at 18:58

## 2024-05-30 SDOH — ECONOMIC STABILITY: TRANSPORTATION INSECURITY
IN THE PAST 12 MONTHS, HAS THE LACK OF TRANSPORTATION KEPT YOU FROM MEDICAL APPOINTMENTS OR FROM GETTING MEDICATIONS?: YES

## 2024-05-30 SDOH — ECONOMIC STABILITY: TRANSPORTATION INSECURITY
IN THE PAST 12 MONTHS, HAS LACK OF RELIABLE TRANSPORTATION KEPT YOU FROM MEDICAL APPOINTMENTS, MEETINGS, WORK OR FROM GETTING THINGS NEEDED FOR DAILY LIVING?: YES

## 2024-05-30 ASSESSMENT — PATIENT HEALTH QUESTIONNAIRE - PHQ9
9. THOUGHTS THAT YOU WOULD BE BETTER OFF DEAD, OR OF HURTING YOURSELF: NOT AT ALL
2. FEELING DOWN, DEPRESSED, IRRITABLE, OR HOPELESS: SEVERAL DAYS
SUM OF ALL RESPONSES TO PHQ QUESTIONS 1-9: 5
8. MOVING OR SPEAKING SO SLOWLY THAT OTHER PEOPLE COULD HAVE NOTICED. OR THE OPPOSITE, BEING SO FIGETY OR RESTLESS THAT YOU HAVE BEEN MOVING AROUND A LOT MORE THAN USUAL: SEVERAL DAYS
1. LITTLE INTEREST OR PLEASURE IN DOING THINGS: NOT AT ALL
5. POOR APPETITE OR OVEREATING: NOT AT ALL
6. FEELING BAD ABOUT YOURSELF - OR THAT YOU ARE A FAILURE OR HAVE LET YOURSELF OR YOUR FAMILY DOWN: NOT AL ALL
7. TROUBLE CONCENTRATING ON THINGS, SUCH AS READING THE NEWSPAPER OR WATCHING TELEVISION: NOT AT ALL
SUM OF ALL RESPONSES TO PHQ9 QUESTIONS 1 AND 2: 1
4. FEELING TIRED OR HAVING LITTLE ENERGY: MORE THAN HALF THE DAYS
3. TROUBLE FALLING OR STAYING ASLEEP OR SLEEPING TOO MUCH: SEVERAL DAYS

## 2024-05-30 ASSESSMENT — ENCOUNTER SYMPTOMS
SHORTNESS OF BREATH: 1
FEVER: 0
NAUSEA: 0
WHEEZING: 1
DIARRHEA: 0
HEARTBURN: 0
PALPITATIONS: 0
ABDOMINAL PAIN: 0
DIZZINESS: 0
HEADACHES: 0
COUGH: 0
VOMITING: 0
CHILLS: 0

## 2024-05-30 ASSESSMENT — PAIN DESCRIPTION - PAIN TYPE: TYPE: ACUTE PAIN

## 2024-05-30 ASSESSMENT — CHA2DS2 SCORE
DIABETES: NO
CHF OR LEFT VENTRICULAR DYSFUNCTION: NO
VASCULAR DISEASE: NO
SEX: FEMALE
CHA2DS2 VASC SCORE: 5
PRIOR STROKE OR TIA OR THROMBOEMBOLISM: YES
AGE 75 OR GREATER: NO
AGE 65 TO 74: YES
HYPERTENSION: YES

## 2024-05-30 ASSESSMENT — LIFESTYLE VARIABLES
CONSUMPTION TOTAL: NEGATIVE
DOES PATIENT WANT TO STOP DRINKING: NO
ON A TYPICAL DAY WHEN YOU DRINK ALCOHOL HOW MANY DRINKS DO YOU HAVE: 0
EVER FELT BAD OR GUILTY ABOUT YOUR DRINKING: NO
ALCOHOL_USE: NO
HOW MANY TIMES IN THE PAST YEAR HAVE YOU HAD 5 OR MORE DRINKS IN A DAY: 0
TOTAL SCORE: 0
HAVE PEOPLE ANNOYED YOU BY CRITICIZING YOUR DRINKING: NO
HAVE YOU EVER FELT YOU SHOULD CUT DOWN ON YOUR DRINKING: NO
EVER HAD A DRINK FIRST THING IN THE MORNING TO STEADY YOUR NERVES TO GET RID OF A HANGOVER: NO
TOTAL SCORE: 0
AVERAGE NUMBER OF DAYS PER WEEK YOU HAVE A DRINK CONTAINING ALCOHOL: 0
TOTAL SCORE: 0

## 2024-05-30 ASSESSMENT — SOCIAL DETERMINANTS OF HEALTH (SDOH)

## 2024-05-30 ASSESSMENT — FIBROSIS 4 INDEX
FIB4 SCORE: 1.83
FIB4 SCORE: 1.355261854357876857

## 2024-05-30 NOTE — ASSESSMENT & PLAN NOTE
- Troponin elevated at outside hospital at 150  - Troponin at [facility] is 44  - Continue to trend troponin  - Patient is now chest pain-free
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
## 2024-05-30 NOTE — ED NOTES
Med Rec complete per patient   Allergies reviewed  Antibiotics in the past 30 days:no  Anticoagulant in past 14 days:yes  Anticoagulant:Eliquis 5 mg Last dose:05/29/24  Pharmacy patient utilizes:[facility] in [place]    Unable to verify Tramadol via

## 2024-05-30 NOTE — ASSESSMENT & PLAN NOTE
- Continue home Norvasc, digoxin  - Metoprolol 25 mg twice daily per chest pain order set  - Vitals every 4 hours  - IV hydralazine available as needed
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
## 2024-05-31 ENCOUNTER — PHARMACY VISIT (OUTPATIENT)
Dept: PHARMACY | Facility: MEDICAL CENTER | Age: 74
End: 2024-05-31
Payer: COMMERCIAL

## 2024-05-31 VITALS
RESPIRATION RATE: 16 BRPM | OXYGEN SATURATION: 94 % | HEART RATE: 63 BPM | WEIGHT: 234.79 LBS | HEIGHT: 64 IN | SYSTOLIC BLOOD PRESSURE: 121 MMHG | BODY MASS INDEX: 40.08 KG/M2 | DIASTOLIC BLOOD PRESSURE: 67 MMHG | TEMPERATURE: 98.4 F

## 2024-05-31 PROBLEM — R07.9 CHEST PAIN: Status: RESOLVED | Noted: 2024-05-30 | Resolved: 2024-05-31

## 2024-05-31 LAB
ANION GAP SERPL CALC-SCNC: 12 MMOL/L (ref 7–16)
BUN SERPL-MCNC: 16 MG/DL (ref 8–22)
CALCIUM SERPL-MCNC: 8.6 MG/DL (ref 8.5–10.5)
CHLORIDE SERPL-SCNC: 107 MMOL/L (ref 96–112)
CHOLEST SERPL-MCNC: 106 MG/DL (ref 100–199)
CO2 SERPL-SCNC: 22 MMOL/L (ref 20–33)
CREAT SERPL-MCNC: 0.91 MG/DL (ref 0.5–1.4)
ERYTHROCYTE [DISTWIDTH] IN BLOOD BY AUTOMATED COUNT: 57 FL (ref 35.9–50)
GFR SERPLBLD CREATININE-BSD FMLA CKD-EPI: 66 ML/MIN/1.73 M 2
GLUCOSE SERPL-MCNC: 95 MG/DL (ref 65–99)
HCT VFR BLD AUTO: 25.2 % (ref 37–47)
HDLC SERPL-MCNC: 45 MG/DL
HGB BLD-MCNC: 7.9 G/DL (ref 12–16)
LDLC SERPL CALC-MCNC: 44 MG/DL
MCH RBC QN AUTO: 29.3 PG (ref 27–33)
MCHC RBC AUTO-ENTMCNC: 31.3 G/DL (ref 32.2–35.5)
MCV RBC AUTO: 93.3 FL (ref 81.4–97.8)
PLATELET # BLD AUTO: 234 K/UL (ref 164–446)
PMV BLD AUTO: 10.1 FL (ref 9–12.9)
POTASSIUM SERPL-SCNC: 3.8 MMOL/L (ref 3.6–5.5)
RBC # BLD AUTO: 2.7 M/UL (ref 4.2–5.4)
SODIUM SERPL-SCNC: 141 MMOL/L (ref 135–145)
TRIGL SERPL-MCNC: 85 MG/DL (ref 0–149)
TROPONIN T SERPL-MCNC: 37 NG/L (ref 6–19)
WBC # BLD AUTO: 4.7 K/UL (ref 4.8–10.8)

## 2024-05-31 PROCEDURE — 85027 COMPLETE CBC AUTOMATED: CPT

## 2024-05-31 PROCEDURE — 700101 HCHG RX REV CODE 250: Mod: JZ

## 2024-05-31 PROCEDURE — 94640 AIRWAY INHALATION TREATMENT: CPT

## 2024-05-31 PROCEDURE — 700102 HCHG RX REV CODE 250 W/ 637 OVERRIDE(OP)

## 2024-05-31 PROCEDURE — RXMED WILLOW AMBULATORY MEDICATION CHARGE: Performed by: NURSE PRACTITIONER

## 2024-05-31 PROCEDURE — 84484 ASSAY OF TROPONIN QUANT: CPT

## 2024-05-31 PROCEDURE — 99239 HOSP IP/OBS DSCHRG MGMT >30: CPT | Mod: GC | Performed by: STUDENT IN AN ORGANIZED HEALTH CARE EDUCATION/TRAINING PROGRAM

## 2024-05-31 PROCEDURE — A9270 NON-COVERED ITEM OR SERVICE: HCPCS

## 2024-05-31 PROCEDURE — G0378 HOSPITAL OBSERVATION PER HR: HCPCS

## 2024-05-31 PROCEDURE — 80061 LIPID PANEL: CPT

## 2024-05-31 PROCEDURE — 80048 BASIC METABOLIC PNL TOTAL CA: CPT

## 2024-05-31 RX ORDER — DIGOXIN 125 MCG
125 TABLET ORAL DAILY
Qty: 30 TABLET | Refills: 0 | Status: SHIPPED | OUTPATIENT
Start: 2024-05-31 | End: 2024-06-30

## 2024-05-31 RX ORDER — ALLOPURINOL 300 MG/1
300 TABLET ORAL DAILY
Qty: 30 TABLET | Refills: 0 | Status: SHIPPED | OUTPATIENT
Start: 2024-05-31 | End: 2024-06-30

## 2024-05-31 RX ORDER — FLUOXETINE HYDROCHLORIDE 20 MG/1
60 CAPSULE ORAL DAILY
Qty: 90 CAPSULE | Refills: 0 | Status: SHIPPED | OUTPATIENT
Start: 2024-05-31 | End: 2024-06-30

## 2024-05-31 RX ORDER — NYSTATIN 100000 [USP'U]/G
1 POWDER TOPICAL 3 TIMES DAILY PRN
Qty: 45 G | Refills: 1 | Status: SHIPPED | OUTPATIENT
Start: 2024-05-31 | End: 2024-06-30

## 2024-05-31 RX ORDER — PRAMIPEXOLE DIHYDROCHLORIDE 0.12 MG/1
0.25 TABLET ORAL NIGHTLY
Qty: 60 TABLET | Refills: 0 | Status: SHIPPED | OUTPATIENT
Start: 2024-05-31 | End: 2024-06-30

## 2024-05-31 RX ORDER — ATORVASTATIN CALCIUM 40 MG/1
40 TABLET, FILM COATED ORAL EVERY EVENING
Qty: 30 TABLET | Refills: 0 | Status: SHIPPED | OUTPATIENT
Start: 2024-05-31 | End: 2024-06-30

## 2024-05-31 RX ORDER — FLUTICASONE PROPIONATE 50 MCG
1 SPRAY, SUSPENSION (ML) NASAL
Qty: 16 G | Refills: 0 | Status: SHIPPED | OUTPATIENT
Start: 2024-05-31 | End: 2024-06-30

## 2024-05-31 RX ORDER — TRAMADOL HYDROCHLORIDE 50 MG/1
100 TABLET ORAL NIGHTLY
Qty: 60 TABLET | Refills: 0 | Status: SHIPPED | OUTPATIENT
Start: 2024-05-31 | End: 2024-06-30

## 2024-05-31 RX ORDER — AMLODIPINE BESYLATE 10 MG/1
10 TABLET ORAL DAILY
Qty: 30 TABLET | Refills: 0 | Status: SHIPPED | OUTPATIENT
Start: 2024-05-31 | End: 2024-06-30

## 2024-05-31 RX ORDER — LEVOTHYROXINE SODIUM 175 UG/1
175 TABLET ORAL
Qty: 30 TABLET | Refills: 0 | Status: SHIPPED | OUTPATIENT
Start: 2024-05-31 | End: 2024-06-30

## 2024-05-31 RX ADMIN — LEVALBUTEROL 1.25 MG: 1.25 SOLUTION RESPIRATORY (INHALATION) at 11:08

## 2024-05-31 RX ADMIN — LEVALBUTEROL HYDROCHLORIDE 1.25 MG: 1.25 SOLUTION RESPIRATORY (INHALATION) at 05:07

## 2024-05-31 RX ADMIN — ASPIRIN 325 MG: 325 TABLET ORAL at 05:07

## 2024-05-31 RX ADMIN — FLUOXETINE HYDROCHLORIDE 60 MG: 20 CAPSULE ORAL at 05:06

## 2024-05-31 RX ADMIN — LEVOTHYROXINE SODIUM 175 MCG: 0.17 TABLET ORAL at 05:07

## 2024-05-31 RX ADMIN — AMLODIPINE BESYLATE 10 MG: 10 TABLET ORAL at 05:07

## 2024-05-31 RX ADMIN — LEVALBUTEROL 1.25 MG: 1.25 SOLUTION RESPIRATORY (INHALATION) at 06:04

## 2024-05-31 RX ADMIN — METOPROLOL TARTRATE 25 MG: 25 TABLET, FILM COATED ORAL at 05:07

## 2024-05-31 RX ADMIN — APIXABAN 5 MG: 5 TABLET, FILM COATED ORAL at 05:07

## 2024-05-31 RX ADMIN — LEVALBUTEROL HYDROCHLORIDE 1.25 MG: 1.25 SOLUTION RESPIRATORY (INHALATION) at 02:57

## 2024-05-31 ASSESSMENT — COPD QUESTIONNAIRES
COPD SCREENING SCORE: 3
DO YOU EVER COUGH UP ANY MUCUS OR PHLEGM?: YES, A FEW DAYS A WEEK OR MONTH
DURING THE PAST 4 WEEKS HOW MUCH DID YOU FEEL SHORT OF BREATH: NONE/LITTLE OF THE TIME
HAVE YOU SMOKED AT LEAST 100 CIGARETTES IN YOUR ENTIRE LIFE: NO/DON'T KNOW

## 2024-05-31 ASSESSMENT — PAIN DESCRIPTION - PAIN TYPE: TYPE: ACUTE PAIN

## 2024-05-31 NOTE — DISCHARGE PLANNING
Care Transition Team Assessment    Spoke with patient at bedside and verified all information. Lives alone in 2 story [place]. Stays on lower level. PCP Zandra Zuniga in [place]. Has Home o2 at night with Piyush. Uses W/C, Walker and cane as needed. Uses [facility] in [place] for meds. Unsure of ride at present but encouraged to start working on ride. Anticipate increase in O2 need. Spoke with patient and patient gave verbal consent and choice filled out. Patient signed choice. Choice form faxed to [facility]. Message sent to Fam with request to Scan to Media tab.      Information Source  Orientation Level: Oriented X4  Information Given By: Patient    Readmission Evaluation  Is this a readmission?: No    Interdisciplinary Discharge Planning  Primary Care Physician: Zandra Zuniga  Lives with - Patient's Self Care Capacity: Alone and Able to Care For Self  Patient or legal guardian wants to designate a caregiver: No  Support Systems: Friends / Neighbors  Housing / Facility: [address] [facility]  Do You Take your Prescribed Medications Regularly: Yes  Able to Return to Previous ADL's: Yes  Mobility Issues: Yes  Prior Services: Home-Independent  Patient Prefers to be Discharged to:: Home  Assistance Needed: No  Durable Medical Equipment: Home Oxygen, Walker, Other - Specify (W/C, Cane)  DME Provider / Phone: Piyush    Discharge Preparedness  What are your discharge supports?: Other (comment) (Friends)  Prior Functional Level: Uses Cane, Uses Walker, Uses Wheelchair    Functional Assesment  Prior Functional Level: Uses Cane, Uses Walker, Uses Wheelchair    Finances  Prescription Coverage: Yes    Anticipated Discharge Information  Discharge Disposition: Discharged to home/self care (01)  Discharge Address: [address] [place] [address]  Discharge Contact Phone Number: [phone]

## 2024-05-31 NOTE — CARE PLAN
Problem: Bronchoconstriction  Goal: Improve in air movement and diminished wheezing  Description: Target End Date:  2 to 3 days    1.  Implement inhaled treatments  2.  Evaluate and manage medication effects  Outcome: Progressing    Xopenex QID + PRN

## 2024-05-31 NOTE — PROGRESS NOTES
4 Eyes Skin Assessment Completed by CHICHI Granados and CHICHI Grullon.    Head WDL  Ears WDL  Nose WDL  Mouth WDL  Neck WDL  Breast/Chest WDL  Shoulder Blades WDL  Spine WDL  (R) Arm/Elbow/Hand Redness and Blanching  (L) Arm/Elbow/Hand Redness and Blanching  Abdomen Redness and Blanching  Groin WDL  Scrotum/Coccyx/Buttocks Redness and Blanching  (R) Leg WDL  (L) Leg WDL  (R) Heel/Foot/Toe WDL  (L) Heel/Foot/Toe WDL      Devices In Places Tele Box, Pulse Ox, and Nasal Cannula      Interventions In Place Gray Ear Foams, NC W/Ear Foams, and Pillows    Possible Skin Injury No    Pictures Uploaded Into Epic N/A  Wound Consult Placed N/A  RN Wound Prevention Protocol Ordered No

## 2024-05-31 NOTE — PROGRESS NOTES
Telemetry Shift Summary     Rhythm: SB-SR  HR: 42-64  Ectopy: bigem, coup, f PVC, f PAC    Measurements: 0.15/0.07/0.40

## 2024-05-31 NOTE — CARE PLAN
The patient is Stable - Low risk of patient condition declining or worsening    Problem: Knowledge Deficit - Standard  Goal: Patient and family/care givers will demonstrate understanding of plan of care, disease process/condition, diagnostic tests and medications  5/31/2024 0321 by Darwin Maza R.N.  Outcome: Progressing    Shift Goals  Clinical Goals: pain control, monitor O2  Patient Goals: sleep    Progress made toward(s) clinical / shift goals:  POC discussed with patient, pt O2 sats stable on 2L NC    Patient is not progressing towards the following goals:

## 2024-05-31 NOTE — DISCHARGE INSTRUCTIONS
Discharge Instructions    Discharged to home by medical transportation with escort. Discharged via wheelchair, hospital escort: Yes.  Special equipment needed: Oxygen    Be sure to schedule a follow-up appointment with your primary care doctor or any specialists as instructed.     Discharge Plan:   Diet Plan: Discussed  Activity Level: Discussed  Confirmed Follow up Appointment: Patient to Call and Schedule Appointment  Confirmed Symptoms Management: Discussed  Medication Reconciliation Updated: Yes    I understand that a diet low in cholesterol, fat, and sodium is recommended for good health. Unless I have been given specific instructions below for another diet, I accept this instruction as my diet prescription.   Other diet: heart healthy    Special Instructions: None    -Is this patient being discharged with medication to prevent blood clots?  No    Is patient discharged on Warfarin / Coumadin?   No         FOLLOW UP ITEMS POST DISCHARGE  Please call [phone] to schedule PCP appointment for patient.    Required specialty appointments include:       Discharge Instructions per DOMINGO RogersRBarbieN.    -Follow-up with PCP s/p hospitalization  -Use oxygen 24/7 morning and night at 2 L; monitor O2 at home  -Start taking metoprolol 25 mg twice daily for management of history of atrial fibrillation  -Continue all home medications    DIET: As tolerated    ACTIVITY: As tolerated    DIAGNOSIS: Chest pain    Return to ER if symptoms persist, chest pain, palpitations, shortness of breath, numbness, tingling, weakness, and high fevers.

## 2024-05-31 NOTE — DISCHARGE PLANNING
-1125  SOREN manually faxed oxygen referral to [facility], per choice form.     -1210  DPA spoke with [facility]. Company received order for oxygen, but is requesting that pt calls them before they will fulfill the order. RN CM notified.     -1337  DPA spoke with Timothy from [facility], she has processed the order and will be contacting the [place] branch about delivering a portable tank soon.

## 2024-05-31 NOTE — DISCHARGE PLANNING
Updated by Fam [facility]Y that patient needs to call [facility] @ [phone] before they are able to bring tank for D/C. Spoke with patient at bedside and gave patient [facility] contact # and instructed to call. Paige CADET updated.

## 2024-05-31 NOTE — PROGRESS NOTES
Bedside report received from day shift RN. Pt A&Ox4. SR-SB on the monitors. POC discussed with pt. Pt verbalizes understanding. Call light and belongings within reach. Bed locked and in lowest position.     23:06- monitor tech called for pt Dariel in 40s-50s, checked on pt. Pt asymptomatic and BP stable. Continue to monitor pt.

## 2024-05-31 NOTE — CARE PLAN
The patient is Watcher - Medium risk of patient condition declining or worsening    Shift Goals  Clinical Goals: pain control, monitor O2  Patient Goals: sleep    Progress made toward(s) clinical / shift goals:    Problem: Knowledge Deficit - Standard  Goal: Patient and family/care givers will demonstrate understanding of plan of care, disease process/condition, diagnostic tests and medications  Description: Target End Date:  1-3 days or as soon as patient condition allows    Document in Patient Education    1.  Patient and family/caregiver oriented to unit, equipment, visitation policy and means for communicating concern  2.  Complete/review Learning Assessment  3.  Assess knowledge level of disease process/condition, treatment plan, diagnostic tests and medications  4.  Explain disease process/condition, treatment plan, diagnostic tests and medications  Outcome: Progressing     Problem: Fall Risk  Goal: Patient will remain free from falls  Description: Target End Date:  Prior to discharge or change in level of care    Document interventions on the Xiomara Villalta Fall Risk Assessment    1.  Assess for fall risk factors  2.  Implement fall precautions  Outcome: Progressing       Patient is not progressing towards the following goals:

## 2024-05-31 NOTE — PROGRESS NOTES
Pt dc'd home. IV and monitor removed; monitor room notified. Pt left unit via wheelchair with RN, Loaded into UBER. 2 personal oxygen tanks, and [id number] order with pt. Personal belongings with pt when leaving unit. Pt given discharge instructions prior to leaving unit including where to  prescriptions and when to follow-up; verbalizes understanding. Copy of discharge instructions with pt and in the chart.

## 2024-05-31 NOTE — ASSESSMENT & PLAN NOTE
- Patient has been using oxygen at night since her discharge last week  - Now having audible wheezing and required 2 L at rest  - RT protocol, Casey  - IS

## 2024-05-31 NOTE — FACE TO FACE
"Face to Face Note  -  Durable Medical Equipment    JAMESON Rogers - NPI: [id number]  I certify that this patient is under my care and that they had a durable medical equipment(DME)face to face encounter by myself that meets the physician DME face-to-face encounter requirements with this patient on:    Date of encounter:   Patient:                    MRN:                       YOB: 2025  Azalea Gan  [id number]  1950     The encounter with the patient was in whole, or in part, for the following medical condition, which is the primary reason for durable medical equipment:  Other - O2 all day and night    I certify that, based on my findings, the following durable medical equipment is medically necessary:    Oxygen   HOME O2 Saturation Measurements:(Values must be present for Home Oxygen orders)  Room air sat at rest: 87  Room air sat with amb: 85  With liters of O2: 1, O2 sat at rest with O2: 91  With Liters of O2: 2, O2 sat with amb with O2 : 92  Is the patient mobile?: Yes  If patient feels more short of breath, they can go up to 6 liters per minute and contact healthcare provider.    Supporting Symptoms: The patient requires supplemental oxygen, as the following interventions have been tried with limited or no improvement: \"Ambulation with oximetry and \"Incentive spirometry.    My Clinical findings support the need for the above equipment due to:  Hypoxia  "

## 2024-05-31 NOTE — PROGRESS NOTES
Pt informed RN that all available transportation options are unable to provide ride home, case management updated.
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
## 2024-05-31 NOTE — DISCHARGE PLANNING
Patient has no ride home to [facility] O2 just arrived. Messaged Trish ESTRADA  for [employer] ride along with address and patient info. Patient has keys and no steps. Paige CADET contact # given to Trish ESTRADA . Paige CADET updated.
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
## 2024-05-31 NOTE — PROGRESS NOTES
Pt AAOx4, denies CP only slight discomfort reported at this time, able to swallow pills great. VSS. Call light and belongings with in reach.

## 2024-06-11 LAB
FUNGUS SPEC CULT: NORMAL
FUNGUS SPEC FUNGUS STN: NORMAL
SIGNIFICANT IND 70042: NORMAL
SITE SITE: NORMAL
SOURCE SOURCE: NORMAL

## 2024-06-29 ENCOUNTER — APPOINTMENT (OUTPATIENT)
Dept: RADIOLOGY | Facility: MEDICAL CENTER | Age: 74
DRG: 377 | End: 2024-06-29
Attending: HOSPITALIST
Payer: MEDICARE

## 2024-06-29 ENCOUNTER — HOSPITAL ENCOUNTER (INPATIENT)
Facility: MEDICAL CENTER | Age: 74
DRG: 377 | End: 2024-06-29
Attending: HOSPITALIST | Admitting: HOSPITALIST
Payer: MEDICARE

## 2024-06-29 DIAGNOSIS — G25.81 RESTLESS LEG: ICD-10-CM

## 2024-06-29 DIAGNOSIS — M25.552 LEFT HIP PAIN: ICD-10-CM

## 2024-06-29 DIAGNOSIS — R07.9 CHEST PAIN, UNSPECIFIED TYPE: ICD-10-CM

## 2024-06-29 DIAGNOSIS — F41.9 ANXIETY: ICD-10-CM

## 2024-06-29 DIAGNOSIS — E66.01 CLASS 3 SEVERE OBESITY WITHOUT SERIOUS COMORBIDITY WITH BODY MASS INDEX (BMI) OF 40.0 TO 44.9 IN ADULT, UNSPECIFIED OBESITY TYPE (HCC): ICD-10-CM

## 2024-06-29 DIAGNOSIS — K92.1 GASTROINTESTINAL HEMORRHAGE WITH MELENA: ICD-10-CM

## 2024-06-29 DIAGNOSIS — J96.11 CHRONIC HYPOXIC RESPIRATORY FAILURE (HCC): Chronic | ICD-10-CM

## 2024-06-29 DIAGNOSIS — I27.20 PULMONARY HYPERTENSION (HCC): ICD-10-CM

## 2024-06-29 DIAGNOSIS — I48.91 ATRIAL FIBRILLATION WITH RAPID VENTRICULAR RESPONSE (HCC): ICD-10-CM

## 2024-06-29 DIAGNOSIS — E87.6 HYPOKALEMIA: ICD-10-CM

## 2024-06-29 DIAGNOSIS — J96.01 ACUTE RESPIRATORY FAILURE WITH HYPOXIA (HCC): ICD-10-CM

## 2024-06-29 DIAGNOSIS — R09.81 CHRONIC NASAL CONGESTION: ICD-10-CM

## 2024-06-29 DIAGNOSIS — M1A.09X0 IDIOPATHIC CHRONIC GOUT OF MULTIPLE SITES WITHOUT TOPHUS: ICD-10-CM

## 2024-06-29 DIAGNOSIS — G47.34 NOCTURNAL HYPOXEMIA: ICD-10-CM

## 2024-06-29 DIAGNOSIS — I50.33 ACUTE ON CHRONIC HEART FAILURE WITH PRESERVED EJECTION FRACTION (HCC): ICD-10-CM

## 2024-06-29 PROBLEM — K92.2 ACUTE GI BLEEDING: Status: ACTIVE | Noted: 2024-06-29

## 2024-06-29 PROBLEM — K92.2 GIB (GASTROINTESTINAL BLEEDING): Status: ACTIVE | Noted: 2024-06-29

## 2024-06-29 LAB
ERYTHROCYTE [DISTWIDTH] IN BLOOD BY AUTOMATED COUNT: 55.3 FL (ref 35.9–50)
HCT VFR BLD AUTO: 29.1 % (ref 37–47)
HGB BLD-MCNC: 9.2 G/DL (ref 12–16)
INR PPP: 1.3 (ref 0.87–1.13)
MCH RBC QN AUTO: 30.1 PG (ref 27–33)
MCHC RBC AUTO-ENTMCNC: 31.6 G/DL (ref 32.2–35.5)
MCV RBC AUTO: 95.1 FL (ref 81.4–97.8)
NT-PROBNP SERPL IA-MCNC: 702 PG/ML (ref 0–125)
PLATELET # BLD AUTO: 253 K/UL (ref 164–446)
PMV BLD AUTO: 10.6 FL (ref 9–12.9)
PROTHROMBIN TIME: 16.9 SEC (ref 12–14.6)
RBC # BLD AUTO: 3.06 M/UL (ref 4.2–5.4)
WBC # BLD AUTO: 6.3 K/UL (ref 4.8–10.8)

## 2024-06-29 PROCEDURE — 770020 HCHG ROOM/CARE - TELE (206)

## 2024-06-29 PROCEDURE — 94760 N-INVAS EAR/PLS OXIMETRY 1: CPT

## 2024-06-29 PROCEDURE — 30233N1 TRANSFUSION OF NONAUTOLOGOUS RED BLOOD CELLS INTO PERIPHERAL VEIN, PERCUTANEOUS APPROACH: ICD-10-PCS | Performed by: HOSPITALIST

## 2024-06-29 PROCEDURE — 85027 COMPLETE CBC AUTOMATED: CPT

## 2024-06-29 PROCEDURE — 99223 1ST HOSP IP/OBS HIGH 75: CPT | Mod: AI | Performed by: HOSPITALIST

## 2024-06-29 PROCEDURE — 85610 PROTHROMBIN TIME: CPT

## 2024-06-29 PROCEDURE — 700111 HCHG RX REV CODE 636 W/ 250 OVERRIDE (IP): Mod: JZ | Performed by: HOSPITALIST

## 2024-06-29 PROCEDURE — C9113 INJ PANTOPRAZOLE SODIUM, VIA: HCPCS | Performed by: HOSPITALIST

## 2024-06-29 PROCEDURE — 83880 ASSAY OF NATRIURETIC PEPTIDE: CPT

## 2024-06-29 RX ORDER — FUROSEMIDE 20 MG/1
40 TABLET ORAL DAILY
Status: ON HOLD | COMMUNITY
End: 2024-07-02

## 2024-06-29 RX ORDER — HYDROMORPHONE HYDROCHLORIDE 1 MG/ML
0.5 INJECTION, SOLUTION INTRAMUSCULAR; INTRAVENOUS; SUBCUTANEOUS
Status: DISCONTINUED | OUTPATIENT
Start: 2024-06-29 | End: 2024-07-02

## 2024-06-29 RX ORDER — POLYETHYLENE GLYCOL 3350 17 G/17G
1 POWDER, FOR SOLUTION ORAL
Status: DISCONTINUED | OUTPATIENT
Start: 2024-06-29 | End: 2024-07-03 | Stop reason: HOSPADM

## 2024-06-29 RX ORDER — PANTOPRAZOLE SODIUM 40 MG/10ML
40 INJECTION, POWDER, LYOPHILIZED, FOR SOLUTION INTRAVENOUS 2 TIMES DAILY
Status: DISCONTINUED | OUTPATIENT
Start: 2024-06-29 | End: 2024-07-03 | Stop reason: HOSPADM

## 2024-06-29 RX ORDER — ACETAMINOPHEN 325 MG/1
650 TABLET ORAL EVERY 6 HOURS PRN
Status: DISCONTINUED | OUTPATIENT
Start: 2024-06-29 | End: 2024-07-03 | Stop reason: HOSPADM

## 2024-06-29 RX ORDER — HYDROMORPHONE HYDROCHLORIDE 1 MG/ML
0.25 INJECTION, SOLUTION INTRAMUSCULAR; INTRAVENOUS; SUBCUTANEOUS
Status: DISCONTINUED | OUTPATIENT
Start: 2024-06-29 | End: 2024-06-30

## 2024-06-29 RX ORDER — ONDANSETRON 4 MG/1
4 TABLET, ORALLY DISINTEGRATING ORAL EVERY 4 HOURS PRN
Status: DISCONTINUED | OUTPATIENT
Start: 2024-06-29 | End: 2024-06-30

## 2024-06-29 RX ORDER — FUROSEMIDE 10 MG/ML
20 INJECTION INTRAMUSCULAR; INTRAVENOUS ONCE
Status: COMPLETED | OUTPATIENT
Start: 2024-06-29 | End: 2024-06-29

## 2024-06-29 RX ORDER — AMOXICILLIN 250 MG
2 CAPSULE ORAL 2 TIMES DAILY
Status: DISCONTINUED | OUTPATIENT
Start: 2024-06-29 | End: 2024-07-03 | Stop reason: HOSPADM

## 2024-06-29 RX ORDER — ONDANSETRON 2 MG/ML
4 INJECTION INTRAMUSCULAR; INTRAVENOUS EVERY 4 HOURS PRN
Status: DISCONTINUED | OUTPATIENT
Start: 2024-06-29 | End: 2024-06-30

## 2024-06-29 RX ADMIN — HYDROMORPHONE HYDROCHLORIDE 0.5 MG: 1 INJECTION, SOLUTION INTRAMUSCULAR; INTRAVENOUS; SUBCUTANEOUS at 23:34

## 2024-06-29 RX ADMIN — PANTOPRAZOLE SODIUM 40 MG: 40 INJECTION, POWDER, FOR SOLUTION INTRAVENOUS at 23:34

## 2024-06-29 RX ADMIN — FUROSEMIDE 20 MG: 10 INJECTION, SOLUTION INTRAVENOUS at 23:33

## 2024-06-29 ASSESSMENT — ENCOUNTER SYMPTOMS
BLOOD IN STOOL: 1
DOUBLE VISION: 0
PALPITATIONS: 0
NAUSEA: 0
BRUISES/BLEEDS EASILY: 0
WEAKNESS: 0
BLURRED VISION: 0
NECK PAIN: 0
FEVER: 0
DEPRESSION: 0
SHORTNESS OF BREATH: 0
HEADACHES: 0
INSOMNIA: 0
SORE THROAT: 0
MYALGIAS: 0
COUGH: 0
DIZZINESS: 0
VOMITING: 0

## 2024-06-29 ASSESSMENT — SOCIAL DETERMINANTS OF HEALTH (SDOH)
WITHIN THE LAST YEAR, HAVE TO BEEN RAPED OR FORCED TO HAVE ANY KIND OF SEXUAL ACTIVITY BY YOUR PARTNER OR EX-PARTNER?: NO
WITHIN THE LAST YEAR, HAVE YOU BEEN KICKED, HIT, SLAPPED, OR OTHERWISE PHYSICALLY HURT BY YOUR PARTNER OR EX-PARTNER?: NO
IN THE PAST 12 MONTHS, HAS THE ELECTRIC, GAS, OIL, OR WATER COMPANY THREATENED TO SHUT OFF SERVICE IN YOUR HOME?: YES
WITHIN THE LAST YEAR, HAVE YOU BEEN AFRAID OF YOUR PARTNER OR EX-PARTNER?: NO
WITHIN THE PAST 12 MONTHS, THE FOOD YOU BOUGHT JUST DIDN'T LAST AND YOU DIDN'T HAVE MONEY TO GET MORE: NEVER TRUE
WITHIN THE PAST 12 MONTHS, YOU WORRIED THAT YOUR FOOD WOULD RUN OUT BEFORE YOU GOT THE MONEY TO BUY MORE: NEVER TRUE
WITHIN THE LAST YEAR, HAVE YOU BEEN HUMILIATED OR EMOTIONALLY ABUSED IN OTHER WAYS BY YOUR PARTNER OR EX-PARTNER?: NO

## 2024-06-29 ASSESSMENT — COGNITIVE AND FUNCTIONAL STATUS - GENERAL
DAILY ACTIVITIY SCORE: 24
SUGGESTED CMS G CODE MODIFIER DAILY ACTIVITY: CH
CLIMB 3 TO 5 STEPS WITH RAILING: A LITTLE
MOVING TO AND FROM BED TO CHAIR: A LITTLE
SUGGESTED CMS G CODE MODIFIER MOBILITY: CH
STANDING UP FROM CHAIR USING ARMS: A LITTLE
DAILY ACTIVITIY SCORE: 24
SUGGESTED CMS G CODE MODIFIER DAILY ACTIVITY: CH
MOBILITY SCORE: 24

## 2024-06-29 ASSESSMENT — LIFESTYLE VARIABLES
DOES PATIENT WANT TO STOP DRINKING: NO
TOTAL SCORE: 0
CONSUMPTION TOTAL: NEGATIVE
HAVE YOU EVER FELT YOU SHOULD CUT DOWN ON YOUR DRINKING: NO
HAVE PEOPLE ANNOYED YOU BY CRITICIZING YOUR DRINKING: NO
ON A TYPICAL DAY WHEN YOU DRINK ALCOHOL HOW MANY DRINKS DO YOU HAVE: 0
HOW MANY TIMES IN THE PAST YEAR HAVE YOU HAD 5 OR MORE DRINKS IN A DAY: 0
EVER HAD A DRINK FIRST THING IN THE MORNING TO STEADY YOUR NERVES TO GET RID OF A HANGOVER: NO
EVER FELT BAD OR GUILTY ABOUT YOUR DRINKING: NO
TOTAL SCORE: 0
AVERAGE NUMBER OF DAYS PER WEEK YOU HAVE A DRINK CONTAINING ALCOHOL: 0
ALCOHOL_USE: NO
TOTAL SCORE: 0

## 2024-06-29 ASSESSMENT — FIBROSIS 4 INDEX: FIB4 SCORE: 2.3

## 2024-06-29 ASSESSMENT — PAIN DESCRIPTION - PAIN TYPE
TYPE: ACUTE PAIN
TYPE: ACUTE PAIN

## 2024-06-29 NOTE — PROGRESS NOTES
[facility] TRIAGE OFFICER CONSULT REQUEST REPORT  Consult requested by: Dr Muller  Reason for consult: GIB  Is consult for transition of care: Yes  Pertinent history/hospital Course: Patient reported to the [place] emergency room today.  Patient has positive occult blood stools.  For 5 days she has been having black tarry stools at home.  Hemoglobin is down from 13.3-10.2.  BUN is up to 34 from 15.  Vital signs stable at 138/74 with heart rate 77 respirations 18 and temperature 98.8.  She is 91% saturating on 2 L by nasal cannula patient is being transferred for higher level of care for GI evaluation with ongoing gastrointestinal bleeding.  Code Status: Full Code  Can the hospitalist sign off upon completion of required consult/outcomes requested: No   Assigned hospitalist: call hospitalist on arrival     For any question or concerns regarding the care of this patient, please reach out to the assigned hospitalist.

## 2024-06-30 VITALS
TEMPERATURE: 98.8 F | OXYGEN SATURATION: 95 % | WEIGHT: 244.05 LBS | DIASTOLIC BLOOD PRESSURE: 60 MMHG | HEART RATE: 68 BPM | RESPIRATION RATE: 17 BRPM | HEIGHT: 64 IN | BODY MASS INDEX: 41.67 KG/M2 | SYSTOLIC BLOOD PRESSURE: 126 MMHG

## 2024-06-30 PROBLEM — N30.90 CYSTITIS: Status: ACTIVE | Noted: 2024-06-30

## 2024-06-30 PROBLEM — R94.31 QT PROLONGATION: Status: ACTIVE | Noted: 2024-06-30

## 2024-06-30 PROBLEM — E78.5 HLD (HYPERLIPIDEMIA): Status: ACTIVE | Noted: 2024-06-30

## 2024-06-30 PROBLEM — K92.2 GIB (GASTROINTESTINAL BLEEDING): Status: RESOLVED | Noted: 2024-06-29 | Resolved: 2024-06-30

## 2024-06-30 PROBLEM — G47.34 NOCTURNAL HYPOXIA: Status: ACTIVE | Noted: 2024-06-30

## 2024-06-30 LAB
ABO GROUP BLD: NORMAL
ANION GAP SERPL CALC-SCNC: 13 MMOL/L (ref 7–16)
BARCODED ABORH UBTYP: 5100
BARCODED PRD CODE UBPRD: NORMAL
BARCODED UNIT NUM UBUNT: NORMAL
BLD GP AB SCN SERPL QL: NORMAL
BUN SERPL-MCNC: 27 MG/DL (ref 8–22)
CALCIUM SERPL-MCNC: 8.9 MG/DL (ref 8.4–10.2)
CHLORIDE SERPL-SCNC: 101 MMOL/L (ref 96–112)
CO2 SERPL-SCNC: 28 MMOL/L (ref 20–33)
COMPONENT R 8504R: NORMAL
CREAT SERPL-MCNC: 0.93 MG/DL (ref 0.5–1.4)
EKG IMPRESSION: NORMAL
ERYTHROCYTE [DISTWIDTH] IN BLOOD BY AUTOMATED COUNT: 55 FL (ref 35.9–50)
GFR SERPLBLD CREATININE-BSD FMLA CKD-EPI: 64 ML/MIN/1.73 M 2
GLUCOSE SERPL-MCNC: 91 MG/DL (ref 65–99)
HCT VFR BLD AUTO: 27.1 % (ref 37–47)
HCT VFR BLD AUTO: 32.7 % (ref 37–47)
HCT VFR BLD AUTO: 34.2 % (ref 37–47)
HGB BLD-MCNC: 10.4 G/DL (ref 12–16)
HGB BLD-MCNC: 10.8 G/DL (ref 12–16)
HGB BLD-MCNC: 8.6 G/DL (ref 12–16)
MAGNESIUM SERPL-MCNC: 1.7 MG/DL (ref 1.5–2.5)
MCH RBC QN AUTO: 29.9 PG (ref 27–33)
MCHC RBC AUTO-ENTMCNC: 31.7 G/DL (ref 32.2–35.5)
MCV RBC AUTO: 94.1 FL (ref 81.4–97.8)
PLATELET # BLD AUTO: 239 K/UL (ref 164–446)
PMV BLD AUTO: 10.8 FL (ref 9–12.9)
POTASSIUM SERPL-SCNC: 2.9 MMOL/L (ref 3.6–5.5)
POTASSIUM SERPL-SCNC: 3.6 MMOL/L (ref 3.6–5.5)
PRODUCT TYPE UPROD: NORMAL
RBC # BLD AUTO: 2.88 M/UL (ref 4.2–5.4)
RH BLD: NORMAL
SODIUM SERPL-SCNC: 142 MMOL/L (ref 135–145)
UNIT STATUS USTAT: NORMAL
WBC # BLD AUTO: 5.6 K/UL (ref 4.8–10.8)

## 2024-06-30 PROCEDURE — 86923 COMPATIBILITY TEST ELECTRIC: CPT

## 2024-06-30 PROCEDURE — 84132 ASSAY OF SERUM POTASSIUM: CPT

## 2024-06-30 PROCEDURE — 85027 COMPLETE CBC AUTOMATED: CPT

## 2024-06-30 PROCEDURE — 86850 RBC ANTIBODY SCREEN: CPT

## 2024-06-30 PROCEDURE — 85014 HEMATOCRIT: CPT

## 2024-06-30 PROCEDURE — 86900 BLOOD TYPING SEROLOGIC ABO: CPT

## 2024-06-30 PROCEDURE — 85018 HEMOGLOBIN: CPT | Mod: 91

## 2024-06-30 PROCEDURE — 770020 HCHG ROOM/CARE - TELE (206)

## 2024-06-30 PROCEDURE — C9113 INJ PANTOPRAZOLE SODIUM, VIA: HCPCS | Performed by: HOSPITALIST

## 2024-06-30 PROCEDURE — 700111 HCHG RX REV CODE 636 W/ 250 OVERRIDE (IP): Mod: JZ | Performed by: INTERNAL MEDICINE

## 2024-06-30 PROCEDURE — 700102 HCHG RX REV CODE 250 W/ 637 OVERRIDE(OP): Performed by: HOSPITALIST

## 2024-06-30 PROCEDURE — 86901 BLOOD TYPING SEROLOGIC RH(D): CPT

## 2024-06-30 PROCEDURE — 83735 ASSAY OF MAGNESIUM: CPT

## 2024-06-30 PROCEDURE — 700111 HCHG RX REV CODE 636 W/ 250 OVERRIDE (IP): Mod: JZ | Performed by: HOSPITALIST

## 2024-06-30 PROCEDURE — 93010 ELECTROCARDIOGRAM REPORT: CPT | Performed by: INTERNAL MEDICINE

## 2024-06-30 PROCEDURE — P9016 RBC LEUKOCYTES REDUCED: HCPCS

## 2024-06-30 PROCEDURE — 36415 COLL VENOUS BLD VENIPUNCTURE: CPT

## 2024-06-30 PROCEDURE — 700102 HCHG RX REV CODE 250 W/ 637 OVERRIDE(OP): Performed by: INTERNAL MEDICINE

## 2024-06-30 PROCEDURE — 80048 BASIC METABOLIC PNL TOTAL CA: CPT

## 2024-06-30 PROCEDURE — 36430 TRANSFUSION BLD/BLD COMPNT: CPT

## 2024-06-30 PROCEDURE — 99232 SBSQ HOSP IP/OBS MODERATE 35: CPT | Performed by: INTERNAL MEDICINE

## 2024-06-30 PROCEDURE — A9270 NON-COVERED ITEM OR SERVICE: HCPCS | Performed by: INTERNAL MEDICINE

## 2024-06-30 PROCEDURE — 93005 ELECTROCARDIOGRAM TRACING: CPT | Performed by: INTERNAL MEDICINE

## 2024-06-30 PROCEDURE — A9270 NON-COVERED ITEM OR SERVICE: HCPCS | Performed by: HOSPITALIST

## 2024-06-30 PROCEDURE — 94760 N-INVAS EAR/PLS OXIMETRY 1: CPT

## 2024-06-30 RX ORDER — PRAMIPEXOLE DIHYDROCHLORIDE 0.12 MG/1
0.25 TABLET ORAL NIGHTLY
Status: DISCONTINUED | OUTPATIENT
Start: 2024-06-30 | End: 2024-07-03 | Stop reason: HOSPADM

## 2024-06-30 RX ORDER — FLUTICASONE PROPIONATE 50 MCG
1 SPRAY, SUSPENSION (ML) NASAL DAILY
Status: DISCONTINUED | OUTPATIENT
Start: 2024-06-30 | End: 2024-06-30

## 2024-06-30 RX ORDER — TRAZODONE HYDROCHLORIDE 50 MG/1
100 TABLET ORAL NIGHTLY
Status: DISCONTINUED | OUTPATIENT
Start: 2024-06-30 | End: 2024-07-03 | Stop reason: HOSPADM

## 2024-06-30 RX ORDER — POTASSIUM CHLORIDE 7.45 MG/ML
10 INJECTION INTRAVENOUS
Status: COMPLETED | OUTPATIENT
Start: 2024-06-30 | End: 2024-06-30

## 2024-06-30 RX ORDER — POTASSIUM CHLORIDE 7.45 MG/ML
10 INJECTION INTRAVENOUS
Status: DISCONTINUED | OUTPATIENT
Start: 2024-06-30 | End: 2024-06-30

## 2024-06-30 RX ORDER — TRAZODONE HYDROCHLORIDE 100 MG/1
100 TABLET ORAL NIGHTLY
COMMUNITY

## 2024-06-30 RX ORDER — FUROSEMIDE 40 MG/1
40 TABLET ORAL DAILY
Status: DISCONTINUED | OUTPATIENT
Start: 2024-07-01 | End: 2024-07-02

## 2024-06-30 RX ORDER — ATORVASTATIN CALCIUM 40 MG/1
40 TABLET, FILM COATED ORAL EVERY EVENING
Status: DISCONTINUED | OUTPATIENT
Start: 2024-06-30 | End: 2024-07-03 | Stop reason: HOSPADM

## 2024-06-30 RX ORDER — IBUPROFEN 200 MG
200 TABLET ORAL EVERY 6 HOURS PRN
Status: ON HOLD | COMMUNITY
End: 2024-06-30

## 2024-06-30 RX ORDER — DIGOXIN 125 MCG
125 TABLET ORAL EVERY EVENING
Status: DISCONTINUED | OUTPATIENT
Start: 2024-06-30 | End: 2024-07-03 | Stop reason: HOSPADM

## 2024-06-30 RX ORDER — HYDROMORPHONE HYDROCHLORIDE 1 MG/ML
0.25 INJECTION, SOLUTION INTRAMUSCULAR; INTRAVENOUS; SUBCUTANEOUS
Status: DISCONTINUED | OUTPATIENT
Start: 2024-06-30 | End: 2024-07-02

## 2024-06-30 RX ORDER — AMLODIPINE BESYLATE 5 MG/1
10 TABLET ORAL DAILY
Status: DISCONTINUED | OUTPATIENT
Start: 2024-06-30 | End: 2024-07-03 | Stop reason: HOSPADM

## 2024-06-30 RX ORDER — FUROSEMIDE 40 MG/1
40 TABLET ORAL DAILY
Status: DISCONTINUED | OUTPATIENT
Start: 2024-06-30 | End: 2024-06-30

## 2024-06-30 RX ORDER — NITROFURANTOIN 25; 75 MG/1; MG/1
100 CAPSULE ORAL 2 TIMES DAILY
COMMUNITY

## 2024-06-30 RX ORDER — ALLOPURINOL 300 MG/1
300 TABLET ORAL DAILY
Status: DISCONTINUED | OUTPATIENT
Start: 2024-06-30 | End: 2024-07-03 | Stop reason: HOSPADM

## 2024-06-30 RX ORDER — TRAMADOL HYDROCHLORIDE 50 MG/1
50-100 TABLET ORAL EVERY 6 HOURS PRN
Status: CANCELLED | DISCHARGE
Start: 2024-06-30 | End: 2024-07-30

## 2024-06-30 RX ORDER — TRAMADOL HYDROCHLORIDE 50 MG/1
50-100 TABLET ORAL EVERY 6 HOURS PRN
Status: DISCONTINUED | OUTPATIENT
Start: 2024-06-30 | End: 2024-07-03 | Stop reason: HOSPADM

## 2024-06-30 RX ORDER — NITROFURANTOIN 25; 75 MG/1; MG/1
100 CAPSULE ORAL 2 TIMES DAILY
Status: DISCONTINUED | OUTPATIENT
Start: 2024-06-30 | End: 2024-07-01

## 2024-06-30 RX ORDER — LEVOTHYROXINE SODIUM 175 UG/1
175 TABLET ORAL
Status: DISCONTINUED | OUTPATIENT
Start: 2024-06-30 | End: 2024-07-03 | Stop reason: HOSPADM

## 2024-06-30 RX ORDER — FLUOXETINE HYDROCHLORIDE 20 MG/1
60 CAPSULE ORAL DAILY
Status: DISCONTINUED | OUTPATIENT
Start: 2024-06-30 | End: 2024-07-03 | Stop reason: HOSPADM

## 2024-06-30 RX ADMIN — POTASSIUM CHLORIDE 10 MEQ: 10 INJECTION, SOLUTION INTRAVENOUS at 11:45

## 2024-06-30 RX ADMIN — SENNOSIDES AND DOCUSATE SODIUM 2 TABLET: 50; 8.6 TABLET ORAL at 05:37

## 2024-06-30 RX ADMIN — PANTOPRAZOLE SODIUM 40 MG: 40 INJECTION, POWDER, FOR SOLUTION INTRAVENOUS at 17:15

## 2024-06-30 RX ADMIN — FLUOXETINE 60 MG: 20 CAPSULE ORAL at 14:03

## 2024-06-30 RX ADMIN — ATORVASTATIN CALCIUM 40 MG: 40 TABLET, FILM COATED ORAL at 17:15

## 2024-06-30 RX ADMIN — PRAMIPEXOLE DIHYDROCHLORIDE 0.25 MG: 0.12 TABLET ORAL at 21:02

## 2024-06-30 RX ADMIN — POTASSIUM CHLORIDE 10 MEQ: 10 INJECTION, SOLUTION INTRAVENOUS at 14:01

## 2024-06-30 RX ADMIN — AMLODIPINE BESYLATE 10 MG: 5 TABLET ORAL at 14:03

## 2024-06-30 RX ADMIN — HYDROMORPHONE HYDROCHLORIDE 0.5 MG: 1 INJECTION, SOLUTION INTRAMUSCULAR; INTRAVENOUS; SUBCUTANEOUS at 23:19

## 2024-06-30 RX ADMIN — DIGOXIN 125 MCG: 0.12 TABLET ORAL at 17:15

## 2024-06-30 RX ADMIN — NITROFURANTOIN MONOHYDRATE/MACROCRYSTALS 100 MG: 75; 25 CAPSULE ORAL at 14:03

## 2024-06-30 RX ADMIN — POTASSIUM CHLORIDE 10 MEQ: 10 INJECTION, SOLUTION INTRAVENOUS at 08:18

## 2024-06-30 RX ADMIN — POTASSIUM CHLORIDE 10 MEQ: 10 INJECTION, SOLUTION INTRAVENOUS at 10:27

## 2024-06-30 RX ADMIN — TRAZODONE HYDROCHLORIDE 100 MG: 50 TABLET ORAL at 21:02

## 2024-06-30 RX ADMIN — POTASSIUM CHLORIDE 10 MEQ: 10 INJECTION, SOLUTION INTRAVENOUS at 12:46

## 2024-06-30 RX ADMIN — ALLOPURINOL 300 MG: 300 TABLET ORAL at 14:03

## 2024-06-30 RX ADMIN — PANTOPRAZOLE SODIUM 40 MG: 40 INJECTION, POWDER, FOR SOLUTION INTRAVENOUS at 05:37

## 2024-06-30 RX ADMIN — LEVOTHYROXINE SODIUM 175 MCG: 0.17 TABLET ORAL at 14:03

## 2024-06-30 RX ADMIN — POTASSIUM CHLORIDE 10 MEQ: 10 INJECTION, SOLUTION INTRAVENOUS at 09:22

## 2024-06-30 ASSESSMENT — PATIENT HEALTH QUESTIONNAIRE - PHQ9
3. TROUBLE FALLING OR STAYING ASLEEP OR SLEEPING TOO MUCH: NOT AT ALL
9. THOUGHTS THAT YOU WOULD BE BETTER OFF DEAD, OR OF HURTING YOURSELF: NOT AT ALL
4. FEELING TIRED OR HAVING LITTLE ENERGY: SEVERAL DAYS
8. MOVING OR SPEAKING SO SLOWLY THAT OTHER PEOPLE COULD HAVE NOTICED. OR THE OPPOSITE, BEING SO FIGETY OR RESTLESS THAT YOU HAVE BEEN MOVING AROUND A LOT MORE THAN USUAL: NOT AT ALL
SUM OF ALL RESPONSES TO PHQ9 QUESTIONS 1 AND 2: 1
SUM OF ALL RESPONSES TO PHQ QUESTIONS 1-9: 3
2. FEELING DOWN, DEPRESSED, IRRITABLE, OR HOPELESS: NOT AT ALL
1. LITTLE INTEREST OR PLEASURE IN DOING THINGS: SEVERAL DAYS
6. FEELING BAD ABOUT YOURSELF - OR THAT YOU ARE A FAILURE OR HAVE LET YOURSELF OR YOUR FAMILY DOWN: NOT AL ALL
7. TROUBLE CONCENTRATING ON THINGS, SUCH AS READING THE NEWSPAPER OR WATCHING TELEVISION: NOT AT ALL
5. POOR APPETITE OR OVEREATING: SEVERAL DAYS

## 2024-06-30 ASSESSMENT — ENCOUNTER SYMPTOMS
EYES NEGATIVE: 1
PSYCHIATRIC NEGATIVE: 1
WEAKNESS: 1
RESPIRATORY NEGATIVE: 1
MUSCULOSKELETAL NEGATIVE: 1

## 2024-06-30 ASSESSMENT — PAIN DESCRIPTION - PAIN TYPE
TYPE: ACUTE PAIN

## 2024-06-30 NOTE — H&P
"Hospital Medicine History & Physical Note    Date of Service  6/29/2024    Primary Care Physician  Pcp Pt States None    Consultants  GI: She will need GI Consult in am    Code Status  Full Code    Chief Complaint  Direct Admission from [facility] for GI bleed    History of Presenting Illness  Azalea Gan is a 73 y.o. female, who is coming as a Direct Admission from [facility] for GI bleed on 6/29/2024.    She has Afib and is anticoagulated with Eliquis.  Had a recent cardiac ablation in May, following with cardiologist in [place] and still anticoagulated.  Also has h/o Chris-en-Y gastric bypass surgery in 2006 ([place], [place]).  She then had a gastric band placed in August 2018 by Dr. Vega.  Patient reports having significant GI issues with it reason why the gastric band was removed in November 2022.    Patient reports that over the past 3 to 4 days noticed worsening bilateral pitting edema and dark/black stools which initially she attributed to \"eating a lot of Oreos \".  She went to the ED today mostly out of concern for her worsening edema on her legs however, when she mention about her black stools, states that workup suddenly was more related to that.  Patient denies having any abdominal pain.  She is not taking any NSAIDs.  Denies previous history of gastric bleed.    I discussed the plan of care with patient.    Review of Systems  Review of Systems   Constitutional:  Positive for malaise/fatigue. Negative for fever.   HENT:  Negative for congestion and sore throat.    Eyes:  Negative for blurred vision and double vision.   Respiratory:  Negative for cough and shortness of breath.    Cardiovascular:  Positive for leg swelling. Negative for chest pain and palpitations.   Gastrointestinal:  Positive for blood in stool. Negative for nausea and vomiting.   Genitourinary:  Negative for dysuria and urgency.   Musculoskeletal:  Negative for myalgias and neck pain.   Skin:  Negative for " itching and rash.   Neurological:  Negative for dizziness, weakness and headaches.   Endo/Heme/Allergies:  Does not bruise/bleed easily.   Psychiatric/Behavioral:  Negative for depression. The patient does not have insomnia.        Past Medical History   has a past medical history of Hypertension, Pulmonary HTN (HCC), Sleep apnea, Stroke (HCC), Thyroid disease, and Tricuspid valve regurgitation.    Surgical History   has a past surgical history that includes gastric bypass laparoscopic (2006); hysterectomy, total abdominal; thyroidectomy (3/2012); knee replacement, total (Right, 3/15/2016); cardiac cath, right/left heart (12/2015); cataract extraction with iol (Bilateral, 2016); colonoscopy (2010); colonoscopy - endo (12/8/2016); lap, place adjust alfreda restrict de* (N/A, 11/2/2022); and pr upper gi endoscopy,diagnosis (N/A, 11/2/2022).     Family History  family history includes Heart Attack in her mother.   Family history reviewed with patient. There is no family history that is pertinent to the chief complaint.     Social History   reports that she has never smoked. She has never used smokeless tobacco. She reports that she does not drink alcohol and does not use drugs.    Allergies  Allergies   Allergen Reactions    Iodine Anaphylaxis    Iodine Contrast [Diagn[employer]] Anaphylaxis    Povidone Iodine Anaphylaxis       Medications  Prior to Admission Medications   Prescriptions Last Dose Informant Patient Reported? Taking?   FLUoxetine (PROZAC) 20 MG Cap 6/29/2024 at 0630  No Yes   Sig: Take 3 Capsules by mouth every day for 30 days. 60 mg = 3 capsules   Furosemide (LASIX PO) 6/29/2024 at 0630  Yes Yes   Sig: Take 20 mg by mouth 2 times a day.   allopurinol (ZYLOPRIM) 300 MG Tab 6/29/2024 at 0630  No Yes   Sig: Take 1 Tablet by mouth every day for 30 days.   amLODIPine (NORVASC) 10 MG Tab 6/29/2024 at 0630  No Yes   Sig: Take 1 Tablet by mouth every day for 30 days.   apixaban (ELIQUIS) 5mg Tab  6/29/2024 at 0630  No Yes   Sig: Take 1 Tablet by mouth 2 times a day for 30 days.   atorvastatin (LIPITOR) 40 MG Tab 6/28/2024 at 2000  No Yes   Sig: Take 1 Tablet by mouth every evening for 30 days.   digoxin (LANOXIN) 125 MCG Tab 6/28/2024 at 2000  No Yes   Sig: Take 1 Tablet by mouth every day at 6 PM for 30 days.   fluticasone (FLONASE) 50 MCG/ACT nasal spray 6/29/2024 at 0800  No Yes   Sig: Administer 1 Spray into affected nostril(S) 1 time a day as needed (allergies) for up to 30 days.   levothyroxine (SYNTHROID) 175 MCG Tab 6/29/2024 at 0630  No Yes   Sig: Take 1 Tablet by mouth every morning on an empty stomach for 30 days.   metoprolol tartrate (LOPRESSOR) 25 MG Tab 6/29/2024 at 0630  No Yes   Sig: Take 1 Tablet by mouth 2 times a day for 30 days.   nystatin (MYCOSTATIN) powder 6/29/2024 at 0630  No Yes   Sig: Apply 1 g topically 3 times a day as needed (itchy) for up to 30 days.   pramipexole (MIRAPEX) 0.125 MG Tab 6/28/2024 at 2000  No Yes   Sig: Take 2 Tablets by mouth every evening for 30 days. Indications: Restless Leg Syndrome   traMADol (ULTRAM) 50 MG Tab 6/28/2024 at 2130  No Yes   Sig: Take 2 Tablets by mouth every evening for 30 days.      Facility-Administered Medications: None       Physical Exam  Temp:  [36.7 °C (98 °F)] 36.7 °C (98 °F)  Pulse:  [75] 75  Resp:  [20] 20  BP: (123)/(54) 123/54  SpO2:  [96 %] 96 %  Blood Pressure : 123/54   Temperature: 36.7 °C (98 °F)   Pulse: 75   Respiration: 20   Pulse Oximetry: 96 %       Physical Exam  Constitutional:       Appearance: Normal appearance.   HENT:      Head: Normocephalic and atraumatic.      Mouth/Throat:      Mouth: Mucous membranes are moist.      Pharynx: Oropharynx is clear.   Eyes:      Extraocular Movements: Extraocular movements intact.      Pupils: Pupils are equal, round, and reactive to light.   Cardiovascular:      Rate and Rhythm: Normal rate and regular rhythm.      Heart sounds: Normal heart sounds.   Pulmonary:      Effort:  Pulmonary effort is normal.      Breath sounds: Normal breath sounds.   Abdominal:      General: Abdomen is flat. Bowel sounds are normal.      Palpations: Abdomen is soft.      Tenderness: There is no abdominal tenderness. There is no guarding.   Musculoskeletal:      Cervical back: Normal range of motion and neck supple.      Right lower leg: Edema present.      Left lower leg: Edema present.   Skin:     General: Skin is warm and dry.   Neurological:      General: No focal deficit present.      Mental Status: She is alert and oriented to person, place, and time.   Psychiatric:         Mood and Affect: Mood normal.         Behavior: Behavior normal.         Laboratory:          Outside Facility Laboratory:  CBC: WBC 7.21, Hgb 10.2, Platelets 274  CMP: Na 135, K 3.0, Chloride 99, CO2 30, Glucose 86, Creatinine 1.28, BUN 34, Calcium 9.4, Alk Phos 119, T Bili 0.4, AST 27, ALT 25    Lipase 32  Fecal Occult Blood: Positive  BNP: 119.0  Troponin: 22.6 (negative)      Outside Facility EKG (My personal review and interpretation)  Sinus rhythm with frequent PACs.  Left axis deviation.  No acute ST elevation.  Prolonged QTc 519.      I provided extensive external medical records review    Assessment/Plan:  Justification for Admission Status  I anticipate this patient will require at least two midnights for appropriate medical management, necessitating inpatient admission because Direct Admission from [facility] for GI bleed.      * Acute GI bleeding- (present on admission)  Assessment & Plan  -Inpatient status to telemetry.  -4-day history of black/tarry stools.  Patient on Eliquis for A-fib and no prior history of GI bleed.  -Per ER documentation, she had a 3-point drop on her hemoglobin as compared to 5 days prior.  Hemoglobin 10.2 at outside facility prior to transfer.  -Additionally, patient had a history of Chris-en-Y gastric bypass in 2006 and adjustable band placed in 2019 and later removed in 2022.  -I  ordered a recheck hemoglobin on arrival which came back as 9.2, later dropping to 8.6.  I am adding 1 unit PRBC blood transfusion for hemoglobin drop of 2 points today and 5.6 as compared to the last 5 days.  -She is not having any abdominal pain and I consider adding CT imaging however she is severely allergic to contrast therefore this was later discontinued.  -She might need some imaging like MRI given she has a modified anatomy with history of bypass.  -She is hemodynamically stable at this time and will need a nonemergency GI consult in the morning.  -Hold Eliquis and check serial hemoglobin    Nocturnal hypoxia  Assessment & Plan  -On 2 L of oxygen overnight at baseline.    Hypokalemia- (present on admission)  Assessment & Plan  -Potassium at outside facility was 3.0.  Recheck potassium and replace as needed.    Paroxysmal atrial fibrillation (HCC)- (present on admission)  Assessment & Plan  -On digoxin and anticoagulated with Eliquis.  She is currently NPO.  If no procedure planned by GI, please restart her on digoxin.    HLD (hyperlipidemia)  Assessment & Plan  -She takes atorvastatin.  She is currently NPO.    QT prolongation  Assessment & Plan  -EKG done at outside facility showed QTc of 519.  -Avoid QT prolonging medication including antiemetics    Hypothyroidism- (present on admission)  Assessment & Plan  -She takes levothyroxine but currently NPO.    Essential hypertension- (present on admission)  Assessment & Plan  -She takes amlodipine.  Will treat blood pressure if severely elevated with IV medications.        VTE prophylaxis: SCDs/TEDs

## 2024-06-30 NOTE — ASSESSMENT & PLAN NOTE
-EKG done at outside facility showed QTc of 519.  -Avoid QT prolonging medication including antiemetics  -recheck EKG today, I have ordered one

## 2024-06-30 NOTE — CONSULTS
Gastroenterology Consult Note:    JAMESON Landry  Date & Time note created:    6/30/2024   2:16 PM     Referring MD:  Dr. Nena Griffin    Patient ID:  Name:             Azalea Gan   YOB: 1950  Age:                 73 y.o.  female   MRN:               [id number]                                                             Reason for Consult:      Melena  Anemia    History of Present Illness:    This is a 74 yo female PMH atrial fibrillation on Eliquis, HTN, HLD, gastric bypass, CVA, pulmonary hypertension, JOSH on oxygen at night,  who was seen in consultation for a 4 day history of melena with anemia. Began to notice bilateral lower extremity swelling over the past few days. Called her cardiologist who recommended evaluation at ER. During the exam, she mentioned that she was having black tarry stools for the past 4 days. Denies abdominal pain, N/V, changes in bowel habits. No history of GI bleed. Notable labs:  Hgb; 9.2-->8.6. Received 1 unit of blood with a current Hgb: 10.4. Interesting reviewed previous H/H and she had a hgb: 7.9 on 5/31/24 when she was admitted for chest pain. No intervention was performed at that time. Looks like her Baseline is around 9.4.    Review of Systems:      Review of Systems   Constitutional:  Positive for malaise/fatigue.   HENT: Negative.     Eyes: Negative.    Respiratory: Negative.     Cardiovascular:  Positive for leg swelling.   Gastrointestinal:  Positive for melena.   Genitourinary: Negative.    Musculoskeletal: Negative.    Skin: Negative.    Neurological:  Positive for weakness.   Endo/Heme/Allergies: Negative.    Psychiatric/Behavioral: Negative.               Physical Exam:  Vitals/ General Appearance:   Weight/BMI: Body mass index is 41.89 kg/m².    Vitals:    06/30/24 0722 06/30/24 0807 06/30/24 1031 06/30/24 1109   BP: 134/61 135/67 128/61 136/68   Pulse: 78 77 71 78   Resp: 16 18 16 16   Temp: 36.8 °C (98.3 °F)  36.8 °C (98.3 °F) 36.9  °C (98.5 °F)   TempSrc: Oral  Oral Temporal   SpO2: 95% 94% 95% 94%   Weight:       Height:         Oxygen Therapy:  Pulse Oximetry: 94 %, O2 (LPM): 2, O2 Delivery Device: Silicone Nasal Cannula    Physical Exam  Vitals reviewed.   Constitutional:       Appearance: She is obese.   HENT:      Mouth/Throat:      Mouth: Mucous membranes are moist.   Eyes:      Extraocular Movements: Extraocular movements intact.      Pupils: Pupils are equal, round, and reactive to light.   Cardiovascular:      Rate and Rhythm: Normal rate and regular rhythm.      Pulses: Normal pulses.      Heart sounds: Normal heart sounds.   Pulmonary:      Effort: Pulmonary effort is normal.      Breath sounds: Normal breath sounds.   Abdominal:      General: Bowel sounds are normal. There is no distension.      Palpations: Abdomen is soft.      Tenderness: There is no abdominal tenderness.   Musculoskeletal:         General: Normal range of motion.      Cervical back: Normal range of motion and neck supple.   Skin:     General: Skin is warm and dry.      Capillary Refill: Capillary refill takes 2 to 3 seconds.      Coloration: Skin is pale.   Neurological:      General: No focal deficit present.      Mental Status: She is alert and oriented to person, place, and time.   Psychiatric:         Mood and Affect: Mood normal.         Behavior: Behavior normal.         Thought Content: Thought content normal.         Judgment: Judgment normal.         Past Medical History:   Past Medical History:   Diagnosis Date    Hypertension     Pulmonary HTN (HCC)     moderate     Sleep apnea     mild JOSH    not on CPAP or O2    Stroke (HCC)     Thyroid disease     Tricuspid valve regurgitation        Past Surgical History:  Past Surgical History:   Procedure Laterality Date    LAP, PLACE ADJUST NISREEN RESTRICT DE* N/A 11/2/2022    Procedure: LAPAROSCOPIC ADJUSTABLE GASTRIC BANDING, PORT REMOVAL,ESOPHAGOGASTRODUODENOSCOPY;  Surgeon: Eitan Preston M.D.;   Location: SURGERY [facility];  Service: Gastroenterology    FL UPPER GI ENDOSCOPY,DIAGNOSIS N/A 11/2/2022    Procedure: GASTROSCOPY;  Surgeon: Eitan Preston M.D.;  Location: SURGERY [facility];  Service: Gastroenterology    COLONOSCOPY - ENDO  12/8/2016    Procedure: COLONOSCOPY - ENDO with Anesthesia;  Surgeon: Raffaele Ritter M.D.;  Location: SURGERY [facility];  Service:     KNEE REPLACEMENT, TOTAL Right 3/15/2016    Dr Horowitz    CATARACT EXTRACTION WITH IOL Bilateral 2016    CARDIAC CATH, RIGHT/LEFT HEART  12/2015    nonobstructive CAD EF 60%    THYROIDECTOMY  3/2012    COLONOSCOPY  2010    GASTRIC BYPASS LAPAROSCOPIC  2006    Chris-en-Y    HYSTERECTOMY, TOTAL ABDOMINAL         Hospital Medications:    Current Facility-Administered Medications:     potassium chloride (KCL) ivpb 10 mEq, 10 mEq, Intravenous, Q HOUR, Nena Griffin M.D., Last Rate: 100 mL/hr at 06/30/24 1401, 10 mEq at 06/30/24 1401    nitrofurantoin (Macrobid) capsule 100 mg, 100 mg, Oral, BID, Nena Griffin M.D., 100 mg at 06/30/24 1403    traZODone (Desyrel) tablet 100 mg, 100 mg, Oral, Nightly, Nena Griffin M.D.    allopurinol (Zyloprim) tablet 300 mg, 300 mg, Oral, DAILY, Nena Griffin M.D., 300 mg at 06/30/24 1403    amLODIPine (Norvasc) tablet 10 mg, 10 mg, Oral, DAILY, Nena Griffin M.D., 10 mg at 06/30/24 1403    atorvastatin (Lipitor) tablet 40 mg, 40 mg, Oral, Q EVENING, Nena Griffin M.D.    digoxin (Lanoxin) tablet 125 mcg, 125 mcg, Oral, Q EVENING, Nena Griffin M.D.    FLUoxetine (PROzac) capsule 60 mg, 60 mg, Oral, DAILY, Nena Griffin M.D., 60 mg at 06/30/24 1403    levothyroxine (Synthroid) tablet 175 mcg, 175 mcg, Oral, AM ES, Nena Griffin M.D., 175 mcg at 06/30/24 1403    pramipexole (Mirapex) tablet 0.25 mg, 0.25 mg, Oral, Nightly, Nena Griffin M.D.    traMADol (Ultram) 50 MG tablet  mg,  mg, Oral, Q6HRS PRN, Nena Griffin M.D.    [START ON  7/1/2024] furosemide (Lasix) tablet 40 mg, 40 mg, Oral, DAILY, Nena Griffin M.D.    HYDROmorphone (Dilaudid) injection 0.25 mg, 0.25 mg, Intravenous, Q3HRS PRN, Nena Griffin M.D.    acetaminophen (Tylenol) tablet 650 mg, 650 mg, Oral, Q6HRS PRN, Coretta Chino M.D.    senna-docusate (Pericolace Or Senokot S) 8.6-50 MG per tablet 2 Tablet, 2 Tablet, Oral, BID, 2 Tablet at 06/30/24 0537 **AND** polyethylene glycol/lytes (Miralax) Packet 1 Packet, 1 Packet, Oral, QDAY PRN, Coretta Chino M.D.    pantoprazole (Protonix) injection 40 mg, 40 mg, Intravenous, BID, Coretta Chino M.D., 40 mg at 06/30/24 0537    HYDROmorphone (Dilaudid) injection 0.5 mg, 0.5 mg, Intravenous, Q3HRS PRN, Coretta Chino M.D., 0.5 mg at 06/29/24 7990    Current Outpatient Medications:  Current Facility-Administered Medications   Medication Dose Route Frequency Provider Last Rate Last Admin    potassium chloride (KCL) ivpb 10 mEq  10 mEq Intravenous Q HOUR Nena Griffin M.D. 100 mL/hr at 06/30/24 1401 10 mEq at 06/30/24 1401    nitrofurantoin (Macrobid) capsule 100 mg  100 mg Oral BID Nena Griffin M.D.   100 mg at 06/30/24 1403    traZODone (Desyrel) tablet 100 mg  100 mg Oral Nightly Nena Griffin M.D.        allopurinol (Zyloprim) tablet 300 mg  300 mg Oral DAILY Nena Griffin M.D.   300 mg at 06/30/24 1403    amLODIPine (Norvasc) tablet 10 mg  10 mg Oral DAILY Nena Griffin M.D.   10 mg at 06/30/24 1403    atorvastatin (Lipitor) tablet 40 mg  40 mg Oral Q EVENING Nena Griffin M.D.        digoxin (Lanoxin) tablet 125 mcg  125 mcg Oral Q EVENING Nena Griffin M.D.        FLUoxetine (PROzac) capsule 60 mg  60 mg Oral DAILY Nena Griffin M.D.   60 mg at 06/30/24 1403    levothyroxine (Synthroid) tablet 175 mcg  175 mcg Oral AM ES Nena Griffin M.D.   175 mcg at 06/30/24 1403    pramipexole (Mirapex) tablet 0.25 mg  0.25 mg Oral Nightly Nena Griffin  M.D.        traMADol (Ultram) 50 MG tablet  mg   mg Oral Q6HRS PRN Nena Griffin M.D.        [START ON 7/1/2024] furosemide (Lasix) tablet 40 mg  40 mg Oral DAILY Nena Griffin M.D.        HYDROmorphone (Dilaudid) injection 0.25 mg  0.25 mg Intravenous Q3HRS PRN Nena Griffin M.D.        acetaminophen (Tylenol) tablet 650 mg  650 mg Oral Q6HRS PRN Coretta Chino M.D.        senna-docusate (Pericolace Or Senokot S) 8.6-50 MG per tablet 2 Tablet  2 Tablet Oral BID Coretta Chino M.D.   2 Tablet at 06/30/24 0537    And    polyethylene glycol/lytes (Miralax) Packet 1 Packet  1 Packet Oral QDAY PRN Coretta Chino M.D.        pantoprazole (Protonix) injection 40 mg  40 mg Intravenous BID Coretta Chino M.D.   40 mg at 06/30/24 0537    HYDROmorphone (Dilaudid) injection 0.5 mg  0.5 mg Intravenous Q3HRS PRN Coretta Chino M.D.   0.5 mg at 06/29/24 1084       Medication Allergy:  Allergies   Allergen Reactions    Iodine Anaphylaxis    Iodine Contrast [Diagnostic X-Ray Materials] Anaphylaxis    Povidone Iodine Anaphylaxis       Family History:  Family History   Problem Relation Age of Onset    Heart Attack Mother        Social History:  Social History     Socioeconomic History    Marital status:      Spouse name: Not on file    Number of children: Not on file    Years of education: Not on file    Highest education level: Not on file   Occupational History    Not on file   Tobacco Use    Smoking status: Never    Smokeless tobacco: Never   Vaping Use    Vaping status: Never Used   Substance and Sexual Activity    Alcohol use: No    Drug use: No    Sexual activity: Not on file   Other Topics Concern    Not on file   Social History Narrative    Not on file     Social Determinants of Health     Financial Resource Strain: Not on file   Food Insecurity: No Food Insecurity (6/29/2024)    Hunger Vital Sign     Worried About Running Out of Food in the Last  Year: Never true     Ran Out of Food in the Last Year: Never true   Transportation Needs: Unmet Transportation Needs (6/29/2024)    PRAPARE - Transportation     Lack of Transportation (Medical): Yes     Lack of Transportation (Non-Medical): Yes   Physical Activity: Not on file   Stress: Not on file   Social Connections: Not on file   Intimate Partner Violence: Not At Risk (6/29/2024)    Humiliation, Afraid, Rape, and Kick questionnaire     Fear of Current or Ex-Partner: No     Emotionally Abused: No     Physically Abused: No     Sexually Abused: No   Housing Stability: Low Risk  (6/29/2024)    Housing Stability Vital Sign     Unable to Pay for Housing in the Last Year: No     Number of Places Lived in the Last Year: 1     Unstable Housing in the Last Year: No         MDM (Data Review):     Records reviewed and summarized in current documentation    Lab Data Review:  Recent Results (from the past 24 hour(s))   CBC WITHOUT DIFFERENTIAL    Collection Time: 06/29/24  9:52 PM   Result Value Ref Range    WBC 6.3 4.8 - 10.8 K/uL    RBC 3.06 (L) 4.20 - 5.40 M/uL    Hemoglobin 9.2 (L) 12.0 - 16.0 g/dL    Hematocrit 29.1 (L) 37.0 - 47.0 %    MCV 95.1 81.4 - 97.8 fL    MCH 30.1 27.0 - 33.0 pg    MCHC 31.6 (L) 32.2 - 35.5 g/dL    RDW 55.3 (H) 35.9 - 50.0 fL    Platelet Count 253 164 - 446 K/uL    MPV 10.6 9.0 - 12.9 fL   Prothrombin Time    Collection Time: 06/29/24  9:52 PM   Result Value Ref Range    PT 16.9 (H) 12.0 - 14.6 sec    INR 1.30 (H) 0.87 - 1.13   proBrain Natriuretic Peptide, NT    Collection Time: 06/29/24  9:52 PM   Result Value Ref Range    NT-proBNP 702 (H) 0 - 125 pg/mL   CBC without Differential    Collection Time: 06/30/24  2:00 AM   Result Value Ref Range    WBC 5.6 4.8 - 10.8 K/uL    RBC 2.88 (L) 4.20 - 5.40 M/uL    Hemoglobin 8.6 (L) 12.0 - 16.0 g/dL    Hematocrit 27.1 (L) 37.0 - 47.0 %    MCV 94.1 81.4 - 97.8 fL    MCH 29.9 27.0 - 33.0 pg    MCHC 31.7 (L) 32.2 - 35.5 g/dL    RDW 55.0 (H) 35.9 - 50.0 fL     Platelet Count 239 164 - 446 K/uL    MPV 10.8 9.0 - 12.9 fL   Basic Metabolic Panel (BMP)    Collection Time: 24  2:00 AM   Result Value Ref Range    Sodium 142 135 - 145 mmol/L    Potassium 2.9 (L) 3.6 - 5.5 mmol/L    Chloride 101 96 - 112 mmol/L    Co2 28 20 - 33 mmol/L    Glucose 91 65 - 99 mg/dL    Bun 27 (H) 8 - 22 mg/dL    Creatinine 0.93 0.50 - 1.40 mg/dL    Calcium 8.9 8.4 - 10.2 mg/dL    Anion Gap 13.0 7.0 - 16.0   ESTIMATED GFR    Collection Time: 24  2:00 AM   Result Value Ref Range    GFR (CKD-EPI) 64 >60 mL/min/1.73 m 2   COD - Adult (Type and Screen)    Collection Time: 24  4:17 AM   Result Value Ref Range    ABO Grouping Only O     Rh Grouping Only POS     Antibody Screen-Cod NEG     Component R       R99                 Red Cells, LR       [id number]   issued       24   06:50      Product Type R99     Dispense Status issued     Unit Number (Barcoded) [id number]     Product Code (Barcoded) Z0911S57     Blood Type (Barcoded) 5100    HEMOGLOBIN AND HEMATOCRIT    Collection Time: 24 11:30 AM   Result Value Ref Range    Hemoglobin 10.4 (L) 12.0 - 16.0 g/dL    Hematocrit 32.7 (L) 37.0 - 47.0 %   EKG    Collection Time: 24 12:27 PM   Result Value Ref Range    Report       [facility]    Test Date:  2024  Pt Name:    JOSE LANE                  Department: [facility]  MRN:        [id number]                      Room:       1121  Gender:     Female                       Technician: TOSHIA  :        1950                   Requested By:AMPARO HICKEY  Order #:    [id number]                    Reading MD: Joey Clemons MD    Measurements  Intervals                                Axis  Rate:       76                           P:          3  AL:         181                          QRS:        -57  QRSD:       106                          T:          109  QT:         392  QTc:        441    Interpretive Statements  Sinus rhythm  Atrial premature complexes  Left  anterior fascicular block  Nonspecific ST-T wave changes  Electronically Signed On 06- 14:03:19 PDT by Joey Clemons MD         Imaging/Procedures Review:      No orders to display        MDM (Assessment and Plan):     Patient Active Problem List    Diagnosis Date Noted    QT prolongation 06/30/2024    Nocturnal hypoxia 06/30/2024    HLD (hyperlipidemia) 06/30/2024    Cystitis 06/30/2024    Acute GI bleeding 06/29/2024    History of cardiac radiofrequency ablation 05/30/2024    Acute respiratory failure with hypoxia (HCC) 05/14/2024    Acute kidney injury (HCC) 05/14/2024    Acute hypoxic respiratory failure (HCC) 05/12/2024    Septic shock (HCC) 05/12/2024    Elevated troponin 05/12/2024    Aspiration pneumonia (HCC) 05/12/2024    Heart failure with preserved ejection fraction (HCC) 05/11/2024    Fatigue 05/11/2024    Normocytic anemia 05/10/2024    Positive cardiac stress test 05/10/2024    Atrial fibrillation with rapid ventricular response (HCC) 05/08/2024    Hypotension 05/08/2024    Hypokalemia 06/01/2023    Left hip pain 06/01/2023    SOB (shortness of breath) 05/30/2023    Dysphagia 11/02/2022    CKD (chronic kidney disease) 11/02/2022    Stenosis of stomach 11/01/2022    Food impaction of esophagus 11/01/2022    Nocturnal hypoxemia 10/31/2022    Hypomagnesemia 10/31/2022    Bradycardia 10/31/2022    Dental infection 10/31/2022    Hx of laparoscopic gastric banding 10/31/2022    Vomiting 10/30/2022    Depression 10/30/2022    Hypothyroidism 10/30/2022    ALCIRA (acute kidney injury) (HCC) 10/30/2022    Idiopathic chronic gout of multiple sites without tophus 10/06/2022    Essential hypertension 05/04/2022    Renal artery stenosis (HCC) 05/04/2022    Sleep apnea-like behavior 05/04/2022    Advance care planning 05/04/2022    Hypersomnia due to medical condition  05/04/2022    Chronic fatigue 05/04/2022    Bilateral lower extremity edema 05/04/2022    History of stroke 10/14/2020    Coronary atherosclerosis  10/14/2020    Obesity 10/14/2020    Paroxysmal atrial fibrillation (HCC) 10/14/2020    Ventricular tachyarrhythmia (HCC) 10/14/2020    Pulmonary hypertension (HCC) 10/14/2020     This is a 72 yo female who was admitted to the hospital with a 4 day history of painless melenic stools. Hgb trending down requiring 1 unit of blood. Takes Eliquis for atrial fibrillation.     ASSESSMENT:  Anemia  Melena  Atrial fibrillation: EKG shows NSR  Pulmonary hypertension    PLAN:  Full liquids diet no reds  NPO after midnight  EGD tomorrow   PPI IV BID  Hold Eliquis.     Thank your for the opportunity to assist in the care of your patient.  Please call for any questions or concerns.    SANDRO Landry.

## 2024-06-30 NOTE — PROGRESS NOTES
Telemetry Shift Summary    Rhythm SR w/BBB  HR Range 66-78  Ectopy O PAC, R PVC, R Trig  Measurements 0.20/0.12/0.48 and 0.20/0.12/0.42       Normal Values  Rhythm SR  HR Range:   Measurements: 0.12-0.20/0.06-0.10/0.30-0.52

## 2024-06-30 NOTE — PROGRESS NOTES
Telemetry Shift Summary    Rhythm SR  HR Range 65-84  Ectopy O-F PVC/PAC; R-Trig/Big  Measurements 0.20/0.10/0.46        Normal Values  Rhythm SR  HR Range    Measurements 0.12-0.20 / 0.06-0.10  / 0.30-0.52

## 2024-06-30 NOTE — PROGRESS NOTES
4 Eyes Skin Assessment Completed by CHICHI Can and CHICHI Banda.    Head WDL  Ears WDL  Nose WDL  Mouth WDL  Neck WDL  Breast/Chest WDL  Shoulder Blades WDL  Spine Incision-old surgical scar lower back  (R) Arm/Elbow/Hand Bruising and Abrasion  (L) Arm/Elbow/Hand Bruising and Abrasion  Abdomen Incision- old surgical scars   Groin Redness and Blanching-skin folds   Scrotum/Coccyx/Buttocks WDL  (R) Leg Edema and Incision  (L) Leg Edema  (R) Heel/Foot/Toe WDL  (L) Heel/Foot/Toe WDL          Devices In Places Tele Box, Pulse Ox, and Nasal Cannula      Interventions In Place Gray Ear Foams    Possible Skin Injury No    Pictures Uploaded Into Epic N/A  Wound Consult Placed N/A  RN Wound Prevention Protocol Ordered No

## 2024-06-30 NOTE — CARE PLAN
The patient is Stable - Low risk of patient condition declining or worsening         Progress made toward(s) clinical / shift goals:    Problem: Knowledge Deficit - Standard  Goal: Patient and family/care givers will demonstrate understanding of plan of care, disease process/condition, diagnostic tests and medications  Description: Target End Date:  1-3 days or as soon as patient condition allows    Document in Patient Education    1.  Patient and family/caregiver oriented to unit, equipment, visitation policy and means for communicating concern  2.  Complete/review Learning Assessment  3.  Assess knowledge level of disease process/condition, treatment plan, diagnostic tests and medications  4.  Explain disease process/condition, treatment plan, diagnostic tests and medications  Outcome: Progressing  Note: Plan of care discussed with patient. Patient understands the plan.     Problem: Pain - Standard  Goal: Alleviation of pain or a reduction in pain to the patient’s comfort goal  Description: Target End Date:  Prior to discharge or change in level of care    Document on Vitals flowsheet    1.  Document pain using the appropriate pain scale per order or unit policy  2.  Educate and implement non-pharmacologic comfort measures (i.e. relaxation, distraction, massage, cold/heat therapy, etc.)  3.  Pain management medications as ordered  4.  Reassess pain after pain med administration per policy  5.  If opiods administered assess patient's response to pain medication is appropriate per POSS sedation scale  6.  Follow pain management plan developed in collaboration with patient and interdisciplinary team (including palliative care or pain specialists if applicable)  Outcome: Progressing  Note: Patient had pain in her legs upon admission. Patient given pain medication. Patient has not required additional pain medication.      Problem: Fall Risk  Goal: Patient will remain free from falls  Description: Target End Date:   Prior to discharge or change in level of care    Document interventions on the Xiomara Villalta Fall Risk Assessment    1.  Assess for fall risk factors  2.  Implement fall precautions  Outcome: Progressing  Note: Patient remains free of falls this shift. Patient calls for assistance before exiting bed. Patient uses front wheel walker to assist with stability. Bed alarms in place and on.

## 2024-06-30 NOTE — CARE PLAN
The patient is Stable - Low risk of patient condition declining or worsening    Shift Goals  Clinical Goals: Blood transfusion, GI consult, possible scope.  Patient Goals: Sleep, comfort    Progress made toward(s) clinical / shift goals:    Problem: Knowledge Deficit - Standard  Goal: Patient and family/care givers will demonstrate understanding of plan of care, disease process/condition, diagnostic tests and medications  Outcome: Progressing  Note: Discussed blood transfusion and reaction s/s. Discussed POC which is dependant on GI consult.        Patient is not progressing towards the following goals:      Problem: Risk for Bleeding  Goal: Patient will take measures to prevent bleeding and recognizes signs of bleeding that need to be reported immediately to a health care professional  Outcome: Not Progressing  Note: Episode of black stool this morning x1.

## 2024-06-30 NOTE — PROGRESS NOTES
Hospital Medicine Daily Progress Note    Date of Service  6/30/2024    Chief Complaint  Azalea Gan is a 73 y.o. female admitted 6/29/2024 tx from [facility] [place] for complaints of leg swelling and black tarry stools    Hospital Course  74 yo w afib on eliquis, htn, phtn, presented to OSH with leg swelling and mentioned black tarry stool. FOBT +. Transferred for EGD at [facility]. Hb 3 units lower than baseline, received 1 unit PRBC for Hb<8.     Interval Problem Update  - pt reports painless melena x 4 days  - last eliquis dose was AM of 6/29  - also taking macrobid for uti  - leg swelling much improved    I have discussed this patient's plan of care and discharge plan at IDT rounds today with Case Management, Nursing, Nursing leadership, and other members of the IDT team.    Consultants/Specialty  GI    Code Status  Full Code    Disposition  The patient is not medically cleared for discharge to home or a post-acute facility.  Anticipate discharge to: home with close outpatient follow-up    I have placed the appropriate orders for post-discharge needs.    Review of Systems  Review of Systems   All other systems reviewed and are negative.       Physical Exam  Temp:  [36.7 °C (98 °F)-36.9 °C (98.5 °F)] 36.9 °C (98.5 °F)  Pulse:  [70-79] 78  Resp:  [16-20] 16  BP: (106-136)/(44-68) 136/68  SpO2:  [92 %-96 %] 94 %    Physical Exam  General: NAD, resting comfortably  HEENT: PERRLA, EOMI  Cards: RRR, no murmur or gallops, no tenderness of chest wall, JVP nl  Pulm: normal respiratory effort, CTAB, no wheezes or rhonchi  Abdomen: soft, NTND, + bowel sounds, no rebound tenderness or guarding  MSK: normal ROM of upper and lower extremities  Neuro: CN II-XII grossly intact, sensation/strength intact, AAOx3  Psych: Appropriate mood   Fluids    Intake/Output Summary (Last 24 hours) at 6/30/2024 1207  Last data filed at 6/30/2024 1031  Gross per 24 hour   Intake 412 ml   Output 200 ml   Net 212 ml       Laboratory  Recent  Labs     06/29/24  2152 06/30/24  0200   WBC 6.3 5.6   RBC 3.06* 2.88*   HEMOGLOBIN 9.2* 8.6*   HEMATOCRIT 29.1* 27.1*   MCV 95.1 94.1   MCH 30.1 29.9   MCHC 31.6* 31.7*   RDW 55.3* 55.0*   PLATELETCT 253 239   MPV 10.6 10.8     Recent Labs     06/30/24  0200   SODIUM 142   POTASSIUM 2.9*   CHLORIDE 101   CO2 28   GLUCOSE 91   BUN 27*   CREATININE 0.93   CALCIUM 8.9     Recent Labs     06/29/24  2152   INR 1.30*               Imaging  No orders to display        Assessment/Plan  * Acute GI bleeding- (present on admission)  Assessment & Plan  -cw telemetry.  -4-day history of black/tarry stools.  Patient on Eliquis for A-fib and no prior history of GI bleed.  -3 unit drop from b/l  -Additionally, patient had a history of Chris-en-Y gastric bypass in 2006 and adjustable band placed in 2019 and later removed in 2022.  -admitted doc giving  1 unit pRBC  -GIC consulted for EGD  -Hold Eliquis and check serial hemoglobin    HLD (hyperlipidemia)  Assessment & Plan  -She takes atorvastatin.  She is currently NPO.    Nocturnal hypoxia  Assessment & Plan  -On 2 L of oxygen overnight at baseline.    QT prolongation  Assessment & Plan  -EKG done at outside facility showed QTc of 519.  -Avoid QT prolonging medication including antiemetics  -recheck EKG today, I have ordered one    Hypokalemia- (present on admission)  Assessment & Plan  K 2.9, replete aggressively  Repeat K check at 1500    Hypothyroidism- (present on admission)  Assessment & Plan  Home synthroid    Idiopathic chronic gout of multiple sites without tophus- (present on admission)  Assessment & Plan  Cw home allopurinol    Paroxysmal atrial fibrillation (HCC)- (present on admission)  Assessment & Plan  -resume home bb  -resume home digoxin  -hold eliquis    Essential hypertension- (present on admission)  Assessment & Plan  Resume home meds         VTE prophylaxis: SCD    I have performed a physical exam and reviewed and updated ROS and Plan today (6/30/2024). In  review of yesterday's note (6/29/2024), there are no changes except as documented above.

## 2024-06-30 NOTE — ASSESSMENT & PLAN NOTE
- telemetry.  -4-day history of black/tarry stools.  Patient on Eliquis for A-fib and no prior history of GI bleed.  -3 unit drop from b/l  -Additionally, patient had a history of Chris-en-Y gastric bypass in 2006 and adjustable band placed in 2019 and later removed in 2022.  -admitted doc giving  1 unit pRBC  -GIC consulted for EGD  -Hold Eliquis and check serial hemoglobin

## 2024-06-30 NOTE — PROGRESS NOTES
Medication history reviewed with pt. Med rec is complete.  Allergies reviewed, per pt    Pt reports that she is taking her METOPROLOL TARTRATE 25MG, half tablet (12.5MG) QDAY not 25MG BID    Patient has had outpatient antibiotics in the last 30 days, pt started MACROBID 100MG on 6/26/2024 for 7 day course.  Last dose was taken at home on 6/29/2024 at 0630    Pt is on anticoagulants, pt takes ELIQUIS 5MG.  Last dose was taken on 6/29/2024 at 0630

## 2024-07-01 ENCOUNTER — ANESTHESIA EVENT (OUTPATIENT)
Dept: SURGERY | Facility: MEDICAL CENTER | Age: 74
DRG: 377 | End: 2024-07-01
Payer: MEDICARE

## 2024-07-01 ENCOUNTER — ANESTHESIA (OUTPATIENT)
Dept: SURGERY | Facility: MEDICAL CENTER | Age: 74
DRG: 377 | End: 2024-07-01
Payer: MEDICARE

## 2024-07-01 PROBLEM — R94.31 QT PROLONGATION: Status: RESOLVED | Noted: 2024-06-30 | Resolved: 2024-07-01

## 2024-07-01 LAB
ANION GAP SERPL CALC-SCNC: 13 MMOL/L (ref 7–16)
BASOPHILS # BLD AUTO: 0.9 % (ref 0–1.8)
BASOPHILS # BLD: 0.05 K/UL (ref 0–0.12)
BUN SERPL-MCNC: 22 MG/DL (ref 8–22)
CALCIUM SERPL-MCNC: 9 MG/DL (ref 8.4–10.2)
CHLORIDE SERPL-SCNC: 103 MMOL/L (ref 96–112)
CO2 SERPL-SCNC: 23 MMOL/L (ref 20–33)
CREAT SERPL-MCNC: 0.95 MG/DL (ref 0.5–1.4)
EOSINOPHIL # BLD AUTO: 0.34 K/UL (ref 0–0.51)
EOSINOPHIL NFR BLD: 6.1 % (ref 0–6.9)
ERYTHROCYTE [DISTWIDTH] IN BLOOD BY AUTOMATED COUNT: 57.7 FL (ref 35.9–50)
GFR SERPLBLD CREATININE-BSD FMLA CKD-EPI: 63 ML/MIN/1.73 M 2
GLUCOSE SERPL-MCNC: 114 MG/DL (ref 65–99)
HCT VFR BLD AUTO: 30.4 % (ref 37–47)
HCT VFR BLD AUTO: 32.3 % (ref 37–47)
HGB BLD-MCNC: 10.2 G/DL (ref 12–16)
HGB BLD-MCNC: 9.5 G/DL (ref 12–16)
IMM GRANULOCYTES # BLD AUTO: 0.02 K/UL (ref 0–0.11)
IMM GRANULOCYTES NFR BLD AUTO: 0.4 % (ref 0–0.9)
LYMPHOCYTES # BLD AUTO: 1.77 K/UL (ref 1–4.8)
LYMPHOCYTES NFR BLD: 32 % (ref 22–41)
MCH RBC QN AUTO: 29.9 PG (ref 27–33)
MCHC RBC AUTO-ENTMCNC: 31.3 G/DL (ref 32.2–35.5)
MCV RBC AUTO: 95.6 FL (ref 81.4–97.8)
MONOCYTES # BLD AUTO: 0.6 K/UL (ref 0–0.85)
MONOCYTES NFR BLD AUTO: 10.8 % (ref 0–13.4)
NEUTROPHILS # BLD AUTO: 2.75 K/UL (ref 1.82–7.42)
NEUTROPHILS NFR BLD: 49.8 % (ref 44–72)
NRBC # BLD AUTO: 0 K/UL
NRBC BLD-RTO: 0 /100 WBC (ref 0–0.2)
PLATELET # BLD AUTO: 236 K/UL (ref 164–446)
PMV BLD AUTO: 10.5 FL (ref 9–12.9)
POTASSIUM SERPL-SCNC: 3.4 MMOL/L (ref 3.6–5.5)
RBC # BLD AUTO: 3.18 M/UL (ref 4.2–5.4)
SODIUM SERPL-SCNC: 139 MMOL/L (ref 135–145)
WBC # BLD AUTO: 5.5 K/UL (ref 4.8–10.8)

## 2024-07-01 PROCEDURE — 88312 SPECIAL STAINS GROUP 1: CPT

## 2024-07-01 PROCEDURE — 700111 HCHG RX REV CODE 636 W/ 250 OVERRIDE (IP): Mod: JZ | Performed by: ANESTHESIOLOGY

## 2024-07-01 PROCEDURE — 80048 BASIC METABOLIC PNL TOTAL CA: CPT

## 2024-07-01 PROCEDURE — 36415 COLL VENOUS BLD VENIPUNCTURE: CPT

## 2024-07-01 PROCEDURE — 160048 HCHG OR STATISTICAL LEVEL 1-5: Performed by: INTERNAL MEDICINE

## 2024-07-01 PROCEDURE — 770001 HCHG ROOM/CARE - MED/SURG/GYN PRIV*

## 2024-07-01 PROCEDURE — 160009 HCHG ANES TIME/MIN: Performed by: INTERNAL MEDICINE

## 2024-07-01 PROCEDURE — 99232 SBSQ HOSP IP/OBS MODERATE 35: CPT | Performed by: INTERNAL MEDICINE

## 2024-07-01 PROCEDURE — 0DB68ZX EXCISION OF STOMACH, VIA NATURAL OR ARTIFICIAL OPENING ENDOSCOPIC, DIAGNOSTIC: ICD-10-PCS | Performed by: INTERNAL MEDICINE

## 2024-07-01 PROCEDURE — 160035 HCHG PACU - 1ST 60 MINS PHASE I: Performed by: INTERNAL MEDICINE

## 2024-07-01 PROCEDURE — 700111 HCHG RX REV CODE 636 W/ 250 OVERRIDE (IP): Performed by: HOSPITALIST

## 2024-07-01 PROCEDURE — 160202 HCHG ENDO MINUTES - 1ST 30 MINS LEVEL 3: Performed by: INTERNAL MEDICINE

## 2024-07-01 PROCEDURE — 700111 HCHG RX REV CODE 636 W/ 250 OVERRIDE (IP): Performed by: INTERNAL MEDICINE

## 2024-07-01 PROCEDURE — C9113 INJ PANTOPRAZOLE SODIUM, VIA: HCPCS | Performed by: HOSPITALIST

## 2024-07-01 PROCEDURE — 85014 HEMATOCRIT: CPT

## 2024-07-01 PROCEDURE — 85018 HEMOGLOBIN: CPT

## 2024-07-01 PROCEDURE — 700102 HCHG RX REV CODE 250 W/ 637 OVERRIDE(OP): Performed by: INTERNAL MEDICINE

## 2024-07-01 PROCEDURE — 700105 HCHG RX REV CODE 258: Performed by: INTERNAL MEDICINE

## 2024-07-01 PROCEDURE — 94760 N-INVAS EAR/PLS OXIMETRY 1: CPT

## 2024-07-01 PROCEDURE — 160002 HCHG RECOVERY MINUTES (STAT): Performed by: INTERNAL MEDICINE

## 2024-07-01 PROCEDURE — 85025 COMPLETE CBC W/AUTO DIFF WBC: CPT

## 2024-07-01 PROCEDURE — A9270 NON-COVERED ITEM OR SERVICE: HCPCS | Performed by: INTERNAL MEDICINE

## 2024-07-01 PROCEDURE — 700101 HCHG RX REV CODE 250: Performed by: ANESTHESIOLOGY

## 2024-07-01 PROCEDURE — 700101 HCHG RX REV CODE 250: Performed by: INTERNAL MEDICINE

## 2024-07-01 PROCEDURE — 88305 TISSUE EXAM BY PATHOLOGIST: CPT

## 2024-07-01 RX ORDER — EPHEDRINE SULFATE 50 MG/ML
5 INJECTION, SOLUTION INTRAVENOUS
Status: DISCONTINUED | OUTPATIENT
Start: 2024-07-01 | End: 2024-07-01 | Stop reason: HOSPADM

## 2024-07-01 RX ORDER — POTASSIUM CHLORIDE 20 MEQ/1
40 TABLET, EXTENDED RELEASE ORAL ONCE
Status: DISCONTINUED | OUTPATIENT
Start: 2024-07-01 | End: 2024-07-01

## 2024-07-01 RX ORDER — POTASSIUM CHLORIDE 7.45 MG/ML
10 INJECTION INTRAVENOUS
Status: DISPENSED | OUTPATIENT
Start: 2024-07-01 | End: 2024-07-01

## 2024-07-01 RX ORDER — HYDRALAZINE HYDROCHLORIDE 20 MG/ML
5 INJECTION INTRAMUSCULAR; INTRAVENOUS
Status: DISCONTINUED | OUTPATIENT
Start: 2024-07-01 | End: 2024-07-01 | Stop reason: HOSPADM

## 2024-07-01 RX ORDER — SODIUM CHLORIDE, SODIUM LACTATE, POTASSIUM CHLORIDE, CALCIUM CHLORIDE 600; 310; 30; 20 MG/100ML; MG/100ML; MG/100ML; MG/100ML
INJECTION, SOLUTION INTRAVENOUS CONTINUOUS
Status: DISCONTINUED | OUTPATIENT
Start: 2024-07-01 | End: 2024-07-01 | Stop reason: HOSPADM

## 2024-07-01 RX ORDER — OXYCODONE HCL 5 MG/5 ML
10 SOLUTION, ORAL ORAL
Status: DISCONTINUED | OUTPATIENT
Start: 2024-07-01 | End: 2024-07-01 | Stop reason: HOSPADM

## 2024-07-01 RX ORDER — ONDANSETRON 2 MG/ML
INJECTION INTRAMUSCULAR; INTRAVENOUS PRN
Status: DISCONTINUED | OUTPATIENT
Start: 2024-07-01 | End: 2024-07-01 | Stop reason: SURG

## 2024-07-01 RX ORDER — MAGNESIUM SULFATE HEPTAHYDRATE 40 MG/ML
2 INJECTION, SOLUTION INTRAVENOUS ONCE
Status: COMPLETED | OUTPATIENT
Start: 2024-07-01 | End: 2024-07-01

## 2024-07-01 RX ORDER — SODIUM CHLORIDE, SODIUM LACTATE, POTASSIUM CHLORIDE, CALCIUM CHLORIDE 600; 310; 30; 20 MG/100ML; MG/100ML; MG/100ML; MG/100ML
INJECTION, SOLUTION INTRAVENOUS CONTINUOUS
Status: ACTIVE | OUTPATIENT
Start: 2024-07-01 | End: 2024-07-01

## 2024-07-01 RX ORDER — HALOPERIDOL 5 MG/ML
1 INJECTION INTRAMUSCULAR
Status: DISCONTINUED | OUTPATIENT
Start: 2024-07-01 | End: 2024-07-01 | Stop reason: HOSPADM

## 2024-07-01 RX ORDER — DIPHENHYDRAMINE HYDROCHLORIDE 50 MG/ML
12.5 INJECTION INTRAMUSCULAR; INTRAVENOUS
Status: DISCONTINUED | OUTPATIENT
Start: 2024-07-01 | End: 2024-07-01 | Stop reason: HOSPADM

## 2024-07-01 RX ORDER — DEXAMETHASONE SODIUM PHOSPHATE 4 MG/ML
INJECTION, SOLUTION INTRA-ARTICULAR; INTRALESIONAL; INTRAMUSCULAR; INTRAVENOUS; SOFT TISSUE PRN
Status: DISCONTINUED | OUTPATIENT
Start: 2024-07-01 | End: 2024-07-01 | Stop reason: SURG

## 2024-07-01 RX ORDER — HYDROMORPHONE HYDROCHLORIDE 1 MG/ML
0.2 INJECTION, SOLUTION INTRAMUSCULAR; INTRAVENOUS; SUBCUTANEOUS
Status: DISCONTINUED | OUTPATIENT
Start: 2024-07-01 | End: 2024-07-01 | Stop reason: HOSPADM

## 2024-07-01 RX ORDER — HYDROMORPHONE HYDROCHLORIDE 1 MG/ML
0.4 INJECTION, SOLUTION INTRAMUSCULAR; INTRAVENOUS; SUBCUTANEOUS
Status: DISCONTINUED | OUTPATIENT
Start: 2024-07-01 | End: 2024-07-01 | Stop reason: HOSPADM

## 2024-07-01 RX ORDER — LABETALOL HYDROCHLORIDE 5 MG/ML
5 INJECTION, SOLUTION INTRAVENOUS
Status: DISCONTINUED | OUTPATIENT
Start: 2024-07-01 | End: 2024-07-01 | Stop reason: HOSPADM

## 2024-07-01 RX ORDER — POTASSIUM CHLORIDE 7.45 MG/ML
10 INJECTION INTRAVENOUS ONCE
Status: COMPLETED | OUTPATIENT
Start: 2024-07-01 | End: 2024-07-01

## 2024-07-01 RX ORDER — HYDROMORPHONE HYDROCHLORIDE 1 MG/ML
0.1 INJECTION, SOLUTION INTRAMUSCULAR; INTRAVENOUS; SUBCUTANEOUS
Status: DISCONTINUED | OUTPATIENT
Start: 2024-07-01 | End: 2024-07-01 | Stop reason: HOSPADM

## 2024-07-01 RX ORDER — LIDOCAINE HYDROCHLORIDE 20 MG/ML
INJECTION, SOLUTION EPIDURAL; INFILTRATION; INTRACAUDAL; PERINEURAL PRN
Status: DISCONTINUED | OUTPATIENT
Start: 2024-07-01 | End: 2024-07-01 | Stop reason: SURG

## 2024-07-01 RX ORDER — SUCCINYLCHOLINE CHLORIDE 20 MG/ML
INJECTION INTRAMUSCULAR; INTRAVENOUS PRN
Status: DISCONTINUED | OUTPATIENT
Start: 2024-07-01 | End: 2024-07-01 | Stop reason: SURG

## 2024-07-01 RX ORDER — ONDANSETRON 2 MG/ML
4 INJECTION INTRAMUSCULAR; INTRAVENOUS
Status: DISCONTINUED | OUTPATIENT
Start: 2024-07-01 | End: 2024-07-01 | Stop reason: HOSPADM

## 2024-07-01 RX ORDER — OXYCODONE HCL 5 MG/5 ML
5 SOLUTION, ORAL ORAL
Status: DISCONTINUED | OUTPATIENT
Start: 2024-07-01 | End: 2024-07-01 | Stop reason: HOSPADM

## 2024-07-01 RX ADMIN — PROPOFOL 160 MG: 10 INJECTION, EMULSION INTRAVENOUS at 16:22

## 2024-07-01 RX ADMIN — LIDOCAINE HYDROCHLORIDE 80 MG: 20 INJECTION, SOLUTION EPIDURAL; INFILTRATION; INTRACAUDAL at 16:22

## 2024-07-01 RX ADMIN — SUCCINYLCHOLINE CHLORIDE 120 MG: 20 INJECTION, SOLUTION INTRAMUSCULAR; INTRAVENOUS at 16:22

## 2024-07-01 RX ADMIN — MAGNESIUM SULFATE HEPTAHYDRATE 2 G: 2 INJECTION, SOLUTION INTRAVENOUS at 18:19

## 2024-07-01 RX ADMIN — POTASSIUM CHLORIDE 10 MEQ: 7.46 INJECTION, SOLUTION INTRAVENOUS at 19:50

## 2024-07-01 RX ADMIN — ONDANSETRON 4 MG: 2 INJECTION INTRAMUSCULAR; INTRAVENOUS at 16:32

## 2024-07-01 RX ADMIN — PROPOFOL 50 MG: 10 INJECTION, EMULSION INTRAVENOUS at 16:31

## 2024-07-01 RX ADMIN — PANTOPRAZOLE SODIUM 40 MG: 40 INJECTION, POWDER, FOR SOLUTION INTRAVENOUS at 18:13

## 2024-07-01 RX ADMIN — FENTANYL CITRATE 50 MCG: 50 INJECTION, SOLUTION INTRAMUSCULAR; INTRAVENOUS at 16:30

## 2024-07-01 RX ADMIN — PROPOFOL 40 MG: 10 INJECTION, EMULSION INTRAVENOUS at 16:29

## 2024-07-01 RX ADMIN — DEXAMETHASONE SODIUM PHOSPHATE 4 MG: 4 INJECTION INTRA-ARTICULAR; INTRALESIONAL; INTRAMUSCULAR; INTRAVENOUS; SOFT TISSUE at 16:32

## 2024-07-01 RX ADMIN — POLYETHYLENE GLYCOL-3350 AND ELECTROLYTES 4 L: 236; 6.74; 5.86; 2.97; 22.74 POWDER, FOR SOLUTION ORAL at 21:03

## 2024-07-01 RX ADMIN — POTASSIUM CHLORIDE 10 MEQ: 7.46 INJECTION, SOLUTION INTRAVENOUS at 18:18

## 2024-07-01 RX ADMIN — ATORVASTATIN CALCIUM 40 MG: 40 TABLET, FILM COATED ORAL at 18:14

## 2024-07-01 RX ADMIN — DIGOXIN 125 MCG: 0.12 TABLET ORAL at 18:13

## 2024-07-01 RX ADMIN — POTASSIUM CHLORIDE 10 MEQ: 10 INJECTION, SOLUTION INTRAVENOUS at 22:34

## 2024-07-01 RX ADMIN — SODIUM CHLORIDE, POTASSIUM CHLORIDE, SODIUM LACTATE AND CALCIUM CHLORIDE: 600; 310; 30; 20 INJECTION, SOLUTION INTRAVENOUS at 16:15

## 2024-07-01 RX ADMIN — PANTOPRAZOLE SODIUM 40 MG: 40 INJECTION, POWDER, FOR SOLUTION INTRAVENOUS at 05:40

## 2024-07-01 RX ADMIN — POTASSIUM CHLORIDE 10 MEQ: 7.46 INJECTION, SOLUTION INTRAVENOUS at 16:00

## 2024-07-01 ASSESSMENT — COGNITIVE AND FUNCTIONAL STATUS - GENERAL
MOBILITY SCORE: 24
SUGGESTED CMS G CODE MODIFIER MOBILITY: CH
DAILY ACTIVITIY SCORE: 24
SUGGESTED CMS G CODE MODIFIER DAILY ACTIVITY: CH

## 2024-07-01 ASSESSMENT — PAIN DESCRIPTION - PAIN TYPE
TYPE: ACUTE PAIN

## 2024-07-01 ASSESSMENT — PAIN SCALES - GENERAL: PAIN_LEVEL: 1

## 2024-07-01 ASSESSMENT — FIBROSIS 4 INDEX: FIB4 SCORE: 2.28

## 2024-07-01 NOTE — PROGRESS NOTES
GI ATTENDING:    Patient seen and examined.  Chart reviewed.  Esophagogastroduodenoscopy (EGD) explained at length.    Patient expresses understanding and accepts all risks.  Also understands that if exam fails to demonstrate a source of upper GI bleeding then colonoscopy will be planned    Consenting person was given an opportunity to ask questions and discuss other options.  Risks including but not limited to perforation, infection, bleeding, missed lesion(s), cardiac and/or pulmonary event, aspiration, stroke, possible need for surgery and/or interventional radiology, hospitalization possibly prolonged, discomfort, unsuccessful and/or incomplete procedure, indefinite diagnosis, ineffective therapy and/or persistent symptoms, possible need for repeat procedures and/or additional testings, damage to adjacent organs and/or vascular structures, medication reaction, disability, death, and other adverse events possibly life-threatening.  Discussion was undertaken with Layman's terms.  Consenting person stated understanding and acceptance of these risks, and wished to proceed.  Informed consent was given in clear state of mind.
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
## 2024-07-01 NOTE — PROGRESS NOTES
Hospital Medicine Daily Progress Note    Date of Service  7/1/2024    Chief Complaint  Azalea Gan is a 73 y.o. female admitted 6/29/2024 tx from [facility] for complaints of leg swelling and black tarry stools    Hospital Course  72 yo w afib on eliquis, htn, phtn, presented to OSH with leg swelling and mentioned black tarry stool. FOBT +. Transferred for EGD at [facility]. Hb 3 units lower than baseline, received 1 unit PRBC for Hb<8.     Interval Problem Update  - awaiting egd  - k low , will try IV if burning then PO after egd  - give mg    I have discussed this patient's plan of care and discharge plan at IDT rounds today with Case Management, Nursing, Nursing leadership, and other members of the IDT team.    Consultants/Specialty  GI    Code Status  Full Code    Disposition  The patient is not medically cleared for discharge to home or a post-acute facility.  Anticipate discharge to: home with close outpatient follow-up    I have placed the appropriate orders for post-discharge needs.    Review of Systems  Review of Systems   All other systems reviewed and are negative.       Physical Exam  Temp:  [36.4 °C (97.6 °F)-37.1 °C (98.8 °F)] 36.9 °C (98.5 °F)  Pulse:  [67-72] 68  Resp:  [16-18] 18  BP: (118-141)/(60-69) 138/65  SpO2:  [92 %-98 %] 94 %    Physical Exam  General: NAD, resting comfortably  HEENT: PERRLA, EOMI  Cards: RRR, systolic murmur  Pulm: normal respiratory effort, CTAB, no wheezes or rhonchi  Abdomen: soft, NTND, + bowel sounds, no rebound tenderness or guarding  MSK: normal ROM of upper and lower extremities  Neuro: CN II-XII grossly intact, sensation/strength intact, AAOx3  Psych: Appropriate mood   Fluids  No intake or output data in the 24 hours ending 07/01/24 1448      Laboratory  Recent Labs     06/29/24  2152 06/30/24  0200 06/30/24  1130 06/30/24  1800 07/01/24  0054 07/01/24  0705   WBC 6.3 5.6  --   --  5.5  --    RBC 3.06* 2.88*  --   --  3.18*  --    HEMOGLOBIN 9.2* 8.6*    < > 10.8* 9.5* 10.2*   HEMATOCRIT 29.1* 27.1*   < > 34.2* 30.4* 32.3*   MCV 95.1 94.1  --   --  95.6  --    MCH 30.1 29.9  --   --  29.9  --    MCHC 31.6* 31.7*  --   --  31.3*  --    RDW 55.3* 55.0*  --   --  57.7*  --    PLATELETCT 253 239  --   --  236  --    MPV 10.6 10.8  --   --  10.5  --     < > = values in this interval not displayed.     Recent Labs     06/30/24  0200 06/30/24  1800 07/01/24  0054   SODIUM 142  --  139   POTASSIUM 2.9* 3.6 3.4*   CHLORIDE 101  --  103   CO2 28  --  23   GLUCOSE 91  --  114*   BUN 27*  --  22   CREATININE 0.93  --  0.95   CALCIUM 8.9  --  9.0     Recent Labs     06/29/24  2152   INR 1.30*               Imaging  No orders to display        Assessment/Plan  * Acute GI bleeding- (present on admission)  Assessment & Plan  -cw telemetry.  -4-day history of black/tarry stools.  Patient on Eliquis for A-fib and no prior history of GI bleed.  -3 unit drop from b/l  -Additionally, patient had a history of Chris-en-Y gastric bypass in 2006 and adjustable band placed in 2019 and later removed in 2022.  -sp 1 unit pRBC  -GIC consulted for EGD planned for 7/1 late afternoon  -Hold Eliquis    Cystitis  Assessment & Plan  Cw home macrobid x 5 days    HLD (hyperlipidemia)  Assessment & Plan  -She takes atorvastatin.  She is currently NPO.    Nocturnal hypoxia  Assessment & Plan  -On 2 L of oxygen overnight at baseline.    Hypokalemia- (present on admission)  Assessment & Plan  K 2.9, repleted but K 3.4 still  Give IV potassium chloride  Give IV mg 2g x 1    Hypothyroidism- (present on admission)  Assessment & Plan  Home synthroid    Idiopathic chronic gout of multiple sites without tophus- (present on admission)  Assessment & Plan  Cw home allopurinol    Paroxysmal atrial fibrillation (HCC)- (present on admission)  Assessment & Plan  -resume home bb  -resume home digoxin  -hold eliquis    Essential hypertension- (present on admission)  Assessment & Plan  Resume home meds         VTE  prophylaxis: SCD    I have performed a physical exam and reviewed and updated ROS and Plan today (7/1/2024). In review of yesterday's note (6/30/2024), there are no changes except as documented above.      Likely home tomorrow after egd

## 2024-07-01 NOTE — PROGRESS NOTES
Telemetry Shift Summary    Rhythm SB-SR-ST  HR Range   Ectopy R-O PAC, R/O/F PVC, R Coup, R Trig  Measurements 0.18/0.10/0.42      Normal Values  Rhythm SR  HR Range:   Measurements: 0.12-0.20/0.06-0.10/0.30-0.52

## 2024-07-01 NOTE — OR NURSING
1645: Patient arrived from [facility] via gurney.  Start Stop Bang per protocol for a score of 5.  Sedation/Resp Status: Eye opening to verbal.  Respirations spontaneous and non-labored.    1700: Pt coughing up mucus and blowing nose.4 NC applied. Dr. Glover at bedside talking to pt and about plan. Pt to have colonoscopy tomorrow.     1715: Pt resting comfortably, pt states she has chronic pain in BLE. Warm blankets applied for comfort.     1730: Pt resting in [facility] with eyes closed. Caregiver updated of pt status.     1745: STOPBANG complete. Pt meets criteria for transfer to floor. Awaiting to give report.     1743: Report to CHICHI Omer.

## 2024-07-01 NOTE — CARE PLAN
The patient is Stable - Low risk of patient condition declining or worsening    Shift Goals  Clinical Goals: EGD, replace potassium, education.  Patient Goals: comfort  Family Goals: DANE    Progress made toward(s) clinical / shift goals:    Problem: Knowledge Deficit - Standard  Goal: Patient and family/care givers will demonstrate understanding of plan of care, disease process/condition, diagnostic tests and medications  Outcome: Progressing     Problem: Risk for Bleeding  Goal: Patient will take measures to prevent bleeding and recognizes signs of bleeding that need to be reported immediately to a health care professional  Outcome: Progressing  Goal: Patient will not experience bleeding as evidenced by normal blood pressure, stable hematocrit and hemoglobin levels and desired ranges for coagulation profiles  Outcome: Progressing  Note: BM today was brown not black.       Patient is not progressing towards the following goals:

## 2024-07-01 NOTE — OR SURGEON
Esophagogastroduodenoscopy (EGD) operative note    PreOp Diagnosis: Acute GI bleed anemia with melena      PostOp Diagnosis: Same      Procedure(s):  GASTROSCOPY - Wound Class: None    Surgeon(s):  Grant Glover M.D.    Anesthesiologist/Type of Anesthesia:  Anesthesiologist: Vikas Granda M.D./General    Surgical Staff:  Circulator: Sally Goode R.N.  Endoscopy Technician: Chepe Bush; Roxane Griggs    Specimens removed if any:  ID Type Source Tests Collected by Time Destination   A : gastric antrum, rule out h pylori Tissue Antrum PATHOLOGY SPECIMEN Grant Glover M.D. 7/1/2024  4:35 PM        Dr. Glover  [facility]  [facility]  [phone]    EGD with:  Biopsy    Indication:  Acute GI bleed anemia with melena    Sedation:  GA    Findings:    Esophagus     Unremarkable mucosa    Stomach     Gastric anatomy normal     Antrum erythema, edema and mosaic mucosal pattern biopsied for H. pylori    Gastrojejunal anastomosis     At gastric cardia     Suture material seen     To short blind pouch is     Jejunum visualized and unremarkable to length of gastroscope    Duodenum     Unremarkable through fourth portion    Plan:   Follow-up pathology     Clear liquid diet then n.p.o. past breakfast tomorrow     Colonoscopy prep tonight     Colonoscopy tomorrow    Procedure detail:    Prior to procedure, informed consent was obtained.  Risks, benefits, alternatives, including but not limited to risk of bleeding, infection, perforation, adverse reaction to sedating medicine, failure to identify pathology and death were explained to the patient who accepted all risks.    Patient was prepped in the supine position with a roll under her right shoulder after intubation and sedation was provided by anesthesia.    Scope tip of the Olympus flexible gastroscope was passed in the proximal esophagus.  Proximal, middle and distal thirds of the esophagus were well-visualized and were unremarkable.  The  stomach was entered and immediately along the posterior lesser curvature aspect of the cardia, and anastomosis was encountered.  Suture material was present.  The gastric body, fundus and antrum were otherwise unremarkable and anatomy.  Air was insufflated and the gastric pool was suctioned and scope retroflexed view the body fundus and cardia demonstrating the anastomosis but otherwise unremarkable mucosa.  No anastomotic ulcer was appreciated.  Scope was straightened view the antrum and incisura which had a mild mosaic mucosal pattern as well as erythema and edema.  Biopsies were taken for H. pylori.  The pylorus is patent and the duodenal bulb sweep second third and fourth portion were unremarkable.  The scope was withdrawn back to the anastomosis where 3 potential lumens were seen.  The first 2 lumens were entered and were short blind pouch is without abnormalities.  The anastomosis was closely visualized and no ulcer was appreciated.  The third lumen was entered and the scope passed deeply into the jejunum but the distal anastomosis was not reached.  The scope was slowly withdrawn visualizing mucosa in a methodical 360 degree manner and no abnormalities were seen.  Upon withdrawal of the scope back into the stomach the anastomosis was once again closely examined and no source of bleeding was seen.  Procedure was deemed complete.  Air and liquid resection.  The scope was withdrawn.  Patient tolerated the procedure well and was sent to recovery without immediate complications.      7/1/2024 4:44 PM Grant Glover M.D.

## 2024-07-01 NOTE — DIETARY
NUTRITION SERVICES: BMI - Pt with BMI >40 (=Body mass index is 42.27 kg/m².), Class III obesity. Weight loss counseling not appropriate in acute care setting. RECOMMEND - If appropriate at DC please refer to outpatient nutrition services for weight management.

## 2024-07-01 NOTE — PROGRESS NOTES
Telemetry Shift Summary     Rhythm SR  HR Range 68-78  Ectopy R-F PVC/PAC; R-Big/Trig  Measurements 0.18/0.10/0.40           Normal Values  Rhythm SR  HR Range    Measurements 0.12-0.20 / 0.06-0.10  / 0.30-0.52

## 2024-07-01 NOTE — OR NURSING
Pt allergies and NPO status verified. Belongings secured in locker. Pt verbalizes understanding of pain scale, expected course of stay, and plan of care. Surgical site verified with pt and MD. IV access assessment complete. All questions answered. Bed in lowest position, call light within reach.

## 2024-07-01 NOTE — CARE PLAN
The patient is Stable - Low risk of patient condition declining or worsening    Shift Goals  Clinical Goals: NPO @ 2400. monitor stool, monitor vitals  Patient Goals: sleep, comfort  Family Goals: DANE    Progress made toward(s) clinical / shift goals:    Problem: Knowledge Deficit - Standard  Goal: Patient and family/care givers will demonstrate understanding of plan of care, disease process/condition, diagnostic tests and medications  Description: Target End Date:  1-3 days or as soon as patient condition allows    Document in Patient Education    1.  Patient and family/caregiver oriented to unit, equipment, visitation policy and means for communicating concern  2.  Complete/review Learning Assessment  3.  Assess knowledge level of disease process/condition, treatment plan, diagnostic tests and medications  4.  Explain disease process/condition, treatment plan, diagnostic tests and medications  Outcome: Progressing  Note: Plan of care discussed with patient. Patient understands the plan of care. Labs were discussed with patient, at patients request.      Problem: Pain - Standard  Goal: Alleviation of pain or a reduction in pain to the patient’s comfort goal  Description: Target End Date:  Prior to discharge or change in level of care    Document on Vitals flowsheet    1.  Document pain using the appropriate pain scale per order or unit policy  2.  Educate and implement non-pharmacologic comfort measures (i.e. relaxation, distraction, massage, cold/heat therapy, etc.)  3.  Pain management medications as ordered  4.  Reassess pain after pain med administration per policy  5.  If opiods administered assess patient's response to pain medication is appropriate per POSS sedation scale  6.  Follow pain management plan developed in collaboration with patient and interdisciplinary team (including palliative care or pain specialists if applicable)  Outcome: Progressing  Note: Patient had pain in her legs this shift. Patient  given one dose of pain medication. Medication relieved patients pain.      Problem: Fall Risk  Goal: Patient will remain free from falls  Description: Target End Date:  Prior to discharge or change in level of care    Document interventions on the Xiomara Villalta Fall Risk Assessment    1.  Assess for fall risk factors  2.  Implement fall precautions  Outcome: Progressing  Note: Patient remains free of falls this shift. Patient calls for assistance when she needs to exit her bed and ambulate.

## 2024-07-02 ENCOUNTER — ANESTHESIA (OUTPATIENT)
Dept: SURGERY | Facility: MEDICAL CENTER | Age: 74
DRG: 377 | End: 2024-07-02
Payer: MEDICARE

## 2024-07-02 ENCOUNTER — ANESTHESIA EVENT (OUTPATIENT)
Dept: SURGERY | Facility: MEDICAL CENTER | Age: 74
DRG: 377 | End: 2024-07-02
Payer: MEDICARE

## 2024-07-02 ENCOUNTER — APPOINTMENT (OUTPATIENT)
Dept: RADIOLOGY | Facility: MEDICAL CENTER | Age: 74
DRG: 377 | End: 2024-07-02
Attending: INTERNAL MEDICINE
Payer: MEDICARE

## 2024-07-02 PROBLEM — J96.11 CHRONIC HYPOXIC RESPIRATORY FAILURE (HCC): Chronic | Status: ACTIVE | Noted: 2024-07-02

## 2024-07-02 PROBLEM — M1A.29X0 DRUG-INDUCED CHRONIC GOUT OF MULTIPLE SITES WITHOUT TOPHUS: Status: ACTIVE | Noted: 2022-10-06

## 2024-07-02 PROBLEM — I50.33 ACUTE ON CHRONIC HEART FAILURE WITH PRESERVED EJECTION FRACTION (HCC): Status: ACTIVE | Noted: 2024-05-11

## 2024-07-02 PROBLEM — J96.11 CHRONIC HYPOXIC RESPIRATORY FAILURE (HCC): Status: ACTIVE | Noted: 2024-07-02

## 2024-07-02 LAB
ANION GAP SERPL CALC-SCNC: 12 MMOL/L (ref 7–16)
BUN SERPL-MCNC: 14 MG/DL (ref 8–22)
CALCIUM SERPL-MCNC: 9.7 MG/DL (ref 8.4–10.2)
CHLORIDE SERPL-SCNC: 100 MMOL/L (ref 96–112)
CO2 SERPL-SCNC: 28 MMOL/L (ref 20–33)
CREAT SERPL-MCNC: 0.88 MG/DL (ref 0.5–1.4)
ERYTHROCYTE [DISTWIDTH] IN BLOOD BY AUTOMATED COUNT: 57.1 FL (ref 35.9–50)
FLUAV RNA SPEC QL NAA+PROBE: NEGATIVE
FLUBV RNA SPEC QL NAA+PROBE: NEGATIVE
GFR SERPLBLD CREATININE-BSD FMLA CKD-EPI: 69 ML/MIN/1.73 M 2
GLUCOSE SERPL-MCNC: 153 MG/DL (ref 65–99)
HCT VFR BLD AUTO: 37.3 % (ref 37–47)
HGB BLD-MCNC: 11.5 G/DL (ref 12–16)
MAGNESIUM SERPL-MCNC: 2.3 MG/DL (ref 1.5–2.5)
MCH RBC QN AUTO: 29.3 PG (ref 27–33)
MCHC RBC AUTO-ENTMCNC: 30.8 G/DL (ref 32.2–35.5)
MCV RBC AUTO: 94.9 FL (ref 81.4–97.8)
NT-PROBNP SERPL IA-MCNC: 823 PG/ML (ref 0–125)
PATHOLOGY CONSULT NOTE: NORMAL
PLATELET # BLD AUTO: 300 K/UL (ref 164–446)
PMV BLD AUTO: 10.7 FL (ref 9–12.9)
POTASSIUM SERPL-SCNC: 4.3 MMOL/L (ref 3.6–5.5)
RBC # BLD AUTO: 3.93 M/UL (ref 4.2–5.4)
RSV RNA SPEC QL NAA+PROBE: NEGATIVE
SARS-COV-2 RNA RESP QL NAA+PROBE: NOTDETECTED
SODIUM SERPL-SCNC: 140 MMOL/L (ref 135–145)
SPECIMEN SOURCE: NORMAL
WBC # BLD AUTO: 4.7 K/UL (ref 4.8–10.8)

## 2024-07-02 PROCEDURE — 83880 ASSAY OF NATRIURETIC PEPTIDE: CPT

## 2024-07-02 PROCEDURE — 80048 BASIC METABOLIC PNL TOTAL CA: CPT

## 2024-07-02 PROCEDURE — 160203 HCHG ENDO MINUTES - 1ST 30 MINS LEVEL 4: Performed by: INTERNAL MEDICINE

## 2024-07-02 PROCEDURE — 700101 HCHG RX REV CODE 250: Performed by: ANESTHESIOLOGY

## 2024-07-02 PROCEDURE — 700102 HCHG RX REV CODE 250 W/ 637 OVERRIDE(OP): Performed by: INTERNAL MEDICINE

## 2024-07-02 PROCEDURE — 85027 COMPLETE CBC AUTOMATED: CPT

## 2024-07-02 PROCEDURE — 94760 N-INVAS EAR/PLS OXIMETRY 1: CPT

## 2024-07-02 PROCEDURE — 770020 HCHG ROOM/CARE - TELE (206)

## 2024-07-02 PROCEDURE — 0DBL8ZZ EXCISION OF TRANSVERSE COLON, VIA NATURAL OR ARTIFICIAL OPENING ENDOSCOPIC: ICD-10-PCS | Performed by: INTERNAL MEDICINE

## 2024-07-02 PROCEDURE — A9270 NON-COVERED ITEM OR SERVICE: HCPCS | Performed by: HOSPITALIST

## 2024-07-02 PROCEDURE — 160009 HCHG ANES TIME/MIN: Performed by: INTERNAL MEDICINE

## 2024-07-02 PROCEDURE — 700111 HCHG RX REV CODE 636 W/ 250 OVERRIDE (IP): Performed by: ANESTHESIOLOGY

## 2024-07-02 PROCEDURE — 0DBN8ZZ EXCISION OF SIGMOID COLON, VIA NATURAL OR ARTIFICIAL OPENING ENDOSCOPIC: ICD-10-PCS | Performed by: INTERNAL MEDICINE

## 2024-07-02 PROCEDURE — 88305 TISSUE EXAM BY PATHOLOGIST: CPT

## 2024-07-02 PROCEDURE — C9113 INJ PANTOPRAZOLE SODIUM, VIA: HCPCS | Performed by: HOSPITALIST

## 2024-07-02 PROCEDURE — 71046 X-RAY EXAM CHEST 2 VIEWS: CPT

## 2024-07-02 PROCEDURE — 160035 HCHG PACU - 1ST 60 MINS PHASE I: Performed by: INTERNAL MEDICINE

## 2024-07-02 PROCEDURE — 160002 HCHG RECOVERY MINUTES (STAT): Performed by: INTERNAL MEDICINE

## 2024-07-02 PROCEDURE — A9270 NON-COVERED ITEM OR SERVICE: HCPCS | Performed by: INTERNAL MEDICINE

## 2024-07-02 PROCEDURE — 700102 HCHG RX REV CODE 250 W/ 637 OVERRIDE(OP): Performed by: HOSPITALIST

## 2024-07-02 PROCEDURE — 700111 HCHG RX REV CODE 636 W/ 250 OVERRIDE (IP): Performed by: HOSPITALIST

## 2024-07-02 PROCEDURE — 700111 HCHG RX REV CODE 636 W/ 250 OVERRIDE (IP): Performed by: INTERNAL MEDICINE

## 2024-07-02 PROCEDURE — 0DBK8ZZ EXCISION OF ASCENDING COLON, VIA NATURAL OR ARTIFICIAL OPENING ENDOSCOPIC: ICD-10-PCS | Performed by: INTERNAL MEDICINE

## 2024-07-02 PROCEDURE — 700105 HCHG RX REV CODE 258: Performed by: ANESTHESIOLOGY

## 2024-07-02 PROCEDURE — 83735 ASSAY OF MAGNESIUM: CPT

## 2024-07-02 PROCEDURE — 0241U HCHG SARS-COV-2 COVID-19 NFCT DS RESP RNA 4 TRGT MIC: CPT

## 2024-07-02 PROCEDURE — 160048 HCHG OR STATISTICAL LEVEL 1-5: Performed by: INTERNAL MEDICINE

## 2024-07-02 PROCEDURE — 99232 SBSQ HOSP IP/OBS MODERATE 35: CPT | Performed by: INTERNAL MEDICINE

## 2024-07-02 RX ORDER — LIDOCAINE HYDROCHLORIDE 20 MG/ML
INJECTION, SOLUTION EPIDURAL; INFILTRATION; INTRACAUDAL; PERINEURAL PRN
Status: DISCONTINUED | OUTPATIENT
Start: 2024-07-02 | End: 2024-07-02 | Stop reason: SURG

## 2024-07-02 RX ORDER — DIPHENHYDRAMINE HYDROCHLORIDE 50 MG/ML
12.5 INJECTION INTRAMUSCULAR; INTRAVENOUS
Status: DISCONTINUED | OUTPATIENT
Start: 2024-07-02 | End: 2024-07-02 | Stop reason: HOSPADM

## 2024-07-02 RX ORDER — HALOPERIDOL 5 MG/ML
1 INJECTION INTRAMUSCULAR
Status: DISCONTINUED | OUTPATIENT
Start: 2024-07-02 | End: 2024-07-02 | Stop reason: HOSPADM

## 2024-07-02 RX ORDER — EPHEDRINE SULFATE 50 MG/ML
INJECTION, SOLUTION INTRAVENOUS PRN
Status: DISCONTINUED | OUTPATIENT
Start: 2024-07-02 | End: 2024-07-02 | Stop reason: SURG

## 2024-07-02 RX ORDER — HYDRALAZINE HYDROCHLORIDE 20 MG/ML
5 INJECTION INTRAMUSCULAR; INTRAVENOUS
Status: DISCONTINUED | OUTPATIENT
Start: 2024-07-02 | End: 2024-07-02 | Stop reason: HOSPADM

## 2024-07-02 RX ORDER — LABETALOL HYDROCHLORIDE 5 MG/ML
5 INJECTION, SOLUTION INTRAVENOUS
Status: DISCONTINUED | OUTPATIENT
Start: 2024-07-02 | End: 2024-07-02 | Stop reason: HOSPADM

## 2024-07-02 RX ORDER — FUROSEMIDE 10 MG/ML
40 INJECTION INTRAMUSCULAR; INTRAVENOUS ONCE
Status: COMPLETED | OUTPATIENT
Start: 2024-07-02 | End: 2024-07-02

## 2024-07-02 RX ORDER — ONDANSETRON 2 MG/ML
4 INJECTION INTRAMUSCULAR; INTRAVENOUS
Status: DISCONTINUED | OUTPATIENT
Start: 2024-07-02 | End: 2024-07-02 | Stop reason: HOSPADM

## 2024-07-02 RX ORDER — SODIUM CHLORIDE, SODIUM LACTATE, POTASSIUM CHLORIDE, CALCIUM CHLORIDE 600; 310; 30; 20 MG/100ML; MG/100ML; MG/100ML; MG/100ML
INJECTION, SOLUTION INTRAVENOUS
Status: DISCONTINUED | OUTPATIENT
Start: 2024-07-02 | End: 2024-07-02 | Stop reason: SURG

## 2024-07-02 RX ORDER — FUROSEMIDE 10 MG/ML
40 INJECTION INTRAMUSCULAR; INTRAVENOUS
Status: DISCONTINUED | OUTPATIENT
Start: 2024-07-03 | End: 2024-07-03 | Stop reason: HOSPADM

## 2024-07-02 RX ORDER — ONDANSETRON 2 MG/ML
INJECTION INTRAMUSCULAR; INTRAVENOUS PRN
Status: DISCONTINUED | OUTPATIENT
Start: 2024-07-02 | End: 2024-07-02 | Stop reason: SURG

## 2024-07-02 RX ORDER — SODIUM CHLORIDE, SODIUM LACTATE, POTASSIUM CHLORIDE, CALCIUM CHLORIDE 600; 310; 30; 20 MG/100ML; MG/100ML; MG/100ML; MG/100ML
INJECTION, SOLUTION INTRAVENOUS CONTINUOUS
Status: DISCONTINUED | OUTPATIENT
Start: 2024-07-02 | End: 2024-07-02

## 2024-07-02 RX ORDER — SODIUM CHLORIDE, SODIUM LACTATE, POTASSIUM CHLORIDE, CALCIUM CHLORIDE 600; 310; 30; 20 MG/100ML; MG/100ML; MG/100ML; MG/100ML
INJECTION, SOLUTION INTRAVENOUS CONTINUOUS
Status: DISCONTINUED | OUTPATIENT
Start: 2024-07-02 | End: 2024-07-02 | Stop reason: HOSPADM

## 2024-07-02 RX ORDER — OXYCODONE HYDROCHLORIDE 5 MG/1
5 TABLET ORAL EVERY 4 HOURS PRN
Status: DISCONTINUED | OUTPATIENT
Start: 2024-07-02 | End: 2024-07-03 | Stop reason: HOSPADM

## 2024-07-02 RX ORDER — FUROSEMIDE 40 MG/1
40 TABLET ORAL DAILY
Qty: 30 TABLET | Refills: 0 | Status: SHIPPED
Start: 2024-07-02 | End: 2024-07-03

## 2024-07-02 RX ORDER — DEXAMETHASONE SODIUM PHOSPHATE 4 MG/ML
INJECTION, SOLUTION INTRA-ARTICULAR; INTRALESIONAL; INTRAMUSCULAR; INTRAVENOUS; SOFT TISSUE PRN
Status: DISCONTINUED | OUTPATIENT
Start: 2024-07-02 | End: 2024-07-02 | Stop reason: SURG

## 2024-07-02 RX ADMIN — AMLODIPINE BESYLATE 10 MG: 5 TABLET ORAL at 05:26

## 2024-07-02 RX ADMIN — LIDOCAINE HYDROCHLORIDE 60 MG: 20 INJECTION, SOLUTION EPIDURAL; INFILTRATION; INTRACAUDAL at 17:33

## 2024-07-02 RX ADMIN — TRAZODONE HYDROCHLORIDE 100 MG: 50 TABLET ORAL at 21:20

## 2024-07-02 RX ADMIN — PANTOPRAZOLE SODIUM 40 MG: 40 INJECTION, POWDER, FOR SOLUTION INTRAVENOUS at 18:46

## 2024-07-02 RX ADMIN — OXYCODONE HYDROCHLORIDE 5 MG: 5 TABLET ORAL at 14:43

## 2024-07-02 RX ADMIN — ACETAMINOPHEN 650 MG: 325 TABLET ORAL at 18:51

## 2024-07-02 RX ADMIN — PRAMIPEXOLE DIHYDROCHLORIDE 0.25 MG: 0.12 TABLET ORAL at 21:20

## 2024-07-02 RX ADMIN — ALLOPURINOL 300 MG: 300 TABLET ORAL at 05:27

## 2024-07-02 RX ADMIN — FUROSEMIDE 40 MG: 40 TABLET ORAL at 05:26

## 2024-07-02 RX ADMIN — EPHEDRINE SULFATE 10 MG: 50 INJECTION, SOLUTION INTRAVENOUS at 17:40

## 2024-07-02 RX ADMIN — FLUOXETINE 60 MG: 20 CAPSULE ORAL at 05:26

## 2024-07-02 RX ADMIN — FUROSEMIDE 40 MG: 10 INJECTION, SOLUTION INTRAMUSCULAR; INTRAVENOUS at 10:34

## 2024-07-02 RX ADMIN — DIGOXIN 125 MCG: 0.12 TABLET ORAL at 18:45

## 2024-07-02 RX ADMIN — ATORVASTATIN CALCIUM 40 MG: 40 TABLET, FILM COATED ORAL at 18:46

## 2024-07-02 RX ADMIN — LEVOTHYROXINE SODIUM 175 MCG: 0.17 TABLET ORAL at 05:27

## 2024-07-02 RX ADMIN — DEXAMETHASONE SODIUM PHOSPHATE 4 MG: 4 INJECTION INTRA-ARTICULAR; INTRALESIONAL; INTRAMUSCULAR; INTRAVENOUS; SOFT TISSUE at 17:38

## 2024-07-02 RX ADMIN — SODIUM CHLORIDE, POTASSIUM CHLORIDE, SODIUM LACTATE AND CALCIUM CHLORIDE: 600; 310; 30; 20 INJECTION, SOLUTION INTRAVENOUS at 17:31

## 2024-07-02 RX ADMIN — PANTOPRAZOLE SODIUM 40 MG: 40 INJECTION, POWDER, FOR SOLUTION INTRAVENOUS at 05:26

## 2024-07-02 RX ADMIN — ONDANSETRON 4 MG: 2 INJECTION INTRAMUSCULAR; INTRAVENOUS at 17:38

## 2024-07-02 RX ADMIN — PROPOFOL 100 MG: 10 INJECTION, EMULSION INTRAVENOUS at 17:33

## 2024-07-02 ASSESSMENT — PAIN SCALES - GENERAL: PAIN_LEVEL: 0

## 2024-07-02 ASSESSMENT — FIBROSIS 4 INDEX: FIB4 SCORE: 1.79

## 2024-07-02 ASSESSMENT — PAIN DESCRIPTION - PAIN TYPE: TYPE: ACUTE PAIN

## 2024-07-02 NOTE — OR NURSING
Pt allergies and NPO status verified. Home medications reviewed. Belongings left in inpatient room. Pt verbalizes understanding of pain scale, expected course of stay and plan of care. Procedure verified with pt. IV access assessed. All questions answered. Bed in low position. Call light within reach.

## 2024-07-02 NOTE — DISCHARGE INSTRUCTIONS
You were hospitalized due to black tarry stools and underwent endoscopy which did not show cause of any GI bleeding.  Your colonoscopy showed ---.   Of note you need a sleep study to be formally evaluated for sleep apnea as you may need a mask .  You required IV diuretic to get rid of excess fluids.  You do have high pressures in your lung and need formal evaluation by lung and heart doctors and referrals have been sent.  You have congestive heart failure and need to have low salt diet and take diuretics and weight yourself daily.      Please stop/avoid alcohol, spicy foods, coffee/tea, acidic drinks (citrus, sodas) while you are on treatment for your ulcer.  AVOID NSAIDS - such has generic named ibuprofen, naproxen; brand names such as Advil, Motrin, Aleve, etc...  Please recheck CBC with your primary care provider. Please follow up with you Gastroenterologist.   Avoid OTC bowel medications with aspirin/salicylate acid/salicylate.

## 2024-07-02 NOTE — PROGRESS NOTES
Patient last seen 03/15/24  Was prescribed DULoxetine (Cymbalta) 30 mg DR capsule   States feels foggy, would like to switch to Lexapro  Please advise  TY      Telemetry Shift Summary    Rhythm SR   HR Range 60-87  Ectopy R-O PVC, R PAC  Measurements 0.10/0.10/0.40      Normal Values  Rhythm SR  HR Range:   Measurements: 0.12-0.20/0.06-0.10/0.30-0.52

## 2024-07-02 NOTE — ASSESSMENT & PLAN NOTE
Since March patient states she's had a need for o2 and is not sure why  Her echo is notable for pHTN and she would benefit from a RHC  I have referred patient to pulmonary, sleep medicine and cardiology  IV diuresing here , wean o2 as tolerated

## 2024-07-02 NOTE — CARE PLAN
The patient is Stable - Low risk of patient condition declining or worsening    Shift Goals  Clinical Goals: Colonoscopy, K rider, education  Patient Goals: comfort, results  Family Goals: DANE    Progress made toward(s) clinical / shift goals:    Problem: Knowledge Deficit - Standard  Goal: Patient and family/care givers will demonstrate understanding of plan of care, disease process/condition, diagnostic tests and medications  Description: Target End Date:  1-3 days or as soon as patient condition allows    Document in Patient Education    1.  Patient and family/caregiver oriented to unit, equipment, visitation policy and means for communicating concern  2.  Complete/review Learning Assessment  3.  Assess knowledge level of disease process/condition, treatment plan, diagnostic tests and medications  4.  Explain disease process/condition, treatment plan, diagnostic tests and medications  Outcome: Progressing  Note: Plan of care, test, test results and upcoming procedures discussed with the patient. Patient understands plan of care and the need for procedures.      Problem: Fall Risk  Goal: Patient will remain free from falls  Description: Target End Date:  Prior to discharge or change in level of care    Document interventions on the [facility] Fall Risk Assessment    1.  Assess for fall risk factors  2.  Implement fall precautions  Outcome: Progressing  Note: Patient remains free of falls this shift. Patient calls for assistance when she needs to exit bed or ambulate.      Problem: Risk for Bleeding  Goal: Patient will not experience bleeding as evidenced by normal blood pressure, stable hematocrit and hemoglobin levels and desired ranges for coagulation profiles  Description: Target End Date:  Prior to discharge or change in level of care    1.  When laboratory values are abnormal, administer blood products as prescribed  2.  For bleeding linked with excessive anticoagulant use, give appropriate antidotes as  prescribed  3.  Monitor patient’s vital signs, especially BP and HR. Look for signs of orthostatic hypotension  4.  Evaluate the patient’s use of any medications that can affect hemostasis (e.g, anticoagulants, salicylates, NSAIDs, or cancer chemotherapy)  5.  Review laboratory results for coagulation status as appropriate: platelet count, prothrombin time/international normalized ratio (PT/INR), activated partial thromboplastin time (aPTT), fibrinogen, bleeding time, fibrin degradation products, vitamin  K, activated coagulation time (ACT)  6.  Check stool (guaiac) and urine (Hemastix) for occult blood  7.  Assess skin and mucous membranes for signs of petechiae, bruising, hematoma formation, or oozing of blood  8.  Monitor hematocrit (Hct) and hemoglobin (Hgb)  Outcome: Progressing  Note: Patients H&H are improving. Patients vitals remain stable.

## 2024-07-02 NOTE — ASSESSMENT & PLAN NOTE
Reason for current decompensation: lack of follow up  Current EF: 55%. Etiology of heart failure: likely multifactorial from htn/dayna/pHTN. Valves without sig regurgitation. Afib ablated 3/2024. She had a LHC prior to her ablation and  I do not see the report but suspect no ischemic disease.  -  tele  - Diuretics: Furosemide 40mg IV BID  - Goal: net -1L/day  - Monitor K/Mg closely  - Strict I/Os, daily weights

## 2024-07-02 NOTE — PROGRESS NOTES
Hospital Medicine Daily Progress Note    Date of Service  7/2/2024    Chief Complaint  Azalea Gan is a 73 y.o. female admitted 6/29/2024 tx from [facility] for complaints of leg swelling and black tarry stools    Hospital Course  74 yo w suspected JOSH, pHTN on most recent TTE 5/2024, afib sp ablation in march 2024 on eliquis, htn, presented to OSH with leg swelling and mentioned black tarry stool. FOBT +. Transferred for EGD at [facility]. Hb 3 units lower than baseline, received 1 unit PRBC for Hb<8. Hb has remained stable in 10-11 range. EGD on 7/1 was unrevealing thus she is planned for a colonoscopy on 7/2.     Of note, ongoing hypoxia, patient reports needing oxygen last few months and she is confused as to why. CXR w/ possible early edema. I have initiated IV diuresis. Referrals sent to sleep medicine, cardiology and pulmonary for pHTN clinic.    Interval Problem Update  - pt distraught that she does not have well established care for her  cardiopulmonary problems  - she is wondering why she needs o2 and no one has given her an answer yet  - I discussed needing sleep study as well as RHC to evaluate her pulmonary pressures, referrals sent  - she is awaiting colonoscopy today    I have discussed this patient's plan of care and discharge plan at IDT rounds today with Case Management, Nursing, Nursing leadership, and other members of the IDT team.    Consultants/Specialty  GI    Code Status  Full Code    Disposition  The patient is not medically cleared for discharge to home or a post-acute facility.  Anticipate discharge to: home with close outpatient follow-up    I have placed the appropriate orders for post-discharge needs.    Review of Systems  Review of Systems   All other systems reviewed and are negative.       Physical Exam  Temp:  [36.1 °C (97 °F)-36.9 °C (98.4 °F)] 36.2 °C (97.2 °F)  Pulse:  [61-94] 83  Resp:  [15-19] 16  BP: (100-146)/(56-77) 124/63  SpO2:  [87 %-98 %] 94 %    Physical  Exam  General: NAD, resting comfortably  HEENT: PERRLA, EOMI  Cards: RRR, systolic murmur  Pulm: normal respiratory effort, CTAB, no wheezes or rhonchi  Abdomen: soft, NTND, + bowel sounds, no rebound tenderness or guarding  MSK: normal ROM of upper and lower extremities  Neuro: CN II-XII grossly intact, sensation/strength intact, AAOx3  Psych: Appropriate mood   Fluids    Intake/Output Summary (Last 24 hours) at 7/2/2024 1304  Last data filed at 7/2/2024 1000  Gross per 24 hour   Intake 650 ml   Output 775 ml   Net -125 ml         Laboratory  Recent Labs     06/30/24  0200 06/30/24  1130 07/01/24  0054 07/01/24  0705 07/02/24  0120   WBC 5.6  --  5.5  --  4.7*   RBC 2.88*  --  3.18*  --  3.93*   HEMOGLOBIN 8.6*   < > 9.5* 10.2* 11.5*   HEMATOCRIT 27.1*   < > 30.4* 32.3* 37.3   MCV 94.1  --  95.6  --  94.9   MCH 29.9  --  29.9  --  29.3   MCHC 31.7*  --  31.3*  --  30.8*   RDW 55.0*  --  57.7*  --  57.1*   PLATELETCT 239  --  236  --  300   MPV 10.8  --  10.5  --  10.7    < > = values in this interval not displayed.     Recent Labs     06/30/24  0200 06/30/24  1800 07/01/24  0054 07/02/24  0120   SODIUM 142  --  139 140   POTASSIUM 2.9* 3.6 3.4* 4.3   CHLORIDE 101  --  103 100   CO2 28  --  23 28   GLUCOSE 91  --  114* 153*   BUN 27*  --  22 14   CREATININE 0.93  --  0.95 0.88   CALCIUM 8.9  --  9.0 9.7     Recent Labs     06/29/24  2152   INR 1.30*               Imaging  DX-CHEST-2 VIEWS   Final Result      Cardiomegaly with pulmonary vascular engorgement/early edema.           Assessment/Plan  * Acute GI bleeding- (present on admission)  Assessment & Plan  - telemetry.  -4-day history of black/tarry stools.  Patient on Eliquis for A-fib and no prior history of GI bleed.  -3 unit drop from b/l  -has history of Chris-en-Y gastric bypass in 2006 and adjustable band placed in 2019 and later removed in 2022.  -sp 1 unit pRBC  -[facility] consulted for EGD planned for 7/1 which did not show any findings to explain  melena  -7/2: plan for colonoscopy today, based on findings discuss w GI when to resume Eliquis    Acute on chronic heart failure with preserved ejection fraction (HCC)- (present on admission)  Assessment & Plan  Reason for current decompensation: lack of follow up  Current EF: 55%. Etiology of heart failure: likely multifactorial from htn/dayna/pHTN. Valves without sig regurgitation. Afib ablated 3/2024. She had a LHC prior to her ablation and  I do not see the report but suspect no ischemic disease.  - cw tele  - Diuretics: Furosemide 40mg IV BID  - Goal: net -1L/day  - Monitor K/Mg closely  - Strict I/Os, daily weights      Pulmonary hypertension (HCC)- (present on admission)  Assessment & Plan  Severely elevated pressures on 5/2024 echo  RVSP of 63  Patient had come in reporting NHAN that has resolved but she has ongoing oxygen requirement and CXR shows pulm edema  Initiate IV diuresis with lasix 40mg BID  I have referred her to both cardiology and pulmonology  Needs outpatient sleep study      Paroxysmal atrial fibrillation (HCC)- (present on admission)  Assessment & Plan  Sp ablation 3/2024  Hx of CVA rather high risk to be off anticoagulation  CHADSVASC 6  -resume home bb  -resume home digoxin  -hold eliquis    Chronic hypoxic respiratory failure (HCC)  Assessment & Plan  Since March patient states she's had a need for o2 and is not sure why  Her echo is notable for pHTN and she would benefit from a RHC  I have referred patient to pulmonary, sleep medicine and cardiology  IV diuresing here , wean o2 as tolerated    Cystitis  Assessment & Plan  Cw home macrobid x 5 days    HLD (hyperlipidemia)  Assessment & Plan  -She takes atorvastatin.  She is currently NPO.    Nocturnal hypoxia  Assessment & Plan  -On 2 L of oxygen overnight at baseline.  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 In review of yesterday's note (7/1/2024), there are no changes except as documented above.    I spent 45 minutes providing care for this patient.  This included face-to-face interview, physical examination.  Review of lab work including CBC, BMP, prior discharge summaries, procedure notes a well as most recent echo.  Review of imaging study including x-ray and CT scan. Discussion with multidisciplinary team including case management, nursing staff and pharmacy

## 2024-07-02 NOTE — CARE PLAN
The patient is Stable - Low risk of patient condition declining or worsening    Shift Goals  Clinical Goals: colonoscopy, chest xr  Patient Goals: cards consult  Family Goals: DANE    Progress made toward(s) clinical / shift goals:    Problem: Knowledge Deficit - Standard  Goal: Patient and family/care givers will demonstrate understanding of plan of care, disease process/condition, diagnostic tests and medications  Outcome: Progressing  Note: POC discussed. Pt verbalized understanding.      Problem: Risk for Bleeding  Goal: Patient will not experience bleeding as evidenced by normal blood pressure, stable hematocrit and hemoglobin levels and desired ranges for coagulation profiles  Outcome: Progressing  Note: Hgb improved from 10.2 to 11.5 today.       Patient is not progressing towards the following goals:

## 2024-07-03 ENCOUNTER — PHARMACY VISIT (OUTPATIENT)
Dept: PHARMACY | Facility: MEDICAL CENTER | Age: 74
End: 2024-07-03
Payer: COMMERCIAL

## 2024-07-03 VITALS
RESPIRATION RATE: 16 BRPM | TEMPERATURE: 97.7 F | HEIGHT: 64 IN | SYSTOLIC BLOOD PRESSURE: 120 MMHG | BODY MASS INDEX: 44.22 KG/M2 | DIASTOLIC BLOOD PRESSURE: 88 MMHG | OXYGEN SATURATION: 95 % | WEIGHT: 259.04 LBS | HEART RATE: 76 BPM

## 2024-07-03 PROBLEM — K92.2 ACUTE GI BLEEDING: Status: RESOLVED | Noted: 2024-06-29 | Resolved: 2024-07-03

## 2024-07-03 LAB
ANION GAP SERPL CALC-SCNC: 8 MMOL/L (ref 7–16)
BUN SERPL-MCNC: 15 MG/DL (ref 8–22)
CALCIUM SERPL-MCNC: 8.7 MG/DL (ref 8.4–10.2)
CHLORIDE SERPL-SCNC: 101 MMOL/L (ref 96–112)
CO2 SERPL-SCNC: 30 MMOL/L (ref 20–33)
CREAT SERPL-MCNC: 1.17 MG/DL (ref 0.5–1.4)
ERYTHROCYTE [DISTWIDTH] IN BLOOD BY AUTOMATED COUNT: 56.4 FL (ref 35.9–50)
GFR SERPLBLD CREATININE-BSD FMLA CKD-EPI: 49 ML/MIN/1.73 M 2
GLUCOSE SERPL-MCNC: 155 MG/DL (ref 65–99)
HCT VFR BLD AUTO: 30.3 % (ref 37–47)
HGB BLD-MCNC: 9.3 G/DL (ref 12–16)
MAGNESIUM SERPL-MCNC: 2 MG/DL (ref 1.5–2.5)
MCH RBC QN AUTO: 29.7 PG (ref 27–33)
MCHC RBC AUTO-ENTMCNC: 30.7 G/DL (ref 32.2–35.5)
MCV RBC AUTO: 96.8 FL (ref 81.4–97.8)
PATHOLOGY CONSULT NOTE: NORMAL
PLATELET # BLD AUTO: 234 K/UL (ref 164–446)
PMV BLD AUTO: 10.2 FL (ref 9–12.9)
POTASSIUM SERPL-SCNC: 4.1 MMOL/L (ref 3.6–5.5)
RBC # BLD AUTO: 3.13 M/UL (ref 4.2–5.4)
SODIUM SERPL-SCNC: 139 MMOL/L (ref 135–145)
WBC # BLD AUTO: 4.8 K/UL (ref 4.8–10.8)

## 2024-07-03 PROCEDURE — RXMED WILLOW AMBULATORY MEDICATION CHARGE: Performed by: INTERNAL MEDICINE

## 2024-07-03 PROCEDURE — C9113 INJ PANTOPRAZOLE SODIUM, VIA: HCPCS | Performed by: HOSPITALIST

## 2024-07-03 PROCEDURE — A9270 NON-COVERED ITEM OR SERVICE: HCPCS | Performed by: INTERNAL MEDICINE

## 2024-07-03 PROCEDURE — 83735 ASSAY OF MAGNESIUM: CPT

## 2024-07-03 PROCEDURE — 85027 COMPLETE CBC AUTOMATED: CPT

## 2024-07-03 PROCEDURE — 700111 HCHG RX REV CODE 636 W/ 250 OVERRIDE (IP): Performed by: INTERNAL MEDICINE

## 2024-07-03 PROCEDURE — 99239 HOSP IP/OBS DSCHRG MGMT >30: CPT | Performed by: INTERNAL MEDICINE

## 2024-07-03 PROCEDURE — 94760 N-INVAS EAR/PLS OXIMETRY 1: CPT

## 2024-07-03 PROCEDURE — 700102 HCHG RX REV CODE 250 W/ 637 OVERRIDE(OP): Performed by: INTERNAL MEDICINE

## 2024-07-03 PROCEDURE — 80048 BASIC METABOLIC PNL TOTAL CA: CPT

## 2024-07-03 PROCEDURE — 700111 HCHG RX REV CODE 636 W/ 250 OVERRIDE (IP): Performed by: HOSPITALIST

## 2024-07-03 RX ORDER — ATORVASTATIN CALCIUM 40 MG/1
40 TABLET, FILM COATED ORAL EVERY EVENING
Qty: 30 TABLET | Refills: 2 | Status: SHIPPED | OUTPATIENT
Start: 2024-07-03 | End: 2024-08-02

## 2024-07-03 RX ORDER — OMEPRAZOLE 20 MG/1
CAPSULE, DELAYED RELEASE ORAL
Qty: 58 CAPSULE | Refills: 0 | Status: SHIPPED | OUTPATIENT
Start: 2024-07-03 | End: 2024-08-16

## 2024-07-03 RX ORDER — LEVOTHYROXINE SODIUM 175 UG/1
175 TABLET ORAL
Qty: 30 TABLET | Refills: 2 | Status: SHIPPED | OUTPATIENT
Start: 2024-07-03 | End: 2024-08-02

## 2024-07-03 RX ORDER — FLUOXETINE HYDROCHLORIDE 20 MG/1
60 CAPSULE ORAL DAILY
Qty: 90 CAPSULE | Refills: 2 | Status: SHIPPED | OUTPATIENT
Start: 2024-07-03 | End: 2024-08-02

## 2024-07-03 RX ORDER — DIGOXIN 125 MCG
125 TABLET ORAL DAILY
Qty: 30 TABLET | Refills: 2 | Status: SHIPPED | OUTPATIENT
Start: 2024-07-03 | End: 2024-08-02

## 2024-07-03 RX ORDER — FLUTICASONE PROPIONATE 50 MCG
1 SPRAY, SUSPENSION (ML) NASAL
Qty: 16 G | Refills: 0 | Status: SHIPPED | OUTPATIENT
Start: 2024-07-03 | End: 2024-08-02

## 2024-07-03 RX ORDER — PRAMIPEXOLE DIHYDROCHLORIDE 0.5 MG/1
0.25 TABLET ORAL NIGHTLY
Qty: 15 TABLET | Refills: 2 | Status: SHIPPED | OUTPATIENT
Start: 2024-07-03 | End: 2024-08-02

## 2024-07-03 RX ORDER — FUROSEMIDE 40 MG/1
40 TABLET ORAL DAILY
Qty: 30 TABLET | Refills: 2 | Status: SHIPPED | OUTPATIENT
Start: 2024-07-03 | End: 2024-08-02

## 2024-07-03 RX ORDER — ALLOPURINOL 300 MG/1
300 TABLET ORAL DAILY
Qty: 30 TABLET | Refills: 2 | Status: SHIPPED | OUTPATIENT
Start: 2024-07-03 | End: 2024-08-02

## 2024-07-03 RX ORDER — AMLODIPINE BESYLATE 10 MG/1
10 TABLET ORAL DAILY
Qty: 30 TABLET | Refills: 2 | Status: SHIPPED | OUTPATIENT
Start: 2024-07-03 | End: 2024-08-02

## 2024-07-03 RX ADMIN — LEVOTHYROXINE SODIUM 175 MCG: 0.17 TABLET ORAL at 05:43

## 2024-07-03 RX ADMIN — AMLODIPINE BESYLATE 10 MG: 5 TABLET ORAL at 05:42

## 2024-07-03 RX ADMIN — FLUOXETINE 60 MG: 20 CAPSULE ORAL at 05:43

## 2024-07-03 RX ADMIN — FUROSEMIDE 40 MG: 10 INJECTION, SOLUTION INTRAMUSCULAR; INTRAVENOUS at 05:43

## 2024-07-03 RX ADMIN — PANTOPRAZOLE SODIUM 40 MG: 40 INJECTION, POWDER, FOR SOLUTION INTRAVENOUS at 05:43

## 2024-07-03 RX ADMIN — ALLOPURINOL 300 MG: 300 TABLET ORAL at 05:43

## 2024-07-03 ASSESSMENT — COGNITIVE AND FUNCTIONAL STATUS - GENERAL
CLIMB 3 TO 5 STEPS WITH RAILING: A LITTLE
SUGGESTED CMS G CODE MODIFIER DAILY ACTIVITY: CH
MOBILITY SCORE: 23
SUGGESTED CMS G CODE MODIFIER MOBILITY: CI
DAILY ACTIVITIY SCORE: 24

## 2024-07-03 ASSESSMENT — ENCOUNTER SYMPTOMS
MUSCULOSKELETAL NEGATIVE: 1
RESPIRATORY NEGATIVE: 1
WEAKNESS: 1
EYES NEGATIVE: 1
ABDOMINAL PAIN: 0
PSYCHIATRIC NEGATIVE: 1
BLOOD IN STOOL: 0

## 2024-07-03 ASSESSMENT — PAIN DESCRIPTION - PAIN TYPE: TYPE: ACUTE PAIN

## 2024-07-03 NOTE — PROGRESS NOTES
Oxygen delivered by [facility] at 1400.   Patient educated on follow up care, appointments to schedule, new medications, and when to return to the ER. Patient verbalized understanding. Patient discharged via private vehicle with meds to beds delivery, DME, and all personal belongings at 1433 with friend.

## 2024-07-03 NOTE — PROGRESS NOTES
Gastroenterology Progress Note:    RAMÓN Jensen  Date & Time note created:    7/3/2024   12:25 PM     Referring MD:  Dr. Nena Griffin    Patient ID:  Name:             Azalea Gan   YOB: 1950  Age:                 73 y.o.  female   MRN:               [id number]                                                             Reason for Consult:      Melena  Anemia    History of Present Illness:    This is a 74 yo female PMH atrial fibrillation on Eliquis, HTN, HLD, gastric bypass, CVA, pulmonary hypertension, JOSH on oxygen at night,  who was seen in consultation for a 4 day history of melena with anemia. Began to notice bilateral lower extremity swelling over the past few days. Called her cardiologist who recommended evaluation at ER. During the exam, she mentioned that she was having black tarry stools for the past 4 days. Denies abdominal pain, N/V, changes in bowel habits. No history of GI bleed. Notable labs:  Hgb; 9.2-->8.6. Received 1 unit of blood with a current Hgb: 10.4. Interesting reviewed previous H/H and she had a hgb: 7.9 on 5/31/24 when she was admitted for chest pain. No intervention was performed at that time. Looks like her Baseline is around 9.4.    Interval History  7/1 EGD  Findings:                          Esophagus                                      Unremarkable mucosa                          Stomach                                      Gastric anatomy normal                                      Antrum erythema, edema and mosaic mucosal pattern biopsied for H. pylori                          Gastrojejunal anastomosis                                      At gastric cardia                                      Suture material seen                                      To short blind pouch is                                      Jejunum visualized and unremarkable to length of gastroscope                          Duodenum                                      Unremarkable  through fourth portion    7/2 Colonoscopy  Findings:                          NANCY                                      Small to medium external hemorrhoids without stigmata of bleeding                                      Otherwise unremarkable                          Terminal ileum                                      Unremarkable mucosa                          Colon                                      Moderate to severe sigmoid colon diverticulosis                                      Sharp angulation of sigmoid colon suspicious for adhesions                                      5 mm ascending colon polyp removed with cold snare                                      5 mm transverse colon polyp removed with cold snare                                      5 mm sigmoid colon polyp removed with cold snare    7/3: Stable. No bleeding. Hgb 9.3. No pain.     Review of Systems:      Review of Systems   Constitutional:  Positive for malaise/fatigue.   HENT: Negative.     Eyes: Negative.    Respiratory: Negative.     Cardiovascular:  Positive for leg swelling.   Gastrointestinal:  Negative for abdominal pain, blood in stool and melena.   Genitourinary: Negative.    Musculoskeletal: Negative.    Skin: Negative.    Neurological:  Positive for weakness.   Endo/Heme/Allergies: Negative.    Psychiatric/Behavioral: Negative.               Physical Exam:  Vitals/ General Appearance:   Weight/BMI: Body mass index is 44.46 kg/m².    Vitals:    07/03/24 0003 07/03/24 0453 07/03/24 0701 07/03/24 1107   BP: 133/68 130/64 (!) 166/86 120/88   Pulse: 60 73 79 76   Resp: 18 18 16 16   Temp: 36.2 °C (97.1 °F) 36.3 °C (97.3 °F) 36.3 °C (97.4 °F)    TempSrc: Temporal Temporal Temporal    SpO2: 92% 94% 96% 95%   Weight:       Height:         Oxygen Therapy:  Pulse Oximetry: 95 %, O2 (LPM): 2.5, O2 Delivery Device: Silicone Nasal Cannula    Physical Exam  Vitals reviewed.   Constitutional:       Appearance: She is obese.   HENT:       Mouth/Throat:      Mouth: Mucous membranes are moist.   Eyes:      Extraocular Movements: Extraocular movements intact.      Pupils: Pupils are equal, round, and reactive to light.   Cardiovascular:      Rate and Rhythm: Normal rate and regular rhythm.      Pulses: Normal pulses.      Heart sounds: Normal heart sounds.   Pulmonary:      Effort: Pulmonary effort is normal.      Breath sounds: Normal breath sounds.   Abdominal:      General: Bowel sounds are normal. There is no distension.      Palpations: Abdomen is soft.      Tenderness: There is no abdominal tenderness.   Musculoskeletal:         General: Normal range of motion.      Cervical back: Normal range of motion and neck supple.   Skin:     General: Skin is warm and dry.   Neurological:      General: No focal deficit present.      Mental Status: She is alert and oriented to person, place, and time.   Psychiatric:         Mood and Affect: Mood normal.         Behavior: Behavior normal.         Thought Content: Thought content normal.         Judgment: Judgment normal.         Past Medical History:   Past Medical History:   Diagnosis Date    Hypertension     Pulmonary HTN (HCC)     moderate     Sleep apnea     mild JOSH    not on CPAP or O2    Stroke (HCC)     Thyroid disease     Tricuspid valve regurgitation        Past Surgical History:  Past Surgical History:   Procedure Laterality Date    WY COLONOSCOPY,DIAGNOSTIC  7/2/2024    Procedure: COLONOSCOPY;  Surgeon: Grant Glover M.D.;  Location: [facility];  Service: Gastroenterology    [place] UPPER GI ENDOSCOPY,DIAGNOSIS N/A 7/1/2024    Procedure: GASTROSCOPY;  Surgeon: Grant Glover M.D.;  Location: [facility];  Service: Gastroenterology    [facility], PLACE ADJUST NISREEN RESTRICT DE* N/A 11/2/2022    Procedure: LAPAROSCOPIC ADJUSTABLE GASTRIC BANDING, PORT REMOVAL,ESOPHAGOGASTRODUODENOSCOPY;  Surgeon: Eitan Preston M.D.;  Location: [facility];  Service:  Gastroenterology    [place] UPPER GI ENDOSCOPY,DIAGNOSIS N/A 11/2/2022    Procedure: GASTROSCOPY;  Surgeon: Eitan Preston M.D.;  Location: SURGERY [facility];  Service: Gastroenterology    COLONOSCOPY - ENDO  12/8/2016    Procedure: COLONOSCOPY - ENDO with Anesthesia;  Surgeon: Raffaele Ritter M.D.;  Location: SURGERY [facility];  Service:     KNEE REPLACEMENT, TOTAL Right 3/15/2016    Dr Horowitz    CATARACT EXTRACTION WITH IOL Bilateral 2016    CARDIAC CATH, RIGHT/LEFT HEART  12/2015    nonobstructive CAD EF 60%    THYROIDECTOMY  3/2012    COLONOSCOPY  2010    GASTRIC BYPASS LAPAROSCOPIC  2006    Chris-en-Y    HYSTERECTOMY, TOTAL ABDOMINAL         Hospital Medications:    Current Facility-Administered Medications:     furosemide (Lasix) injection 40 mg, 40 mg, Intravenous, Q DAY, Nena Griffin M.D., 40 mg at 07/03/24 0543    oxyCODONE immediate-release (Roxicodone) tablet 5 mg, 5 mg, Oral, Q4HRS PRN, Nena Griffin M.D., 5 mg at 07/02/24 1443    traZODone (Desyrel) tablet 100 mg, 100 mg, Oral, Nightly, Nena Griffin M.D., 100 mg at 07/02/24 2120    allopurinol (Zyloprim) tablet 300 mg, 300 mg, Oral, DAILY, ELI Callhaan.DBarbie, 300 mg at 07/03/24 0543    amLODIPine (Norvasc) tablet 10 mg, 10 mg, Oral, DAILY, Nena Griffin M.D., 10 mg at 07/03/24 0542    atorvastatin (Lipitor) tablet 40 mg, 40 mg, Oral, Q EVENING, BIB CallahanDBarbie, 40 mg at 07/02/24 1846    digoxin (Lanoxin) tablet 125 mcg, 125 mcg, Oral, Q EVENING, Nena Griffin M.D., 125 mcg at 07/02/24 1845    FLUoxetine (PROzac) capsule 60 mg, 60 mg, Oral, DAILY, BIB CallahanDBarbie, 60 mg at 07/03/24 0543    levothyroxine (Synthroid) tablet 175 mcg, 175 mcg, Oral, AM ES, Nena M Al Danaf, M.D., 175 mcg at 07/03/24 0543    pramipexole (Mirapex) tablet 0.25 mg, 0.25 mg, Oral, Nightly, Nena Griffin M.D., 0.25 mg at 07/02/24 2120    traMADol (Ultram) 50 MG tablet  mg,  mg, Oral, Q6HRS PRN, Nena Lackey  STAN Andres    acetaminophen (Tylenol) tablet 650 mg, 650 mg, Oral, Q6HRS PRN, Coretta Chino M.D., 650 mg at 07/02/24 1851    senna-docusate (Pericolace Or Senokot S) 8.6-50 MG per tablet 2 Tablet, 2 Tablet, Oral, BID, 2 Tablet at 06/30/24 0537 **AND** polyethylene glycol/lytes (Miralax) Packet 1 Packet, 1 Packet, Oral, QDAY PRN, Coretta Chino M.D.    pantoprazole (Protonix) injection 40 mg, 40 mg, Intravenous, BID, Coretta Chino M.D., 40 mg at 07/03/24 0543    Current Outpatient Medications:  Current Facility-Administered Medications   Medication Dose Route Frequency Provider Last Rate Last Admin    furosemide (Lasix) injection 40 mg  40 mg Intravenous Q DAY Nena Griffin M.D.   40 mg at 07/03/24 0543    oxyCODONE immediate-release (Roxicodone) tablet 5 mg  5 mg Oral Q4HRS PRN Nena Griffin M.D.   5 mg at 07/02/24 1443    traZODone (Desyrel) tablet 100 mg  100 mg Oral Nightly Nenaras Griffin M.D.   100 mg at 07/02/24 2120    allopurinol (Zyloprim) tablet 300 mg  300 mg Oral DAILY Nena Griffin M.D.   300 mg at 07/03/24 0543    amLODIPine (Norvasc) tablet 10 mg  10 mg Oral DAILY Nena Griffin M.D.   10 mg at 07/03/24 0542    atorvastatin (Lipitor) tablet 40 mg  40 mg Oral Q EVENING Nenaras Griffin M.D.   40 mg at 07/02/24 1846    digoxin (Lanoxin) tablet 125 mcg  125 mcg Oral Q EVENING Nena Griffin M.D.   125 mcg at 07/02/24 1845    FLUoxetine (PROzac) capsule 60 mg  60 mg Oral DAILY Nenaras Griffin M.D.   60 mg at 07/03/24 0543    levothyroxine (Synthroid) tablet 175 mcg  175 mcg Oral AM ES Nena Griffin M.D.   175 mcg at 07/03/24 0543    pramipexole (Mirapex) tablet 0.25 mg  0.25 mg Oral Nightly Nena Griffin M.D.   0.25 mg at 07/02/24 2120    traMADol (Ultram) 50 MG tablet  mg   mg Oral Q6HRS PRN Nena Griffin M.D.        acetaminophen (Tylenol) tablet 650 mg  650 mg Oral Q6HRS PRN Coretta Chino M.D.   650 mg at  07/02/24 1851    senna-docusate (Pericolace Or Senokot S) 8.6-50 MG per tablet 2 Tablet  2 Tablet Oral BID Coretta Chino M.D.   2 Tablet at 06/30/24 0537    And    polyethylene glycol/lytes (Miralax) Packet 1 Packet  1 Packet Oral QDAY PRN Coretta Chino M.D.        pantoprazole (Protonix) injection 40 mg  40 mg Intravenous BID Coretta Chino M.D.   40 mg at 07/03/24 0543       Medication Allergy:  Allergies   Allergen Reactions    Iodine Anaphylaxis    Iodine Contrast [Diagnostic X-Ray Materials] Anaphylaxis    Povidone Iodine Anaphylaxis       Family History:  Family History   Problem Relation Age of Onset    Heart Attack Mother        Social History:  Social History     Socioeconomic History    Marital status:      Spouse name: Not on file    Number of children: Not on file    Years of education: Not on file    Highest education level: Not on file   Occupational History    Not on file   Tobacco Use    Smoking status: Never    Smokeless tobacco: Never   Vaping Use    Vaping status: Never Used   Substance and Sexual Activity    Alcohol use: No    Drug use: No    Sexual activity: Not on file   Other Topics Concern    Not on file   Social History Narrative    Not on file     Social Determinants of Health     Financial Resource Strain: Not on file   Food Insecurity: No Food Insecurity (6/29/2024)    Hunger Vital Sign     Worried About Running Out of Food in the Last Year: Never true     Ran Out of Food in the Last Year: Never true   Transportation Needs: Unmet Transportation Needs (6/29/2024)    PRAPARE - Transportation     Lack of Transportation (Medical): Yes     Lack of Transportation (Non-Medical): Yes   Physical Activity: Not on file   Stress: Not on file   Social Connections: Not on file   Intimate Partner Violence: Not At Risk (6/29/2024)    Humiliation, Afraid, Rape, and Kick questionnaire     Fear of Current or Ex-Partner: No     Emotionally Abused: No     Physically Abused:  No     Sexually Abused: No   Housing Stability: Low Risk  (6/29/2024)    Housing Stability Vital Sign     Unable to Pay for Housing in the Last Year: No     Number of Places Lived in the Last Year: 1     Unstable Housing in the Last Year: No         MDM (Data Review):     Records reviewed and summarized in current documentation    Lab Data Review:  Recent Results (from the past 24 hour(s))   Histology Request    Collection Time: 07/02/24  5:45 PM   Result Value Ref Range    Pathology Request Sent to Histo    CBC WITHOUT DIFFERENTIAL    Collection Time: 07/03/24  2:22 AM   Result Value Ref Range    WBC 4.8 4.8 - 10.8 K/uL    RBC 3.13 (L) 4.20 - 5.40 M/uL    Hemoglobin 9.3 (L) 12.0 - 16.0 g/dL    Hematocrit 30.3 (L) 37.0 - 47.0 %    MCV 96.8 81.4 - 97.8 fL    MCH 29.7 27.0 - 33.0 pg    MCHC 30.7 (L) 32.2 - 35.5 g/dL    RDW 56.4 (H) 35.9 - 50.0 fL    Platelet Count 234 164 - 446 K/uL    MPV 10.2 9.0 - 12.9 fL   Basic Metabolic Panel    Collection Time: 07/03/24  2:22 AM   Result Value Ref Range    Sodium 139 135 - 145 mmol/L    Potassium 4.1 3.6 - 5.5 mmol/L    Chloride 101 96 - 112 mmol/L    Co2 30 20 - 33 mmol/L    Glucose 155 (H) 65 - 99 mg/dL    Bun 15 8 - 22 mg/dL    Creatinine 1.17 0.50 - 1.40 mg/dL    Calcium 8.7 8.4 - 10.2 mg/dL    Anion Gap 8.0 7.0 - 16.0   MAGNESIUM    Collection Time: 07/03/24  2:22 AM   Result Value Ref Range    Magnesium 2.0 1.5 - 2.5 mg/dL   ESTIMATED GFR    Collection Time: 07/03/24  2:22 AM   Result Value Ref Range    GFR (CKD-EPI) 49 (A) >60 mL/min/1.73 m 2       Imaging/Procedures Review:      DX-CHEST-2 VIEWS   Final Result      Cardiomegaly with pulmonary vascular engorgement/early edema.           MDM (Assessment and Plan):     Patient Active Problem List    Diagnosis Date Noted    Chronic hypoxic respiratory failure (HCC) 07/02/2024    Nocturnal hypoxia 06/30/2024    HLD (hyperlipidemia) 06/30/2024    Cystitis 06/30/2024    History of cardiac radiofrequency ablation 05/30/2024     Acute respiratory failure with hypoxia (HCC) 05/14/2024    Acute kidney injury (HCC) 05/14/2024    Acute hypoxic respiratory failure (HCC) 05/12/2024    Septic shock (HCC) 05/12/2024    Elevated troponin 05/12/2024    Aspiration pneumonia (HCC) 05/12/2024    Acute on chronic heart failure with preserved ejection fraction (HCC) 05/11/2024    Fatigue 05/11/2024    Normocytic anemia 05/10/2024    Positive cardiac stress test 05/10/2024    Atrial fibrillation with rapid ventricular response (HCC) 05/08/2024    Hypotension 05/08/2024    Hypokalemia 06/01/2023    Left hip pain 06/01/2023    SOB (shortness of breath) 05/30/2023    Dysphagia 11/02/2022    CKD (chronic kidney disease) 11/02/2022    Stenosis of stomach 11/01/2022    Food impaction of esophagus 11/01/2022    Nocturnal hypoxemia 10/31/2022    Hypomagnesemia 10/31/2022    Bradycardia 10/31/2022    Dental infection 10/31/2022    Hx of laparoscopic gastric banding 10/31/2022    Vomiting 10/30/2022    Depression 10/30/2022    Hypothyroidism 10/30/2022    ALCIRA (acute kidney injury) ([facility]) 10/30/2022    Drug-induced chronic gout of multiple sites without tophus 10/06/2022    Essential hypertension 05/04/2022    Renal artery stenosis (HCC) 05/04/2022    Sleep apnea-like behavior 05/04/2022    Advance care planning 05/04/2022    Hypersomnia due to medical condition  05/04/2022    Chronic fatigue 05/04/2022    Bilateral lower extremity edema 05/04/2022    History of stroke 10/14/2020    Coronary atherosclerosis 10/14/2020    Obesity 10/14/2020    Paroxysmal atrial fibrillation (HCC) 10/14/2020    Ventricular tachyarrhythmia (HCC) 10/14/2020    Pulmonary hypertension (HCC) 10/14/2020     This is a 74 yo female who was admitted to the hospital with a 4 day history of painless melenic stools. Hgb trending down requiring 1 unit of blood. Takes Eliquis for atrial fibrillation. EGD and colonoscopy without a bleeding source. ? Diverticular bleeding that has resolved.      ASSESSMENT:  Anemia  Melena  Atrial fibrillation: EKG shows NSR  Pulmonary hypertension    PLAN:  Resume DOAC  Advance diet as tolerated  Follow up with [facility]. Consider capsule endoscopy if persistent anemia or dark stools.    GI will sign off    Thank your for the opportunity to assist in the care of your patient.  Please call for any questions or concerns.    SANDRO Jensen.

## 2024-07-03 NOTE — DISCHARGE PLANNING
Case Management Discharge Planning    Admission Date: 6/29/2024  GMLOS: 4.6  ALOS: 4    6-Clicks ADL Score: 24  6-Clicks Mobility Score: 23      Anticipated Discharge Dispo: Discharge Disposition: Discharged to home/self care (01) HOME    DME Needed: Active with [facility] for home O2.     Action(s) Taken: Discussed with IDT during rounds . MD said that EGD and Colonoscopy is negatve. RN completed updated desat study and DPA will sent to [facility]. DPA will als obtain O2 tank for dc.     CM met with pt and explained that we yong obtain an O2 tank from [facility]. Pt confirmed that she has family that is picking her up and they are here in [place] so unable to bring a tank.    Escalations Completed: n/a    Medically Clear: yes    Next Steps: n/a    Barriers to Discharge: none    Is the patient up for discharge tomorrow: today    Addendum:   Met with pt and caregiver. Caregiver wants paperwork so she can get paid for taking care of pt. Explained that CM do not offer this assistance but she can talk to pt's insurance. Also explained that it may be Medicaid that helps with this.     Pt also said that she received a call from [facility] but it might take hours to deliver.     Called Fam at [facility] and he will have tank delivered to bedside in 1 hrs. Notified KATHIA Awad. Notified NATALIE Jeffers.

## 2024-07-03 NOTE — DISCHARGE PLANNING
Per NATALIE RN, DPA sent resumption orders to [facility] in [place] for a tank to get Pt home and [place] location as that is where Pt lives.  Sent through Epic and hard fax followed.    NATALIE RN notified

## 2024-07-03 NOTE — DISCHARGE SUMMARY
"Discharge Summary    CHIEF COMPLAINT ON ADMISSION  No chief complaint on file.      Reason for Admission  GIB     Admission Date  6/29/2024    CODE STATUS  Full Code    HPI & HOSPITAL COURSE  This is \"Azalea Gan is a 73 y.o. female, who is coming as a Direct Admission from [facility] for GI bleed on 6/29/2024.     She has Afib and is anticoagulated with Eliquis.  Had a recent cardiac ablation in May, following with cardiologist in [place] and still anticoagulated.  Also has h/o Chris-en-Y gastric bypass surgery in 2006 ([place], [place]).  She then had a gastric band placed in August 2018 by Dr. Vega.  Patient reports having significant GI issues with it reason why the gastric band was removed in November 2022.     Patient reports that over the past 3 to 4 days noticed worsening bilateral pitting edema and dark/black stools which initially she attributed to \"eating a lot of Oreos \".  She went to the ED today mostly out of concern for her worsening edema on her legs however, when she mention about her black stools, states that workup suddenly was more related to that.  Patient denies having any abdominal pain.  She is not taking any NSAIDs.  Denies previous history of gastric bleed.\" (As per admitting physician Dr. Love on H&P).    Per patient's report of melena, she had last used Eliquis on 6/29 in the morning. Patient's hemoglobin ranged from 9.2 up to 11.5.  Has been inconsistent on laboratory work.    Patient went to EGD on 7/1 with Dr. Glover.  Findings were unremarkable.  Stomach did show antrum erythema, edema but no signs of bleeding.  GE junction anastomosis was not well-visualized.  Unremarkable duodenum.    Colonoscopy was performed on 7/2, Showed small to medium external hemorrhoids with no signs of bleeding.  In the colon, patient has signs of moderate to severe sigmoid diverticulosis, 3 colon polyps removed by cold snare.    I discussed the results with the patient and her friend at " bedside.  I explained that we could not find a source of her bleeding.  It is possible she may have small intestinal bleed which she will need to follow-up with gastroenterology consultants.  She may need pill endoscopy to investigate small intestinal bleed.  I recommended for patient to restart Eliquis in 3 days that she has been off of it for the last 4 days.  I explained to her to monitor her stool, monitor for any bleeding, skin paleness, fatigue.  She will need to return to the ER if she has any signs of bleeding.  I recommended for her to follow-up with her primary care provider to immediately follow-up on labs after starting Eliquis.  As for patient's breathing, I did reorder home oxygen But she has previous order for continuous oxygen with [employer].    Therefore, she is discharged in fair and stable condition to home with close outpatient follow-up.    The patient met 2-midnight criteria for an inpatient stay at the time of discharge.    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  SALUD Malone  [address]  [address] [facility] [address]  [phone]    Follow up in 1 week(s)  follow up  as soon as possible,   - please restart Eliquis on Saturday, monitor your stool, observe for any worsening skin pale appearance.  - please repeat labs including CBC and BMP.    [facility] - [facility] at [place] [address]  [address]  [place] [id number] [phone]  Go to  go to all scheduled pulmonary appointments    Grant Glover M.D.  [id number]  [place]  [place] [id number] [phone]    Schedule an appointment as soon as possible for a visit in 1 week(s)  Please call to establish a follow up appointment.  - you may need a Pill Endoscopy if you have ongoing GI bleeding while on Eliquis.      MEDICATIONS ON DISCHARGE     Medication List        START taking these medications        Instructions   omeprazole 20 MG delayed-release capsule  Start taking on: July 3, 2024  Commonly known as: PriLOSEC   Take 1 Capsule by mouth 2 times a day for 14 days, THEN 1 Capsule every day for 30 days.            CHANGE how you take these medications        Instructions   apixaban 5mg Tabs  Start taking on: July 6, 2024  What changed: These instructions start on July 6, 2024. If you are unsure what to do until then, ask your doctor or other care provider.  Commonly known as: Eliquis   Take 1 Tablet by mouth 2 times a day for 30 days.  Dose: 5 mg     digoxin 125 MCG Tabs  What changed: when to take this  Commonly known as: Lanoxin   Take 1 Tablet by mouth every day at 6 PM for 30 days.  Dose: 125 mcg     fluticasone 50 MCG/ACT nasal spray  What changed:   when to take this  reasons to take this  additional instructions  Commonly known as: Flonase   Administer 1 Spray into affected nostril(S) 1 time a day as needed (allergies) for up to 30 days.  Dose: 1 Spray     furosemide 40 MG Tabs  What changed: medication strength  Commonly known as: Lasix   Take 1 Tablet by mouth every day for 30 days. Per pt takes 2 tablets (40MG) QDAY  Dose: 40 mg            CONTINUE taking these  medications        Instructions   allopurinol 300 MG Tabs  Commonly known as: Zyloprim   Take 1 Tablet by mouth every day for 30 days.  Dose: 300 mg     amLODIPine 10 MG Tabs  Commonly known as: Norvasc   Take 1 Tablet by mouth every day for 30 days.  Dose: 10 mg     atorvastatin 40 MG Tabs  Commonly known as: Lipitor   Take 1 Tablet by mouth every evening for 30 days.  Dose: 40 mg     FLUoxetine 20 MG Caps  Commonly known as: PROzac   Take 3 Capsules by mouth every day for 30 days. 60 mg = 3 capsules  Dose: 60 mg     levothyroxine 175 MCG Tabs  Commonly known as: Synthroid   Take 1 Tablet by mouth every morning on an empty stomach for 30 days.  Dose: 175 mcg     nitrofurantoin 100 MG Caps  Commonly known as: Macrobid   Take 100 mg by mouth 2 times a day. Pt started on 6/26/2024 for 7 day course  Dose: 100 mg     pramipexole 0.125 MG Tabs  Commonly known as: Mirapex   Doctor's comments: Please send to patient's [facility] if not available  Take 2 Tablets by mouth every evening for 30 days. Indications: Restless Leg Syndrome  Dose: 0.25 mg     traZODone 100 MG Tabs  Commonly known as: Desyrel   Take 100 mg by mouth every evening.  Dose: 100 mg            STOP taking these medications      metoprolol tartrate 25 MG Tabs  Commonly known as: Lopressor     Nyamyc powder  Generic drug: nystatin     traMADol 50 MG Tabs  Commonly known as: Ultram              Allergies  Allergies   Allergen Reactions    Iodine Anaphylaxis    Iodine Contrast [Diagnostic X-[employer]] Anaphylaxis    Povidone Iodine Anaphylaxis       DIET  Orders Placed This Encounter   Procedures    Diet Order Diet: Regular     Standing Status:   Standing     Number of Occurrences:   1     Order Specific Question:   Diet:     Answer:   Regular [1]       ACTIVITY  As tolerated.  With oxygen  Weight bearing as tolerated    CONSULTATIONS  Gastroenterology consultants    PROCEDURES  EGD 7/1, colonoscopy 7/2    LABORATORY  Lab Results   Component Value Date     SODIUM 139 07/03/2024    POTASSIUM 4.1 07/03/2024    CHLORIDE 101 07/03/2024    CO2 30 07/03/2024    GLUCOSE 155 (H) 07/03/2024    BUN 15 07/03/2024    CREATININE 1.17 07/03/2024        Lab Results   Component Value Date    WBC 4.8 07/03/2024    HEMOGLOBIN 9.3 (L) 07/03/2024    HEMATOCRIT 30.3 (L) 07/03/2024    PLATELETCT 234 07/03/2024      DX-CHEST-2 VIEWS   Final Result      Cardiomegaly with pulmonary vascular engorgement/early edema.          Total time of the discharge process exceeds 33 minutes.

## 2024-07-03 NOTE — CARE PLAN
The patient is Stable - Low risk of patient condition declining or worsening    Shift Goals  Clinical Goals: monitor post op vitals  Patient Goals: rest/comfort  Family Goals: DANE    Progress made toward(s) clinical / shift goals:    Problem: Knowledge Deficit - Standard  Goal: Patient and family/care givers will demonstrate understanding of plan of care, disease process/condition, diagnostic tests and medications  Outcome: Progressing     Problem: Pain - Standard  Goal: Alleviation of pain or a reduction in pain to the patient’s comfort goal  Outcome: Progressing       Patient is not progressing towards the following goals:

## 2024-07-03 NOTE — PROGRESS NOTES
Patient hgb dropped from 11.5 to 9.3 this am - vitals stable through out shift and no signs of bleeding. MD Stone aware - no orders at this time.

## 2024-07-03 NOTE — FACE TO FACE
"Face to Face Note  -  Durable Medical Equipment    Lavell Campoverde M.D. - NPI: [id number]  I certify that this patient is under my care and that they had a durable medical equipment(DME)face to face encounter by myself that meets the physician DME face-to-face encounter requirements with this patient on:    Date of encounter:   Patient:                    MRN:                       YOB: 2025  Azalea Gan  [id number]  1950     The encounter with the patient was in whole, or in part, for the following medical condition, which is the primary reason for durable medical equipment:  CHF and Other - acute on chronic hypoxemic respiratory failure, severe pulmonary hypertension, obesity class III BMI 44    I certify that, based on my findings, the following durable medical equipment is medically necessary:    Oxygen   HOME O2 Saturation Measurements:(Values must be present for Home Oxygen orders)  Room air sat at rest: 92  Room air sat with amb: 87  With liters of O2: 2, O2 sat at rest with O2: 95  With Liters of O2: 2, O2 sat with amb with O2 : 92  Is the patient mobile?: Yes  If patient feels more short of breath, they can go up to 6 liters per minute and contact healthcare provider.    Supporting Symptoms: The patient requires supplemental oxygen, as the following interventions have been tried with limited or no improvement: \"Bronchodilators and/or steroid inhalers, \"Ambulation with oximetry, and \"Incentive spirometry.    My Clinical findings support the need for the above equipment due to:  Hypoxia  "

## 2024-07-03 NOTE — PROGRESS NOTES
Telemetry Shift Summary     Rhythm: SR  HR: 56-78  Ectopy: o-rPAC/rPVC    Measurements: .18/.10/.40    Normal Values  Rhythm: SR  HR:   Measurements: 0.12-0.20/0.08-0.10/0.30-0.52

## 2024-07-03 NOTE — CARE PLAN
The patient is Stable - Low risk of patient condition declining or worsening    Shift Goals  Clinical Goals: monitor H&H; monitor vitals; monitor O2 needs  Patient Goals: go home  Family Goals: DANE    Progress made toward(s) clinical / shift goals:    Problem: Knowledge Deficit - Standard  Goal: Patient and family/care givers will demonstrate understanding of plan of care, disease process/condition, diagnostic tests and medications  Outcome: Progressing  Note: Patient updated on plan of care. Monitor H&H, monitor oxygen needs; patient safety. Patient verbalized understanding of plan of care.      Problem: Risk for Bleeding  Goal: Patient will take measures to prevent bleeding and recognizes signs of bleeding that need to be reported immediately to a health care professional  Outcome: Progressing       Patient is not progressing towards the following goals:

## 2024-07-03 NOTE — DISCHARGE PLANNING
CM RN ER placed call to Fam at [facility] to inquire if there was anyway to get a tank here sooner rather than later this afternoon, Pt's caregiver here now to take Pt home to [place]. Fam let NATALIE ER RN know that a tank could be here by 1330. NATALIE ER RN notified charge nurse and he will let Pt know.

## 2024-07-03 NOTE — OR NURSING
1756: Patient arrived to PACU from endo via gurney. Report received from anesthesia and RN. Respirations are spontaneous and unlabored. VSS on 6L.     1825: family updated    1829: pt meets criteria for transfer to room. Report called to Kan CADET

## 2024-07-03 NOTE — OR SURGEON
Colonoscopy operative note note    PreOp Diagnosis: Melena with acute GI blood loss anemia and negative upper endoscopy      PostOp Diagnosis: Same      Procedure(s):  COLONOSCOPY - Wound Class: Clean Contaminated    Surgeon(s):  Grant Glover M.D.    Anesthesiologist/Type of Anesthesia:  Anesthesiologist: Luisa Rasmussen M.D./General    Surgical Staff:  Circulator: Michelle Mederos R.N.  Endoscopy Technician: Thea Appiah    Specimens removed if any:  ID Type Source Tests Collected by Time Destination   A : 5 mm polyp Tissue Colon - Ascending PATHOLOGY SPECIMEN Grant Glover M.D. 7/2/2024  5:45 PM    B : 5 mm polyp Tissue Colon - Transverse PATHOLOGY SPECIMEN Grant Glover M.D. 7/2/2024  5:46 PM    C : 5 mm polyp Tissue Colon - Sigmoid PATHOLOGY SPECIMEN Grant Glover M.D. 7/2/2024  5:49 PM        Dr. Glover  [facility]  DARRYN [place]  [phone]    Colonoscopy with: Cold snare polypectomy    Indication:  Melena with acute GI blood loss anemia and negative upper endoscopy    Sedation:  GA with LMA     Findings:    NANCY     Small to medium external hemorrhoids without stigmata of bleeding     Otherwise unremarkable    Terminal ileum     Unremarkable mucosa    Colon     Moderate to severe sigmoid colon diverticulosis     Sharp angulation of sigmoid colon suspicious for adhesions     5 mm ascending colon polyp removed with cold snare     5 mm transverse colon polyp removed with cold snare     5 mm sigmoid colon polyp removed with cold snare    Plan:   Follow-up final pathology     Serial CBCs     Transfuse as needed to keep hemoglobin greater than or equal to 7     Daily PPI therapy     Advance diet as tolerated     Okay to discharge from GI standpoint if no signs of ongoing bleeding and patient stable    Procedure detail:    Prior to procedure, informed consent was obtained.  Risks, benefits, alternatives, including but not limited to risk of bleeding, infection,  perforation, adverse reaction to sedating medicine, failure to identify pathology and death were explained to the patient who accepted all risks.    Patient was prepped in the left lateral position after general anesthesia with LMA placement was provided by anesthesia.    Digital rectal exam demonstrated medium sized external hemorrhoids without stigmata of bleeding  Scope tip of the Olympus flexible adjustable adult colonoscope was passed from the rectum through to the cecum after maneuvering through sharp and fixed angulation through the sigmoid colon.  There was also moderate to severe diverticulosis in the sigmoid colon.  Ileocecal valve, area behind the ileocecal valve and cecal base were well-visualized and were unremarkable.  Retroflex in the right colon was unremarkable.  Scope was straightened and the terminal ileum was intubated for 10 cm and was unremarkable.  Of note there was no evidence of new or old blood anywhere on the exam including the terminal ileum.  Fluid was irrigated into the terminal ileum and the backwash was visualized and suctioned and was negative for blood.  Scope was withdrawn back to the cecum and slowly withdrawn from the cecum through to the rectum visualizing mucosa in a methodical 360 degree manner.  In the ascending colon a 5 mm sessile polyp was identified and removed with cold snare.  In the transverse colon a separate 5 mm sessile polyp was removed with cold snare and likewise in the sigmoid colon.  No other lesions were appreciated.  Retroflex view in the rectum was unremarkable.  Scope was straightened, air and liquid resection and the scope was withdrawn.  The procedure was deemed complete.  Patient tolerated the procedure well and was sent to recovery without immediate complications.      7/2/2024 5:53 PM Grant Glover M.D.

## 2024-10-07 ENCOUNTER — HOSPITAL ENCOUNTER (INPATIENT)
Facility: MEDICAL CENTER | Age: 74
LOS: 2 days | DRG: 308 | End: 2024-10-09
Attending: STUDENT IN AN ORGANIZED HEALTH CARE EDUCATION/TRAINING PROGRAM | Admitting: STUDENT IN AN ORGANIZED HEALTH CARE EDUCATION/TRAINING PROGRAM
Payer: MEDICARE

## 2024-10-07 DIAGNOSIS — G25.81 RESTLESS LEGS SYNDROME (RLS): ICD-10-CM

## 2024-10-07 DIAGNOSIS — I48.0 PAROXYSMAL ATRIAL FIBRILLATION (HCC): ICD-10-CM

## 2024-10-07 DIAGNOSIS — I50.33 ACUTE ON CHRONIC HEART FAILURE WITH PRESERVED EJECTION FRACTION (HCC): ICD-10-CM

## 2024-10-07 DIAGNOSIS — E03.9 HYPOTHYROIDISM (ACQUIRED): ICD-10-CM

## 2024-10-07 DIAGNOSIS — F32.5 MAJOR DEPRESSIVE DISORDER WITH SINGLE EPISODE, IN REMISSION (HCC): ICD-10-CM

## 2024-10-07 PROBLEM — I49.9 ARRHYTHMIA: Status: ACTIVE | Noted: 2024-10-07

## 2024-10-07 LAB
ALBUMIN SERPL BCP-MCNC: 3.9 G/DL (ref 3.2–4.9)
ALBUMIN/GLOB SERPL: 1.3 G/DL
ALP SERPL-CCNC: 94 U/L (ref 30–99)
ALT SERPL-CCNC: 16 U/L (ref 2–50)
ANION GAP SERPL CALC-SCNC: 14 MMOL/L (ref 7–16)
AST SERPL-CCNC: 20 U/L (ref 12–45)
BASOPHILS # BLD AUTO: 0.4 % (ref 0–1.8)
BASOPHILS # BLD: 0.03 K/UL (ref 0–0.12)
BILIRUB SERPL-MCNC: 0.4 MG/DL (ref 0.1–1.5)
BUN SERPL-MCNC: 19 MG/DL (ref 8–22)
CALCIUM ALBUM COR SERPL-MCNC: 9.8 MG/DL (ref 8.5–10.5)
CALCIUM SERPL-MCNC: 9.7 MG/DL (ref 8.5–10.5)
CHLORIDE SERPL-SCNC: 102 MMOL/L (ref 96–112)
CK SERPL-CCNC: 63 U/L (ref 0–154)
CO2 SERPL-SCNC: 30 MMOL/L (ref 20–33)
CREAT SERPL-MCNC: 0.93 MG/DL (ref 0.5–1.4)
EOSINOPHIL # BLD AUTO: 0.13 K/UL (ref 0–0.51)
EOSINOPHIL NFR BLD: 1.8 % (ref 0–6.9)
ERYTHROCYTE [DISTWIDTH] IN BLOOD BY AUTOMATED COUNT: 55.2 FL (ref 35.9–50)
GFR SERPLBLD CREATININE-BSD FMLA CKD-EPI: 64 ML/MIN/1.73 M 2
GLOBULIN SER CALC-MCNC: 3 G/DL (ref 1.9–3.5)
GLUCOSE SERPL-MCNC: 92 MG/DL (ref 65–99)
HCT VFR BLD AUTO: 31.6 % (ref 37–47)
HGB BLD-MCNC: 10.1 G/DL (ref 12–16)
IMM GRANULOCYTES # BLD AUTO: 0.02 K/UL (ref 0–0.11)
IMM GRANULOCYTES NFR BLD AUTO: 0.3 % (ref 0–0.9)
LYMPHOCYTES # BLD AUTO: 2.07 K/UL (ref 1–4.8)
LYMPHOCYTES NFR BLD: 29.3 % (ref 22–41)
MAGNESIUM SERPL-MCNC: 1.8 MG/DL (ref 1.5–2.5)
MCH RBC QN AUTO: 29.4 PG (ref 27–33)
MCHC RBC AUTO-ENTMCNC: 32 G/DL (ref 32.2–35.5)
MCV RBC AUTO: 91.9 FL (ref 81.4–97.8)
MONOCYTES # BLD AUTO: 0.57 K/UL (ref 0–0.85)
MONOCYTES NFR BLD AUTO: 8.1 % (ref 0–13.4)
NEUTROPHILS # BLD AUTO: 4.24 K/UL (ref 1.82–7.42)
NEUTROPHILS NFR BLD: 60.1 % (ref 44–72)
NRBC # BLD AUTO: 0 K/UL
NRBC BLD-RTO: 0 /100 WBC (ref 0–0.2)
NT-PROBNP SERPL IA-MCNC: 1155 PG/ML (ref 0–125)
PHOSPHATE SERPL-MCNC: 3.2 MG/DL (ref 2.5–4.5)
PLATELET # BLD AUTO: 308 K/UL (ref 164–446)
PMV BLD AUTO: 9.8 FL (ref 9–12.9)
POTASSIUM SERPL-SCNC: 3.2 MMOL/L (ref 3.6–5.5)
PROT SERPL-MCNC: 6.9 G/DL (ref 6–8.2)
RBC # BLD AUTO: 3.44 M/UL (ref 4.2–5.4)
SODIUM SERPL-SCNC: 146 MMOL/L (ref 135–145)
T4 FREE SERPL-MCNC: 1.35 NG/DL (ref 0.93–1.7)
TROPONIN T SERPL-MCNC: 26 NG/L (ref 6–19)
TSH SERPL DL<=0.005 MIU/L-ACNC: 16.2 UIU/ML (ref 0.38–5.33)
WBC # BLD AUTO: 7.1 K/UL (ref 4.8–10.8)

## 2024-10-07 PROCEDURE — 99497 ADVNCD CARE PLAN 30 MIN: CPT | Performed by: STUDENT IN AN ORGANIZED HEALTH CARE EDUCATION/TRAINING PROGRAM

## 2024-10-07 PROCEDURE — 80053 COMPREHEN METABOLIC PANEL: CPT

## 2024-10-07 PROCEDURE — 700111 HCHG RX REV CODE 636 W/ 250 OVERRIDE (IP): Performed by: STUDENT IN AN ORGANIZED HEALTH CARE EDUCATION/TRAINING PROGRAM

## 2024-10-07 PROCEDURE — A9270 NON-COVERED ITEM OR SERVICE: HCPCS | Performed by: STUDENT IN AN ORGANIZED HEALTH CARE EDUCATION/TRAINING PROGRAM

## 2024-10-07 PROCEDURE — 84484 ASSAY OF TROPONIN QUANT: CPT

## 2024-10-07 PROCEDURE — 85025 COMPLETE CBC W/AUTO DIFF WBC: CPT

## 2024-10-07 PROCEDURE — 83880 ASSAY OF NATRIURETIC PEPTIDE: CPT

## 2024-10-07 PROCEDURE — 99223 1ST HOSP IP/OBS HIGH 75: CPT | Mod: 25,AI | Performed by: STUDENT IN AN ORGANIZED HEALTH CARE EDUCATION/TRAINING PROGRAM

## 2024-10-07 PROCEDURE — 83735 ASSAY OF MAGNESIUM: CPT

## 2024-10-07 PROCEDURE — 84443 ASSAY THYROID STIM HORMONE: CPT

## 2024-10-07 PROCEDURE — 84439 ASSAY OF FREE THYROXINE: CPT

## 2024-10-07 PROCEDURE — 36415 COLL VENOUS BLD VENIPUNCTURE: CPT

## 2024-10-07 PROCEDURE — 93005 ELECTROCARDIOGRAM TRACING: CPT | Performed by: STUDENT IN AN ORGANIZED HEALTH CARE EDUCATION/TRAINING PROGRAM

## 2024-10-07 PROCEDURE — 700102 HCHG RX REV CODE 250 W/ 637 OVERRIDE(OP): Performed by: STUDENT IN AN ORGANIZED HEALTH CARE EDUCATION/TRAINING PROGRAM

## 2024-10-07 PROCEDURE — 84100 ASSAY OF PHOSPHORUS: CPT

## 2024-10-07 PROCEDURE — 770020 HCHG ROOM/CARE - TELE (206)

## 2024-10-07 PROCEDURE — 82550 ASSAY OF CK (CPK): CPT

## 2024-10-07 RX ORDER — ATORVASTATIN CALCIUM 40 MG/1
40 TABLET, FILM COATED ORAL DAILY
COMMUNITY

## 2024-10-07 RX ORDER — DIGOXIN 125 MCG
TABLET ORAL DAILY
COMMUNITY

## 2024-10-07 RX ORDER — ACETAMINOPHEN 325 MG/1
650 TABLET ORAL EVERY 6 HOURS PRN
Status: DISCONTINUED | OUTPATIENT
Start: 2024-10-13 | End: 2024-10-09 | Stop reason: HOSPADM

## 2024-10-07 RX ORDER — MAGNESIUM SULFATE HEPTAHYDRATE 40 MG/ML
2 INJECTION, SOLUTION INTRAVENOUS ONCE
Status: COMPLETED | OUTPATIENT
Start: 2024-10-07 | End: 2024-10-08

## 2024-10-07 RX ORDER — ACETAMINOPHEN 325 MG/1
650 TABLET ORAL EVERY 6 HOURS PRN
Status: DISCONTINUED | OUTPATIENT
Start: 2024-10-07 | End: 2024-10-07

## 2024-10-07 RX ORDER — ALLOPURINOL 100 MG/1
100 TABLET ORAL DAILY
Status: DISCONTINUED | OUTPATIENT
Start: 2024-10-08 | End: 2024-10-09 | Stop reason: HOSPADM

## 2024-10-07 RX ORDER — POLYETHYLENE GLYCOL 3350 17 G/17G
1 POWDER, FOR SOLUTION ORAL
Status: DISCONTINUED | OUTPATIENT
Start: 2024-10-07 | End: 2024-10-09 | Stop reason: HOSPADM

## 2024-10-07 RX ORDER — HYDROMORPHONE HYDROCHLORIDE 1 MG/ML
0.25 INJECTION, SOLUTION INTRAMUSCULAR; INTRAVENOUS; SUBCUTANEOUS
Status: DISCONTINUED | OUTPATIENT
Start: 2024-10-07 | End: 2024-10-09 | Stop reason: HOSPADM

## 2024-10-07 RX ORDER — METOPROLOL TARTRATE 1 MG/ML
5 INJECTION, SOLUTION INTRAVENOUS
Status: DISCONTINUED | OUTPATIENT
Start: 2024-10-07 | End: 2024-10-09 | Stop reason: HOSPADM

## 2024-10-07 RX ORDER — ONDANSETRON 2 MG/ML
4 INJECTION INTRAMUSCULAR; INTRAVENOUS EVERY 4 HOURS PRN
Status: DISCONTINUED | OUTPATIENT
Start: 2024-10-07 | End: 2024-10-09 | Stop reason: HOSPADM

## 2024-10-07 RX ORDER — AMOXICILLIN 250 MG
2 CAPSULE ORAL EVERY EVENING
Status: DISCONTINUED | OUTPATIENT
Start: 2024-10-07 | End: 2024-10-09 | Stop reason: HOSPADM

## 2024-10-07 RX ORDER — POTASSIUM CHLORIDE 1500 MG/1
40 TABLET, EXTENDED RELEASE ORAL ONCE
Status: COMPLETED | OUTPATIENT
Start: 2024-10-07 | End: 2024-10-07

## 2024-10-07 RX ORDER — FUROSEMIDE 10 MG/ML
40 INJECTION INTRAMUSCULAR; INTRAVENOUS
Status: DISCONTINUED | OUTPATIENT
Start: 2024-10-08 | End: 2024-10-09 | Stop reason: HOSPADM

## 2024-10-07 RX ORDER — OXYCODONE HYDROCHLORIDE 5 MG/1
5 TABLET ORAL
Status: DISCONTINUED | OUTPATIENT
Start: 2024-10-07 | End: 2024-10-09 | Stop reason: HOSPADM

## 2024-10-07 RX ORDER — ACETAMINOPHEN 325 MG/1
650 TABLET ORAL EVERY 6 HOURS
Status: DISCONTINUED | OUTPATIENT
Start: 2024-10-07 | End: 2024-10-09 | Stop reason: HOSPADM

## 2024-10-07 RX ORDER — TRAZODONE HYDROCHLORIDE 100 MG/1
100 TABLET ORAL NIGHTLY
Status: DISCONTINUED | OUTPATIENT
Start: 2024-10-07 | End: 2024-10-09 | Stop reason: HOSPADM

## 2024-10-07 RX ORDER — DIGOXIN 125 MCG
125 TABLET ORAL DAILY
Status: DISCONTINUED | OUTPATIENT
Start: 2024-10-07 | End: 2024-10-09 | Stop reason: HOSPADM

## 2024-10-07 RX ORDER — LABETALOL HYDROCHLORIDE 5 MG/ML
10 INJECTION, SOLUTION INTRAVENOUS EVERY 4 HOURS PRN
Status: DISCONTINUED | OUTPATIENT
Start: 2024-10-07 | End: 2024-10-09 | Stop reason: HOSPADM

## 2024-10-07 RX ORDER — ATORVASTATIN CALCIUM 40 MG/1
40 TABLET, FILM COATED ORAL DAILY
Status: DISCONTINUED | OUTPATIENT
Start: 2024-10-08 | End: 2024-10-09 | Stop reason: HOSPADM

## 2024-10-07 RX ORDER — ONDANSETRON 4 MG/1
4 TABLET, ORALLY DISINTEGRATING ORAL EVERY 4 HOURS PRN
Status: DISCONTINUED | OUTPATIENT
Start: 2024-10-07 | End: 2024-10-09 | Stop reason: HOSPADM

## 2024-10-07 RX ORDER — POTASSIUM CHLORIDE 1500 MG/1
40 TABLET, EXTENDED RELEASE ORAL EVERY 4 HOURS
Status: COMPLETED | OUTPATIENT
Start: 2024-10-08 | End: 2024-10-08

## 2024-10-07 RX ORDER — ROPINIROLE 0.5 MG/1
0.5 TABLET, FILM COATED ORAL
Status: DISCONTINUED | OUTPATIENT
Start: 2024-10-07 | End: 2024-10-09 | Stop reason: HOSPADM

## 2024-10-07 RX ORDER — OXYCODONE HYDROCHLORIDE 5 MG/1
2.5 TABLET ORAL
Status: DISCONTINUED | OUTPATIENT
Start: 2024-10-07 | End: 2024-10-09 | Stop reason: HOSPADM

## 2024-10-07 RX ADMIN — POTASSIUM CHLORIDE 40 MEQ: 1500 TABLET, EXTENDED RELEASE ORAL at 22:49

## 2024-10-07 RX ADMIN — APIXABAN 5 MG: 5 TABLET, FILM COATED ORAL at 22:56

## 2024-10-07 RX ADMIN — DIGOXIN 125 MCG: 0.12 TABLET ORAL at 22:53

## 2024-10-07 RX ADMIN — OXYCODONE 5 MG: 5 TABLET ORAL at 22:53

## 2024-10-07 RX ADMIN — MAGNESIUM SULFATE HEPTAHYDRATE 2 G: 2 INJECTION, SOLUTION INTRAVENOUS at 23:02

## 2024-10-07 RX ADMIN — TRAZODONE HYDROCHLORIDE 100 MG: 100 TABLET ORAL at 22:54

## 2024-10-07 RX ADMIN — ROPINIROLE HYDROCHLORIDE 0.5 MG: 0.5 TABLET, FILM COATED ORAL at 22:53

## 2024-10-07 RX ADMIN — ACETAMINOPHEN 650 MG: 325 TABLET ORAL at 22:50

## 2024-10-07 ASSESSMENT — CHA2DS2 SCORE
SEX: FEMALE
VASCULAR DISEASE: NO
AGE 75 OR GREATER: YES
DIABETES: NO
AGE 65 TO 74: NO
HYPERTENSION: YES
CHA2DS2 VASC SCORE: 5
CHF OR LEFT VENTRICULAR DYSFUNCTION: YES
PRIOR STROKE OR TIA OR THROMBOEMBOLISM: NO

## 2024-10-07 ASSESSMENT — COGNITIVE AND FUNCTIONAL STATUS - GENERAL
WALKING IN HOSPITAL ROOM: A LITTLE
TURNING FROM BACK TO SIDE WHILE IN FLAT BAD: A LITTLE
MOBILITY SCORE: 18
DRESSING REGULAR LOWER BODY CLOTHING: A LITTLE
SUGGESTED CMS G CODE MODIFIER MOBILITY: CK
HELP NEEDED FOR BATHING: A LITTLE
MOVING FROM LYING ON BACK TO SITTING ON SIDE OF FLAT BED: A LITTLE
DRESSING REGULAR UPPER BODY CLOTHING: A LITTLE
EATING MEALS: A LITTLE
DAILY ACTIVITIY SCORE: 18
STANDING UP FROM CHAIR USING ARMS: A LITTLE
MOVING TO AND FROM BED TO CHAIR: A LITTLE
TOILETING: A LITTLE
CLIMB 3 TO 5 STEPS WITH RAILING: A LITTLE
SUGGESTED CMS G CODE MODIFIER DAILY ACTIVITY: CK
PERSONAL GROOMING: A LITTLE

## 2024-10-07 ASSESSMENT — SOCIAL DETERMINANTS OF HEALTH (SDOH)
WITHIN THE LAST YEAR, HAVE YOU BEEN AFRAID OF YOUR PARTNER OR EX-PARTNER?: NO
WITHIN THE LAST YEAR, HAVE YOU BEEN KICKED, HIT, SLAPPED, OR OTHERWISE PHYSICALLY HURT BY YOUR PARTNER OR EX-PARTNER?: NO
WITHIN THE LAST YEAR, HAVE YOU BEEN HUMILIATED OR EMOTIONALLY ABUSED IN OTHER WAYS BY YOUR PARTNER OR EX-PARTNER?: NO
WITHIN THE LAST YEAR, HAVE TO BEEN RAPED OR FORCED TO HAVE ANY KIND OF SEXUAL ACTIVITY BY YOUR PARTNER OR EX-PARTNER?: NO

## 2024-10-07 ASSESSMENT — ENCOUNTER SYMPTOMS
PALPITATIONS: 1
DEPRESSION: 0
ABDOMINAL PAIN: 0
HEARTBURN: 1
COUGH: 0
NAUSEA: 0
DOUBLE VISION: 0
WEAKNESS: 1
ORTHOPNEA: 1
BRUISES/BLEEDS EASILY: 0
FEVER: 0
VOMITING: 0
DIZZINESS: 0
HEADACHES: 0
SHORTNESS OF BREATH: 0
MYALGIAS: 0
BLURRED VISION: 0
CHILLS: 0

## 2024-10-07 ASSESSMENT — PAIN DESCRIPTION - PAIN TYPE
TYPE: ACUTE PAIN
TYPE: ACUTE PAIN

## 2024-10-07 ASSESSMENT — LIFESTYLE VARIABLES
TOTAL SCORE: 0
TOTAL SCORE: 0
AVERAGE NUMBER OF DAYS PER WEEK YOU HAVE A DRINK CONTAINING ALCOHOL: 0
EVER FELT BAD OR GUILTY ABOUT YOUR DRINKING: NO
EVER HAD A DRINK FIRST THING IN THE MORNING TO STEADY YOUR NERVES TO GET RID OF A HANGOVER: NO
HAVE PEOPLE ANNOYED YOU BY CRITICIZING YOUR DRINKING: NO
ALCOHOL_USE: NO
TOTAL SCORE: 0
HOW MANY TIMES IN THE PAST YEAR HAVE YOU HAD 5 OR MORE DRINKS IN A DAY: 0
SUBSTANCE_ABUSE: 0
HAVE YOU EVER FELT YOU SHOULD CUT DOWN ON YOUR DRINKING: NO
ON A TYPICAL DAY WHEN YOU DRINK ALCOHOL HOW MANY DRINKS DO YOU HAVE: 0
CONSUMPTION TOTAL: NEGATIVE

## 2024-10-07 ASSESSMENT — PATIENT HEALTH QUESTIONNAIRE - PHQ9
2. FEELING DOWN, DEPRESSED, IRRITABLE, OR HOPELESS: NOT AT ALL
1. LITTLE INTEREST OR PLEASURE IN DOING THINGS: NOT AT ALL
SUM OF ALL RESPONSES TO PHQ9 QUESTIONS 1 AND 2: 0

## 2024-10-07 ASSESSMENT — FIBROSIS 4 INDEX
FIB4 SCORE: 2.33
FIB4 SCORE: 2.33

## 2024-10-08 ENCOUNTER — APPOINTMENT (OUTPATIENT)
Dept: CARDIOLOGY | Facility: MEDICAL CENTER | Age: 74
DRG: 308 | End: 2024-10-08
Attending: STUDENT IN AN ORGANIZED HEALTH CARE EDUCATION/TRAINING PROGRAM
Payer: MEDICARE

## 2024-10-08 ENCOUNTER — APPOINTMENT (OUTPATIENT)
Dept: RADIOLOGY | Facility: MEDICAL CENTER | Age: 74
DRG: 308 | End: 2024-10-08
Attending: STUDENT IN AN ORGANIZED HEALTH CARE EDUCATION/TRAINING PROGRAM
Payer: MEDICARE

## 2024-10-08 LAB
ALBUMIN SERPL BCP-MCNC: 3.3 G/DL (ref 3.2–4.9)
APPEARANCE UR: CLEAR
BASOPHILS # BLD AUTO: 0.5 % (ref 0–1.8)
BASOPHILS # BLD: 0.03 K/UL (ref 0–0.12)
BILIRUB UR QL STRIP.AUTO: NEGATIVE
BUN SERPL-MCNC: 21 MG/DL (ref 8–22)
CALCIUM ALBUM COR SERPL-MCNC: 9.7 MG/DL (ref 8.5–10.5)
CALCIUM SERPL-MCNC: 9.1 MG/DL (ref 8.5–10.5)
CHLORIDE SERPL-SCNC: 104 MMOL/L (ref 96–112)
CO2 SERPL-SCNC: 27 MMOL/L (ref 20–33)
COLOR UR: YELLOW
CREAT SERPL-MCNC: 1.07 MG/DL (ref 0.5–1.4)
CREAT UR-MCNC: 82.5 MG/DL
DIGOXIN SERPL-MCNC: 1.2 NG/ML (ref 0.8–2)
EKG IMPRESSION: NORMAL
EKG IMPRESSION: NORMAL
EOSINOPHIL # BLD AUTO: 0.16 K/UL (ref 0–0.51)
EOSINOPHIL NFR BLD: 2.6 % (ref 0–6.9)
ERYTHROCYTE [DISTWIDTH] IN BLOOD BY AUTOMATED COUNT: 55.8 FL (ref 35.9–50)
GFR SERPLBLD CREATININE-BSD FMLA CKD-EPI: 54 ML/MIN/1.73 M 2
GLUCOSE SERPL-MCNC: 109 MG/DL (ref 65–99)
GLUCOSE UR STRIP.AUTO-MCNC: NEGATIVE MG/DL
HCT VFR BLD AUTO: 28.5 % (ref 37–47)
HGB BLD-MCNC: 9 G/DL (ref 12–16)
IMM GRANULOCYTES # BLD AUTO: 0.02 K/UL (ref 0–0.11)
IMM GRANULOCYTES NFR BLD AUTO: 0.3 % (ref 0–0.9)
KETONES UR STRIP.AUTO-MCNC: NEGATIVE MG/DL
LEUKOCYTE ESTERASE UR QL STRIP.AUTO: NEGATIVE
LV EJECT FRACT  99904: 55
LV EJECT FRACT MOD 2C 99903: 59.87
LV EJECT FRACT MOD 4C 99902: 54.42
LV EJECT FRACT MOD BP 99901: 57.15
LYMPHOCYTES # BLD AUTO: 1.93 K/UL (ref 1–4.8)
LYMPHOCYTES NFR BLD: 31.6 % (ref 22–41)
MAGNESIUM SERPL-MCNC: 2.3 MG/DL (ref 1.5–2.5)
MCH RBC QN AUTO: 29.7 PG (ref 27–33)
MCHC RBC AUTO-ENTMCNC: 31.6 G/DL (ref 32.2–35.5)
MCV RBC AUTO: 94.1 FL (ref 81.4–97.8)
MICRO URNS: NORMAL
MONOCYTES # BLD AUTO: 0.57 K/UL (ref 0–0.85)
MONOCYTES NFR BLD AUTO: 9.3 % (ref 0–13.4)
NEUTROPHILS # BLD AUTO: 3.4 K/UL (ref 1.82–7.42)
NEUTROPHILS NFR BLD: 55.7 % (ref 44–72)
NITRITE UR QL STRIP.AUTO: NEGATIVE
NRBC # BLD AUTO: 0 K/UL
NRBC BLD-RTO: 0 /100 WBC (ref 0–0.2)
PH UR STRIP.AUTO: 5.5 [PH] (ref 5–8)
PHOSPHATE SERPL-MCNC: 4.1 MG/DL (ref 2.5–4.5)
PLATELET # BLD AUTO: 290 K/UL (ref 164–446)
PMV BLD AUTO: 10.2 FL (ref 9–12.9)
POTASSIUM SERPL-SCNC: 3.5 MMOL/L (ref 3.6–5.5)
PROT UR QL STRIP: NEGATIVE MG/DL
RBC # BLD AUTO: 3.03 M/UL (ref 4.2–5.4)
RBC UR QL AUTO: NEGATIVE
SODIUM SERPL-SCNC: 145 MMOL/L (ref 135–145)
SODIUM UR-SCNC: 124 MMOL/L
SP GR UR STRIP.AUTO: 1.01
TROPONIN T SERPL-MCNC: 25 NG/L (ref 6–19)
UROBILINOGEN UR STRIP.AUTO-MCNC: 0.2 MG/DL
WBC # BLD AUTO: 6.1 K/UL (ref 4.8–10.8)

## 2024-10-08 PROCEDURE — 700102 HCHG RX REV CODE 250 W/ 637 OVERRIDE(OP): Performed by: INTERNAL MEDICINE

## 2024-10-08 PROCEDURE — 700111 HCHG RX REV CODE 636 W/ 250 OVERRIDE (IP): Performed by: STUDENT IN AN ORGANIZED HEALTH CARE EDUCATION/TRAINING PROGRAM

## 2024-10-08 PROCEDURE — 80162 ASSAY OF DIGOXIN TOTAL: CPT

## 2024-10-08 PROCEDURE — 81003 URINALYSIS AUTO W/O SCOPE: CPT

## 2024-10-08 PROCEDURE — 85025 COMPLETE CBC W/AUTO DIFF WBC: CPT

## 2024-10-08 PROCEDURE — 83735 ASSAY OF MAGNESIUM: CPT

## 2024-10-08 PROCEDURE — A9270 NON-COVERED ITEM OR SERVICE: HCPCS | Performed by: INTERNAL MEDICINE

## 2024-10-08 PROCEDURE — 93010 ELECTROCARDIOGRAM REPORT: CPT | Mod: 59,76 | Performed by: INTERNAL MEDICINE

## 2024-10-08 PROCEDURE — 36415 COLL VENOUS BLD VENIPUNCTURE: CPT

## 2024-10-08 PROCEDURE — 93306 TTE W/DOPPLER COMPLETE: CPT | Mod: 26 | Performed by: INTERNAL MEDICINE

## 2024-10-08 PROCEDURE — 71045 X-RAY EXAM CHEST 1 VIEW: CPT

## 2024-10-08 PROCEDURE — 82570 ASSAY OF URINE CREATININE: CPT

## 2024-10-08 PROCEDURE — 99222 1ST HOSP IP/OBS MODERATE 55: CPT | Performed by: INTERNAL MEDICINE

## 2024-10-08 PROCEDURE — 84484 ASSAY OF TROPONIN QUANT: CPT

## 2024-10-08 PROCEDURE — 80069 RENAL FUNCTION PANEL: CPT

## 2024-10-08 PROCEDURE — 93005 ELECTROCARDIOGRAM TRACING: CPT | Performed by: STUDENT IN AN ORGANIZED HEALTH CARE EDUCATION/TRAINING PROGRAM

## 2024-10-08 PROCEDURE — 84300 ASSAY OF URINE SODIUM: CPT

## 2024-10-08 PROCEDURE — A9270 NON-COVERED ITEM OR SERVICE: HCPCS | Performed by: STUDENT IN AN ORGANIZED HEALTH CARE EDUCATION/TRAINING PROGRAM

## 2024-10-08 PROCEDURE — 770020 HCHG ROOM/CARE - TELE (206)

## 2024-10-08 PROCEDURE — 93306 TTE W/DOPPLER COMPLETE: CPT

## 2024-10-08 PROCEDURE — 700102 HCHG RX REV CODE 250 W/ 637 OVERRIDE(OP): Performed by: STUDENT IN AN ORGANIZED HEALTH CARE EDUCATION/TRAINING PROGRAM

## 2024-10-08 PROCEDURE — 99233 SBSQ HOSP IP/OBS HIGH 50: CPT | Performed by: STUDENT IN AN ORGANIZED HEALTH CARE EDUCATION/TRAINING PROGRAM

## 2024-10-08 RX ORDER — MAGNESIUM SULFATE 1 G/100ML
1 INJECTION INTRAVENOUS ONCE
Status: COMPLETED | OUTPATIENT
Start: 2024-10-08 | End: 2024-10-08

## 2024-10-08 RX ORDER — DAPAGLIFLOZIN 10 MG/1
10 TABLET, FILM COATED ORAL DAILY
Status: DISCONTINUED | OUTPATIENT
Start: 2024-10-09 | End: 2024-10-09 | Stop reason: HOSPADM

## 2024-10-08 RX ORDER — METOPROLOL TARTRATE 25 MG/1
12.5 TABLET, FILM COATED ORAL 2 TIMES DAILY
Status: DISCONTINUED | OUTPATIENT
Start: 2024-10-08 | End: 2024-10-09 | Stop reason: HOSPADM

## 2024-10-08 RX ADMIN — APIXABAN 5 MG: 5 TABLET, FILM COATED ORAL at 17:18

## 2024-10-08 RX ADMIN — OXYCODONE 5 MG: 5 TABLET ORAL at 20:43

## 2024-10-08 RX ADMIN — MAGNESIUM SULFATE IN DEXTROSE 1 G: 10 INJECTION, SOLUTION INTRAVENOUS at 01:15

## 2024-10-08 RX ADMIN — FUROSEMIDE 40 MG: 10 INJECTION, SOLUTION INTRAVENOUS at 06:02

## 2024-10-08 RX ADMIN — APIXABAN 5 MG: 5 TABLET, FILM COATED ORAL at 05:09

## 2024-10-08 RX ADMIN — OXYCODONE 5 MG: 5 TABLET ORAL at 11:25

## 2024-10-08 RX ADMIN — LEVOTHYROXINE SODIUM 175 MCG: 0.12 TABLET ORAL at 05:09

## 2024-10-08 RX ADMIN — ACETAMINOPHEN 650 MG: 325 TABLET ORAL at 17:18

## 2024-10-08 RX ADMIN — ATORVASTATIN CALCIUM 40 MG: 40 TABLET, FILM COATED ORAL at 05:09

## 2024-10-08 RX ADMIN — ALLOPURINOL 100 MG: 100 TABLET ORAL at 05:08

## 2024-10-08 RX ADMIN — TRAZODONE HYDROCHLORIDE 100 MG: 100 TABLET ORAL at 20:43

## 2024-10-08 RX ADMIN — OXYCODONE 5 MG: 5 TABLET ORAL at 05:10

## 2024-10-08 RX ADMIN — METOPROLOL TARTRATE 12.5 MG: 25 TABLET, FILM COATED ORAL at 17:19

## 2024-10-08 RX ADMIN — FLUOXETINE HYDROCHLORIDE 20 MG: 20 CAPSULE ORAL at 05:10

## 2024-10-08 RX ADMIN — POTASSIUM CHLORIDE 40 MEQ: 1500 TABLET, EXTENDED RELEASE ORAL at 12:33

## 2024-10-08 RX ADMIN — ROPINIROLE HYDROCHLORIDE 0.5 MG: 0.5 TABLET, FILM COATED ORAL at 20:42

## 2024-10-08 RX ADMIN — OXYCODONE 5 MG: 5 TABLET ORAL at 01:33

## 2024-10-08 RX ADMIN — FUROSEMIDE 40 MG: 10 INJECTION, SOLUTION INTRAVENOUS at 17:19

## 2024-10-08 RX ADMIN — ACETAMINOPHEN 650 MG: 325 TABLET ORAL at 05:10

## 2024-10-08 RX ADMIN — POTASSIUM CHLORIDE 40 MEQ: 1500 TABLET, EXTENDED RELEASE ORAL at 05:09

## 2024-10-08 SDOH — ECONOMIC STABILITY: TRANSPORTATION INSECURITY
IN THE PAST 12 MONTHS, HAS THE LACK OF TRANSPORTATION KEPT YOU FROM MEDICAL APPOINTMENTS OR FROM GETTING MEDICATIONS?: NO

## 2024-10-08 SDOH — ECONOMIC STABILITY: TRANSPORTATION INSECURITY
IN THE PAST 12 MONTHS, HAS LACK OF RELIABLE TRANSPORTATION KEPT YOU FROM MEDICAL APPOINTMENTS, MEETINGS, WORK OR FROM GETTING THINGS NEEDED FOR DAILY LIVING?: NO

## 2024-10-08 ASSESSMENT — ENCOUNTER SYMPTOMS
CHILLS: 0
WEAKNESS: 1
NAUSEA: 0
PALPITATIONS: 1
COUGH: 0
MYALGIAS: 0
SHORTNESS OF BREATH: 0
VOMITING: 0
ABDOMINAL PAIN: 0
HEADACHES: 0
FEVER: 0
DIZZINESS: 1

## 2024-10-08 ASSESSMENT — PAIN DESCRIPTION - PAIN TYPE
TYPE: ACUTE PAIN

## 2024-10-08 ASSESSMENT — SOCIAL DETERMINANTS OF HEALTH (SDOH)
IN THE PAST 12 MONTHS, HAS THE ELECTRIC, GAS, OIL, OR WATER COMPANY THREATENED TO SHUT OFF SERVICE IN YOUR HOME?: YES
WITHIN THE PAST 12 MONTHS, THE FOOD YOU BOUGHT JUST DIDN'T LAST AND YOU DIDN'T HAVE MONEY TO GET MORE: NEVER TRUE
WITHIN THE PAST 12 MONTHS, YOU WORRIED THAT YOUR FOOD WOULD RUN OUT BEFORE YOU GOT THE MONEY TO BUY MORE: NEVER TRUE

## 2024-10-08 ASSESSMENT — FIBROSIS 4 INDEX: FIB4 SCORE: 1.22

## 2024-10-09 ENCOUNTER — PHARMACY VISIT (OUTPATIENT)
Dept: PHARMACY | Facility: MEDICAL CENTER | Age: 74
End: 2024-10-09
Payer: COMMERCIAL

## 2024-10-09 VITALS
BODY MASS INDEX: 38.09 KG/M2 | DIASTOLIC BLOOD PRESSURE: 64 MMHG | TEMPERATURE: 97.9 F | SYSTOLIC BLOOD PRESSURE: 118 MMHG | WEIGHT: 223.11 LBS | HEART RATE: 55 BPM | HEIGHT: 64 IN | RESPIRATION RATE: 16 BRPM | OXYGEN SATURATION: 94 %

## 2024-10-09 LAB
ANION GAP SERPL CALC-SCNC: 8 MMOL/L (ref 7–16)
BUN SERPL-MCNC: 26 MG/DL (ref 8–22)
CALCIUM SERPL-MCNC: 9.6 MG/DL (ref 8.5–10.5)
CHLORIDE SERPL-SCNC: 100 MMOL/L (ref 96–112)
CO2 SERPL-SCNC: 33 MMOL/L (ref 20–33)
CREAT SERPL-MCNC: 1.25 MG/DL (ref 0.5–1.4)
GFR SERPLBLD CREATININE-BSD FMLA CKD-EPI: 45 ML/MIN/1.73 M 2
GLUCOSE SERPL-MCNC: 101 MG/DL (ref 65–99)
HCT VFR BLD AUTO: 29.5 % (ref 37–47)
HGB BLD-MCNC: 9.1 G/DL (ref 12–16)
MAGNESIUM SERPL-MCNC: 2.1 MG/DL (ref 1.5–2.5)
POTASSIUM SERPL-SCNC: 4.4 MMOL/L (ref 3.6–5.5)
SODIUM SERPL-SCNC: 141 MMOL/L (ref 135–145)

## 2024-10-09 PROCEDURE — 85018 HEMOGLOBIN: CPT

## 2024-10-09 PROCEDURE — 700102 HCHG RX REV CODE 250 W/ 637 OVERRIDE(OP): Performed by: STUDENT IN AN ORGANIZED HEALTH CARE EDUCATION/TRAINING PROGRAM

## 2024-10-09 PROCEDURE — 99232 SBSQ HOSP IP/OBS MODERATE 35: CPT | Performed by: INTERNAL MEDICINE

## 2024-10-09 PROCEDURE — A9270 NON-COVERED ITEM OR SERVICE: HCPCS | Performed by: STUDENT IN AN ORGANIZED HEALTH CARE EDUCATION/TRAINING PROGRAM

## 2024-10-09 PROCEDURE — RXMED WILLOW AMBULATORY MEDICATION CHARGE: Performed by: STUDENT IN AN ORGANIZED HEALTH CARE EDUCATION/TRAINING PROGRAM

## 2024-10-09 PROCEDURE — 80048 BASIC METABOLIC PNL TOTAL CA: CPT

## 2024-10-09 PROCEDURE — A9270 NON-COVERED ITEM OR SERVICE: HCPCS | Performed by: INTERNAL MEDICINE

## 2024-10-09 PROCEDURE — 36415 COLL VENOUS BLD VENIPUNCTURE: CPT

## 2024-10-09 PROCEDURE — 99239 HOSP IP/OBS DSCHRG MGMT >30: CPT | Performed by: STUDENT IN AN ORGANIZED HEALTH CARE EDUCATION/TRAINING PROGRAM

## 2024-10-09 PROCEDURE — 700102 HCHG RX REV CODE 250 W/ 637 OVERRIDE(OP): Performed by: INTERNAL MEDICINE

## 2024-10-09 PROCEDURE — 83735 ASSAY OF MAGNESIUM: CPT

## 2024-10-09 PROCEDURE — 85014 HEMATOCRIT: CPT

## 2024-10-09 RX ORDER — ROPINIROLE 0.5 MG/1
0.5 TABLET, FILM COATED ORAL
Qty: 30 TABLET | Refills: 1 | Status: SHIPPED | OUTPATIENT
Start: 2024-10-09

## 2024-10-09 RX ORDER — FUROSEMIDE 20 MG/1
20 TABLET ORAL DAILY
Qty: 30 TABLET | Refills: 1 | Status: SHIPPED | OUTPATIENT
Start: 2024-10-09

## 2024-10-09 RX ORDER — METOPROLOL TARTRATE 25 MG/1
12.5 TABLET, FILM COATED ORAL 2 TIMES DAILY
Qty: 30 TABLET | Refills: 1 | Status: SHIPPED | OUTPATIENT
Start: 2024-10-09

## 2024-10-09 RX ORDER — SPIRONOLACTONE 25 MG/1
25 TABLET ORAL DAILY
Qty: 30 TABLET | Refills: 1 | Status: SHIPPED | OUTPATIENT
Start: 2024-10-09

## 2024-10-09 RX ORDER — LEVOTHYROXINE SODIUM 175 UG/1
175 TABLET ORAL
Qty: 30 TABLET | Refills: 1 | Status: SHIPPED | OUTPATIENT
Start: 2024-10-10

## 2024-10-09 RX ORDER — DAPAGLIFLOZIN 10 MG/1
10 TABLET, FILM COATED ORAL DAILY
Qty: 30 TABLET | Refills: 2 | Status: SHIPPED | OUTPATIENT
Start: 2024-10-10

## 2024-10-09 RX ADMIN — METOPROLOL TARTRATE 12.5 MG: 25 TABLET, FILM COATED ORAL at 09:14

## 2024-10-09 RX ADMIN — LEVOTHYROXINE SODIUM 175 MCG: 0.12 TABLET ORAL at 04:33

## 2024-10-09 RX ADMIN — APIXABAN 5 MG: 5 TABLET, FILM COATED ORAL at 04:37

## 2024-10-09 RX ADMIN — DAPAGLIFLOZIN 10 MG: 10 TABLET, FILM COATED ORAL at 06:19

## 2024-10-09 RX ADMIN — ALLOPURINOL 100 MG: 100 TABLET ORAL at 04:34

## 2024-10-09 RX ADMIN — DIGOXIN 125 MCG: 0.12 TABLET ORAL at 04:40

## 2024-10-09 RX ADMIN — ATORVASTATIN CALCIUM 40 MG: 40 TABLET, FILM COATED ORAL at 04:34

## 2024-10-09 RX ADMIN — FLUOXETINE HYDROCHLORIDE 20 MG: 20 CAPSULE ORAL at 04:37

## 2024-10-09 RX ADMIN — ACETAMINOPHEN 650 MG: 325 TABLET ORAL at 04:34

## 2024-10-09 RX ADMIN — ACETAMINOPHEN 650 MG: 325 TABLET ORAL at 00:14

## 2024-10-09 RX ADMIN — OXYCODONE 5 MG: 5 TABLET ORAL at 04:34

## 2024-10-09 ASSESSMENT — FIBROSIS 4 INDEX: FIB4 SCORE: 1.29

## 2024-10-10 PROBLEM — E87.6 HYPOKALEMIA: Status: RESOLVED | Noted: 2023-06-01 | Resolved: 2024-10-10

## 2024-10-10 PROBLEM — Z71.89 ACP (ADVANCE CARE PLANNING): Status: RESOLVED | Noted: 2022-05-04 | Resolved: 2024-10-10

## 2024-10-10 PROBLEM — I50.33 ACUTE ON CHRONIC HEART FAILURE WITH PRESERVED EJECTION FRACTION (HCC): Status: RESOLVED | Noted: 2024-05-11 | Resolved: 2024-10-10

## 2024-10-28 ENCOUNTER — TELEPHONE (OUTPATIENT)
Dept: CARDIOLOGY | Facility: MEDICAL CENTER | Age: 74
End: 2024-10-28
Payer: MEDICARE

## (undated) DEVICE — TUBE CONNECTING SUCTION - CLEAR PLASTIC STERILE 72 IN (50EA/CA)

## (undated) DEVICE — MASK WITH FACE SHIELD (25/BX 4BX/CA)

## (undated) DEVICE — LACTATED RINGERS INJ 1000 ML - (14EA/CA 60CA/PF)

## (undated) DEVICE — CANNULA O2 COMFORT SOFT EAR ADULT 7 FT TUBING (50/CA)

## (undated) DEVICE — GOWN SURGEONS LARGE - (32/CA)

## (undated) DEVICE — BAG SPONGE COUNT 10.25 X 32 - BLUE (250/CA)

## (undated) DEVICE — KIT CUSTOM PROCEDURE SINGLE FOR ENDO  (15/CA)

## (undated) DEVICE — SENSOR OXIMETER ADULT SPO2 RD SET (20EA/BX)

## (undated) DEVICE — DRAPESURG STERI-DRAPE LONG - (10/BX 4BX/CA)

## (undated) DEVICE — CANISTER SUCTION RIGID RED 1500CC (40EA/CA)

## (undated) DEVICE — FORCEP RADIAL JAW 4 STANDARD CAPACITY W/NEEDLE 240CM (40EA/BX)

## (undated) DEVICE — MASK  AIRWAY SIZE 5 UNIQUE SILICON (10EA/BX)

## (undated) DEVICE — SUTURE GENERAL

## (undated) DEVICE — CUFF BP ADULT LARGE DISPOSABLE (20EA/CA)

## (undated) DEVICE — CATHETER IV 20 GA X 1-1/4 ---SURG.& SDS ONLY--- (50EA/BX)

## (undated) DEVICE — COVER LIGHT HANDLE ALC PLUS DISP (18EA/BX)

## (undated) DEVICE — SET EXTENSION WITH 2 PORTS (48EA/CA) ***PART #2C8610 IS A SUBSTITUTE*****

## (undated) DEVICE — SET TUBING PNEUMOCLEAR HIGH FLOW SMOKE EVACUATION (10EA/BX)

## (undated) DEVICE — TROCAR 5X100 NON BLADED Z-TH - READ KII (6/BX)

## (undated) DEVICE — SUTURE2-0 27IN VCRL ANTI VIOL (36PK/BX)

## (undated) DEVICE — MASK AIRWAY SIZE 4 UNIQUE SILICON (10EA/BX)

## (undated) DEVICE — SET LEADWIRE 5 LEAD BEDSIDE DISPOSABLE ECG (1SET OF 5/EA)

## (undated) DEVICE — TOWEL STOP TIMEOUT SAFETY FLAG (40EA/CA)

## (undated) DEVICE — SODIUM CHL IRRIGATION 0.9% 1000ML (12EA/CA)

## (undated) DEVICE — TROCAR Z THREAD12MM OPTICAL - NON BLADED (6/BX)

## (undated) DEVICE — CANISTER SUCTION 3000ML MECHANICAL FILTER AUTO SHUTOFF MEDI-VAC NONSTERILE LF DISP  (40EA/CA)

## (undated) DEVICE — WATER IRRIGATION STERILE 1000ML (12EA/CA)

## (undated) DEVICE — BAG RETRIEVAL 12/15 MM INZII (5EA/CA) THIS WILL REPLACE ITEM 75018

## (undated) DEVICE — ELECTRODE DUAL RETURN W/ CORD - (50/PK)

## (undated) DEVICE — SYRINGE SAFETY 3 ML 18 GA X 1 1/2 BLUNT LL (100/BX 8BX/CA)

## (undated) DEVICE — SCISSORS 5MM CVD (6EA/BX)

## (undated) DEVICE — PAD PREP 24 X 48 CUFFED - (100/CA)

## (undated) DEVICE — SYRINGE DISP. 60 CC LL - (30/BX, 12BX/CA)**WHEN THESE ARE GONE ORDER #500206**

## (undated) DEVICE — SYSTEM CLEARIFY VISUALIZATION (10EA/PK)

## (undated) DEVICE — PACK GASTRIC BANDING OR - (1/CA)

## (undated) DEVICE — TUBE E-T HI-LO CUFF 6.5MM (10EA/BX)

## (undated) DEVICE — SPONGE GAUZESTER 4 X 4 4PLY - (128PK/CA)

## (undated) DEVICE — GOWN WARMING X-LARGE FLEX - (20/CA)

## (undated) DEVICE — CHLORAPREP 26 ML APPLICATOR - ORANGE TINT(25/CA)

## (undated) DEVICE — SCISSORS HANDLE HARMONIC ACE - 45CM CURVED (6/BX)

## (undated) DEVICE — STERI STRIP COMPOUND BENZOIN - TINCTURE 0.6ML WITH APPLICATOR (40EA/BX)

## (undated) DEVICE — TUBING CLEARLINK DUO-VENT - C-FLO (48EA/CA)

## (undated) DEVICE — COVER LIGHT HANDLE FLEXIBLE - SOFT (2EA/PK 80PK/CA)

## (undated) DEVICE — GOWN WARMING STANDARD FLEX - (30/CA)

## (undated) DEVICE — BLOCK BITE ENDOSCOPIC 2809 - (100/BX) INTERMEDIATE

## (undated) DEVICE — KIT  I.V. START (100EA/CA)

## (undated) DEVICE — SUCTION INSTRUMENT YANKAUER BULBOUS TIP W/O VENT (50EA/CA)

## (undated) DEVICE — SHEET PEDIATRIC LAPAROTOMY - (10/CA)

## (undated) DEVICE — SLEEVE, VASO, REPROC, LARGE

## (undated) DEVICE — SLEEVE VASO CALF MED - (10PR/CA)

## (undated) DEVICE — CONTAINER, SPECIMEN, STERILE

## (undated) DEVICE — MASK AIRWAY SIZE 3 UNIQUE SILICON (10/BX)

## (undated) DEVICE — TUBE SUCTION YANKAUER  1/4 X 6FT (20EA/CA)"

## (undated) DEVICE — GLOVE BIOGEL SZ 8 SURGICAL PF LTX - (50PR/BX 4BX/CA)

## (undated) DEVICE — SPONGE GAUZE NON-STERILE 4X4 - (2000/CA 10PK/CA)

## (undated) DEVICE — PACK MINOR BASIN - (2EA/CA)

## (undated) DEVICE — SYRINGE SAFETY 5 ML 18 GA X 1-1/2 BLUNT LL (100/BX 4BX/CA)

## (undated) DEVICE — SYRINGE SAFETY 10 ML 18 GA X 1 1/2 BLUNT LL (100/BX 4BX/CA)

## (undated) DEVICE — SUTURE 0 LIGATING REEL VICRYL PLUS (12PK/BX)

## (undated) DEVICE — BITE BLOCK ADULT 60FR (100EA/CA)

## (undated) DEVICE — MASK AIRWAY SIZE 2 LMA WITH LUBE & SYRINGE (10/BX)

## (undated) DEVICE — CANNULA W/SEAL 5X100 Z-THRE - ADED KII (12/BX)

## (undated) DEVICE — SUTURE 4-0 MONOCRYL PLUS PS-2 - 27 INCH (36/BX)

## (undated) DEVICE — BLADE SURGICAL #15 - (50/BX 3BX/CA)

## (undated) DEVICE — SOD. CHL. INJ. 0.9% 1000 ML - (14EA/CA 60CA/PF)

## (undated) DEVICE — SET SUCTION/IRRIGATION WITH DISPOSABLE TIP (6/CA )PART #0250-070-520 IS A SUB

## (undated) DEVICE — MASK O2 VNYL ADLT RBRTH HI - (50/CS)

## (undated) DEVICE — CAPTIVATOR COLD 10MM